# Patient Record
Sex: MALE | Race: WHITE | Employment: OTHER | URBAN - METROPOLITAN AREA
[De-identification: names, ages, dates, MRNs, and addresses within clinical notes are randomized per-mention and may not be internally consistent; named-entity substitution may affect disease eponyms.]

---

## 2017-02-04 PROBLEM — I38 ENDOCARDITIS: Status: ACTIVE | Noted: 2017-01-28

## 2017-02-04 PROBLEM — I33.0 MYCOTIC ANEURYSM DUE TO BACTERIAL ENDOCARDITIS: Status: ACTIVE | Noted: 2017-01-28

## 2017-02-04 PROBLEM — R49.0 DYSPHONIA: Status: ACTIVE | Noted: 2017-02-01

## 2017-02-04 PROBLEM — E11.9 TYPE 2 DIABETES MELLITUS (HCC): Status: ACTIVE | Noted: 2017-01-29

## 2017-02-04 PROBLEM — I61.9 BRAIN BLEED (HCC): Status: ACTIVE | Noted: 2017-01-25

## 2017-02-04 PROBLEM — A49.1 BACTERIAL INFECTION DUE TO STREPTOCOCCUS: Status: ACTIVE | Noted: 2017-02-02

## 2017-02-04 PROBLEM — E46 PROTEIN CALORIE MALNUTRITION (HCC): Status: ACTIVE | Noted: 2017-02-04

## 2017-02-04 PROBLEM — R13.10 DYSPHAGIA: Status: ACTIVE | Noted: 2017-02-01

## 2017-02-04 PROBLEM — I38 ENDOCARDITIS, VALVE: Status: ACTIVE | Noted: 2017-01-28

## 2019-08-14 ENCOUNTER — ANESTHESIA (OUTPATIENT)
Dept: ENDOSCOPY | Age: 59
End: 2019-08-14
Payer: MEDICARE

## 2019-08-14 ENCOUNTER — HOSPITAL ENCOUNTER (OUTPATIENT)
Age: 59
Setting detail: OUTPATIENT SURGERY
Discharge: HOME | End: 2019-08-14
Attending: INTERNAL MEDICINE | Admitting: INTERNAL MEDICINE
Payer: MEDICARE

## 2019-08-14 ENCOUNTER — ANESTHESIA EVENT (OUTPATIENT)
Dept: ENDOSCOPY | Age: 59
End: 2019-08-14
Payer: MEDICARE

## 2019-08-14 VITALS
BODY MASS INDEX: 21.67 KG/M2 | OXYGEN SATURATION: 99 % | HEART RATE: 78 BPM | DIASTOLIC BLOOD PRESSURE: 73 MMHG | RESPIRATION RATE: 18 BRPM | SYSTOLIC BLOOD PRESSURE: 116 MMHG | TEMPERATURE: 97.9 F | WEIGHT: 160 LBS | HEIGHT: 72 IN

## 2019-08-14 VITALS
DIASTOLIC BLOOD PRESSURE: 72 MMHG | OXYGEN SATURATION: 99 % | SYSTOLIC BLOOD PRESSURE: 110 MMHG | RESPIRATION RATE: 19 BRPM

## 2019-08-14 PROCEDURE — 3609012400 HC EGD TRANSORAL BIOPSY SINGLE/MULTIPLE: Performed by: INTERNAL MEDICINE

## 2019-08-14 PROCEDURE — 88305 TISSUE EXAM BY PATHOLOGIST: CPT

## 2019-08-14 PROCEDURE — 7100000010 HC PHASE II RECOVERY - FIRST 15 MIN: Performed by: INTERNAL MEDICINE

## 2019-08-14 PROCEDURE — 3609009500 HC COLONOSCOPY DIAGNOSTIC OR SCREENING: Performed by: INTERNAL MEDICINE

## 2019-08-14 PROCEDURE — 2580000003 HC RX 258: Performed by: NURSE ANESTHETIST, CERTIFIED REGISTERED

## 2019-08-14 PROCEDURE — 6370000000 HC RX 637 (ALT 250 FOR IP): Performed by: INTERNAL MEDICINE

## 2019-08-14 PROCEDURE — 3700000001 HC ADD 15 MINUTES (ANESTHESIA): Performed by: INTERNAL MEDICINE

## 2019-08-14 PROCEDURE — 3700000000 HC ANESTHESIA ATTENDED CARE: Performed by: INTERNAL MEDICINE

## 2019-08-14 PROCEDURE — 6360000002 HC RX W HCPCS: Performed by: INTERNAL MEDICINE

## 2019-08-14 PROCEDURE — 2709999900 HC NON-CHARGEABLE SUPPLY: Performed by: INTERNAL MEDICINE

## 2019-08-14 PROCEDURE — 2500000003 HC RX 250 WO HCPCS: Performed by: INTERNAL MEDICINE

## 2019-08-14 PROCEDURE — 2580000003 HC RX 258: Performed by: INTERNAL MEDICINE

## 2019-08-14 PROCEDURE — 7100000011 HC PHASE II RECOVERY - ADDTL 15 MIN: Performed by: INTERNAL MEDICINE

## 2019-08-14 RX ORDER — OLANZAPINE 2.5 MG/1
2.5 TABLET ORAL NIGHTLY
Status: ON HOLD | COMMUNITY
End: 2022-01-13

## 2019-08-14 RX ORDER — METHYLPREDNISOLONE 4 MG/1
2 TABLET ORAL DAILY
COMMUNITY
End: 2020-07-15 | Stop reason: ALTCHOICE

## 2019-08-14 RX ORDER — AMPICILLIN AND SULBACTAM 2; 1 G/1; G/1
3 INJECTION, POWDER, FOR SOLUTION INTRAMUSCULAR; INTRAVENOUS EVERY 6 HOURS
Status: DISCONTINUED | OUTPATIENT
Start: 2019-08-14 | End: 2019-08-14

## 2019-08-14 RX ORDER — CITALOPRAM 20 MG/1
20 TABLET ORAL DAILY
Status: ON HOLD | COMMUNITY
End: 2022-01-13

## 2019-08-14 RX ORDER — CALCIUM CARBONATE 200(500)MG
1 TABLET,CHEWABLE ORAL 2 TIMES DAILY
COMMUNITY

## 2019-08-14 RX ORDER — POTASSIUM CHLORIDE 20 MEQ/1
20 TABLET, EXTENDED RELEASE ORAL 2 TIMES DAILY
COMMUNITY
End: 2020-07-15 | Stop reason: ALTCHOICE

## 2019-08-14 RX ORDER — AMPICILLIN AND SULBACTAM 2; 1 G/1; G/1
3 INJECTION, POWDER, FOR SOLUTION INTRAMUSCULAR; INTRAVENOUS EVERY 6 HOURS
Status: DISCONTINUED | OUTPATIENT
Start: 2019-08-14 | End: 2019-08-14 | Stop reason: SDUPTHER

## 2019-08-14 RX ORDER — LEVETIRACETAM 500 MG/1
500 TABLET ORAL
Status: ON HOLD | COMMUNITY
End: 2020-07-18 | Stop reason: HOSPADM

## 2019-08-14 RX ORDER — LORAZEPAM 2 MG/ML
2 INJECTION INTRAMUSCULAR PRN
COMMUNITY
End: 2020-07-15 | Stop reason: ALTCHOICE

## 2019-08-14 RX ORDER — LACOSAMIDE 200 MG/1
200 TABLET ORAL 2 TIMES DAILY
COMMUNITY

## 2019-08-14 RX ORDER — METHADONE HYDROCHLORIDE 5 MG/1
5 TABLET ORAL DAILY
COMMUNITY

## 2019-08-14 RX ORDER — LEVETIRACETAM 750 MG/1
1500 TABLET ORAL 2 TIMES DAILY
COMMUNITY
Start: 2018-07-02 | End: 2019-08-19

## 2019-08-14 RX ORDER — SODIUM CHLORIDE, SODIUM LACTATE, POTASSIUM CHLORIDE, CALCIUM CHLORIDE 600; 310; 30; 20 MG/100ML; MG/100ML; MG/100ML; MG/100ML
INJECTION, SOLUTION INTRAVENOUS CONTINUOUS PRN
Status: DISCONTINUED | OUTPATIENT
Start: 2019-08-14 | End: 2019-08-14 | Stop reason: SDUPTHER

## 2019-08-14 RX ADMIN — SODIUM CHLORIDE, SODIUM LACTATE, POTASSIUM CHLORIDE, AND CALCIUM CHLORIDE: 600; 310; 30; 20 INJECTION, SOLUTION INTRAVENOUS at 09:26

## 2019-08-14 RX ADMIN — AMPICILLIN SODIUM AND SULBACTAM SODIUM 3 G: 2; 1 INJECTION, POWDER, FOR SOLUTION INTRAMUSCULAR; INTRAVENOUS at 11:39

## 2019-08-14 ASSESSMENT — PAIN SCALES - GENERAL
PAINLEVEL_OUTOF10: 0

## 2019-08-14 ASSESSMENT — PULMONARY FUNCTION TESTS
PIF_VALUE: 0

## 2019-08-14 ASSESSMENT — PAIN SCALES - WONG BAKER
WONGBAKER_NUMERICALRESPONSE: 0

## 2019-08-14 ASSESSMENT — PAIN - FUNCTIONAL ASSESSMENT
PAIN_FUNCTIONAL_ASSESSMENT: ACTIVITIES ARE NOT PREVENTED
PAIN_FUNCTIONAL_ASSESSMENT: 0-10

## 2019-08-14 ASSESSMENT — PAIN DESCRIPTION - DESCRIPTORS: DESCRIPTORS: ACHING;BURNING;STABBING

## 2019-08-14 NOTE — ANESTHESIA PRE PROCEDURE
Department of Anesthesiology  Preprocedure Note       Name:  Yessi Santos   Age:  62 y.o.  :  1960                                          MRN:  [id number]         Date:  2019      Surgeon: Severiano Limes):  Shun Flor MD    Procedure: ESOPHAGOGASTRODUODENOSCOPY (N/A )  COLONOSCOPY WITH MAC ANESTHESIA (N/A )    Medications prior to admission:   Prior to Admission medications    Medication Sig Start Date End Date Taking? Authorizing Provider   levETIRAcetam (KEPPRA) 750 MG tablet Take 1,500 mg by mouth 2 times daily 18  Yes Historical Provider, MD   levETIRAcetam (KEPPRA) 500 MG tablet Take 500 mg by mouth AT [address] [place] [address]   Yes Historical Provider, MD   citalopram (CELEXA) 20 MG tablet Take 20 mg by mouth daily   Yes Historical Provider, MD   Cholecalciferol (VITAMIN D3) 5000 units TABS Take by mouth daily   Yes Historical Provider, MD   OLANZapine (ZYPREXA) 2.5 MG tablet Take 2.5 mg by mouth nightly   Yes Historical Provider, MD   methylPREDNISolone (MEDROL) 4 MG tablet Take 2 mg by mouth daily   Yes Historical Provider, MD   lacosamide (VIMPAT) 200 MG tablet Take 200 mg by mouth 2 times daily. Yes Historical Provider, MD   calcium carbonate (TUMS) 500 MG chewable tablet Take 1 tablet by mouth daily   Yes Historical Provider, MD   methadone (DOLOPHINE) 5 MG tablet Take 5 mg by mouth 2 times daily. Yes Historical Provider, MD   potassium chloride (KLOR-CON M) 20 MEQ extended release tablet Take 20 mEq by mouth 2 times daily   Yes Historical Provider, MD   methylPREDNISolone (MEDROL) 4 MG tablet Take 2 mg by mouth daily   Yes Historical Provider, MD   atorvastatin (LIPITOR) 10 MG tablet Take 10 mg by mouth 2/3/17  Yes Historical Provider, MD   LORazepam (ATIVAN) 2 MG/ML injection Infuse 2 mg intravenously as needed.  AS NEEDED FOR SEIZURE    Historical Provider, MD   Dimethicone-Zinc Oxide 2-26 % OINT Apply 1 applicator topically 2 times daily 2/3/17   Historical Provider, MD       Current medications: [address]    Anesthesia Evaluation  Patient summary reviewed and Nursing notes reviewed  Airway: Mallampati: II  TM distance: >3 FB   Neck ROM: limited  Mouth opening: > = 3 FB Dental: normal exam         Pulmonary:Negative Pulmonary ROS and normal exam  breath sounds clear to auscultation                             Cardiovascular:Negative CV ROS            Rhythm: regular  Rate: normal                    Neuro/Psych:   Negative Neuro/Psych ROS              GI/Hepatic/Renal: Neg GI/Hepatic/Renal ROS            Endo/Other: Negative Endo/Other ROS                    Abdominal:           Vascular: negative vascular ROS. Anesthesia Plan      MAC     ASA 3       Induction: intravenous. MIPS: Postoperative opioids intended and Prophylactic antiemetics administered. Anesthetic plan and risks discussed with patient.         Attending anesthesiologist reviewed and agrees with Mp Shankar MD   8/14/2019

## 2019-08-14 NOTE — ANESTHESIA POSTPROCEDURE EVALUATION
Department of Anesthesiology  Postprocedure Note    Patient: Jackeline Mireles  MRN: [id number]  YOB: 1960  Date of evaluation: 8/14/2019  Time:  2:23 PM     Procedure Summary     Date:  08/14/19 Room / Location:  [facility] ENDO 01 / [facility] ENDOSCOPY    Anesthesia Start:  5378 Anesthesia Stop:  1025    Procedures:       EGD BIOPSY (N/A )      COLONOSCOPY WITH MAC ANESTHESIA (N/A ) Diagnosis:  (ANEMIA D64.9)    Surgeon:  Aide Coto MD Responsible Provider:  Lynn French MD    Anesthesia Type:  MAC ASA Status:  3          Anesthesia Type: No value filed. Eriberto Phase I: Eriberto Score: 10    Eriberto Phase II: Eriberto Score: 10    Last vitals: Reviewed and per EMR flowsheets.        Anesthesia Post Evaluation    Patient location during evaluation: bedside  Patient participation: complete - patient participated  Level of consciousness: awake and alert  Airway patency: patent  Nausea & Vomiting: no nausea and no vomiting  Complications: no  Cardiovascular status: blood pressure returned to baseline  Respiratory status: acceptable  Hydration status: stable

## 2019-08-19 ENCOUNTER — APPOINTMENT (OUTPATIENT)
Dept: CT IMAGING | Age: 59
End: 2019-08-19
Payer: MEDICARE

## 2019-08-19 ENCOUNTER — HOSPITAL ENCOUNTER (EMERGENCY)
Age: 59
Discharge: NURSING, REHAB OR LONG-TERM CARE | End: 2019-08-20
Attending: EMERGENCY MEDICINE
Payer: MEDICARE

## 2019-08-19 VITALS
OXYGEN SATURATION: 98 % | HEIGHT: 73 IN | RESPIRATION RATE: 16 BRPM | WEIGHT: 180 LBS | TEMPERATURE: 98.1 F | BODY MASS INDEX: 23.86 KG/M2 | SYSTOLIC BLOOD PRESSURE: 138 MMHG | HEART RATE: 77 BPM | DIASTOLIC BLOOD PRESSURE: 75 MMHG

## 2019-08-19 DIAGNOSIS — Z04.9 OBSERVATION OR EVALUATION FOR SUSPECTED CONDITION: Primary | ICD-10-CM

## 2019-08-19 LAB
A/G RATIO: 1.7 (ref 1.1–2.2)
ALBUMIN SERPL-MCNC: 4.1 G/DL (ref 3.4–5)
ALP BLD-CCNC: 35 U/L (ref 40–129)
ALT SERPL-CCNC: 24 U/L (ref 10–40)
AMPHETAMINE SCREEN, URINE: ABNORMAL
ANION GAP SERPL CALCULATED.3IONS-SCNC: 8 MMOL/L (ref 3–16)
AST SERPL-CCNC: 20 U/L (ref 15–37)
BARBITURATE SCREEN URINE: ABNORMAL
BASOPHILS ABSOLUTE: 0 K/UL (ref 0–0.2)
BASOPHILS RELATIVE PERCENT: 0.6 %
BENZODIAZEPINE SCREEN, URINE: ABNORMAL
BILIRUB SERPL-MCNC: 0.4 MG/DL (ref 0–1)
BILIRUBIN URINE: NEGATIVE
BLOOD, URINE: NEGATIVE
BUN BLDV-MCNC: 17 MG/DL (ref 7–20)
CALCIUM SERPL-MCNC: 9.4 MG/DL (ref 8.3–10.6)
CANNABINOID SCREEN URINE: ABNORMAL
CHLORIDE BLD-SCNC: 103 MMOL/L (ref 99–110)
CLARITY: CLEAR
CO2: 28 MMOL/L (ref 21–32)
COCAINE METABOLITE SCREEN URINE: ABNORMAL
COLOR: YELLOW
CREAT SERPL-MCNC: 0.6 MG/DL (ref 0.9–1.3)
EOSINOPHILS ABSOLUTE: 0 K/UL (ref 0–0.6)
EOSINOPHILS RELATIVE PERCENT: 0.2 %
ETHANOL: NORMAL MG/DL (ref 0–0.08)
GFR AFRICAN AMERICAN: >60
GFR NON-AFRICAN AMERICAN: >60
GLOBULIN: 2.4 G/DL
GLUCOSE BLD-MCNC: 147 MG/DL (ref 70–99)
GLUCOSE URINE: NEGATIVE MG/DL
HCT VFR BLD CALC: 33 % (ref 40.5–52.5)
HEMOGLOBIN: 10.5 G/DL (ref 13.5–17.5)
KETONES, URINE: NEGATIVE MG/DL
LEUKOCYTE ESTERASE, URINE: NEGATIVE
LYMPHOCYTES ABSOLUTE: 1.1 K/UL (ref 1–5.1)
LYMPHOCYTES RELATIVE PERCENT: 19 %
Lab: ABNORMAL
MCH RBC QN AUTO: 28.1 PG (ref 26–34)
MCHC RBC AUTO-ENTMCNC: 32 G/DL (ref 31–36)
MCV RBC AUTO: 87.9 FL (ref 80–100)
METHADONE SCREEN, URINE: POSITIVE
MICROSCOPIC EXAMINATION: NORMAL
MONOCYTES ABSOLUTE: 0.4 K/UL (ref 0–1.3)
MONOCYTES RELATIVE PERCENT: 6.1 %
NEUTROPHILS ABSOLUTE: 4.4 K/UL (ref 1.7–7.7)
NEUTROPHILS RELATIVE PERCENT: 74.1 %
NITRITE, URINE: NEGATIVE
OPIATE SCREEN URINE: ABNORMAL
OXYCODONE URINE: ABNORMAL
PDW BLD-RTO: 18.8 % (ref 12.4–15.4)
PH UA: 7
PH UA: 7 (ref 5–8)
PHENCYCLIDINE SCREEN URINE: ABNORMAL
PLATELET # BLD: 140 K/UL (ref 135–450)
PMV BLD AUTO: 8.5 FL (ref 5–10.5)
POTASSIUM REFLEX MAGNESIUM: 4.3 MMOL/L (ref 3.5–5.1)
PROPOXYPHENE SCREEN: ABNORMAL
PROTEIN UA: NEGATIVE MG/DL
RBC # BLD: 3.76 M/UL (ref 4.2–5.9)
SALICYLATE, SERUM: <0.3 MG/DL (ref 15–30)
SODIUM BLD-SCNC: 139 MMOL/L (ref 136–145)
SPECIFIC GRAVITY UA: <=1.005 (ref 1–1.03)
TOTAL PROTEIN: 6.5 G/DL (ref 6.4–8.2)
TROPONIN: <0.01 NG/ML
URINE REFLEX TO CULTURE: NORMAL
URINE TYPE: NORMAL
UROBILINOGEN, URINE: 0.2 E.U./DL
WBC # BLD: 6 K/UL (ref 4–11)

## 2019-08-19 PROCEDURE — 70450 CT HEAD/BRAIN W/O DYE: CPT

## 2019-08-19 PROCEDURE — 6370000000 HC RX 637 (ALT 250 FOR IP): Performed by: PHYSICIAN ASSISTANT

## 2019-08-19 PROCEDURE — 99285 EMERGENCY DEPT VISIT HI MDM: CPT

## 2019-08-19 PROCEDURE — 93005 ELECTROCARDIOGRAM TRACING: CPT | Performed by: PHYSICIAN ASSISTANT

## 2019-08-19 PROCEDURE — 80053 COMPREHEN METABOLIC PANEL: CPT

## 2019-08-19 PROCEDURE — 81003 URINALYSIS AUTO W/O SCOPE: CPT

## 2019-08-19 PROCEDURE — G0480 DRUG TEST DEF 1-7 CLASSES: HCPCS

## 2019-08-19 PROCEDURE — 80307 DRUG TEST PRSMV CHEM ANLYZR: CPT

## 2019-08-19 PROCEDURE — 85025 COMPLETE CBC W/AUTO DIFF WBC: CPT

## 2019-08-19 PROCEDURE — 84484 ASSAY OF TROPONIN QUANT: CPT

## 2019-08-19 RX ORDER — IBUPROFEN 600 MG/1
600 TABLET ORAL ONCE
Status: COMPLETED | OUTPATIENT
Start: 2019-08-19 | End: 2019-08-19

## 2019-08-19 RX ADMIN — IBUPROFEN 600 MG: 600 TABLET, FILM COATED ORAL at 20:55

## 2019-08-19 ASSESSMENT — PAIN SCALES - WONG BAKER: WONGBAKER_NUMERICALRESPONSE: 0

## 2019-08-19 ASSESSMENT — PAIN DESCRIPTION - LOCATION
LOCATION: HEAD
LOCATION: BACK

## 2019-08-19 ASSESSMENT — PAIN SCALES - GENERAL
PAINLEVEL_OUTOF10: 7
PAINLEVEL_OUTOF10: 7
PAINLEVEL_OUTOF10: 8

## 2019-08-19 ASSESSMENT — PAIN DESCRIPTION - DESCRIPTORS: DESCRIPTORS: ACHING;HEADACHE

## 2019-08-19 ASSESSMENT — PAIN DESCRIPTION - PAIN TYPE
TYPE: ACUTE PAIN
TYPE: CHRONIC PAIN

## 2019-08-19 ASSESSMENT — ENCOUNTER SYMPTOMS
GASTROINTESTINAL NEGATIVE: 1
RESPIRATORY NEGATIVE: 1

## 2019-08-19 ASSESSMENT — PAIN DESCRIPTION - FREQUENCY: FREQUENCY: CONTINUOUS

## 2019-08-19 NOTE — ED PROVIDER NOTES
Phone [phone]   COMPREHENSIVE METABOLIC PANEL W/ REFLEX TO MG FOR LOW K - Abnormal; Notable for the following components:    Glucose 147 (*)     CREATININE 0.6 (*)     Alkaline Phosphatase 35 (*)     All other components within normal limits    Narrative:     Performed at:  [facility],  [place], [facility]   Phone [phone]   SALICYLATE LEVEL - Abnormal; Notable for the following components:    Salicylate, Serum <1.5 (*)     All other components within normal limits    Narrative:     Performed at:  [facility]) - [facility],  [place], [facility]   Phone [phone]   [address] - Abnormal; Notable for the following components:    Methadone Screen, Urine POSITIVE (*)     All other components within normal limits    Narrative:     Performed at:  [facility],  [place], [facility]   Phone [phone]   ETHANOL    Narrative:     Performed at:  [facility],  [place], [facility]   Phone [phone]   URINE RT REFLEX TO CULTURE    Narrative:     Performed at:  [facility],  [place], [facility]   Phone [phone]   TROPONIN    Narrative:     Performed at:  [facility]) [facility],  [place], [facility] [employer] [facility]   Phone [phone]       All other labs were within normal range or not returned as of this dictation. EKG: All EKG's are interpreted by the Emergency Department Physician in the absence of a cardiologist.  Please see their note for interpretation of EKG.       RADIOLOGY:   Non-plain film images such as CT, Ultrasound and MRI are read by the radiologist. Plain radiographic images are visualized andpreliminarily interpreted by the  ED Provider with the below findings:        Interpretation perthe Radiologist below,

## 2019-08-19 NOTE — ED NOTES
Bed: 09  Expected date:   Expected time:   Means of arrival:   Comments:  Hold for Felix Martell RN  08/19/19 8762

## 2019-08-20 LAB
EKG ATRIAL RATE: 83 BPM
EKG DIAGNOSIS: NORMAL
EKG P AXIS: 48 DEGREES
EKG P-R INTERVAL: 176 MS
EKG Q-T INTERVAL: 372 MS
EKG QRS DURATION: 112 MS
EKG QTC CALCULATION (BAZETT): 437 MS
EKG R AXIS: -42 DEGREES
EKG T AXIS: 59 DEGREES
EKG VENTRICULAR RATE: 83 BPM

## 2019-08-20 PROCEDURE — 93010 ELECTROCARDIOGRAM REPORT: CPT | Performed by: INTERNAL MEDICINE

## 2019-08-20 NOTE — ED NOTES
Collateral Contact:  Name: Sabas Westfall  Relation to Patient: RN at pt's F  Last Contact with Patient: has seen pt in the building, but has not directly cared for him in a few months. Concerns:     Sabas Westfall stated she typically works on a different unit, so she does not regularly care for pt. Pt was initially on her unit when he first came to the facility, so she cared for him regularly then. Pt was moved to a different unit a few months ago, and tonight is the first time she has cared for him since he was moved. She does work in the same building and gets to see pt's behavior and interactions. She is able to describe what she has seen, but much of the specific information she provided was from pt's chart. Pt stated pt has been having behavioral issues over the past month or so. She stated he has been running out of the building and refusing to come back in, breaking dishes and other things, and physically attempt to take things from others when they don't give him what he wants. He has not been attacking others, but he does become physically aggressive when trying to get things from others. He has not harmed anyone. When he leaves the building, he will refuse to return until he is given beer or cigarettes. Initially, they were able to redirect him back into the building, but this has been getting harder to do. At the end of July, pt refused to return, and the police were called to help the staff get him back inside. After that incident (7/27/19), they were given an order for Zyprexa 5 mg three times a day PRN for pt's agitation and behavioral problems. According to the documentation in pt's chart, he has only received this medication once. There is no record of it being offered other times and pt refusing. She reports pt was sent to the ED today, because he eloped from the building again, and they could not redirect him and get him to return.  He also became agitated prior to eloping and was breaking dishes and

## 2020-02-22 ENCOUNTER — HOSPITAL ENCOUNTER (EMERGENCY)
Age: 60
Discharge: HOME | End: 2020-02-23
Attending: EMERGENCY MEDICINE
Payer: MEDICARE

## 2020-02-22 LAB
ANION GAP SERPL CALCULATED.3IONS-SCNC: 8 MMOL/L (ref 3–16)
BASOPHILS ABSOLUTE: 0 K/UL (ref 0–0.2)
BASOPHILS RELATIVE PERCENT: 0.2 %
BUN BLDV-MCNC: 11 MG/DL (ref 7–20)
CALCIUM SERPL-MCNC: 9.1 MG/DL (ref 8.3–10.6)
CHLORIDE BLD-SCNC: 102 MMOL/L (ref 99–110)
CO2: 29 MMOL/L (ref 21–32)
CREAT SERPL-MCNC: 0.7 MG/DL (ref 0.9–1.3)
EOSINOPHILS ABSOLUTE: 0 K/UL (ref 0–0.6)
EOSINOPHILS RELATIVE PERCENT: 0.4 %
GFR AFRICAN AMERICAN: >60
GFR NON-AFRICAN AMERICAN: >60
GLUCOSE BLD-MCNC: 108 MG/DL (ref 70–99)
HCT VFR BLD CALC: 39.2 % (ref 40.5–52.5)
HEMOGLOBIN: 13.4 G/DL (ref 13.5–17.5)
LYMPHOCYTES ABSOLUTE: 0.5 K/UL (ref 1–5.1)
LYMPHOCYTES RELATIVE PERCENT: 9.6 %
MCH RBC QN AUTO: 33.2 PG (ref 26–34)
MCHC RBC AUTO-ENTMCNC: 34.2 G/DL (ref 31–36)
MCV RBC AUTO: 97.2 FL (ref 80–100)
MONOCYTES ABSOLUTE: 0.3 K/UL (ref 0–1.3)
MONOCYTES RELATIVE PERCENT: 5.5 %
NEUTROPHILS ABSOLUTE: 4.4 K/UL (ref 1.7–7.7)
NEUTROPHILS RELATIVE PERCENT: 84.3 %
PDW BLD-RTO: 13.2 % (ref 12.4–15.4)
PLATELET # BLD: 133 K/UL (ref 135–450)
PMV BLD AUTO: 8.6 FL (ref 5–10.5)
POTASSIUM SERPL-SCNC: 5.2 MMOL/L (ref 3.5–5.1)
RBC # BLD: 4.03 M/UL (ref 4.2–5.9)
SODIUM BLD-SCNC: 139 MMOL/L (ref 136–145)
WBC # BLD: 5.2 K/UL (ref 4–11)

## 2020-02-22 PROCEDURE — 93005 ELECTROCARDIOGRAM TRACING: CPT | Performed by: EMERGENCY MEDICINE

## 2020-02-22 PROCEDURE — 80177 DRUG SCRN QUAN LEVETIRACETAM: CPT

## 2020-02-22 PROCEDURE — 85025 COMPLETE CBC W/AUTO DIFF WBC: CPT

## 2020-02-22 PROCEDURE — 99284 EMERGENCY DEPT VISIT MOD MDM: CPT

## 2020-02-22 PROCEDURE — 80048 BASIC METABOLIC PNL TOTAL CA: CPT

## 2020-02-22 RX ORDER — LORAZEPAM 2 MG/ML
1 INJECTION INTRAMUSCULAR PRN
Status: DISCONTINUED | OUTPATIENT
Start: 2020-02-22 | End: 2020-02-23 | Stop reason: HOSPADM

## 2020-02-22 RX ORDER — LEVETIRACETAM 5 MG/ML
500 INJECTION INTRAVASCULAR ONCE
Status: COMPLETED | OUTPATIENT
Start: 2020-02-23 | End: 2020-02-23

## 2020-02-23 VITALS
HEART RATE: 88 BPM | RESPIRATION RATE: 18 BRPM | DIASTOLIC BLOOD PRESSURE: 60 MMHG | WEIGHT: 210 LBS | BODY MASS INDEX: 27.71 KG/M2 | SYSTOLIC BLOOD PRESSURE: 105 MMHG | TEMPERATURE: 98.7 F | OXYGEN SATURATION: 96 %

## 2020-02-23 LAB
EKG ATRIAL RATE: 104 BPM
EKG DIAGNOSIS: NORMAL
EKG P AXIS: 61 DEGREES
EKG P-R INTERVAL: 204 MS
EKG Q-T INTERVAL: 354 MS
EKG QRS DURATION: 108 MS
EKG QTC CALCULATION (BAZETT): 465 MS
EKG R AXIS: -29 DEGREES
EKG T AXIS: 41 DEGREES
EKG VENTRICULAR RATE: 104 BPM
KEPPRA DOSE AMT: ABNORMAL
KEPPRA: <2 UG/ML (ref 6–46)

## 2020-02-23 PROCEDURE — 93010 ELECTROCARDIOGRAM REPORT: CPT | Performed by: INTERNAL MEDICINE

## 2020-02-23 PROCEDURE — 96365 THER/PROPH/DIAG IV INF INIT: CPT

## 2020-02-23 PROCEDURE — 6360000002 HC RX W HCPCS: Performed by: EMERGENCY MEDICINE

## 2020-02-23 RX ADMIN — LEVETIRACETAM 500 MG: 5 INJECTION INTRAVENOUS at 00:04

## 2020-02-23 NOTE — ED PROVIDER NOTES
[address] [facility]        Pt Name: Jeramy Salinas  MRN: [id number]  [place] 1960  Date of evaluation: 2/22/2020  Provider: Stephany Lyles PA-C  PCP: Debbie Lee MD    This patient was seen and evaluated by the attending physician Dr. Chandler Sen. CHIEF COMPLAINT       Chief Complaint   Patient presents with    Seizures     pt. from [facility] [place] and brought in by [facility]. pt. had a sz at [facility] [place] and was given ativan at [facility] [place] and squad unsure of amount. HISTORY OF PRESENT ILLNESS   (Location, Timing/Onset, Context/Setting, Quality, Duration, Modifying Factors, Severity, Associated Signs and Symptoms)  Note limiting factors. Jeramy Salinas is a 61 y.o. male presents for evaluation of reported seizure-like activity by ECF. He has known seizures and takes Keppra and Vimpat. ECF had given him Ativan prior to arrival.  Patient remains postictal, confused, poor historian. No additional information is able to obtained at this time. Nursing Notes were all reviewed and agreed with or any disagreements were addressed in the HPI. REVIEW OF SYSTEMS    (2-9 systems for level 4, 10 or more for level 5)     Review of Systems   Unable to perform ROS: Other   Neurological: Positive for seizures. Positives and Pertinent negatives as per HPI. Except as noted above in the ROS, all other systems were reviewed and negative.        PAST MEDICAL HISTORY     Past Medical History:   Diagnosis Date    Acute intracerebral hemorrhage ([address] 02/03/2017    Anxiety     Back pain     Cerebral artery occlusion with cerebral infarction (HCC)     Chronic back pain greater than 3 months duration 02/03/2017    Diabetes mellitus ([address]     Dysphasia 02/03/2017    Dysphonia 02/03/2017    Endocarditis and heart valve disorders in diseases classified elsewhere 02/03/2017    Hydrocephalus ([address] 02/03/2017    Hypertension     IVH (intraventricular such as CT, Ultrasound and MRI are read by the radiologist. Plain radiographic images are visualized and preliminarily interpreted by the ED Provider with the below findings:        Interpretation per the Radiologist below, if available at the time of this note:    No orders to display     No results found. PROCEDURES   Unless otherwise noted below, none     Procedures    CRITICAL CARE TIME   N/A    CONSULTS:  None      EMERGENCY DEPARTMENT COURSE and DIFFERENTIAL DIAGNOSIS/MDM:   Vitals:    Vitals:    02/22/20 2201 02/22/20 2300   BP: (!) 122/52    Pulse: 52    Resp: 18    Temp:  98.7 °F (37.1 °C)   SpO2: 97%    Weight: 210 lb (95.3 kg)        Patient was given the following medications:  Medications   LORazepam (ATIVAN) injection 1 mg (has no administration in time range)       Patient presents for evaluation of breakthrough seizure. On exam, he is resting comfortably in bed no acute distress and nontoxic. Vitals are stable and he is afebrile. He is confused but alert, presumably postictal.  No focal neurologic deficits appreciated. Lungs are clear to auscultation bilaterally. Chest is nontender and abdomen is benign. Seizure precautions were initiated. PRN order for Ativan. Please see attending note for EKG interpretation. Basic labs and Keppra level are pending at time of shift change. Please see attending note for additional information regarding these results and patient disposition. FINAL IMPRESSION      1. Breakthrough seizure ([address]          DISPOSITION/PLAN   DISPOSITION        PATIENT REFERREDTO:  No follow-up provider specified.     DISCHARGE MEDICATIONS:  New Prescriptions    No medications on file       DISCONTINUED MEDICATIONS:  Discontinued Medications    No medications on file              (Please note that portions of this note were completed with a voice recognition program.  Efforts were made to edit the dictations but occasionally words are mis-transcribed.)    Basiloi CUNNINGHAM Chantel Rondon (electronically signed)    ]     Gilberto Perdomo PA-C  02/22/20 8356

## 2020-07-15 ENCOUNTER — APPOINTMENT (OUTPATIENT)
Dept: CT IMAGING | Age: 60
DRG: 101 | End: 2020-07-15
Payer: COMMERCIAL

## 2020-07-15 ENCOUNTER — APPOINTMENT (OUTPATIENT)
Dept: GENERAL RADIOLOGY | Age: 60
DRG: 101 | End: 2020-07-15
Payer: COMMERCIAL

## 2020-07-15 ENCOUNTER — HOSPITAL ENCOUNTER (INPATIENT)
Age: 60
LOS: 3 days | Discharge: HOME | DRG: 101 | End: 2020-07-18
Attending: EMERGENCY MEDICINE | Admitting: INTERNAL MEDICINE
Payer: COMMERCIAL

## 2020-07-15 PROBLEM — R56.9 SEIZURE (HCC): Status: ACTIVE | Noted: 2020-07-15

## 2020-07-15 LAB
A/G RATIO: 1.8 (ref 1.1–2.2)
ALBUMIN SERPL-MCNC: 4.5 G/DL (ref 3.4–5)
ALP BLD-CCNC: 56 U/L (ref 40–129)
ALT SERPL-CCNC: 16 U/L (ref 10–40)
ANION GAP SERPL CALCULATED.3IONS-SCNC: 24 MMOL/L (ref 3–16)
AST SERPL-CCNC: 25 U/L (ref 15–37)
BASOPHILS ABSOLUTE: 0.1 K/UL (ref 0–0.2)
BASOPHILS RELATIVE PERCENT: 0.6 %
BILIRUB SERPL-MCNC: 0.4 MG/DL (ref 0–1)
BILIRUBIN URINE: NEGATIVE
BLOOD, URINE: ABNORMAL
BUN BLDV-MCNC: 17 MG/DL (ref 7–20)
CALCIUM SERPL-MCNC: 10.4 MG/DL (ref 8.3–10.6)
CHLORIDE BLD-SCNC: 104 MMOL/L (ref 99–110)
CLARITY: ABNORMAL
CO2: 15 MMOL/L (ref 21–32)
COLOR: YELLOW
COMMENT UA: ABNORMAL
CREAT SERPL-MCNC: 1 MG/DL (ref 0.9–1.3)
EKG ATRIAL RATE: 85 BPM
EKG DIAGNOSIS: NORMAL
EKG P AXIS: 62 DEGREES
EKG P-R INTERVAL: 182 MS
EKG Q-T INTERVAL: 338 MS
EKG QRS DURATION: 116 MS
EKG QTC CALCULATION (BAZETT): 402 MS
EKG R AXIS: -41 DEGREES
EKG T AXIS: 56 DEGREES
EKG VENTRICULAR RATE: 85 BPM
EOSINOPHILS ABSOLUTE: 0.3 K/UL (ref 0–0.6)
EOSINOPHILS RELATIVE PERCENT: 2.7 %
EPITHELIAL CELLS, UA: 0 /HPF (ref 0–5)
GFR AFRICAN AMERICAN: >60
GFR NON-AFRICAN AMERICAN: >60
GLOBULIN: 2.5 G/DL
GLUCOSE BLD-MCNC: 179 MG/DL (ref 70–99)
GLUCOSE URINE: NEGATIVE MG/DL
HCT VFR BLD CALC: 43.3 % (ref 40.5–52.5)
HEMOGLOBIN: 14.1 G/DL (ref 13.5–17.5)
HYALINE CASTS: 4 /LPF (ref 0–8)
KEPPRA DOSE AMT: NORMAL
KEPPRA: 26.8 UG/ML (ref 6–46)
KETONES, URINE: NEGATIVE MG/DL
LACTIC ACID, SEPSIS: 19.5 MMOL/L (ref 0.4–1.9)
LACTIC ACID, SEPSIS: 7 MMOL/L (ref 0.4–1.9)
LEUKOCYTE ESTERASE, URINE: ABNORMAL
LYMPHOCYTES ABSOLUTE: 4.6 K/UL (ref 1–5.1)
LYMPHOCYTES RELATIVE PERCENT: 48.3 %
MCH RBC QN AUTO: 34.4 PG (ref 26–34)
MCHC RBC AUTO-ENTMCNC: 32.4 G/DL (ref 31–36)
MCV RBC AUTO: 106 FL (ref 80–100)
MICROSCOPIC EXAMINATION: YES
MONOCYTES ABSOLUTE: 0.7 K/UL (ref 0–1.3)
MONOCYTES RELATIVE PERCENT: 6.9 %
NEUTROPHILS ABSOLUTE: 3.9 K/UL (ref 1.7–7.7)
NEUTROPHILS RELATIVE PERCENT: 41.5 %
NITRITE, URINE: NEGATIVE
PDW BLD-RTO: 13 % (ref 12.4–15.4)
PH UA: 6 (ref 5–8)
PLATELET # BLD: 165 K/UL (ref 135–450)
PMV BLD AUTO: 9.8 FL (ref 5–10.5)
POTASSIUM REFLEX MAGNESIUM: 5.8 MMOL/L (ref 3.5–5.1)
POTASSIUM SERPL-SCNC: 4.1 MMOL/L (ref 3.5–5.1)
PROTEIN UA: 100 MG/DL
RBC # BLD: 4.09 M/UL (ref 4.2–5.9)
RBC UA: 19 /HPF (ref 0–4)
SODIUM BLD-SCNC: 143 MMOL/L (ref 136–145)
SPECIFIC GRAVITY UA: 1.02 (ref 1–1.03)
TOTAL PROTEIN: 7 G/DL (ref 6.4–8.2)
TROPONIN: <0.01 NG/ML
URINE TYPE: ABNORMAL
UROBILINOGEN, URINE: 1 E.U./DL
WBC # BLD: 9.5 K/UL (ref 4–11)
WBC UA: 24 /HPF (ref 0–5)

## 2020-07-15 PROCEDURE — 99291 CRITICAL CARE FIRST HOUR: CPT

## 2020-07-15 PROCEDURE — 2580000003 HC RX 258: Performed by: EMERGENCY MEDICINE

## 2020-07-15 PROCEDURE — 81001 URINALYSIS AUTO W/SCOPE: CPT

## 2020-07-15 PROCEDURE — 85025 COMPLETE CBC W/AUTO DIFF WBC: CPT

## 2020-07-15 PROCEDURE — 87186 SC STD MICRODIL/AGAR DIL: CPT

## 2020-07-15 PROCEDURE — 80177 DRUG SCRN QUAN LEVETIRACETAM: CPT

## 2020-07-15 PROCEDURE — 2580000003 HC RX 258: Performed by: INTERNAL MEDICINE

## 2020-07-15 PROCEDURE — 6360000002 HC RX W HCPCS: Performed by: INTERNAL MEDICINE

## 2020-07-15 PROCEDURE — 2060000000 HC ICU INTERMEDIATE R&B

## 2020-07-15 PROCEDURE — 87077 CULTURE AEROBIC IDENTIFY: CPT

## 2020-07-15 PROCEDURE — 84484 ASSAY OF TROPONIN QUANT: CPT

## 2020-07-15 PROCEDURE — 96365 THER/PROPH/DIAG IV INF INIT: CPT

## 2020-07-15 PROCEDURE — U0003 INFECTIOUS AGENT DETECTION BY NUCLEIC ACID (DNA OR RNA); SEVERE ACUTE RESPIRATORY SYNDROME CORONAVIRUS 2 (SARS-COV-2) (CORONAVIRUS DISEASE [COVID-19]), AMPLIFIED PROBE TECHNIQUE, MAKING USE OF HIGH THROUGHPUT TECHNOLOGIES AS DESCRIBED BY CMS-2020-01-R: HCPCS

## 2020-07-15 PROCEDURE — 87040 BLOOD CULTURE FOR BACTERIA: CPT

## 2020-07-15 PROCEDURE — 83605 ASSAY OF LACTIC ACID: CPT

## 2020-07-15 PROCEDURE — 80053 COMPREHEN METABOLIC PANEL: CPT

## 2020-07-15 PROCEDURE — 6360000002 HC RX W HCPCS: Performed by: EMERGENCY MEDICINE

## 2020-07-15 PROCEDURE — 70450 CT HEAD/BRAIN W/O DYE: CPT

## 2020-07-15 PROCEDURE — 6370000000 HC RX 637 (ALT 250 FOR IP): Performed by: INTERNAL MEDICINE

## 2020-07-15 PROCEDURE — 93005 ELECTROCARDIOGRAM TRACING: CPT | Performed by: EMERGENCY MEDICINE

## 2020-07-15 PROCEDURE — 96375 TX/PRO/DX INJ NEW DRUG ADDON: CPT

## 2020-07-15 PROCEDURE — 96367 TX/PROPH/DG ADDL SEQ IV INF: CPT

## 2020-07-15 PROCEDURE — 36415 COLL VENOUS BLD VENIPUNCTURE: CPT

## 2020-07-15 PROCEDURE — U0002 COVID-19 LAB TEST NON-CDC: HCPCS

## 2020-07-15 PROCEDURE — 93010 ELECTROCARDIOGRAM REPORT: CPT | Performed by: INTERNAL MEDICINE

## 2020-07-15 PROCEDURE — 71045 X-RAY EXAM CHEST 1 VIEW: CPT

## 2020-07-15 PROCEDURE — 84132 ASSAY OF SERUM POTASSIUM: CPT

## 2020-07-15 PROCEDURE — 87086 URINE CULTURE/COLONY COUNT: CPT

## 2020-07-15 RX ORDER — LEVETIRACETAM 500 MG/1
1500 TABLET ORAL 2 TIMES DAILY
COMMUNITY
End: 2021-01-04 | Stop reason: SDUPTHER

## 2020-07-15 RX ORDER — MIRTAZAPINE 7.5 MG/1
7.5 TABLET, FILM COATED ORAL NIGHTLY
COMMUNITY

## 2020-07-15 RX ORDER — ACETAMINOPHEN 650 MG/1
650 SUPPOSITORY RECTAL EVERY 6 HOURS PRN
Status: DISCONTINUED | OUTPATIENT
Start: 2020-07-15 | End: 2020-07-18 | Stop reason: HOSPADM

## 2020-07-15 RX ORDER — 0.9 % SODIUM CHLORIDE 0.9 %
30 INTRAVENOUS SOLUTION INTRAVENOUS ONCE
Status: COMPLETED | OUTPATIENT
Start: 2020-07-15 | End: 2020-07-15

## 2020-07-15 RX ORDER — METHADONE HYDROCHLORIDE 10 MG/1
5 TABLET ORAL 2 TIMES DAILY
Status: DISCONTINUED | OUTPATIENT
Start: 2020-07-15 | End: 2020-07-18 | Stop reason: HOSPADM

## 2020-07-15 RX ORDER — MIDAZOLAM HYDROCHLORIDE 1 MG/ML
5 INJECTION INTRAMUSCULAR; INTRAVENOUS ONCE
Status: COMPLETED | OUTPATIENT
Start: 2020-07-15 | End: 2020-07-15

## 2020-07-15 RX ORDER — CITALOPRAM 20 MG/1
20 TABLET ORAL DAILY
Status: DISCONTINUED | OUTPATIENT
Start: 2020-07-15 | End: 2020-07-18 | Stop reason: HOSPADM

## 2020-07-15 RX ORDER — LORAZEPAM 2 MG/ML
1 INJECTION INTRAMUSCULAR EVERY 4 HOURS PRN
Status: DISCONTINUED | OUTPATIENT
Start: 2020-07-15 | End: 2020-07-18 | Stop reason: HOSPADM

## 2020-07-15 RX ORDER — SODIUM CHLORIDE 9 MG/ML
INJECTION, SOLUTION INTRAVENOUS CONTINUOUS
Status: DISCONTINUED | OUTPATIENT
Start: 2020-07-15 | End: 2020-07-16

## 2020-07-15 RX ORDER — SODIUM CHLORIDE 0.9 % (FLUSH) 0.9 %
10 SYRINGE (ML) INJECTION PRN
Status: DISCONTINUED | OUTPATIENT
Start: 2020-07-15 | End: 2020-07-18 | Stop reason: HOSPADM

## 2020-07-15 RX ORDER — POLYETHYLENE GLYCOL 3350 17 G/17G
17 POWDER, FOR SOLUTION ORAL DAILY PRN
Status: DISCONTINUED | OUTPATIENT
Start: 2020-07-15 | End: 2020-07-18 | Stop reason: HOSPADM

## 2020-07-15 RX ORDER — LORAZEPAM 2 MG/ML
2 INJECTION INTRAMUSCULAR ONCE
Status: COMPLETED | OUTPATIENT
Start: 2020-07-15 | End: 2020-07-15

## 2020-07-15 RX ORDER — ONDANSETRON 2 MG/ML
4 INJECTION INTRAMUSCULAR; INTRAVENOUS EVERY 6 HOURS PRN
Status: DISCONTINUED | OUTPATIENT
Start: 2020-07-15 | End: 2020-07-18 | Stop reason: HOSPADM

## 2020-07-15 RX ORDER — SODIUM CHLORIDE 0.9 % (FLUSH) 0.9 %
10 SYRINGE (ML) INJECTION EVERY 12 HOURS SCHEDULED
Status: DISCONTINUED | OUTPATIENT
Start: 2020-07-15 | End: 2020-07-18 | Stop reason: HOSPADM

## 2020-07-15 RX ORDER — ACETAMINOPHEN 325 MG/1
650 TABLET ORAL EVERY 6 HOURS PRN
Status: DISCONTINUED | OUTPATIENT
Start: 2020-07-15 | End: 2020-07-18 | Stop reason: HOSPADM

## 2020-07-15 RX ORDER — LEVETIRACETAM 500 MG/5ML
1500 INJECTION, SOLUTION, CONCENTRATE INTRAVENOUS ONCE
Status: DISCONTINUED | OUTPATIENT
Start: 2020-07-15 | End: 2020-07-15 | Stop reason: SDUPTHER

## 2020-07-15 RX ORDER — ACETAMINOPHEN 325 MG/1
650 TABLET ORAL EVERY 4 HOURS PRN
Status: DISCONTINUED | OUTPATIENT
Start: 2020-07-15 | End: 2020-07-18 | Stop reason: HOSPADM

## 2020-07-15 RX ORDER — CALCIUM CARBONATE 200(500)MG
1 TABLET,CHEWABLE ORAL 2 TIMES DAILY
Status: DISCONTINUED | OUTPATIENT
Start: 2020-07-15 | End: 2020-07-18 | Stop reason: HOSPADM

## 2020-07-15 RX ORDER — POTASSIUM CHLORIDE 20 MEQ/1
20 TABLET, EXTENDED RELEASE ORAL 2 TIMES DAILY
COMMUNITY

## 2020-07-15 RX ORDER — PROMETHAZINE HYDROCHLORIDE 25 MG/1
12.5 TABLET ORAL EVERY 6 HOURS PRN
Status: DISCONTINUED | OUTPATIENT
Start: 2020-07-15 | End: 2020-07-18 | Stop reason: HOSPADM

## 2020-07-15 RX ORDER — ACETAMINOPHEN 325 MG/1
650 TABLET ORAL EVERY 4 HOURS PRN
COMMUNITY

## 2020-07-15 RX ORDER — OLANZAPINE 5 MG/1
2.5 TABLET ORAL NIGHTLY
Status: DISCONTINUED | OUTPATIENT
Start: 2020-07-15 | End: 2020-07-18 | Stop reason: HOSPADM

## 2020-07-15 RX ORDER — MIRTAZAPINE 15 MG/1
7.5 TABLET, FILM COATED ORAL NIGHTLY
Status: DISCONTINUED | OUTPATIENT
Start: 2020-07-15 | End: 2020-07-18 | Stop reason: HOSPADM

## 2020-07-15 RX ORDER — LACOSAMIDE 100 MG/1
200 TABLET ORAL 2 TIMES DAILY
Status: DISCONTINUED | OUTPATIENT
Start: 2020-07-15 | End: 2020-07-18 | Stop reason: HOSPADM

## 2020-07-15 RX ORDER — ATORVASTATIN CALCIUM 10 MG/1
10 TABLET, FILM COATED ORAL NIGHTLY
Status: DISCONTINUED | OUTPATIENT
Start: 2020-07-15 | End: 2020-07-18 | Stop reason: HOSPADM

## 2020-07-15 RX ORDER — POTASSIUM CHLORIDE 20 MEQ/1
20 TABLET, EXTENDED RELEASE ORAL 2 TIMES DAILY
Status: DISCONTINUED | OUTPATIENT
Start: 2020-07-15 | End: 2020-07-18 | Stop reason: HOSPADM

## 2020-07-15 RX ADMIN — SODIUM CHLORIDE: 9 INJECTION, SOLUTION INTRAVENOUS at 20:59

## 2020-07-15 RX ADMIN — LEVETIRACETAM 1500 MG: 100 INJECTION, SOLUTION INTRAVENOUS at 23:03

## 2020-07-15 RX ADMIN — ATORVASTATIN CALCIUM 10 MG: 10 TABLET, FILM COATED ORAL at 20:49

## 2020-07-15 RX ADMIN — SODIUM CHLORIDE 2244 ML: 9 INJECTION, SOLUTION INTRAVENOUS at 12:15

## 2020-07-15 RX ADMIN — MIDAZOLAM 5 MG: 1 INJECTION INTRAMUSCULAR; INTRAVENOUS at 10:16

## 2020-07-15 RX ADMIN — CITALOPRAM HYDROBROMIDE 20 MG: 20 TABLET ORAL at 23:02

## 2020-07-15 RX ADMIN — OLANZAPINE 2.5 MG: 5 TABLET, FILM COATED ORAL at 20:49

## 2020-07-15 RX ADMIN — Medication 1 G: at 12:08

## 2020-07-15 RX ADMIN — Medication 10 ML: at 20:58

## 2020-07-15 RX ADMIN — MIRTAZAPINE 7.5 MG: 15 TABLET, FILM COATED ORAL at 20:50

## 2020-07-15 RX ADMIN — ANTACID TABLETS 500 MG: 500 TABLET, CHEWABLE ORAL at 20:49

## 2020-07-15 RX ADMIN — METHADONE HYDROCHLORIDE 5 MG: 10 TABLET ORAL at 20:50

## 2020-07-15 RX ADMIN — LEVETIRACETAM 1500 MG: 100 INJECTION, SOLUTION INTRAVENOUS at 10:56

## 2020-07-15 RX ADMIN — LORAZEPAM 2 MG: 2 INJECTION, SOLUTION INTRAMUSCULAR; INTRAVENOUS at 10:02

## 2020-07-15 RX ADMIN — POTASSIUM CHLORIDE 20 MEQ: 1500 TABLET, EXTENDED RELEASE ORAL at 20:49

## 2020-07-15 RX ADMIN — LACOSAMIDE 200 MG: 100 TABLET, FILM COATED ORAL at 20:50

## 2020-07-15 ASSESSMENT — PAIN SCALES - GENERAL: PAINLEVEL_OUTOF10: 4

## 2020-07-15 NOTE — ED NOTES
Bed: 01  Expected date:   Expected time:   Means of arrival:   Comments:  [address] RN  07/15/20 7152

## 2020-07-15 NOTE — ED NOTES
Franci Ayala from [facility] given report. States pt is there for alcohol abuse and homelessness. She states pt has not had alcohol in a long time, is usually alert and oriented, able to converse normally. She reports pt was eating breakfast this morning when another resident started that the pt did have a seizure hx.         Gurjit Valencia RN  07/15/20 6556

## 2020-07-15 NOTE — ED NOTES
Repeat K+ and lactic drawn and sent. Blood for cultures 1 and 2 also drawn and sent. Pt BP improving, NS still infusing, Keppra complete, will monitor.       Angel Richomnd RN  07/15/20 2538

## 2020-07-15 NOTE — ED NOTES
Pt began to seize, Steven Chin MD at bedside, verbal order for 2 mg ativan IVP given.      Hina Duke RN  07/15/20 1016

## 2020-07-15 NOTE — H&P
Hospital Medicine History & Physical      PCP: Grisel Davis MD    Date of Admission: 7/15/2020    Date of Service: Pt seen/examined on 7/15/2020 and Admitted to Inpatient with expected LOS greater than two midnights due to medical therapy. Chief Complaint: Seizures      History Of Present Illness:      61 y.o. male with a history of intracerebral hemorrhage status post aneurysm repair, grand mal seizures, hypertension, hyperlipidemia, chronic pain syndrome , nursing home resident, medication noncompliance was sent here for evaluation of seizures. Patient apparently had generalized tonic-clonic seizure at nursing home. During transport here by EMS he had a brief seizure. In the ER he had 2 more episodes. He was given IV Ativan, IV Versed, IV Keppra. Discussed with neurology already. At this point of time, his last seizure was 3 hours ago. Patient is still postictal versus under the effect of Ativan. Was not able to provide much history. No recent diarrhea or URI or UTI reported.      Past Medical History:          Diagnosis Date    Acute intracerebral hemorrhage ([address] 02/03/2017    Anxiety     Back pain     Cerebral artery occlusion with cerebral infarction (HCC)     Chronic back pain greater than 3 months duration 02/03/2017    Diabetes mellitus ([address]     Dysphasia 02/03/2017    Dysphonia 02/03/2017    Endocarditis and heart valve disorders in diseases classified elsewhere 02/03/2017    Hydrocephalus ([address] 02/03/2017    Hypertension     IVH (intraventricular hemorrhage) ([address] 02/03/2017    NSTEMI (non-ST elevated myocardial infarction) ([address] 02/03/2017    Seizures ([address]        Past Surgical History:          Procedure Laterality Date    BRAIN ANEURYSM SURGERY Left 02/03/2017    COLONOSCOPY N/A 8/14/2019    COLONOSCOPY WITH MAC ANESTHESIA performed by Antoine Gore MD at [address] [place] [address] Left 02/03/2017    LEG SURGERY      UPPER GASTROINTESTINAL ENDOSCOPY N/A 8/14/2019    EGD BIOPSY performed by Tanya Wiggins MD at [facility]       Medications Prior to Admission:      Prior to Admission medications    Medication Sig Start Date End Date Taking? Authorizing Provider   levETIRAcetam (KEPPRA) 500 MG tablet Take 1,500 mg by mouth 2 times daily   Yes Historical Provider, MD   potassium chloride (KLOR-CON M) 20 MEQ extended release tablet Take 20 mEq by mouth 2 times daily   Yes Historical Provider, MD   acetaminophen (TYLENOL) 325 MG tablet Take 650 mg by mouth every 4 hours as needed for Pain   Yes Historical Provider, MD   mirtazapine (REMERON) 7.5 MG tablet Take 7.5 mg by mouth nightly   Yes Historical Provider, MD   levETIRAcetam (KEPPRA) 500 MG tablet Take 500 mg by mouth AT [address] [place] [address]   Yes Historical Provider, MD   citalopram (CELEXA) 20 MG tablet Take 20 mg by mouth daily   Yes Historical Provider, MD   Cholecalciferol (VITAMIN D3) 5000 units TABS Take by mouth daily   Yes Historical Provider, MD   OLANZapine (ZYPREXA) 2.5 MG tablet Take 2.5 mg by mouth nightly   Yes Historical Provider, MD   lacosamide (VIMPAT) 200 MG tablet Take 200 mg by mouth 2 times daily. Yes Historical Provider, MD   calcium carbonate (TUMS) 500 MG chewable tablet Take 1 tablet by mouth 2 times daily    Yes Historical Provider, MD   methadone (DOLOPHINE) 5 MG tablet Take 5 mg by mouth 2 times daily. Yes Historical Provider, MD   atorvastatin (LIPITOR) 10 MG tablet Take 10 mg by mouth nightly  2/3/17  Yes Historical Provider, MD       Allergies:  No known allergies and Seasonal    Social History:      The patient currently lives at  nursing home  TOBACCO:   reports that he has been smoking cigarettes. He has been smoking about 0.50 packs per day. He has never used smokeless tobacco.  ETOH:   reports no history of alcohol use. E-Cigarettes Vaping or Juuling     Questions Responses    Vaping Use Never User    Start Date     Does device contain nicotine?      Quit Date     Vaping Type Family History:        Reviewed in detail and negative for DM, CAD, Cancer, CVA. Positive as follows:    History reviewed. No pertinent family history. REVIEW OF SYSTEMS:   Pertinent positives as noted in the HPI. All other systems reviewed and negative. PHYSICAL EXAM PERFORMED:    BP (!) 105/56   Pulse 148   Resp 24   Wt 165 lb (74.8 kg)   SpO2 98%   BMI 21.77 kg/m²     General appearance:   Postictal, confused  HEENT:  Normal cephalic, atraumatic without obvious deformity. Pupils equal, round, and reactive to light. Extra ocular muscles intact. Conjunctivae/corneas clear. Neck: Supple, with full range of motion. No jugular venous distention. Trachea midline. Respiratory:  Normal respiratory effort. Clear to auscultation, bilaterally without Rales/Wheezes/Rhonchi. Cardiovascular:  Regular rate and rhythm with normal S1/S2 without murmurs, rubs or gallops. Abdomen: Soft, non-tender, non-distended with normal bowel sounds. Musculoskeletal:  No clubbing, cyanosis or edema bilaterally. Full range of motion without deformity. Skin: Skin color, texture, turgor normal.  No rashes or lesions. Neurologic:  Neurovascularly intact without any focal sensory/motor deficits. Cranial nerves: II-XII intact, grossly non-focal.  Psychiatric:  Alert but confused, possibly postictal.  Capillary Refill: Brisk,< 3 seconds   Peripheral Pulses: +2 palpable, equal bilaterally       Labs:     Recent Labs     07/15/20  1008   WBC 9.5   HGB 14.1   HCT 43.3        Recent Labs     07/15/20  1008 07/15/20  1126     --    K 5.8* 4.1     --    CO2 15*  --    BUN 17  --    CREATININE 1.0  --    CALCIUM 10.4  --      Recent Labs     07/15/20  1008   AST 25   ALT 16   BILITOT 0.4   ALKPHOS 56     No results for input(s): INR in the last 72 hours.   Recent Labs     07/15/20  1008   TROPONINI <0.01       Urinalysis:      Lab Results   Component Value Date    NITRU Negative 07/15/2020    WBCUA 24 07/15/2020 RBCUA 19 07/15/2020    BLOODU MODERATE 07/15/2020    SPECGRAV 1.021 07/15/2020    GLUCOSEU Negative 07/15/2020       Radiology:        EKG:  I have reviewed the EKG with the following interpretation: Sinus rhythm, sinus arrhythmia noted. Left axis deviation noted. CT HEAD WO CONTRAST   Final Result   No acute intracranial abnormality. Redemonstration of postoperative changes of left craniotomy with underlying   left parietal and occipital encephalomalacia. XR CHEST PORTABLE   Final Result   1. Cardiomegaly. ASSESSMENT:    Active Hospital Problems    Diagnosis Date Noted    Seizure [facility]) [R56.9] 07/15/2020         PLAN:      Breakthrough seizure: Recent admission at outside hospital reviewed. Question of medication noncompliance. Keppra level ordered from emergency room. Will follow. Loading dose of Keppra given in the emergency room. Continue IV Keppra 1500 twice daily. Continue Vimpat. Neurology consulted. Will check EEG. CT head nonacute. Seizure precautions. N.p.o. for now. IV fluids gently. Lactic acid: Secondary to seizure. IV fluids    Pyuria:?  UTI. Rocephin given in emergency room. Continue Rocephin. Blood cultures and urine cultures taken. Hyperlipidemia: Lipitor    Chronic pain syndrome: Continue home pain medications. History of intracranial hemorrhage, aneurysm repair          DVT Prophylaxis: scd's   Diet: N.p.o.  Code Status: Full code    PT/OT Eval Status: Not now    Dispo -progressive care       Hermon Claude, MD    Thank you Marcela Swanson MD for the opportunity to be involved in this patient's care. If you have any questions or concerns please feel free to contact me at [phone].

## 2020-07-15 NOTE — ED NOTES
Attempted to call report to [facility]. Floor states she will send a tele box but that the RN is not available to take report and will call back.       John Campbell RN  07/15/20 3744

## 2020-07-15 NOTE — ED PROVIDER NOTES
Admission medications    Medication Sig Start Date End Date Taking? Authorizing Provider   levETIRAcetam (KEPPRA) 500 MG tablet Take 1,500 mg by mouth 2 times daily   Yes Historical Provider, MD   potassium chloride (KLOR-CON M) 20 MEQ extended release tablet Take 20 mEq by mouth 2 times daily   Yes Historical Provider, MD   acetaminophen (TYLENOL) 325 MG tablet Take 650 mg by mouth every 4 hours as needed for Pain   Yes Historical Provider, MD   mirtazapine (REMERON) 7.5 MG tablet Take 7.5 mg by mouth nightly   Yes Historical Provider, MD   levETIRAcetam (KEPPRA) 500 MG tablet Take 500 mg by mouth AT [address] [place] [address]   Yes Historical Provider, MD   citalopram (CELEXA) 20 MG tablet Take 20 mg by mouth daily   Yes Historical Provider, MD   Cholecalciferol (VITAMIN D3) 5000 units TABS Take by mouth daily   Yes Historical Provider, MD   OLANZapine (ZYPREXA) 2.5 MG tablet Take 2.5 mg by mouth nightly   Yes Historical Provider, MD   lacosamide (VIMPAT) 200 MG tablet Take 200 mg by mouth 2 times daily. Yes Historical Provider, MD   calcium carbonate (TUMS) 500 MG chewable tablet Take 1 tablet by mouth 2 times daily    Yes Historical Provider, MD   methadone (DOLOPHINE) 5 MG tablet Take 5 mg by mouth 2 times daily. Yes Historical Provider, MD   atorvastatin (LIPITOR) 10 MG tablet Take 10 mg by mouth nightly  2/3/17  Yes Historical Provider, MD       Social history:  reports that he has been smoking cigarettes. He has been smoking about 0.50 packs per day. He has never used smokeless tobacco. He reports that he does not drink alcohol or use drugs. Family history:  History reviewed. No pertinent family history. Exam  ED Triage Vitals   BP Temp Temp src Pulse Resp SpO2 Height Weight   07/15/20 1012 -- -- 07/15/20 1012 07/15/20 1012 07/15/20 1012 -- 07/15/20 1018   (!) 162/54   117 19 (!) 87 %  165 lb (74.8 kg)   Physical Exam  Vitals signs and nursing note reviewed.    Constitutional:       General: He is in acute distress. Appearance: He is well-developed. He is not diaphoretic. Comments: Patient arrived postictal but had another episode of generalized tonic-clonic seizure   HENT:      Head: Normocephalic and atraumatic. Eyes:      General: No scleral icterus. Right eye: No discharge. Left eye: No discharge. Conjunctiva/sclera: Conjunctivae normal.      Comments: Pupils are sluggish in reaction to light   Neck:      Musculoskeletal: Neck supple. Trachea: No tracheal deviation. Cardiovascular:      Rate and Rhythm: Tachycardia present. Rhythm irregular. Pulmonary:      Effort: Pulmonary effort is normal. Tachypnea present. Breath sounds: No stridor. Decreased breath sounds (in the bases) present. Abdominal:      General: There is no distension. Palpations: Abdomen is soft. Musculoskeletal:         General: No deformity. Right lower leg: No edema. Left lower leg: No edema. Skin:     General: Skin is warm and dry. Findings: No erythema or rash. Neurological:      Mental Status: He is unresponsive. Motor: Seizure activity present. Psychiatric:      Comments: Unable to assess.   Patient is actively seizing           MDM/ED Course  ED Medication Orders (From admission, onward)    Start Ordered     Status Ordering Provider    07/15/20 1215 07/15/20 1203  0.9 % sodium chloride bolus  ONCE      Acknowledged [place], SAVITA     07/15/20 1115 07/15/20 1113  cefTRIAXone (ROCEPHIN) 1 g in sterile water 10 mL IV syringe  ONCE      Last MAR action:  Given - by Aura Mejia on 07/15/20 at [address] [place] [address]    07/15/20 1045 07/15/20 1032  levETIRAcetam (KEPPRA) 1,500 mg in sodium chloride 0.9 % 100 mL IVPB  ONCE      Last MAR action:  Stopped - by Aura Mejia on 07/15/20 at [facility], [facility]    07/15/20 1015 07/15/20 1013  midazolam (VERSED) injection 5 mg  ONCE      Last MAR action:  Given - by Herber Bro on 07/15/20 at [address], [facility]    07/15/20 1015 07/15/20 1013  LORazepam (ATIVAN) injection 2 mg  ONCE      Last MAR action:  Given - by Jacinto Dixon on 07/15/20 at [address]          EKG  The Ekg interpreted by me in the absence of a cardiologist shows. sinus arrhythmia with a rate of 85  Axis is   Left axis deviation  QTc is  normal  Intervals and Durations are unremarkable. No specific ST-T wave changes appreciated. No evidence of acute ischemia. Compared to prior EKG dated February 22, 2020, PVC resolved, otherwise no significant changes. Radiology  Ct Head Wo Contrast    Result Date: 7/15/2020  EXAMINATION: CT OF THE HEAD WITHOUT CONTRAST  7/15/2020 10:48 am TECHNIQUE: CT of the head was performed without the administration of intravenous contrast. Dose modulation, iterative reconstruction, and/or weight based adjustment of the mA/kV was utilized to reduce the radiation dose to as low as reasonably achievable. COMPARISON: CT head August 19, 2019 HISTORY: ORDERING SYSTEM PROVIDED HISTORY: Status epilepticus TECHNOLOGIST PROVIDED HISTORY: Reason for exam:->Status epilepticus Has a \"code stroke\" or \"stroke alert\" been called? ->No Reason for Exam: Status epilepticus Acuity: Acute Type of Exam: Initial FINDINGS: BRAIN/VENTRICLES: There is no acute intracranial hemorrhage, mass effect or midline shift. No abnormal extra-axial fluid collection. Redemonstration of encephalomalacia involving the left parietal and occipital lobes with associated ex vacuo dilatation of the left lateral ventricle. The gray-white differentiation is otherwise grossly maintained without evidence of an acute infarct. There is no evidence of hydrocephalus. ORBITS: The visualized portion of the orbits demonstrate no acute abnormality. SINUSES: The visualized paranasal sinuses and mastoid air cells demonstrate no acute abnormality. SOFT TISSUES/SKULL:  No acute abnormality of the visualized skull or soft tissues.   Redemonstration of postoperative changes of left craniotomy. No acute intracranial abnormality. Redemonstration of postoperative changes of left craniotomy with underlying left parietal and occipital encephalomalacia. Xr Chest Portable    Result Date: 7/15/2020  EXAMINATION: ONE XRAY VIEW OF THE CHEST 7/15/2020 10:49 am COMPARISON: 02/08/2017 HISTORY: ORDERING SYSTEM PROVIDED HISTORY: AMS TECHNOLOGIST PROVIDED HISTORY: Reason for exam:->AMS Reason for Exam: Seizures (Pt in by [facility] from [address] [facility]. Pt has hx of epilepsy, per EMS pt was seizing on the arrival and then again enroute. Pt started to seize shortly after arrival to ED and 2mg of ativan given IV. Seizure has stopped at this time.) Acuity: Acute Type of Exam: Initial FINDINGS: The lungs are clear. The cardiac silhouette is enlarged. There is no pneumothorax or pleural effusion. The endotracheal tube and right PICC is been removed. 1. Cardiomegaly.          Labs  Results for orders placed or performed during the hospital encounter of 07/15/20   CBC Auto Differential   Result Value Ref Range    WBC 9.5 4.0 - 11.0 K/uL    RBC 4.09 (L) 4.20 - 5.90 M/uL    Hemoglobin 14.1 13.5 - 17.5 g/dL    Hematocrit 43.3 40.5 - 52.5 %    .0 (H) 80.0 - 100.0 fL    MCH 34.4 (H) 26.0 - 34.0 pg    MCHC 32.4 31.0 - 36.0 g/dL    RDW 13.0 12.4 - 15.4 %    Platelets 628 169 - 637 K/uL    MPV 9.8 5.0 - 10.5 fL    Neutrophils % 41.5 %    Lymphocytes % 48.3 %    Monocytes % 6.9 %    Eosinophils % 2.7 %    Basophils % 0.6 %    Neutrophils Absolute 3.9 1.7 - 7.7 K/uL    Lymphocytes Absolute 4.6 1.0 - 5.1 K/uL    Monocytes Absolute 0.7 0.0 - 1.3 K/uL    Eosinophils Absolute 0.3 0.0 - 0.6 K/uL    Basophils Absolute 0.1 0.0 - 0.2 K/uL   Comprehensive Metabolic Panel w/ Reflex to MG   Result Value Ref Range    Sodium 143 136 - 145 mmol/L    Potassium reflex Magnesium 5.8 (H) 3.5 - 5.1 mmol/L    Chloride 104 99 - 110 mmol/L    CO2 15 (L) 21 - 32 mmol/L    Anion Gap 24 (H) 3 - 16    Glucose 179 (H) 70 - 99 mg/dL    BUN 17 7 - 20 mg/dL    CREATININE 1.0 0.9 - 1.3 mg/dL    GFR Non-African American >60 >60    GFR African American >60 >60    Calcium 10.4 8.3 - 10.6 mg/dL    Total Protein 7.0 6.4 - 8.2 g/dL    Alb 4.5 3.4 - 5.0 g/dL    Albumin/Globulin Ratio 1.8 1.1 - 2.2    Total Bilirubin 0.4 0.0 - 1.0 mg/dL    Alkaline Phosphatase 56 40 - 129 U/L    ALT 16 10 - 40 U/L    AST 25 15 - 37 U/L    Globulin 2.5 g/dL   Troponin   Result Value Ref Range    Troponin <0.01 <0.01 ng/mL   Urinalysis, reflex to microscopic   Result Value Ref Range    Color, UA YELLOW Straw/Yellow    Clarity, UA CLOUDY (A) Clear    Glucose, Ur Negative Negative mg/dL    Bilirubin Urine Negative Negative    Ketones, Urine Negative Negative mg/dL    Specific Gravity, UA 1.021 1.005 - 1.030    Blood, Urine MODERATE (A) Negative    pH, UA 6.0 5.0 - 8.0    Protein,  (A) Negative mg/dL    Urobilinogen, Urine 1.0 <2.0 E.U./dL    Nitrite, Urine Negative Negative    Leukocyte Esterase, Urine TRACE (A) Negative    Microscopic Examination YES     Urine Type NotGiven    Microscopic Urinalysis   Result Value Ref Range    Urinalysis Comments see below     Hyaline Casts, UA 4 0 - 8 /LPF    WBC, UA 24 (H) 0 - 5 /HPF    RBC, UA 19 (H) 0 - 4 /HPF    Epithelial Cells, UA 0 0 - 5 /HPF   Potassium   Result Value Ref Range    Potassium 4.1 3.5 - 5.1 mmol/L   Lactate, Sepsis   Result Value Ref Range    Lactic Acid, Sepsis 7.0 (HH) 0.4 - 1.9 mmol/L   Levetiracetam level   Result Value Ref Range    KEPPRA Dose Amt Unknown    EKG 12 Lead   Result Value Ref Range    Ventricular Rate 85 BPM    Atrial Rate 85 BPM    P-R Interval 182 ms    QRS Duration 116 ms    Q-T Interval 338 ms    QTc Calculation (Bazett) 402 ms    P Axis 62 degrees    R Axis -41 degrees    T Axis 56 degrees    Diagnosis       Sinus rhythm with marked sinus arrhythmiaLeft axis deviationInferior infarct , age undeterminedCannot rule out Anterior infarct , age undeterminedAbnormal ECGConfirmed by CAROLINA GARCIA, Vanessa Fisher ([id number]) on 7/15/2020 12:36:48 PM         - Patient seen and evaluated in room 1.  61 y.o. male presented for AMS/postictal state. Then a few minutes arrival, patient had generalized tonic-clonic seizure. This will be his third episode of seizure without returning to normal baseline mental status. I ordered 2 mg Ativan IV. Within a minute or 2 of its administration however, patient had another episode of seizure. I therefore order 5 mg of Versed IV. After that, no more episodes of seizure observed. I reviewed the chart and noticed patient has a history of noncompliance and even while in nursing home, his Keppra has been as low as undetectable. It is unclear if patient is taking his medicine at nursing home. I therefore ordered a Keppra load. - Patient was placed on telemetry during his/her ED stay and no malignant dysrhythmia observed. Frequent PVC noted but that was seen on prior EKG in the past.   - Pertinent old records reviewed. - Diagnostic studies reviewed. Patient has UTI, culture sent  - lactate elevated but that is likely due to seizure. Fluids given and repeat lactate ordered. Normal WBC. After the seizure, patient's blood pressure was low. It may be due to in part that he received both Ativan and Versed. Fluid bolus initiated and patient responded to fluid bolus. I spoke with Dr. Clarence Whitfield, neurologist. We discussed the pertinent history, exam, and workup results. Based on that discussion, he recommended adding on Keppra level (this was drawn before Keppra administration) and agree with other meds. Patient's recurrent seizure happened mostly before he received benzodiazepine. Therefore we do not believe continuous EEG is indicated at this time as we have a reason why patient continued to have breakthrough seizure. Ever since patient received his first set, no more episodes of seizure noted.     I spoke with Dr. Karen Mary, patient's PCP and endocrinologist. We discussed the pertinent history, exam, and workup results. Based on that discussion, he states this patient is known to be noncompliant and has not follow-up with Dr. Yoav Martin for quite some time. Dr. Yoav Martin advised that a hospitalist should admit this patient. I spoke with Dr. Meaghan Plaza, hospitalist. We thoroughly discussed the history, physical exam, laboratory and imaging studies, as well as, emergency department course. Based upon that discussion, we've decided to admit Shefali Deshpande for further observation and evaluation of Deane Simmonds Boggs's status epilepticus, urinary tract infection. As I have deemed necessary from their history, physical and studies, I have considered and evaluated Shefali Meals for the following diagnoses:  UTI, PNEUMONIA, DIABETES, INTRACRANIAL HEMORRHAGE, SEPSIS SUBARACHNOID HEMORRHAGE, SUBDURAL HEMATOMA, & STROKE. Clinical Impression:  1. Status epilepticus ([address]    2. Urinary tract infection without hematuria, site unspecified          Disposition:  Admit to hospitalist in guarded condition. Total critical care time is 65 minutes, which excludes separately billable procedures and updating family. Time spent is specifically for management of the presenting complaint and symptoms initially, direct bedside care, reevaluation, review of records, and consultation. There was a high probability of clinically significant life-threatening deterioration in the patient's condition, which required my urgent intervention. This chart was generated in part by using Dragon Dictation system and may contain errors related to that system including errors in grammar, punctuation, and spelling, as well as words and phrases that may be inappropriate. If there are any questions or concerns please feel free to contact the dictating provider for clarification.      Chang Tracey MD  [address] [place] [address] Dinorah Huang MD  07/15/20 7121

## 2020-07-15 NOTE — ED NOTES
Dr Constantino Patten paged and notified of Lactic acid result.       Krystal Gomez RN  07/15/20 7547

## 2020-07-15 NOTE — ED NOTES
Pt now awake, only oriented to person and place - pt does know what happened, states he does not know why he is here or of he even has a seizure hx. Nasal trumpet removed, pt remains on 3 lpm nasal canula. No further seizure activity noted. VS as charted. Pt updated on POC. Pt positioned for comfort. Call light in reach. Bed locked and in low position. Pt denies any needs or concerns at this time.      Avinash Muse RN  07/15/20 0704

## 2020-07-15 NOTE — ED NOTES
COVID swab collected and sent. Pt tolerated WNL. Pt placed in droplet isolation. N95 mask, eye shied, gloves, and gown worn when in room by this RN. Mask remains on pt. Repeat lactic drawn and sent per order. Pt watching TV. Pt updated on POC. Pt positioned for comfort. Call light in reach. Bed locked and in low position. Pt denies any needs or concerns at this time.      Moisés Cline RN  07/15/20 2546

## 2020-07-15 NOTE — ED NOTES
Pt began to seize for a second time since arrival to ED and verbal order from Maria R Cadet MD obtained for 5mg versed IVP and given.      Mitul Woods RN  07/15/20 1013

## 2020-07-16 LAB
ANION GAP SERPL CALCULATED.3IONS-SCNC: 8 MMOL/L (ref 3–16)
BUN BLDV-MCNC: 11 MG/DL (ref 7–20)
CALCIUM SERPL-MCNC: 8.8 MG/DL (ref 8.3–10.6)
CHLORIDE BLD-SCNC: 109 MMOL/L (ref 99–110)
CO2: 26 MMOL/L (ref 21–32)
CREAT SERPL-MCNC: 0.6 MG/DL (ref 0.9–1.3)
GFR AFRICAN AMERICAN: >60
GFR NON-AFRICAN AMERICAN: >60
GLUCOSE BLD-MCNC: 70 MG/DL (ref 70–99)
HCT VFR BLD CALC: 36.8 % (ref 40.5–52.5)
HEMOGLOBIN: 12.7 G/DL (ref 13.5–17.5)
LACTIC ACID: 1.5 MMOL/L (ref 0.4–2)
MCH RBC QN AUTO: 34.5 PG (ref 26–34)
MCHC RBC AUTO-ENTMCNC: 34.4 G/DL (ref 31–36)
MCV RBC AUTO: 100.2 FL (ref 80–100)
PDW BLD-RTO: 12.7 % (ref 12.4–15.4)
PLATELET # BLD: 105 K/UL (ref 135–450)
PMV BLD AUTO: 10.1 FL (ref 5–10.5)
POTASSIUM REFLEX MAGNESIUM: 4.5 MMOL/L (ref 3.5–5.1)
RBC # BLD: 3.67 M/UL (ref 4.2–5.9)
SARS-COV-2, PCR: NOT DETECTED
SODIUM BLD-SCNC: 143 MMOL/L (ref 136–145)
WBC # BLD: 8.3 K/UL (ref 4–11)

## 2020-07-16 PROCEDURE — 95819 EEG AWAKE AND ASLEEP: CPT

## 2020-07-16 PROCEDURE — 36415 COLL VENOUS BLD VENIPUNCTURE: CPT

## 2020-07-16 PROCEDURE — 6360000002 HC RX W HCPCS: Performed by: INTERNAL MEDICINE

## 2020-07-16 PROCEDURE — 6370000000 HC RX 637 (ALT 250 FOR IP): Performed by: INTERNAL MEDICINE

## 2020-07-16 PROCEDURE — 80048 BASIC METABOLIC PNL TOTAL CA: CPT

## 2020-07-16 PROCEDURE — 99255 IP/OBS CONSLTJ NEW/EST HI 80: CPT | Performed by: PSYCHIATRY & NEUROLOGY

## 2020-07-16 PROCEDURE — 83605 ASSAY OF LACTIC ACID: CPT

## 2020-07-16 PROCEDURE — 2060000000 HC ICU INTERMEDIATE R&B

## 2020-07-16 PROCEDURE — 2580000003 HC RX 258: Performed by: INTERNAL MEDICINE

## 2020-07-16 PROCEDURE — 95819 EEG AWAKE AND ASLEEP: CPT | Performed by: PSYCHIATRY & NEUROLOGY

## 2020-07-16 PROCEDURE — 85027 COMPLETE CBC AUTOMATED: CPT

## 2020-07-16 RX ORDER — LEVETIRACETAM 500 MG/1
500 TABLET ORAL
Status: DISCONTINUED | OUTPATIENT
Start: 2020-07-16 | End: 2020-07-17

## 2020-07-16 RX ORDER — LEVETIRACETAM 500 MG/1
1500 TABLET ORAL 2 TIMES DAILY
Status: DISCONTINUED | OUTPATIENT
Start: 2020-07-16 | End: 2020-07-18 | Stop reason: HOSPADM

## 2020-07-16 RX ORDER — LEVETIRACETAM 5 MG/ML
500 INJECTION INTRAVASCULAR DAILY
Status: DISCONTINUED | OUTPATIENT
Start: 2020-07-16 | End: 2020-07-16

## 2020-07-16 RX ADMIN — POTASSIUM CHLORIDE 20 MEQ: 1500 TABLET, EXTENDED RELEASE ORAL at 23:32

## 2020-07-16 RX ADMIN — OLANZAPINE 2.5 MG: 5 TABLET, FILM COATED ORAL at 23:33

## 2020-07-16 RX ADMIN — METHADONE HYDROCHLORIDE 5 MG: 10 TABLET ORAL at 23:33

## 2020-07-16 RX ADMIN — ATORVASTATIN CALCIUM 10 MG: 10 TABLET, FILM COATED ORAL at 23:33

## 2020-07-16 RX ADMIN — LACOSAMIDE 200 MG: 100 TABLET, FILM COATED ORAL at 13:29

## 2020-07-16 RX ADMIN — CITALOPRAM HYDROBROMIDE 20 MG: 20 TABLET ORAL at 09:19

## 2020-07-16 RX ADMIN — ANTACID TABLETS 500 MG: 500 TABLET, CHEWABLE ORAL at 23:32

## 2020-07-16 RX ADMIN — METHADONE HYDROCHLORIDE 5 MG: 10 TABLET ORAL at 09:19

## 2020-07-16 RX ADMIN — Medication 1 G: at 09:18

## 2020-07-16 RX ADMIN — SODIUM CHLORIDE: 9 INJECTION, SOLUTION INTRAVENOUS at 05:17

## 2020-07-16 RX ADMIN — LACOSAMIDE 200 MG: 100 TABLET, FILM COATED ORAL at 23:48

## 2020-07-16 RX ADMIN — LEVETIRACETAM 1500 MG: 500 TABLET ORAL at 23:33

## 2020-07-16 RX ADMIN — MIRTAZAPINE 7.5 MG: 15 TABLET, FILM COATED ORAL at 23:32

## 2020-07-16 RX ADMIN — LEVETIRACETAM 1500 MG: 100 INJECTION, SOLUTION INTRAVENOUS at 13:29

## 2020-07-16 RX ADMIN — ANTACID TABLETS 500 MG: 500 TABLET, CHEWABLE ORAL at 09:19

## 2020-07-16 RX ADMIN — POTASSIUM CHLORIDE 20 MEQ: 1500 TABLET, EXTENDED RELEASE ORAL at 09:19

## 2020-07-16 RX ADMIN — Medication 10 ML: at 23:35

## 2020-07-16 RX ADMIN — Medication 10 ML: at 09:20

## 2020-07-16 RX ADMIN — LEVETIRACETAM 500 MG: 500 TABLET, FILM COATED ORAL at 14:39

## 2020-07-16 ASSESSMENT — PAIN DESCRIPTION - PROGRESSION
CLINICAL_PROGRESSION: NOT CHANGED

## 2020-07-16 ASSESSMENT — PAIN SCALES - GENERAL
PAINLEVEL_OUTOF10: 0
PAINLEVEL_OUTOF10: 0
PAINLEVEL_OUTOF10: 5
PAINLEVEL_OUTOF10: 0
PAINLEVEL_OUTOF10: 7
PAINLEVEL_OUTOF10: 0

## 2020-07-16 ASSESSMENT — PAIN DESCRIPTION - PAIN TYPE
TYPE: CHRONIC PAIN
TYPE: CHRONIC PAIN

## 2020-07-16 ASSESSMENT — PAIN DESCRIPTION - DESCRIPTORS: DESCRIPTORS: THROBBING

## 2020-07-16 ASSESSMENT — PAIN DESCRIPTION - LOCATION
LOCATION: BACK
LOCATION: BACK

## 2020-07-16 ASSESSMENT — PAIN DESCRIPTION - ORIENTATION: ORIENTATION: LOWER;LEFT

## 2020-07-16 ASSESSMENT — PAIN - FUNCTIONAL ASSESSMENT: PAIN_FUNCTIONAL_ASSESSMENT: ACTIVITIES ARE NOT PREVENTED

## 2020-07-16 ASSESSMENT — PAIN DESCRIPTION - FREQUENCY: FREQUENCY: CONTINUOUS

## 2020-07-16 ASSESSMENT — PAIN DESCRIPTION - ONSET: ONSET: ON-GOING

## 2020-07-16 NOTE — PROGRESS NOTES
Shift assessment done, see flow sheet. Medication administrated per MAR, droplet plus and seizure precaution in place, call light in reach. Patient A/O to person and place, disoriented to time and situation.  Will continue to monitor

## 2020-07-16 NOTE — CARE COORDINATION
Discharge Planning Assessment    RN/SW discharge planner met with patient/ (and family member) to discuss reason for admission, current living situation, and potential needs at the time of discharge    Demographics/Insurance verified:  YES    Current type of dwelling:  ECF    Patient from ECF/SW confirmed with:  Sherice Veronica @ [address] [place] [address] arrangements:  LT      Confirmed patient is a resident in [address] [place] [address] at:      [facility] 110: [phone]  F: [phone]    He is on a bed hold and can return to facility at discharge.     Teressa Rodriguez RN BSN  Case Management  [phone]

## 2020-07-16 NOTE — PROGRESS NOTES
PM assessment complete and documented. Meds given per SHERYL Tamayo patent and secure. Draining without difficulty. No needs or concerns expressed. Will continue to monitor.

## 2020-07-16 NOTE — PROGRESS NOTES
Sleeping between care. No changes noted in assessment. Siderails up x4 for seizure precautions. Tamayo draining without difficulty. No needs expressed. Will continue to monitor.

## 2020-07-16 NOTE — CONSULTS
NEUROLOGY CONSULTATION     Patient's Name :   Glenny Sweeney        YOB: 1960                    Date of Consultation : 7/16/2020 4:04 PM    Referring Physician :  Betsy Salinas MD    Reason for Consultation :  Intractable seizure disorder    HISTORY OF PRESENT ILLNESS      Glenny Sweeney is a 61 y.o. male   History was obtained from patient and from dictations in the chart. Patient is a resident of a nursing facility. She had breakthrough seizure and was brought to the emergency room. Here patient had a couple more seizures and was admitted for further evaluation. There is history of poor compliance with medication in the past.  Patient had been seen at [facility] in the past.  There is previous history of left-sided intracerebral hemorrhage. Patient has had a seizure since that time. Patient is now on high-dose Keppra and Vimpat. Patient was loaded with extra Keppra in the hospital.  Keppra level was then later drawn and was therapeutic. Patient admits that he misses medications on and off. There is some confusion present. REVIEW OF SYSTEMS     Denies any chest pain palpitation breathlessness abdominal pain fever or weight loss.   Rest of the 14 system review was reviewed and was negative except for the symptoms noted above    Past Medical History:   Diagnosis Date    Acute intracerebral hemorrhage ([address] 02/03/2017    Anxiety     Back pain     Cerebral artery occlusion with cerebral infarction ([address]     Chronic back pain greater than 3 months duration 02/03/2017    Diabetes mellitus ([address]     Dysphasia 02/03/2017    Dysphonia 02/03/2017    Endocarditis and heart valve disorders in diseases classified elsewhere 02/03/2017    Hydrocephalus ([address] 02/03/2017    Hypertension     IVH (intraventricular hemorrhage) ([address] 02/03/2017    NSTEMI (non-ST elevated myocardial infarction) ([address] 02/03/2017    Seizures ([address]      History reviewed. No pertinent family history.   Social History     Socioeconomic History    Marital status:      Spouse name: None    Number of children: None    Years of education: None    Highest education level: None   Occupational History    None   Social Needs    Financial resource strain: None    Food insecurity     Worry: None     Inability: None    Transportation needs     Medical: None     Non-medical: None   Tobacco Use    Smoking status: Current Some Day Smoker     Packs/day: 0.50     Types: Cigarettes    Smokeless tobacco: Never Used    Tobacco comment: 6 per WEEK   Substance and Sexual Activity    Alcohol use: No    Drug use: No    Sexual activity: None   Lifestyle    Physical activity     Days per week: None     Minutes per session: None    Stress: None   Relationships    Social connections     Talks on phone: None     Gets together: None     Attends Tenriism service: None     Active member of club or organization: None     Attends meetings of clubs or organizations: None     Relationship status: None    Intimate partner violence     Fear of current or ex partner: None     Emotionally abused: None     Physically abused: None     Forced sexual activity: None   Other Topics Concern    None   Social History Narrative    None     Current Facility-Administered Medications   Medication Dose Route Frequency Provider Last Rate Last Dose    levETIRAcetam (KEPPRA) tablet 1,500 mg  1,500 mg Oral BID Luan Ng MD        levETIRAcetam (KEPPRA) tablet 500 mg  500 mg Oral Daily before lunch Luan Ng MD   500 mg at 07/16/20 1439    acetaminophen (TYLENOL) tablet 650 mg  650 mg Oral Q4H PRN Luan Ng MD        atorvastatin (LIPITOR) tablet 10 mg  10 mg Oral Nightly Luan Ng MD   10 mg at 07/15/20 2049    calcium carbonate (TUMS) chewable tablet 500 mg  1 tablet Oral BID Luan Ng MD   500 mg at 07/16/20 0919    citalopram (CELEXA) tablet 20 mg  20 mg Oral Daily Ele Napoles MD   20 mg at 07/16/20 0919    lacosamide (VIMPAT) tablet 200 mg  200 mg Oral BID Ele Napoles MD   200 mg at 07/16/20 1329    methadone (DOLOPHINE) tablet 5 mg  5 mg Oral BID Ele Napoles MD   5 mg at 07/16/20 0919    mirtazapine (REMERON) tablet 7.5 mg  7.5 mg Oral Nightly Ele Napoles MD   7.5 mg at 07/15/20 2050    OLANZapine (ZYPREXA) tablet 2.5 mg  2.5 mg Oral Nightly Ele Napoles MD   2.5 mg at 07/15/20 2049    potassium chloride (KLOR-CON M) extended release tablet 20 mEq  20 mEq Oral BID Ele Napoles MD   20 mEq at 07/16/20 0919    LORazepam (ATIVAN) injection 1 mg  1 mg Intravenous Q4H PRN Ele Napoles MD        sodium chloride flush 0.9 % injection 10 mL  10 mL Intravenous 2 times per day Ele Napoles MD   10 mL at 07/16/20 0920    sodium chloride flush 0.9 % injection 10 mL  10 mL Intravenous PRN Ele Napoles MD   10 mL at 07/15/20 2058    acetaminophen (TYLENOL) tablet 650 mg  650 mg Oral Q6H PRN Ele Napoles MD        Or   Cason acetaminophen (TYLENOL) suppository 650 mg  650 mg Rectal Q6H PRN Ele Napoles MD        polyethylene glycol (GLYCOLAX) packet 17 g  17 g Oral Daily PRN Ele Napoles MD        promethazine (PHENERGAN) tablet 12.5 mg  12.5 mg Oral Q6H PRN Ele Napoles MD        Or    ondansetron (ZOFRAN) injection 4 mg  4 mg Intravenous Q6H PRN Ele Napoles MD        cefTRIAXone (ROCEPHIN) 1 g in sterile water 10 mL IV syringe  1 g Intravenous Q24H Ele Napoles MD   1 g at 07/16/20 9465     Allergies   Allergen Reactions    No Known Allergies     Seasonal        PHYSICAL EXAMINATION:  BP (!) 108/42   Pulse 60   Temp 97.4 °F (36.3 °C) (Temporal)   Resp 10   Ht 6' 1\" (1.854 m)   Wt 153 lb 14.4 oz (69.8 kg)   SpO2 100%   BMI 20.30 kg/m²   Appearance: Well appearing, well nourished and in no distress  Mental Status partial complex seizure  Breakthrough seizure  There is history of noncompliance with medication in the past  History of left intracerebral hemorrhage in the past  CT head shows encephalomalacia in the left hemisphere  EEG showed focal slowing over the left hemisphere  Patient Active Problem List   Diagnosis    Psychosis ([address]    Acute intracerebral hemorrhage ([address]    Dysphagia    Dysphonia    Endocarditis, sumit and aortic valves    Endocarditis    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 Karolyn Ghosh current dose of Keppra as well as Vimpat. He is on fairly high doses and I am concerned that patient's doses may have been increased as an outpatient because of recurrent seizures especially when he is not taking his medications regularly. I will get a Keppra level tomorrow. I will also order a Vimpat level but this may be a few days before itcomes back. His levels will need to be adjusted as an outpatient as well by his primary physician. He had seen [facility] neurologist in the past as well and may prefer to follow-up there. Thank you for this consultation. Please note a portion of this chart was generated using dragon dictation software. Although every effort was made to ensure the accuracy of this automated transcription, some errors in transcription may have occurred.

## 2020-07-16 NOTE — PROGRESS NOTES
without deformity. Skin: Skin color, texture, turgor normal.  No rashes or lesions. Neurologic:  Neurovascularly intact without any focal sensory/motor deficits. Cranial nerves: II-XII intact, grossly non-focal.  Psychiatric: Alert and oriented-memory lapses noted. Capillary Refill: Brisk,< 3 seconds   Peripheral Pulses: +2 palpable, equal bilaterally       Labs:   Recent Labs     07/15/20  1008 07/16/20  0430   WBC 9.5 8.3   HGB 14.1 12.7*   HCT 43.3 36.8*    105*     Recent Labs     07/15/20  1008 07/15/20  1126 07/16/20  0430     --  143   K 5.8* 4.1 4.5     --  109   CO2 15*  --  26   BUN 17  --  11   CREATININE 1.0  --  0.6*   CALCIUM 10.4  --  8.8     Recent Labs     07/15/20  1008   AST 25   ALT 16   BILITOT 0.4   ALKPHOS 56     No results for input(s): INR in the last 72 hours. Recent Labs     07/15/20  1008   TROPONINI <0.01       Urinalysis:      Lab Results   Component Value Date    NITRU Negative 07/15/2020    WBCUA 24 07/15/2020    RBCUA 19 07/15/2020    BLOODU MODERATE 07/15/2020    SPECGRAV 1.021 07/15/2020    GLUCOSEU Negative 07/15/2020       Radiology:  CT HEAD WO CONTRAST   Final Result   No acute intracranial abnormality. Redemonstration of postoperative changes of left craniotomy with underlying   left parietal and occipital encephalomalacia. XR CHEST PORTABLE   Final Result   1. Cardiomegaly. Assessment/Plan:    Active Hospital Problems    Diagnosis    Seizure [facility]) [R56.9]         Breakthrough seizure: CT head nonacute. Recent admission at outside hospital reviewed. Question of medication noncompliance. Keppra level within normal limits. Continue home dose Keppra and Vimpat. Neurology consulted. EEG showed mild slowing in left hemispheric region(patient had craniotomy and aneurysm repair in the past). Await neurology recommendations. .       Lactic acidosis: Secondary to seizure. Normalized. Stop IV fluids.     Pyuria:?  UTI. Rocephin given in emergency room. Continue Rocephin.     Awaiting urine cultures.     Hyperlipidemia: Lipitor     Chronic pain syndrome: Continue home pain medications.     History of intracranial hemorrhage, aneurysm repair              DVT Prophylaxis: scd's   Diet: DIET GENERAL;  Code Status: Full Code    PT/OT Eval Status: order    Dispo - inpt 1 more day     Josh Dunham MD

## 2020-07-16 NOTE — PROGRESS NOTES
Assessment completed, oriented to place and self, disoriented to year, month and his birth year. States \"I'm so confused right now\". Reoriented patient. IV, NS infusing at 125 ml/lhr via right wrist, site unremarkable. Side rails up and padded for seizure precautions. C/o chronic lower left back pain.

## 2020-07-17 LAB
KEPPRA DOSE AMT: ABNORMAL
KEPPRA: 47.4 UG/ML (ref 6–46)
ORGANISM: ABNORMAL
URINE CULTURE, ROUTINE: ABNORMAL

## 2020-07-17 PROCEDURE — 97530 THERAPEUTIC ACTIVITIES: CPT

## 2020-07-17 PROCEDURE — 99232 SBSQ HOSP IP/OBS MODERATE 35: CPT | Performed by: PSYCHIATRY & NEUROLOGY

## 2020-07-17 PROCEDURE — 97161 PT EVAL LOW COMPLEX 20 MIN: CPT

## 2020-07-17 PROCEDURE — 2060000000 HC ICU INTERMEDIATE R&B

## 2020-07-17 PROCEDURE — 80235 DRUG ASSAY LACOSAMIDE: CPT

## 2020-07-17 PROCEDURE — 80177 DRUG SCRN QUAN LEVETIRACETAM: CPT

## 2020-07-17 PROCEDURE — 6370000000 HC RX 637 (ALT 250 FOR IP): Performed by: INTERNAL MEDICINE

## 2020-07-17 PROCEDURE — 97165 OT EVAL LOW COMPLEX 30 MIN: CPT

## 2020-07-17 PROCEDURE — 2580000003 HC RX 258: Performed by: INTERNAL MEDICINE

## 2020-07-17 PROCEDURE — 6360000002 HC RX W HCPCS: Performed by: INTERNAL MEDICINE

## 2020-07-17 RX ADMIN — Medication 10 ML: at 20:44

## 2020-07-17 RX ADMIN — VANCOMYCIN HYDROCHLORIDE 1250 MG: 10 INJECTION, POWDER, LYOPHILIZED, FOR SOLUTION INTRAVENOUS at 23:30

## 2020-07-17 RX ADMIN — LEVETIRACETAM 1500 MG: 500 TABLET ORAL at 10:42

## 2020-07-17 RX ADMIN — LEVETIRACETAM 1500 MG: 500 TABLET ORAL at 20:37

## 2020-07-17 RX ADMIN — METHADONE HYDROCHLORIDE 5 MG: 10 TABLET ORAL at 20:38

## 2020-07-17 RX ADMIN — MIRTAZAPINE 7.5 MG: 15 TABLET, FILM COATED ORAL at 20:37

## 2020-07-17 RX ADMIN — ANTACID TABLETS 500 MG: 500 TABLET, CHEWABLE ORAL at 10:43

## 2020-07-17 RX ADMIN — LACOSAMIDE 200 MG: 100 TABLET, FILM COATED ORAL at 20:37

## 2020-07-17 RX ADMIN — METHADONE HYDROCHLORIDE 5 MG: 10 TABLET ORAL at 10:43

## 2020-07-17 RX ADMIN — VANCOMYCIN HYDROCHLORIDE 1250 MG: 10 INJECTION, POWDER, LYOPHILIZED, FOR SOLUTION INTRAVENOUS at 12:17

## 2020-07-17 RX ADMIN — ANTACID TABLETS 500 MG: 500 TABLET, CHEWABLE ORAL at 20:38

## 2020-07-17 RX ADMIN — CITALOPRAM HYDROBROMIDE 20 MG: 20 TABLET ORAL at 10:42

## 2020-07-17 RX ADMIN — LACOSAMIDE 200 MG: 100 TABLET, FILM COATED ORAL at 12:20

## 2020-07-17 RX ADMIN — POTASSIUM CHLORIDE 20 MEQ: 1500 TABLET, EXTENDED RELEASE ORAL at 10:43

## 2020-07-17 RX ADMIN — POTASSIUM CHLORIDE 20 MEQ: 1500 TABLET, EXTENDED RELEASE ORAL at 20:38

## 2020-07-17 RX ADMIN — LEVETIRACETAM 500 MG: 500 TABLET, FILM COATED ORAL at 12:20

## 2020-07-17 RX ADMIN — ATORVASTATIN CALCIUM 10 MG: 10 TABLET, FILM COATED ORAL at 20:38

## 2020-07-17 RX ADMIN — Medication 10 ML: at 10:42

## 2020-07-17 RX ADMIN — OLANZAPINE 2.5 MG: 5 TABLET, FILM COATED ORAL at 20:37

## 2020-07-17 ASSESSMENT — PAIN SCALES - GENERAL
PAINLEVEL_OUTOF10: 5
PAINLEVEL_OUTOF10: 3

## 2020-07-17 NOTE — PROGRESS NOTES
Clinical Pharmacy Note: Pharmacy to Dose Vancomycin    Giovanni Unger is a 61 y.o. male started on Vancomycin for enterococcus UTI; consult received from Dr. To Bueno to manage therapy. Also receiving the following antibiotics: N/A. Goal Trough Level: 15 mcg/mL    Assessment/Plan:  Initiate vancomycin 1250 mg IV every 12 hours. A vancomycin trough has been ordered for 7/19 @1030. Changes in regimen will be determined based on culture results, renal function, and clinical response. Pharmacy will continue to monitor and adjust regimen as necessary. Allergies:  No known allergies and Seasonal         Recent Labs     07/15/20  1008 07/16/20  0430   CREATININE 1.0 0.6*       Recent Labs     07/15/20  1008 07/16/20  0430   WBC 9.5 8.3       Ht Readings from Last 1 Encounters:   07/15/20 6' 1\" (1.854 m)        Wt Readings from Last 1 Encounters:   07/15/20 153 lb 14.4 oz (69.8 kg)         Estimated Creatinine Clearance: 131 mL/min (A) (based on SCr of 0.6 mg/dL (L)).       Thank you for the consult,    Liza Fernandez, PharmD, [id number].   [id number]

## 2020-07-17 NOTE — PROGRESS NOTES
Hospitalist Progress Note      PCP: Lambert Ramos MD    Date of Admission: 7/15/2020    Chief Complaint: Seizure    Hospital Course: Admitted with breakthrough seizure. Subjective: Doing okay. No new complaints. Wants to return to his place as soon as possible. Medications:  Reviewed    Infusion Medications   Scheduled Medications    vancomycin  1,250 mg Intravenous Q12H    levETIRAcetam  1,500 mg Oral BID    levETIRAcetam  500 mg Oral Daily before lunch    atorvastatin  10 mg Oral Nightly    calcium carbonate  1 tablet Oral BID    citalopram  20 mg Oral Daily    lacosamide  200 mg Oral BID    methadone  5 mg Oral BID    mirtazapine  7.5 mg Oral Nightly    OLANZapine  2.5 mg Oral Nightly    potassium chloride  20 mEq Oral BID    sodium chloride flush  10 mL Intravenous 2 times per day     PRN Meds: acetaminophen, LORazepam, sodium chloride flush, acetaminophen **OR** acetaminophen, polyethylene glycol, promethazine **OR** ondansetron      Intake/Output Summary (Last 24 hours) at 7/17/2020 1531  Last data filed at 7/16/2020 2001  Gross per 24 hour   Intake 240 ml   Output 700 ml   Net -460 ml       Physical Exam Performed:    BP (!) 102/47   Pulse 61   Temp 97.2 °F (36.2 °C) (Temporal)   Resp 14   Ht 6' 1\" (1.854 m)   Wt 153 lb 14.4 oz (69.8 kg)   SpO2 100%   BMI 20.30 kg/m²     General appearance: No apparent distress, appears stated age and cooperative. HEENT: Pupils equal, round, and reactive to light. Conjunctivae/corneas clear. Neck: Supple, with full range of motion. No jugular venous distention. Trachea midline. Respiratory:  Normal respiratory effort. Clear to auscultation, bilaterally without Rales/Wheezes/Rhonchi. Cardiovascular: Regular rate and rhythm with normal S1/S2 without murmurs, rubs or gallops. Abdomen: Soft, non-tender, non-distended with normal bowel sounds. Musculoskeletal: No clubbing, cyanosis or edema bilaterally.   Full range of motion without Continue Vimpat. Lactic acidosis: Secondary to seizure. Normalized.        UTI: Urine cultures grew E faecalis. Change antibiotics to IV vancomycin.   Await sensitivities.     Hyperlipidemia: Lipitor     Chronic pain syndrome: Continue home pain medications.     History of intracranial hemorrhage, aneurysm repair              DVT Prophylaxis: scd's   Diet: DIET GENERAL;  Code Status: Full Code    PT/OT Eval Status: ordered    Dispo - inpt 1 more day     Aide Marin MD

## 2020-07-17 NOTE — PROGRESS NOTES
Physical Therapy    Facility/Department: [address]  Initial Assessment/Discharge Summary    NAME: Erasmo Aguillon  : 1960  MRN: [id number]    Date of Service: 2020    Discharge Recommendations:    Erasmo Aguillon scored a 20/24 on the AM-PAC short mobility form. At this time, no further PT is recommended upon discharge due to independence with functional mobility. Recommend patient returns to prior setting with prior services. PT Equipment Recommendations  Equipment Needed: No    Assessment   Assessment: pt demonstrated independence with functional mobility, no skilled therapy needs  Prognosis: Good  Decision Making: Low Complexity  Patient Education: pt verbalized understanding of PT eval  REQUIRES PT FOLLOW UP: No  Activity Tolerance  Activity Tolerance: Patient Tolerated treatment well       Patient Diagnosis(es): The primary encounter diagnosis was Status epilepticus ([facility] A diagnosis of Urinary tract infection without hematuria, site unspecified was also pertinent to this visit. has a past medical history of Acute intracerebral hemorrhage ([facility] Anxiety, Back pain, Cerebral artery occlusion with cerebral infarction [facility]), Chronic back pain greater than 3 months duration, Diabetes mellitus ([address] Dysphasia, Dysphonia, Endocarditis and heart valve disorders in diseases classified elsewhere, Hydrocephalus ([facility] Hypertension, IVH (intraventricular hemorrhage) ([address] NSTEMI (non-ST elevated myocardial infarction) ([address] and Seizures ([facility] [address] [facility] has a past surgical history that includes Leg Surgery; craniotomy (Left, 2017); Brain aneurysm surgery (Left, 2017); Upper gastrointestinal endoscopy (N/A, 2019); and Colonoscopy (N/A, 2019). Restrictions  Restrictions/Precautions  Restrictions/Precautions: Fall Risk  Appropriate PPE donned prior to entering patients room this date:  gown, gloves, N-95 and face shield. Subjective  General  Chart Reviewed:  Yes  Additional Pertinent Hx: CVA, HTN, DM  Family / Caregiver Present: No  Diagnosis: Status epilepticus  Follows Commands: Within Functional Limits  Subjective  Subjective: pt supine at start of session, agreeable to therapy this morning  Pain Screening  Patient Currently in Pain: Denies  Vital Signs  Patient Currently in Pain: Denies       Orientation  Orientation  Overall Orientation Status: Within Functional Limits  Social/Functional History  Social/Functional History  Type of Home: Facility  ADL Assistance: Independent  Ambulation Assistance: Independent  Transfer Assistance: Independent  Additional Comments: LTC at [facility], normally independent with ADL's and ambulation    Objective    AROM RLE (degrees)  RLE AROM: WFL  AROM LLE (degrees)  LLE AROM : WFL  Strength RLE  Strength RLE: WFL  Strength LLE  Strength LLE: WFL        Bed mobility  Supine to Sit: Supervision  Sit to Supine: Unable to assess(pt sitting up in chair at end of session)  Scooting: Supervision  Transfers  Sit to Stand: Supervision  Stand to sit: Supervision  Ambulation  Ambulation?: Yes  Ambulation 1  Surface: level tile  Device: No Device  Assistance: Supervision  Quality of Gait: steady, no LOB  Distance: 25' in room  Stairs/Curb  Stairs?: No     Balance  Posture: Good  Sitting - Static: Good  Sitting - Dynamic: Good  Standing - Static: Good  Standing - Dynamic: Good        Plan   Safety Devices  Type of devices:  All fall risk precautions in place, Call light within reach, Chair alarm in place, Left in chair, Nurse notified      AM-PAC Score  AM-PAC Inpatient Mobility Raw Score : 20 (07/17/20 1329)  AM-PAC Inpatient T-Scale Score : 47.67 (07/17/20 1329)  Mobility Inpatient CMS 0-100% Score: 35.83 (07/17/20 1329)  Mobility Inpatient CMS G-Code Modifier : Dong Gomez (07/17/20 1329)          Goals  Short term goals  Time Frame for Short term goals: no acute PT goals       Therapy Time   Individual Concurrent Group Co-treatment   Time In 7124         Time Out 0850

## 2020-07-17 NOTE — PROGRESS NOTES
Pt is alert and oriented x1. Able to follow commands throughout assessment. Pt HR and RR within normal limits. Respirations were even and easy with no adventitious sounds. Bowel sounds active x4. Pt demonstrated how to reach nurse with call light. Call light in reach. Will continue to monitor.

## 2020-07-17 NOTE — PROGRESS NOTES
Occupational Therapy   Occupational Therapy Initial Assessment/ discharge  Date: 2020   Patient Name: Kenisha Marmolejo  MRN: [id number]     : 1960    Date of Service: 2020    Discharge Recommendations:  Kenisha Marmolejo scored a 18/24 on the AM-[facility] ADL Inpatient form. At this time, no further OT is recommended upon discharge due to patient being at baseline. Recommend patient returns to prior setting with prior services. OT Equipment Recommendations  Equipment Needed: No    Assessment   Performance deficits / Impairments: Decreased safe awareness;Decreased cognition  Treatment Diagnosis: decreased independence with ADL related ot late affects of seizures  Prognosis: Good  Decision Making: Low Complexity  OT Education: OT Role;Plan of Care;ADL Adaptive Strategies  No Skilled OT: At baseline function  REQUIRES OT FOLLOW UP: No  Activity Tolerance  Activity Tolerance: Patient Tolerated treatment well  Safety Devices  Safety Devices in place: Yes  Type of devices: All fall risk precautions in place; Patient at risk for falls; Left in chair;Call light within reach; Chair alarm in place;Nurse notified           Patient Diagnosis(es): The primary encounter diagnosis was Status epilepticus ([facility] A diagnosis of Urinary tract infection without hematuria, site unspecified was also pertinent to this visit. has a past medical history of Acute intracerebral hemorrhage ([facility] [facility] Anxiety, Back pain, Cerebral artery occlusion with cerebral infarction [facility]), Chronic back pain greater than 3 months duration, Diabetes mellitus ([address] Dysphasia, Dysphonia, Endocarditis and heart valve disorders in diseases classified elsewhere, Hydrocephalus ([facility] Hypertension, IVH (intraventricular hemorrhage) ([facility] NSTEMI (non-ST elevated myocardial infarction) ([facility] and Seizures ([facility] has a past surgical history that includes Leg Surgery; craniotomy (Left, 2017); Brain aneurysm surgery (Left, 2017);  Upper gastrointestinal endoscopy (N/A, 8/14/2019); and Colonoscopy (N/A, 8/14/2019). Treatment Diagnosis: decreased independence with ADL related ot late affects of seizures      Restrictions  Restrictions/Precautions  Restrictions/Precautions: Fall Risk    Subjective   General  Chart Reviewed: Yes  Diagnosis: seizures  Subjective  Subjective: Patinet in good spirits, agreeable to therapy evaluation    Social/Functional History          Objective        Orientation  Overall Orientation Status: Within Normal Limits     Balance  Sitting Balance: Supervision  Standing Balance: Supervision  Standing Balance  Time: up to 5 minutes  Activity: ambulation in room  Wheelchair Bed Transfers  Wheelchair/Bed - Technique: Ambulating  Level of Asssistance: Supervision           Bed mobility  Supine to Sit: Supervision  Transfers  Stand Pivot Transfers: Supervision  Sit to stand: Supervision  Stand to sit: Supervision     Cognition  Overall Cognitive Status: Exceptions  Arousal/Alertness: Appropriate responses to stimuli  Following Commands:  Follows one step commands consistently  Attention Span: Attends with cues to redirect  Memory: Decreased recall of recent events  Safety Judgement: Decreased awareness of need for safety  Problem Solving: Assistance required to correct errors made;Assistance required to identify errors made  Insights: Decreased awareness of deficits  Initiation: Requires cues for some  Sequencing: Requires cues for some                 LUE PROM (degrees)  LUE PROM: WNL  LUE AROM (degrees)  LUE AROM : WNL  RUE AROM (degrees)  RUE AROM : WNL                      Plan        G-Code     OutComes Score                                                  AM-PAC Score        AM-PAC Inpatient Daily Activity Raw Score: 18 (07/17/20 1315)  AM-PAC Inpatient ADL T-Scale Score : 38.66 (07/17/20 1315)  ADL Inpatient CMS 0-100% Score: 46.65 (07/17/20 1315)  ADL Inpatient CMS G-Code Modifier : CK (07/17/20 1315)    Goals  Patient Goals   Patient goals : Patient's goal is to go back to nursing home.        Therapy Time   Individual Concurrent Group Co-treatment   Time In 0926         Time Out 0949         Minutes [address] [place] [address]

## 2020-07-17 NOTE — PROGRESS NOTES
Tamayo discontinued per order. No complications. See flowsheets. Pt wishes to remain in chair as of now for comfort. Pt demonstrated how to reach nurse with call light. Call light in reach. Will continue to monitor.

## 2020-07-17 NOTE — PROGRESS NOTES
NEUROLOGY FOLLOWUP    HISTORY OF PRESENT ILLNESS :     Larisa Cross is a 61 y.o. male   History was obtained from the patient and dictations in the chart. Patient has not had any further seizures. No side effects to medication. His Keppra level was noted to be high today. He is also on Vimpat. REVIEW OF SYSTEMS       Constitutional:  []   Chills   []  Fatigue   []  Fevers   []  Malaise   []  Weight loss     [x] Denies all of the above    Respiratory:   []  Cough    []  Shortness of breath         [x] Denies all of the above     Cardiovascular:   []  Chest pain    []  Exertional chest pressure/discomfort           [] Palpitations    []  Syncope     [x] Denies all of the above    Past Medical History:   Diagnosis Date    [facility] hemorrhage ([facility] 02/03/2017    Anxiety     Back pain     Cerebral artery occlusion with cerebral infarction (HCC)     Chronic back pain greater than 3 months duration 02/03/2017    Diabetes mellitus ([facility]     Dysphasia 02/03/2017    Dysphonia 02/03/2017    Endocarditis and heart valve disorders in diseases classified elsewhere 02/03/2017    Hydrocephalus ([facility] 02/03/2017    Hypertension     IVH (intraventricular hemorrhage) ([facility] 02/03/2017    NSTEMI (non-ST elevated myocardial infarction) ([facility] 02/03/2017    Seizures [facility]      History reviewed. No pertinent family history.   Social History     Socioeconomic History    Marital status:      Spouse name: None    Number of children: None    Years of education: None    Highest education level: None   Occupational History    None   Social Needs    Financial resource strain: None    Food insecurity     Worry: None     Inability: None    Transportation needs     Medical: None     Non-medical: None   Tobacco Use    Smoking status: Current Some Day Smoker     Packs/day: 0.50     Types: Cigarettes    Smokeless GLUCOSE 70 07/16/2020     RADIOLOGY REVIEW:  I have reviewed radiology image(s) and reports(s) of: CT head      IMPRESSION :  Partial complex seizures  Breakthrough seizure  Keppra level is high today  Patient Active Problem List   Diagnosis    Psychosis ([facility]    Acute intracerebral hemorrhage ([facility]    Dysphagia    Dysphonia    Endocarditis, sumit and aortic valves    Endocarditis    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 Nicole Wood  Decrease Keppra dose to 1500 mg twice daily  Continue Vimpat 200 mg twice daily  Would recommend repeating Keppra level in 1 week          Please note a portion of this chart was generated using dragon dictation software. Although every effort was made to ensure the accuracy of this automated transcription, some errors in transcription may have occurred.          Jorge Alvarez M.D.

## 2020-07-18 VITALS
HEIGHT: 73 IN | TEMPERATURE: 97.6 F | RESPIRATION RATE: 14 BRPM | SYSTOLIC BLOOD PRESSURE: 120 MMHG | DIASTOLIC BLOOD PRESSURE: 62 MMHG | WEIGHT: 155.4 LBS | HEART RATE: 62 BPM | BODY MASS INDEX: 20.6 KG/M2 | OXYGEN SATURATION: 97 %

## 2020-07-18 PROCEDURE — 6360000002 HC RX W HCPCS: Performed by: INTERNAL MEDICINE

## 2020-07-18 PROCEDURE — 99232 SBSQ HOSP IP/OBS MODERATE 35: CPT | Performed by: PSYCHIATRY & NEUROLOGY

## 2020-07-18 PROCEDURE — 2580000003 HC RX 258: Performed by: INTERNAL MEDICINE

## 2020-07-18 PROCEDURE — 6370000000 HC RX 637 (ALT 250 FOR IP): Performed by: INTERNAL MEDICINE

## 2020-07-18 RX ORDER — AMOXICILLIN 250 MG/1
500 CAPSULE ORAL 2 TIMES DAILY
Qty: 12 CAPSULE | Refills: 0 | Status: SHIPPED | OUTPATIENT
Start: 2020-07-18 | End: 2020-07-24

## 2020-07-18 RX ORDER — GREEN TEA/HOODIA GORDONII 315-12.5MG
1 CAPSULE ORAL 2 TIMES DAILY
Qty: 60 TABLET | Refills: 0 | Status: SHIPPED | OUTPATIENT
Start: 2020-07-18 | End: 2020-08-17

## 2020-07-18 RX ADMIN — LEVETIRACETAM 1500 MG: 500 TABLET ORAL at 08:29

## 2020-07-18 RX ADMIN — LACOSAMIDE 200 MG: 100 TABLET, FILM COATED ORAL at 08:59

## 2020-07-18 RX ADMIN — POTASSIUM CHLORIDE 20 MEQ: 1500 TABLET, EXTENDED RELEASE ORAL at 08:29

## 2020-07-18 RX ADMIN — CITALOPRAM HYDROBROMIDE 20 MG: 20 TABLET ORAL at 08:30

## 2020-07-18 RX ADMIN — ANTACID TABLETS 500 MG: 500 TABLET, CHEWABLE ORAL at 08:29

## 2020-07-18 RX ADMIN — VANCOMYCIN HYDROCHLORIDE 1250 MG: 10 INJECTION, POWDER, LYOPHILIZED, FOR SOLUTION INTRAVENOUS at 11:41

## 2020-07-18 RX ADMIN — METHADONE HYDROCHLORIDE 5 MG: 10 TABLET ORAL at 08:29

## 2020-07-18 ASSESSMENT — PAIN SCALES - GENERAL: PAINLEVEL_OUTOF10: 7

## 2020-07-18 NOTE — DISCHARGE SUMMARY
Hospital Medicine Discharge Summary    Patient ID: Sena Stroud      Patient's PCP: Ruby Saab MD    Admit Date: 7/15/2020     Discharge Date:   7/18/2020    Admitting Physician: Abdiel Sanchez MD     Discharge Physician: Abdiel Sanchez MD     Discharge Diagnoses: Active Hospital Problems    Diagnosis    Seizure [facility]) [R56.9]       The patient was seen and examined on day of discharge and this discharge summary is in conjunction with any daily progress note from day of discharge.       History Of Present Illness:       61 y.o. male with a history of intracerebral hemorrhage status post aneurysm repair, grand mal seizures, hypertension, hyperlipidemia, chronic pain syndrome , nursing home resident, medication noncompliance was sent here for evaluation of seizures. Patient apparently had generalized tonic-clonic seizure at nursing home. During transport here by EMS he had a brief seizure. In the ER he had 2 more episodes. He was given IV Ativan, IV Versed, IV Keppra. Discussed with neurology already.     At this point of time, his last seizure was 3 hours ago. Patient is still postictal versus under the effect of Ativan. Was not able to provide much history. No recent diarrhea or URI or UTI reported.        Hospital Course:     Breakthrough seizure: CT head nonacute.   Recent admission at outside hospital reviewed.  Question of medication noncompliance.  EEG showed mild slowing in left hemispheric region(patient had craniotomy and aneurysm repair in the past). Initial Keppra level was within normal limits but repeat levels here after 2 days here was supratherapeutic, which strengthened suspicion of medication noncompliance. Decreased dose of Keppra to 1500 twice daily. Discussed with neurology. Continued Vimpat.        Lactic acidosis: Secondary to seizure.    Normalized.        UTI: Urine cultures grew E faecalis- PCN sensitive. Treated with amoxicillin.    Hyperlipidemia: Lipitor     Chronic pain syndrome: Continued home pain medications.     History of intracranial hemorrhage, aneurysm repair          Physical Exam Performed:     /62   Pulse 62   Temp 97.6 °F (36.4 °C) (Tympanic)   Resp 14   Ht 6' 1\" (1.854 m)   Wt 155 lb 6.4 oz (70.5 kg)   SpO2 97%   BMI 20.50 kg/m²       General appearance:  No apparent distress, appears stated age and cooperative. HEENT:  Normal cephalic, atraumatic without obvious deformity. Pupils equal, round, and reactive to light. Extra ocular muscles intact. Conjunctivae/corneas clear. Neck: Supple, with full range of motion. No jugular venous distention. Trachea midline. Respiratory:  Normal respiratory effort. Clear to auscultation, bilaterally without Rales/Wheezes/Rhonchi. Cardiovascular:  Regular rate and rhythm with normal S1/S2 without murmurs, rubs or gallops. Abdomen: Soft, non-tender, non-distended with normal bowel sounds. Musculoskeletal:  No clubbing, cyanosis or edema bilaterally. Full range of motion without deformity. Skin: Skin color, texture, turgor normal.  No rashes or lesions. Neurologic:  Neurovascularly intact without any focal sensory/motor deficits. Cranial nerves: II-XII intact, grossly non-focal.  Psychiatric:  Alert and oriented, thought content appropriate, normal insight  Capillary Refill: Brisk,< 3 seconds   Peripheral Pulses: +2 palpable, equal bilaterally       Labs:  For convenience and continuity at follow-up the following most recent labs are provided:      CBC:    Lab Results   Component Value Date    WBC 8.3 07/16/2020    HGB 12.7 07/16/2020    HCT 36.8 07/16/2020     07/16/2020       Renal:    Lab Results   Component Value Date     07/16/2020    K 4.5 07/16/2020     07/16/2020    CO2 26 07/16/2020    BUN 11 07/16/2020    CREATININE 0.6 07/16/2020    CALCIUM 8.8 07/16/2020         Significant Diagnostic Studies    Radiology:   CT HEAD WO CONTRAST   Final medications and discharge plan. Signed:    Lilian Stark MD   7/18/2020      Thank you Lambert Ramos MD for the opportunity to be involved in this patient's care. If you have any questions or concerns please feel free to contact me at [phone].

## 2020-07-18 NOTE — PLAN OF CARE
Pt is medically stable to dced. Pt does not want to go back to [facility] and wants to go home.    Await  input

## 2020-07-18 NOTE — PROGRESS NOTES
Am assessment complete, vss, alert and oriented, call light and phone by side, fall precautions in place, seizure precautions in place, no needs at this time, will continue to monitor.  Ishmael Chong

## 2020-07-18 NOTE — DISCHARGE INSTR - COC
Continuity of Care Form    Patient Name: Rashida Cage   :  1960  MRN:  [id number]    Admit date:  7/15/2020  Discharge date:  20    Code Status Order: Full Code   Advance Directives:   [facility] Documentation     Date/Time Healthcare Directive Type of Healthcare Directive Copy in [facility] [address] Agent's Name Healthcare Agent's Phone Number    20 0033  No, patient does not have an advance directive for healthcare treatment -- -- -- -- --          Admitting Physician:  Thalia Bond MD  PCP: Viktoria Schuler MD    Discharging Nurse:  Sho  Unit/Room#: W8T-6539/7714-08  Discharging Unit Phone Number: [phone]    Emergency Contact:   Extended Emergency Contact Information  Primary Emergency Contact: Korin Luciano  [place] Phone: [phone]  Relation: Parent  Secondary Emergency Contact: [address]alvo, [address] [place] [address] Phone: [phone]  Relation: Brother/Sister    Past Surgical History:  Past Surgical History:   Procedure Laterality Date    BRAIN ANEURYSM SURGERY Left 2017    COLONOSCOPY N/A 2019    COLONOSCOPY WITH MAC ANESTHESIA performed by Selma Lechuga MD at [address] [place] [address] Left 2017    LEG SURGERY      UPPER GASTROINTESTINAL ENDOSCOPY N/A 2019    EGD BIOPSY performed by Selma Lechuga MD at [facility] ENDOSCOPY       Immunization History: There is no immunization history for the selected administration types on file for this patient.     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 Unmapped Infections (1)      MRSA 03/03/16 03/03/16 07/15/20             Patient Infection Status     Infection Onset Added Last Indicated Last Indicated By Review Planned Expiration Resolved Resolved By    None active    Resolved    COVID-19 Rule Out 07/15/20 07/15/20 07/15/20 COVID-19 (Ordered)   07/16/20 Rule-Out Test Resulted          Nurse Assessment:  Last Vital Signs: /62   Pulse 62   Temp 97.6 °F (36.4 °C) (Tympanic)   Resp 14   Ht 6' 1\" (1.854 m)   Wt 155 lb 6.4 oz (70.5 kg)   SpO2 97%   BMI 20.50 kg/m²     Last documented pain score (0-10 scale): Pain Level: 7  Last Weight:   Wt Readings from Last 1 Encounters:   07/18/20 155 lb 6.4 oz (70.5 kg)     Mental Status:  alert and oriented    IV Access:  - None    Nursing Mobility/ADLs:  Walking   Independent  Transfer  Independent  Bathing  Independent  Dressing  Independent  Toileting  Independent  Feeding  Independent  Med Admin  Assisted  Med Delivery   whole    Wound Care Documentation and Therapy:  Incision 02/03/17 Head Left;Posterior (Active)   Number of days: 1260        Elimination:  Continence:   · Bowel:  Yes  · Bladder: Yes  Urinary Catheter: None   Colostomy/Ileostomy/Ileal Conduit: No       Date of Last BM: NA    Intake/Output Summary (Last 24 hours) at 7/18/2020 1321  Last data filed at 7/18/2020 6993  Gross per 24 hour   Intake 200 ml   Output 300 ml   Net -100 ml     I/O last 3 completed shifts:  In: -   Out: 300 [Urine:300]    Safety Concerns:     History of Seizures    Impairments/Disabilities:      None    Nutrition Therapy:  Current Nutrition Therapy:   - Oral Diet:  General    Routes of Feeding: Oral  Liquids: No Restrictions  Daily Fluid Restriction: no  Last Modified Barium Swallow with Video (Video Swallowing Test): not done    Treatments at the Time of Hospital Discharge:   Respiratory Treatments: none  Oxygen Therapy:  is not on home oxygen therapy. Ventilator:    - No ventilator support    Rehab Therapies: none  Weight Bearing Status/Restrictions: No weight bearing restirctions  Other Medical Equipment (for information only, NOT a DME order):  none  Other Treatments: none    Patient's personal belongings (please select all that are sent with patient):  None    RN SIGNATURE:  Electronically signed by Giacomo Berry RN on 7/18/20 at 1:29 PM EDT    CASE MANAGEMENT/SOCIAL WORK SECTION    Inpatient Status Date: 15 JUL 2020  Readmission Risk Assessment Score:  Readmission Risk              Risk of Unplanned Readmission:        13           Discharging to Facility/ Agency   Name: Discharging to Facility/ Agency   [facility]  R: [phone]  · F: [phone]      / signature: DHARMESH Orellana, .[phone]      PHYSICIAN SECTION    Prognosis: Good    Condition at Discharge: Stable    Rehab Potential (if transferring to Rehab): Good    Recommended Labs or Other Treatments After Discharge: PCP or neurology in 1 week for rechecking keppra levels    Physician Certification: I certify the above information and transfer of John Grady  is necessary for the continuing treatment of the diagnosis listed and that he requires [facility] for less 30 days.      Update Admission H&P: No change in H&P    PHYSICIAN SIGNATURE:  Electronically signed by Ko Min MD on 7/18/20 at 1:21 PM EDT

## 2020-07-18 NOTE — CARE COORDINATION
Discharge Plan:     Patient discharged to:  SW/DC Planner faxed, [address] and AVS to:  [phone]  Narcotic Prescriptions faxed were:  [phone]   RN: Risa Eric [facility]  with: [address] [facility]  [phone]   time: 36  Family advised of discharge?: Yes  HENS Submitted?:  n/a, LTC  All discharge needs met per case management.     Confirmed w/ RN on LTD side of ability to accept patient    Patt Olson RN, BSN  [phone]

## 2020-07-18 NOTE — PROGRESS NOTES
IV infiltrated during vanc infusion. Infusion stopped. No changes noted in assessment. Voiding per urinal. Will continue to monitor.

## 2020-07-18 NOTE — PROGRESS NOTES
NEUROLOGY FOLLOWUP    HISTORY OF PRESENT ILLNESS :     Ese Murrieta is a 61 y.o. male   History was obtained from the patient and dictations in the chart. Patient has not had any further seizures. No side effects to medication. His Keppra level was noted to be high yesterday  He is also on Vimpat. REVIEW OF SYSTEMS       Constitutional:  []   Chills   []  Fatigue   []  Fevers   []  Malaise   []  Weight loss     [x] Denies all of the above    Respiratory:   []  Cough    []  Shortness of breath         [x] Denies all of the above     Cardiovascular:   []  Chest pain    []  Exertional chest pressure/discomfort           [] Palpitations    []  Syncope     [x] Denies all of the above    Past Medical History:   Diagnosis Date    Acute intracerebral hemorrhage ([facility] 02/03/2017    Anxiety     Back pain     Cerebral artery occlusion with cerebral infarction (HCC)     Chronic back pain greater than 3 months duration 02/03/2017    Diabetes mellitus ([facility]     Dysphasia 02/03/2017    Dysphonia 02/03/2017    Endocarditis and heart valve disorders in diseases classified elsewhere 02/03/2017    Hydrocephalus ([facility] 02/03/2017    Hypertension     IVH (intraventricular hemorrhage) ([facility] 02/03/2017    NSTEMI (non-ST elevated myocardial infarction) [facility] 02/03/2017    Seizures ([facility]      History reviewed. No pertinent family history.   Social History     Socioeconomic History    Marital status:      Spouse name: None    Number of children: None    Years of education: None    Highest education level: None   Occupational History    None   Social Needs    Financial resource strain: None    Food insecurity     Worry: None     Inability: None    Transportation needs     Medical: None     Non-medical: None   Tobacco Use    Smoking status: Current Some Day Smoker     Packs/day: 0.50     Types: Cigarettes    Smokeless tobacco: Never Used    Tobacco comment: 6 per WEEK   Substance and Sexual Activity    Alcohol use: No    Drug use: No    Sexual activity: None   Lifestyle    Physical activity     Days per week: None     Minutes per session: None    Stress: None   Relationships    Social connections     Talks on phone: None     Gets together: None     Attends Confucianist service: None     Active member of club or organization: None     Attends meetings of clubs or organizations: None     Relationship status: None    Intimate partner violence     Fear of current or ex partner: None     Emotionally abused: None     Physically abused: None     Forced sexual activity: None   Other Topics Concern    None   Social History Narrative    None        PHYSICAL EXAMINATION      /62   Pulse 62   Temp 97.6 °F (36.4 °C) (Tympanic)   Resp 14   Ht 6' 1\" (1.854 m)   Wt 155 lb 6.4 oz (70.5 kg)   SpO2 97%   BMI 20.50 kg/m²   This is a well-nourished patient in no acute distress  Patient is awake, alert and oriented x3. Speech is normal.  Pupils are equal round reacting to light. Extraocular movements intact. Face symmetrical. Tongue midline. Motor exam shows normal symmetrical strength. Deep tendon reflexes normal. Plantar reflexes downgoing. Sensory exam normal. Coordination normal.. No carotid bruit. No neck stiffness.     DATA :  LABS:  General Labs:    CBC:   Lab Results   Component Value Date    WBC 8.3 07/16/2020    RBC 3.67 07/16/2020    HGB 12.7 07/16/2020    HCT 36.8 07/16/2020    .2 07/16/2020    MCH 34.5 07/16/2020    MCHC 34.4 07/16/2020    RDW 12.7 07/16/2020     07/16/2020    MPV 10.1 07/16/2020     BMP:    Lab Results   Component Value Date     07/16/2020    K 4.5 07/16/2020     07/16/2020    CO2 26 07/16/2020    BUN 11 07/16/2020    LABALBU 4.5 07/15/2020    CREATININE 0.6 07/16/2020    CALCIUM 8.8 07/16/2020    GFRAA >60 07/16/2020    GFRAA >60 12/13/2011    LABGLOM >60 07/16/2020 GLUCOSE 70 07/16/2020     RADIOLOGY REVIEW:  I have reviewed radiology image(s) and reports(s) of: CT head      IMPRESSION :  Partial complex seizures  Breakthrough seizure  Keppra level is slightly high  Patient Active Problem List   Diagnosis    Psychosis ([address]    Acute intracerebral hemorrhage ([address]    Dysphagia    Dysphonia    Endocarditis, sumit and aortic valves    Endocarditis    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 Nolvia Banda  The Keppra dose has been lowered to 1500 mg twice daily. Continue Vimpat 200 mg twice daily  Would recommend repeating Keppra level in 1 week  Okay to discharge from a neurological standpoint          Please note a portion of this chart was generated using dragon dictation software. Although every effort was made to ensure the accuracy of this automated transcription, some errors in transcription may have occurred.          Justin Rosenbaum M.D.

## 2020-07-19 LAB
BLOOD CULTURE, ROUTINE: NORMAL
CULTURE, BLOOD 2: NORMAL

## 2020-07-20 PROBLEM — G40.919 BREAKTHROUGH SEIZURE (HCC): Status: ACTIVE | Noted: 2020-07-15

## 2020-07-20 LAB — LACOSAMIDE: 13.6 UG/ML (ref 5–10)

## 2021-01-03 ENCOUNTER — APPOINTMENT (OUTPATIENT)
Dept: CT IMAGING | Age: 61
End: 2021-01-03
Payer: COMMERCIAL

## 2021-01-03 ENCOUNTER — APPOINTMENT (OUTPATIENT)
Dept: GENERAL RADIOLOGY | Age: 61
End: 2021-01-03
Payer: COMMERCIAL

## 2021-01-03 ENCOUNTER — HOSPITAL ENCOUNTER (EMERGENCY)
Age: 61
Discharge: HOME | End: 2021-01-04
Attending: EMERGENCY MEDICINE
Payer: COMMERCIAL

## 2021-01-03 DIAGNOSIS — G40.901 STATUS EPILEPTICUS (HCC): Primary | ICD-10-CM

## 2021-01-03 DIAGNOSIS — R56.9 SEIZURE (HCC): ICD-10-CM

## 2021-01-03 LAB
A/G RATIO: 1.8 (ref 1.1–2.2)
ALBUMIN SERPL-MCNC: 4.6 G/DL (ref 3.4–5)
ALP BLD-CCNC: 70 U/L (ref 40–129)
ALT SERPL-CCNC: 22 U/L (ref 10–40)
ANION GAP SERPL CALCULATED.3IONS-SCNC: 26 MMOL/L (ref 3–16)
AST SERPL-CCNC: 29 U/L (ref 15–37)
BASE EXCESS VENOUS: -17.3 MMOL/L (ref -3–3)
BASE EXCESS VENOUS: -2.3 MMOL/L (ref -3–3)
BASOPHILS ABSOLUTE: 0.1 K/UL (ref 0–0.2)
BASOPHILS RELATIVE PERCENT: 0.6 %
BILIRUB SERPL-MCNC: 0.5 MG/DL (ref 0–1)
BUN BLDV-MCNC: 15 MG/DL (ref 7–20)
CALCIUM SERPL-MCNC: 9.6 MG/DL (ref 8.3–10.6)
CARBOXYHEMOGLOBIN: 2.4 % (ref 0–1.5)
CARBOXYHEMOGLOBIN: 3.9 % (ref 0–1.5)
CHLORIDE BLD-SCNC: 99 MMOL/L (ref 99–110)
CO2: 14 MMOL/L (ref 21–32)
CREAT SERPL-MCNC: 1.1 MG/DL (ref 0.8–1.3)
EKG ATRIAL RATE: 125 BPM
EKG DIAGNOSIS: NORMAL
EKG Q-T INTERVAL: 360 MS
EKG QRS DURATION: 118 MS
EKG QTC CALCULATION (BAZETT): 543 MS
EKG R AXIS: -44 DEGREES
EKG T AXIS: 60 DEGREES
EKG VENTRICULAR RATE: 137 BPM
EOSINOPHILS ABSOLUTE: 0.4 K/UL (ref 0–0.6)
EOSINOPHILS RELATIVE PERCENT: 3.7 %
GFR AFRICAN AMERICAN: >60
GFR NON-AFRICAN AMERICAN: >60
GLOBULIN: 2.6 G/DL
GLUCOSE BLD-MCNC: 237 MG/DL (ref 70–99)
HCO3 VENOUS: 13.5 MMOL/L (ref 23–29)
HCO3 VENOUS: 22.9 MMOL/L (ref 23–29)
HCT VFR BLD CALC: 45.2 % (ref 40.5–52.5)
HEMATOLOGY PATH CONSULT: YES
HEMOGLOBIN: 14.8 G/DL (ref 13.5–17.5)
KEPPRA DOSE AMT: ABNORMAL
KEPPRA: 47.3 UG/ML (ref 6–46)
LYMPHOCYTES ABSOLUTE: 5.9 K/UL (ref 1–5.1)
LYMPHOCYTES RELATIVE PERCENT: 53.6 %
MACROCYTES: ABNORMAL
MCH RBC QN AUTO: 33.8 PG (ref 26–34)
MCHC RBC AUTO-ENTMCNC: 32.8 G/DL (ref 31–36)
MCV RBC AUTO: 103.1 FL (ref 80–100)
METHEMOGLOBIN VENOUS: 0.1 %
METHEMOGLOBIN VENOUS: 0.1 %
MONOCYTES ABSOLUTE: 0.6 K/UL (ref 0–1.3)
MONOCYTES RELATIVE PERCENT: 5.3 %
NEUTROPHILS ABSOLUTE: 4.1 K/UL (ref 1.7–7.7)
NEUTROPHILS RELATIVE PERCENT: 36.8 %
O2 CONTENT, VEN: 19 VOL %
O2 CONTENT, VEN: 21 VOL %
O2 SAT, VEN: 100 %
O2 SAT, VEN: 99 %
O2 THERAPY: ABNORMAL
O2 THERAPY: ABNORMAL
PCO2, VEN: 39.9 MMHG (ref 40–50)
PCO2, VEN: 49.8 MMHG (ref 40–50)
PDW BLD-RTO: 12.2 % (ref 12.4–15.4)
PH VENOUS: 7.04 (ref 7.35–7.45)
PH VENOUS: 7.37 (ref 7.35–7.45)
PLATELET # BLD: 196 K/UL (ref 135–450)
PLATELET SLIDE REVIEW: ADEQUATE
PMV BLD AUTO: 9.7 FL (ref 5–10.5)
PO2, VEN: 184 MMHG (ref 25–40)
PO2, VEN: 188 MMHG (ref 25–40)
POTASSIUM REFLEX MAGNESIUM: 4.6 MMOL/L (ref 3.5–5.1)
RBC # BLD: 4.39 M/UL (ref 4.2–5.9)
SARS-COV-2, NAAT: NOT DETECTED
SLIDE REVIEW: ABNORMAL
SODIUM BLD-SCNC: 139 MMOL/L (ref 136–145)
TCO2 CALC VENOUS: 34 MMOL/L
TCO2 CALC VENOUS: 54 MMOL/L
TOTAL CK: 58 U/L (ref 39–308)
TOTAL PROTEIN: 7.2 G/DL (ref 6.4–8.2)
TROPONIN: <0.01 NG/ML
WBC # BLD: 11.1 K/UL (ref 4–11)

## 2021-01-03 PROCEDURE — 82550 ASSAY OF CK (CPK): CPT

## 2021-01-03 PROCEDURE — U0002 COVID-19 LAB TEST NON-CDC: HCPCS

## 2021-01-03 PROCEDURE — 84484 ASSAY OF TROPONIN QUANT: CPT

## 2021-01-03 PROCEDURE — 6360000002 HC RX W HCPCS: Performed by: EMERGENCY MEDICINE

## 2021-01-03 PROCEDURE — 71045 X-RAY EXAM CHEST 1 VIEW: CPT

## 2021-01-03 PROCEDURE — 85025 COMPLETE CBC W/AUTO DIFF WBC: CPT

## 2021-01-03 PROCEDURE — 80053 COMPREHEN METABOLIC PANEL: CPT

## 2021-01-03 PROCEDURE — 96365 THER/PROPH/DIAG IV INF INIT: CPT

## 2021-01-03 PROCEDURE — 36415 COLL VENOUS BLD VENIPUNCTURE: CPT

## 2021-01-03 PROCEDURE — 99284 EMERGENCY DEPT VISIT MOD MDM: CPT

## 2021-01-03 PROCEDURE — 80177 DRUG SCRN QUAN LEVETIRACETAM: CPT

## 2021-01-03 PROCEDURE — 70450 CT HEAD/BRAIN W/O DYE: CPT

## 2021-01-03 PROCEDURE — 2580000003 HC RX 258: Performed by: EMERGENCY MEDICINE

## 2021-01-03 PROCEDURE — 93010 ELECTROCARDIOGRAM REPORT: CPT | Performed by: INTERNAL MEDICINE

## 2021-01-03 PROCEDURE — 96375 TX/PRO/DX INJ NEW DRUG ADDON: CPT

## 2021-01-03 PROCEDURE — 93005 ELECTROCARDIOGRAM TRACING: CPT | Performed by: EMERGENCY MEDICINE

## 2021-01-03 PROCEDURE — 82803 BLOOD GASES ANY COMBINATION: CPT

## 2021-01-03 RX ORDER — LEVETIRACETAM 500 MG/5ML
1500 INJECTION, SOLUTION, CONCENTRATE INTRAVENOUS ONCE
Status: DISCONTINUED | OUTPATIENT
Start: 2021-01-04 | End: 2021-01-04 | Stop reason: SDUPTHER

## 2021-01-03 RX ORDER — LEVETIRACETAM 10 MG/ML
1000 INJECTION INTRAVASCULAR ONCE
Status: COMPLETED | OUTPATIENT
Start: 2021-01-03 | End: 2021-01-03

## 2021-01-03 RX ORDER — LORAZEPAM 2 MG/ML
INJECTION INTRAMUSCULAR
Status: DISPENSED
Start: 2021-01-03 | End: 2021-01-03

## 2021-01-03 RX ORDER — LORAZEPAM 2 MG/ML
2 INJECTION INTRAMUSCULAR ONCE
Status: COMPLETED | OUTPATIENT
Start: 2021-01-03 | End: 2021-01-03

## 2021-01-03 RX ORDER — LACOSAMIDE 100 MG/1
200 TABLET ORAL ONCE
Status: COMPLETED | OUTPATIENT
Start: 2021-01-04 | End: 2021-01-04

## 2021-01-03 RX ORDER — 0.9 % SODIUM CHLORIDE 0.9 %
1000 INTRAVENOUS SOLUTION INTRAVENOUS ONCE
Status: COMPLETED | OUTPATIENT
Start: 2021-01-03 | End: 2021-01-03

## 2021-01-03 RX ADMIN — LEVETIRACETAM 1000 MG: 10 INJECTION INTRAVENOUS at 08:12

## 2021-01-03 RX ADMIN — SODIUM CHLORIDE 1000 ML: 9 INJECTION, SOLUTION INTRAVENOUS at 08:10

## 2021-01-03 RX ADMIN — LORAZEPAM 2 MG: 2 INJECTION INTRAMUSCULAR; INTRAVENOUS at 07:44

## 2021-01-03 NOTE — ED NOTES
Pt seizing has stopped at this time  Dr Suzie Nath at bedside, sats good without seizure  Will hold off on RSI at this time    Pt having EKG changes per telemetry monitor, Dr Suzie Nath at bedside.       Collette Michaels, RN  01/03/21 7000

## 2021-01-03 NOTE — ED NOTES
Pt had a approximate 45 sec pause in his seizure activity  Then started seizing again  Gazing to the right     Wilder Novak RN  01/03/21 1773

## 2021-01-03 NOTE — ED PROVIDER NOTES
[address] [facility] Emergency Department      Pt Name: Gonzalo Cabello  MRN: [id number]  [place] 1960  Date of evaluation: 1/3/2021  Provider: Sorin Leon MD  CHIEF COMPLAINT  Chief Complaint   Patient presents with    Seizures     [facility], versed 10 mg given en route pt arrives seizing. Diaphoretic. HPI  Gonzalo Cabello is a 61 y.o. male who presents because of a seizure. He has a history of seizure disorder and is on Keppra. He lives at a nursing home and EMS call was for seizure. He was postictal on their arrival.  He did start to seize again in the ambulance and he received 10 mg of Versed IV. He was seizing on arrival.  Further history is not possible from the patient. REVIEW OF SYSTEMS:  See HPI for further details. Remainder of review of systems limited by patient ability to provide history. Nursing notes reviewed.     PAST MEDICAL HISTORY  Past Medical History:   Diagnosis Date    Acute intracerebral hemorrhage ([address] 02/03/2017    Anxiety     Back pain     Cerebral artery occlusion with cerebral infarction (HCC)     Chronic back pain greater than 3 months duration 02/03/2017    Diabetes mellitus ([address]     Dysphasia 02/03/2017    Dysphonia 02/03/2017    Endocarditis and heart valve disorders in diseases classified elsewhere 02/03/2017    Hydrocephalus ([address] 02/03/2017    Hypertension     IVH (intraventricular hemorrhage) ([address] 02/03/2017    NSTEMI (non-ST elevated myocardial infarction) ([address] 02/03/2017    Seizures ([address]      SURGICAL HISTORY  Past Surgical History:   Procedure Laterality Date    BRAIN ANEURYSM SURGERY Left 02/03/2017    COLONOSCOPY N/A 8/14/2019    COLONOSCOPY WITH MAC ANESTHESIA performed by Parish Rinaldi MD at [address] [place] [address] Left 02/03/2017    LEG SURGERY      UPPER GASTROINTESTINAL ENDOSCOPY N/A 8/14/2019    EGD BIOPSY performed by Parish Rinaldi MD at [place] [address] MEDICATIONS:  No current facility-administered medications on file prior to encounter. Current Outpatient Medications on File Prior to Encounter   Medication Sig Dispense Refill    levETIRAcetam (KEPPRA) 500 MG tablet Take 1,500 mg by mouth 2 times daily      potassium chloride (KLOR-CON M) 20 MEQ extended release tablet Take 20 mEq by mouth 2 times daily      acetaminophen (TYLENOL) 325 MG tablet Take 650 mg by mouth every 4 hours as needed for Pain      mirtazapine (REMERON) 7.5 MG tablet Take 7.5 mg by mouth nightly      citalopram (CELEXA) 20 MG tablet Take 20 mg by mouth daily      Cholecalciferol (VITAMIN D3) 5000 units TABS Take by mouth daily      OLANZapine (ZYPREXA) 2.5 MG tablet Take 2.5 mg by mouth nightly      lacosamide (VIMPAT) 200 MG tablet Take 200 mg by mouth 2 times daily.  calcium carbonate (TUMS) 500 MG chewable tablet Take 1 tablet by mouth 2 times daily       methadone (DOLOPHINE) 5 MG tablet Take 5 mg by mouth 2 times daily.  atorvastatin (LIPITOR) 10 MG tablet Take 10 mg by mouth nightly        ALLERGIES  No known allergies and Seasonal  FAMILY HISTORY:  History reviewed. No pertinent family history. SOCIAL HISTORY  Social History     Tobacco Use    Smoking status: Current Some Day Smoker     Packs/day: 0.50     Types: Cigarettes    Smokeless tobacco: Never Used    Tobacco comment: 6 per WEEK   Substance Use Topics    Alcohol use: No    Drug use: No     IMMUNIZATIONS  Noncontributory    PHYSICAL EXAM  VITAL SIGNS:  Blood pressure (!) 125/59, pulse 137, temperature 99.3 °F (37.4 °C), temperature source Rectal, resp. rate 22, weight 180 lb (81.6 kg), SpO2 97 %.   Constitutional:  61 y.o. male actively seizing with generalized shaking with the right arm and leg more prominently shaking then the left side  HENT:  Atraumatic, mucous membranes moist, tongue without bruising  Eyes:   Conjunctiva clear, no icterus  Neck:  Supple, no adenopathy, no JVD  Cardiovascular:  Regular, no rubs, no discernible murmur  Thorax & Lungs:  No accessory muscle usage, clear  Abdomen:  Soft, bowel sounds present, no tenderness  Back:  No deformity  Genitalia:  Externally appears normal  Rectal:  Deferred  Extremities:  No cyanosis, no edema  Skin:  Warm, dry  Neurologic:  Initially with generalized seizure, later had spontaneous extremity movements, no facial droop, could answer some simple questions, pupils symmetric  Psychiatric: unassessable    DIAGNOSTIC RESULTS:  Labs resulted at the time of this note reviewed.   Labs Reviewed   CBC WITH AUTO DIFFERENTIAL - Abnormal; Notable for the following components:       Result Value    WBC 11.1 (*)     .1 (*)     RDW 12.2 (*)     Lymphocytes Absolute 5.9 (*)     Macrocytes 1+ (*)     All other components within normal limits    Narrative:     Performed at:  [facility] [address], [place], [address]   Phone [phone]   COMPREHENSIVE METABOLIC PANEL W/ REFLEX TO MG FOR LOW K - Abnormal; Notable for the following components:    CO2 14 (*)     Anion Gap 26 (*)     Glucose 237 (*)     All other components within normal limits    Narrative:     Vinay Yusuf  [facility] tel. [phone],  Chemistry results called to and read back by taco manrique, 01/03/2021  08:44, by [facility]  Performed at:  [facility] [address], [place], [address]   Phone [phone]   BLOOD GAS, VENOUS - Abnormal; Notable for the following components:    pH, Vineet 7.040 (*)     pO2, Vineet 188.0 (*)     HCO3, Venous 13.5 (*)     Base Excess, Vineet -17.3 (*)     Carboxyhemoglobin 2.4 (*)     All other components within normal limits    Narrative:     Vinay Yusuf  [facility] tel. [phone],  Chemistry results called to and read back by veto pickard, 01/03/2021  08:17, by [facility]  Performed at:  [facility] [address], [place], [facility] [place]   Phone [phone]   BLOOD GAS, VENOUS - Abnormal; Notable for the following components:    pCO2, Vineet 39.9 (*)     pO2, Vineet 184.0 (*)     HCO3, Venous 22.9 (*)     Carboxyhemoglobin 3.9 (*)     All other components within normal limits    Narrative:     Performed at:  [facility]  [address] [facility],  [place], [address]   Phone [phone]   CK    Narrative:     Perry Banks  [facility] tel. [phone],  Chemistry results called to and read back by taco manrique, 01/03/2021  08:44, by [facility]  Performed at:  [facility] [address] [facility]  [place], [address]   Phone [phone]   TROPONIN    Narrative:     Perry Banks  [facility] tel. [phone],  Chemistry results called to and read back by taco manrique, 01/03/2021  08:44, by [facility]  Performed at:  [facility]  [address] [facility],  [place], [address]   Phone [phone]   LEVETIRACETAM LEVEL    Narrative:     Performed at:  [facility]  [address] [place] [address]   Phone [phone]    Narrative:     Performed at:  [facility] [address] [facility], [address]   Phone [phone]     EKG:  Read by me in the absence of a cardiologist shows: SVT with frequent PVCs, rate 137, left axis, LVH, nonspecific conduction delay, more ectopy and faster heart rate than prior EKG    RADIOLOGY:    Plain x-rays were viewed by me:   CT HEAD WO CONTRAST   Final Result   1. No acute intracranial abnormality. XR CHEST PORTABLE   Final Result   No acute cardiopulmonary disease.            ED COURSE:    Medications administered:  Medications   LORazepam (ATIVAN) 2 MG/ML injection (has no administration in time range)   LORazepam (ATIVAN) injection 2 mg (2 mg Intravenous Given 1/3/21 0744)   levetiracetam (KEPPRA) 1000 mg/100 mL IVPB (0 mg Intravenous Stopped 1/3/21 0827)   0.9 % sodium chloride bolus (0 mLs Intravenous Stopped 1/3/21 1317)     Vitals:    01/03/21 1313 01/03/21 1330 01/03/21 1345 01/03/21 1415   BP: 127/64 113/66 (!) 122/57 135/71   Pulse: 88 90 86 100   Resp: 11 17 19 18   Temp:       TempSrc:       SpO2: 100% 100% 100% 100%   Weight:         PROCEDURES:  None    CRITICAL CARE:  Total critical care time of 31 minutes (excludes any time for procedures). This includes but not limited to vital sign monitoring, medication, clinical response to medications, interpretation of diagnostics, review of nursing notes, pertinent record review, discussions about patient condition, consultation time, documentation time. Critical care due to the patient's seizure. CONSULTATIONS:  Hospitalist, Dr Estefanía Ernandez, Dr Negrito Mcneil: Ramirez Clancy is a 61 y.o. male who presented because of concern for seizure. He had prolonged seizure after brief postictal state. His seizures did stop while monitored in the ED thus far, after receiving ativan, versed, keppra. He is now able to answer some basic questions. His vital signs have improved as has his blood pH. I did consult with Dr. Xochitl Mariscal, our neurologist at this facility. He requests that I transfer the patient to [facility]. He did a chart review and saw that this patient has affiliation with that hospital due to prior seizures. I have spoken with neurology at [facility] and he has been accepted for transfer to their facility for continued care. Differential Diagnosis:  Breakthrough seizure, ingestion, infection, electrolyte abnormality, cardiac arrhythmia, other    FINAL IMPRESSION:    1. Status epilepticus ([facility]        (Please note that I used voice recognition software to generate this note.   Occasionally words are mistranscribed despite my efforts to edit errors.)        Zain King MD  01/03/21 2000 Loretta Riley MD  01/03/21 9154

## 2021-01-03 NOTE — ED NOTES
Pt placed on 5 liters per cannula with nasal trumpet still present.      Collette Michaels, RN  01/03/21 2228

## 2021-01-03 NOTE — SIGNIFICANT EVENT
Hospitalist service received request for admission of this patient  24-year-old male presented with seizure  As per ER signout patient with 30 minutes of witnessed seizure in the ED  As per last progress note patient is still postictal    At this time we would recommend neurology consultation  In all likelihood this would be defined as status epilepticus  In that case patient will need endotracheal intubation to protect airway  Patient will also need transfer to neuro ICU either at [facility] or [facility]    Please notify hospitalist of neurology recommendation

## 2021-01-03 NOTE — ED TRIAGE NOTES
Pt arrives actively seizing, diaphoretic. On a non rebreather mask.     Dr Kim Solis aware of pt's arrival.

## 2021-01-03 NOTE — ED NOTES
Pt continues to seize, Dr Pascual Chaudhari at bedside. Rn x 2 present, pt on non rebreather mask.        Yuly Schmitz RN  01/03/21 0346

## 2021-01-04 VITALS
HEART RATE: 80 BPM | OXYGEN SATURATION: 98 % | SYSTOLIC BLOOD PRESSURE: 111 MMHG | DIASTOLIC BLOOD PRESSURE: 45 MMHG | WEIGHT: 180 LBS | RESPIRATION RATE: 18 BRPM | BODY MASS INDEX: 23.75 KG/M2 | TEMPERATURE: 99.3 F

## 2021-01-04 LAB — HEMATOLOGY PATH CONSULT: NORMAL

## 2021-01-04 PROCEDURE — 96366 THER/PROPH/DIAG IV INF ADDON: CPT

## 2021-01-04 PROCEDURE — 2580000003 HC RX 258: Performed by: EMERGENCY MEDICINE

## 2021-01-04 PROCEDURE — 6370000000 HC RX 637 (ALT 250 FOR IP): Performed by: EMERGENCY MEDICINE

## 2021-01-04 PROCEDURE — 6360000002 HC RX W HCPCS: Performed by: EMERGENCY MEDICINE

## 2021-01-04 RX ORDER — LEVETIRACETAM 500 MG/1
1500 TABLET ORAL EVERY MORNING
Qty: 90 TABLET | Refills: 1 | Status: ON HOLD | OUTPATIENT
Start: 2021-01-04 | End: 2022-01-13 | Stop reason: DRUGHIGH

## 2021-01-04 RX ORDER — LEVETIRACETAM 500 MG/1
2000 TABLET ORAL NIGHTLY
Qty: 120 TABLET | Refills: 1 | Status: ON HOLD | OUTPATIENT
Start: 2021-01-04 | End: 2022-01-13 | Stop reason: DRUGHIGH

## 2021-01-04 RX ADMIN — LEVETIRACETAM 1500 MG: 100 INJECTION, SOLUTION INTRAVENOUS at 00:47

## 2021-01-04 RX ADMIN — LACOSAMIDE 200 MG: 100 TABLET, FILM COATED ORAL at 00:47

## 2021-01-04 NOTE — ED NOTES
PT lights off in room. PT with blanket over his head, denies any needs at this time.       Dewayne Mora RN  01/04/21 2927

## 2021-01-04 NOTE — ED PROVIDER NOTES
Emergency Department Encounter  Location: [address]    Patient: Leanna Peterson  MRN: [id number]  : 1960  Date of evaluation: 1/3/2021  ED Provider: Amelia Smith MD    Leanna Peterson was checked out to me by [facility]. Please see his/her initial documentation for details of the patient's initial ED presentation, physical exam and completed studies. In brief, Leanna Peterson is a 61 y.o. male that presented to the emergency department for seizure. She was in the emergency room pending transfer when he was signed out to me. Patient has a complex seizure. Patient has been in the emergency room for more than 30 hours was ambulating and eating. Neurology was consulted and they recommended patient could be discharged home and maintain his home dose Vimpat however they recommended Keppra 1500 mg in the morning and increase his dose from to 2000mg from 1500 mg. Recommend patient follows up with his neurologist at [facility]. She was discharged home in stable condition. See neurologist note.     I have reviewed and interpreted all of the currently available lab results and diagnostics from this visit:  Results for orders placed or performed during the hospital encounter of 21   CBC Auto Differential   Result Value Ref Range    WBC 11.1 (H) 4.0 - 11.0 K/uL    RBC 4.39 4.20 - 5.90 M/uL    Hemoglobin 14.8 13.5 - 17.5 g/dL    Hematocrit 45.2 40.5 - 52.5 %    .1 (H) 80.0 - 100.0 fL    MCH 33.8 26.0 - 34.0 pg    MCHC 32.8 31.0 - 36.0 g/dL    RDW 12.2 (L) 12.4 - 15.4 %    Platelets 933 594 - 659 K/uL    MPV 9.7 5.0 - 10.5 fL    PLATELET SLIDE REVIEW Adequate     SLIDE REVIEW see below     Path Consult Yes     Neutrophils % 36.8 %    Lymphocytes % 53.6 %    Monocytes % 5.3 %    Eosinophils % 3.7 %    Basophils % 0.6 %    Neutrophils Absolute 4.1 1.7 - 7.7 K/uL    Lymphocytes Absolute 5.9 (H) 1.0 - 5.1 K/uL    Monocytes Absolute 0.6 0.0 - 1.3 K/uL    Eosinophils Absolute 0.4 0.0 - 0.6 K/uL    Basophils Absolute 0.1 0.0 - 0.2 K/uL    Macrocytes 1+ (A)    Comprehensive Metabolic Panel w/ Reflex to MG   Result Value Ref Range    Sodium 139 136 - 145 mmol/L    Potassium reflex Magnesium 4.6 3.5 - 5.1 mmol/L    Chloride 99 99 - 110 mmol/L    CO2 14 (LL) 21 - 32 mmol/L    Anion Gap 26 (H) 3 - 16    Glucose 237 (H) 70 - 99 mg/dL    BUN 15 7 - 20 mg/dL    CREATININE 1.1 0.8 - 1.3 mg/dL    GFR Non-African American >60 >60    GFR African American >60 >60    Calcium 9.6 8.3 - 10.6 mg/dL    Total Protein 7.2 6.4 - 8.2 g/dL    Alb 4.6 3.4 - 5.0 g/dL    Albumin/Globulin Ratio 1.8 1.1 - 2.2    Total Bilirubin 0.5 0.0 - 1.0 mg/dL    Alkaline Phosphatase 70 40 - 129 U/L    ALT 22 10 - 40 U/L    AST 29 15 - 37 U/L    Globulin 2.6 g/dL   Blood Gas, Venous   Result Value Ref Range    pH, Vineet 7.040 (LL) 7.350 - 7.450    pCO2, Vineet 49.8 40.0 - 50.0 mmHg    pO2, Vineet 188.0 (H) 25.0 - 40.0 mmHg    HCO3, Venous 13.5 (L) 23.0 - 29.0 mmol/L    Base Excess, Vineet -17.3 (L) -3.0 - 3.0 mmol/L    O2 Sat, Vineet 99 Not Established %    Carboxyhemoglobin 2.4 (H) 0.0 - 1.5 %    MetHgb, Vineet 0.1 <1.5 %    TC02 (Calc), Vineet 34 Not Established mmol/L    O2 Content, Vineet 21 Not Established VOL %    O2 Therapy Unknown    CK   Result Value Ref Range    Total CK 58 39 - 308 U/L   Troponin   Result Value Ref Range    Troponin <0.01 <0.01 ng/mL   Levetiracetam level   Result Value Ref Range    Levetiracetam Lvl 47.3 (H) 6.0 - 46.0 ug/mL    KEPPRA Dose Amt Unknown    COVID-19   Result Value Ref Range    SARS-CoV-2, NAAT Not Detected Not Detected   Blood Gas, Venous   Result Value Ref Range    pH, Vineet 7.367 7.350 - 7.450    pCO2, Vineet 39.9 (L) 40.0 - 50.0 mmHg    pO2, Vineet 184.0 (H) 25.0 - 40.0 mmHg    HCO3, Venous 22.9 (L) 23.0 - 29.0 mmol/L    Base Excess, Vineet -2.3 -3.0 - 3.0 mmol/L    O2 Sat, Vineet 100 Not Established %    Carboxyhemoglobin 3.9 (H) 0.0 - 1.5 %    MetHgb, Vineet 0.1 <1.5 %    TC02 (Calc), Vineet 54 Not Established mmol/L    O2 Content, Vineet 19 Not Established VOL %    O2 Therapy Unknown    Blood Smear Review   Result Value Ref Range    Path Consult Reviewed    EKG 12 Lead   Result Value Ref Range    Ventricular Rate 137 BPM    Atrial Rate 125 BPM    QRS Duration 118 ms    Q-T Interval 360 ms    QTc Calculation (Bazett) 543 ms    R Axis -44 degrees    T Axis 60 degrees    Diagnosis       Supraventricular tachycardia with frequent Premature ventricular complexes and Fusion complexesLeft axis deviationLeft ventricular hypertrophy with QRS widening and repolarization abnormalityLateral infarct , age undeterminedInferior infarct , age undeterminedAbnormal ECGConfirmed by Kori Brown ([id number]) on 1/3/2021 11:15:50 AM     No results found. ED Medication Orders (From admission, onward)    Start Ordered     Status Ordering Provider    01/04/21 0015 01/04/21 0009  levETIRAcetam (KEPPRA) 1,500 mg in sodium chloride 0.9 % 100 mL IVPB  ONCE      Last MAR action: Stopped - by Junior Llamas on 01/04/21 at [facility]    01/04/21 0000 01/03/21 2350  lacosamide (VIMPAT) tablet 200 mg  ONCE      Last MAR action: Given - by Junior Llamas on 01/04/21 at [facility], [place] [facility]    01/03/21 0815 01/03/21 0807  0.9 % sodium chloride bolus  ONCE      Last MAR action: Stopped - by Ruma Bassett on [facility]03/21 [facility] [address]    01/03/21 0745 01/03/21 0743  LORazepam (ATIVAN) injection 2 mg  ONCE      Last MAR action: Given - by Osmar Jones on 01/03/21 at [address] [facility]    01/03/21 0745 01/03/21 0743  levetiracetam (KEPPRA) 1000 mg/100 mL IVPB  ONCE      Last MAR action: Stopped - by Osmar Jones on 01/03/21 at [facility] [address], [address]    01/03/21 0742 01/03/21 0742  LORazepam (ATIVAN) 2 MG/ML injection     Note to Pharmacy: Ananda Galvez: cabinet override    Ordered           Final Impression      1.  Status epilepticus ([facility] [address]    2. Seizure ([facility] [address] [facility]        DISPOSITION Decision To Discharge 01/04/2021 03:50:13 PM (Please note that portions of this note may have been completed with a voice recognition program. Efforts were made to edit the dictations but occasionally words are mis-transcribed.)    MD Annette Ballesteros MD  01/05/21 Lena Escalona MD  01/07/21 4454

## 2021-01-04 NOTE — ED NOTES
Frances Cardoza from the transfer center called back stating  states they are at a high census and still waiting to d/c patients at [facility]. There are multiple transfers to .      [employer]  01/04/21 6211

## 2021-01-04 NOTE — PROGRESS NOTES
Called about this patient to give an opinion about his epilepsy. The patient has been in the ED at [facility] for 2 days pending transfer to [facility] which has been so far on hold due to lack of availability. The patient now is back to his baseline according to the ER physician with no seizure over the last 24 hours. I was able to review his extensive outside records from [facility]. 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 He has not seen [facility] epilepsy for the last several months. Known history of poor compliance. He is currently on Keppra 1500 mg x 2 and Vimpat 200 mg x 2. Both levels are mildly elevated. He does not appear toxic. He can be discharged home on Vimpat 200 mg x 2 and increasing Keppra dose to 1500 mg in a.m. and 2000 mg at night. The patient is not allowed to drive  Discussed risk of status epilepticus and sudden death  He needs to follow-up with [facility] epilepsy sooner to manage his refractory epilepsy and may consider addition of a third agent. Can be discharged if medically stable from the ED.

## 2021-01-04 NOTE — ED NOTES
[facility] updated on decision to discharge back to [facility].       Loni Mcdonald RN  01/04/21 3438

## 2021-01-04 NOTE — ED NOTES
PT denies any needs at this time. Pt offered breakfast and declined. PT states he has no need to void at this time.        Roger Perla RN  01/04/21 6334

## 2021-01-04 NOTE — ED NOTES
PT given non slip socks. PT out of bed walking around bed. PT given menu. PT denies any needs at this time.       Roger Perla RN  01/04/21 2293

## 2021-01-05 NOTE — ED NOTES
Report given to [facility] JOSE at this time. Pt to be transported back to facility.       Dale Casillas RN  01/04/21 8727

## 2022-01-12 ENCOUNTER — APPOINTMENT (OUTPATIENT)
Dept: GENERAL RADIOLOGY | Age: 62
DRG: 101 | End: 2022-01-12
Payer: COMMERCIAL

## 2022-01-12 ENCOUNTER — APPOINTMENT (OUTPATIENT)
Dept: CT IMAGING | Age: 62
DRG: 101 | End: 2022-01-12
Payer: COMMERCIAL

## 2022-01-12 ENCOUNTER — HOSPITAL ENCOUNTER (INPATIENT)
Age: 62
LOS: 4 days | Discharge: NURSING, REHAB OR LONG-TERM CARE | DRG: 101 | End: 2022-01-17
Attending: EMERGENCY MEDICINE | Admitting: HOSPITALIST
Payer: COMMERCIAL

## 2022-01-12 DIAGNOSIS — R56.9 POST-ICTAL STATE (HCC): ICD-10-CM

## 2022-01-12 DIAGNOSIS — R77.8 ELEVATED TROPONIN: ICD-10-CM

## 2022-01-12 DIAGNOSIS — G40.919 BREAKTHROUGH SEIZURE (HCC): Primary | ICD-10-CM

## 2022-01-12 LAB
A/G RATIO: 1.8 (ref 1.1–2.2)
ALBUMIN SERPL-MCNC: 3.2 G/DL (ref 3.4–5)
ALP BLD-CCNC: 35 U/L (ref 40–129)
ALT SERPL-CCNC: 19 U/L (ref 10–40)
AMPHETAMINE SCREEN, URINE: ABNORMAL
ANION GAP SERPL CALCULATED.3IONS-SCNC: 12 MMOL/L (ref 3–16)
AST SERPL-CCNC: 25 U/L (ref 15–37)
BARBITURATE SCREEN URINE: ABNORMAL
BASOPHILS ABSOLUTE: 0 K/UL (ref 0–0.2)
BASOPHILS RELATIVE PERCENT: 0.4 %
BENZODIAZEPINE SCREEN, URINE: POSITIVE
BILIRUB SERPL-MCNC: 0.3 MG/DL (ref 0–1)
BUN BLDV-MCNC: 20 MG/DL (ref 7–20)
CALCIUM SERPL-MCNC: 7.6 MG/DL (ref 8.3–10.6)
CANNABINOID SCREEN URINE: ABNORMAL
CHLORIDE BLD-SCNC: 105 MMOL/L (ref 99–110)
CO2: 20 MMOL/L (ref 21–32)
COCAINE METABOLITE SCREEN URINE: ABNORMAL
CREAT SERPL-MCNC: 0.8 MG/DL (ref 0.8–1.3)
EOSINOPHILS ABSOLUTE: 0.1 K/UL (ref 0–0.6)
EOSINOPHILS RELATIVE PERCENT: 0.5 %
GFR AFRICAN AMERICAN: >60
GFR NON-AFRICAN AMERICAN: >60
GLUCOSE BLD-MCNC: 87 MG/DL (ref 70–99)
HCT VFR BLD CALC: 42.3 % (ref 40.5–52.5)
HEMOGLOBIN: 14.2 G/DL (ref 13.5–17.5)
KEPPRA DOSE AMT: ABNORMAL
KEPPRA: 49.6 UG/ML (ref 6–46)
LACTIC ACID, SEPSIS: 1.9 MMOL/L (ref 0.4–1.9)
LACTIC ACID, SEPSIS: 6.3 MMOL/L (ref 0.4–1.9)
LYMPHOCYTES ABSOLUTE: 0.5 K/UL (ref 1–5.1)
LYMPHOCYTES RELATIVE PERCENT: 4.5 %
Lab: ABNORMAL
MCH RBC QN AUTO: 33.7 PG (ref 26–34)
MCHC RBC AUTO-ENTMCNC: 33.7 G/DL (ref 31–36)
MCV RBC AUTO: 100.1 FL (ref 80–100)
METHADONE SCREEN, URINE: POSITIVE
MONOCYTES ABSOLUTE: 0.3 K/UL (ref 0–1.3)
MONOCYTES RELATIVE PERCENT: 2.6 %
NEUTROPHILS ABSOLUTE: 10.2 K/UL (ref 1.7–7.7)
NEUTROPHILS RELATIVE PERCENT: 92 %
OPIATE SCREEN URINE: ABNORMAL
OXYCODONE URINE: ABNORMAL
PDW BLD-RTO: 12.4 % (ref 12.4–15.4)
PH UA: 6
PHENCYCLIDINE SCREEN URINE: ABNORMAL
PLATELET # BLD: 161 K/UL (ref 135–450)
PMV BLD AUTO: 9.2 FL (ref 5–10.5)
POTASSIUM SERPL-SCNC: 3.4 MMOL/L (ref 3.5–5.1)
PROPOXYPHENE SCREEN: ABNORMAL
RBC # BLD: 4.23 M/UL (ref 4.2–5.9)
REASON FOR REJECTION: NORMAL
REJECTED TEST: NORMAL
SODIUM BLD-SCNC: 137 MMOL/L (ref 136–145)
TOTAL CK: 95 U/L (ref 39–308)
TOTAL PROTEIN: 5 G/DL (ref 6.4–8.2)
TROPONIN: 0.03 NG/ML
WBC # BLD: 11.1 K/UL (ref 4–11)

## 2022-01-12 PROCEDURE — 70450 CT HEAD/BRAIN W/O DYE: CPT

## 2022-01-12 PROCEDURE — 2580000003 HC RX 258: Performed by: PHYSICIAN ASSISTANT

## 2022-01-12 PROCEDURE — 80307 DRUG TEST PRSMV CHEM ANLYZR: CPT

## 2022-01-12 PROCEDURE — 99284 EMERGENCY DEPT VISIT MOD MDM: CPT

## 2022-01-12 PROCEDURE — 96361 HYDRATE IV INFUSION ADD-ON: CPT

## 2022-01-12 PROCEDURE — 82550 ASSAY OF CK (CPK): CPT

## 2022-01-12 PROCEDURE — 36415 COLL VENOUS BLD VENIPUNCTURE: CPT

## 2022-01-12 PROCEDURE — 84484 ASSAY OF TROPONIN QUANT: CPT

## 2022-01-12 PROCEDURE — 83605 ASSAY OF LACTIC ACID: CPT

## 2022-01-12 PROCEDURE — 96360 HYDRATION IV INFUSION INIT: CPT

## 2022-01-12 PROCEDURE — 2580000003 HC RX 258: Performed by: HOSPITALIST

## 2022-01-12 PROCEDURE — 96365 THER/PROPH/DIAG IV INF INIT: CPT

## 2022-01-12 PROCEDURE — 80177 DRUG SCRN QUAN LEVETIRACETAM: CPT

## 2022-01-12 PROCEDURE — 6360000002 HC RX W HCPCS: Performed by: EMERGENCY MEDICINE

## 2022-01-12 PROCEDURE — 80053 COMPREHEN METABOLIC PANEL: CPT

## 2022-01-12 PROCEDURE — 87040 BLOOD CULTURE FOR BACTERIA: CPT

## 2022-01-12 PROCEDURE — G0378 HOSPITAL OBSERVATION PER HR: HCPCS

## 2022-01-12 PROCEDURE — 93005 ELECTROCARDIOGRAM TRACING: CPT | Performed by: PHYSICIAN ASSISTANT

## 2022-01-12 PROCEDURE — 6360000002 HC RX W HCPCS: Performed by: HOSPITALIST

## 2022-01-12 PROCEDURE — 71045 X-RAY EXAM CHEST 1 VIEW: CPT

## 2022-01-12 PROCEDURE — 85025 COMPLETE CBC W/AUTO DIFF WBC: CPT

## 2022-01-12 RX ORDER — ONDANSETRON 2 MG/ML
4 INJECTION INTRAMUSCULAR; INTRAVENOUS EVERY 6 HOURS PRN
Status: DISCONTINUED | OUTPATIENT
Start: 2022-01-12 | End: 2022-01-17 | Stop reason: HOSPADM

## 2022-01-12 RX ORDER — ACETAMINOPHEN 325 MG/1
650 TABLET ORAL EVERY 6 HOURS PRN
Status: DISCONTINUED | OUTPATIENT
Start: 2022-01-12 | End: 2022-01-17 | Stop reason: HOSPADM

## 2022-01-12 RX ORDER — CITALOPRAM 20 MG/1
20 TABLET ORAL DAILY
Status: DISCONTINUED | OUTPATIENT
Start: 2022-01-13 | End: 2022-01-13

## 2022-01-12 RX ORDER — ATORVASTATIN CALCIUM 10 MG/1
10 TABLET, FILM COATED ORAL NIGHTLY
Status: DISCONTINUED | OUTPATIENT
Start: 2022-01-12 | End: 2022-01-17 | Stop reason: HOSPADM

## 2022-01-12 RX ORDER — OLANZAPINE 5 MG/1
2.5 TABLET ORAL NIGHTLY
Status: DISCONTINUED | OUTPATIENT
Start: 2022-01-12 | End: 2022-01-13

## 2022-01-12 RX ORDER — ACETAMINOPHEN 650 MG/1
650 SUPPOSITORY RECTAL EVERY 6 HOURS PRN
Status: DISCONTINUED | OUTPATIENT
Start: 2022-01-12 | End: 2022-01-17 | Stop reason: HOSPADM

## 2022-01-12 RX ORDER — SODIUM CHLORIDE 9 MG/ML
INJECTION, SOLUTION INTRAVENOUS CONTINUOUS
Status: DISCONTINUED | OUTPATIENT
Start: 2022-01-12 | End: 2022-01-17 | Stop reason: HOSPADM

## 2022-01-12 RX ORDER — 0.9 % SODIUM CHLORIDE 0.9 %
30 INTRAVENOUS SOLUTION INTRAVENOUS ONCE
Status: DISCONTINUED | OUTPATIENT
Start: 2022-01-12 | End: 2022-01-12

## 2022-01-12 RX ORDER — METHADONE HYDROCHLORIDE 5 MG/1
5 TABLET ORAL 2 TIMES DAILY
Status: DISCONTINUED | OUTPATIENT
Start: 2022-01-13 | End: 2022-01-17 | Stop reason: HOSPADM

## 2022-01-12 RX ORDER — SODIUM CHLORIDE 0.9 % (FLUSH) 0.9 %
5-40 SYRINGE (ML) INJECTION PRN
Status: DISCONTINUED | OUTPATIENT
Start: 2022-01-12 | End: 2022-01-17 | Stop reason: HOSPADM

## 2022-01-12 RX ORDER — SODIUM CHLORIDE 0.9 % (FLUSH) 0.9 %
5-40 SYRINGE (ML) INJECTION EVERY 12 HOURS SCHEDULED
Status: DISCONTINUED | OUTPATIENT
Start: 2022-01-12 | End: 2022-01-17 | Stop reason: HOSPADM

## 2022-01-12 RX ORDER — MIRTAZAPINE 15 MG/1
7.5 TABLET, FILM COATED ORAL NIGHTLY
Status: DISCONTINUED | OUTPATIENT
Start: 2022-01-12 | End: 2022-01-17 | Stop reason: HOSPADM

## 2022-01-12 RX ORDER — LORAZEPAM 2 MG/ML
2 INJECTION INTRAMUSCULAR
Status: DISCONTINUED | OUTPATIENT
Start: 2022-01-12 | End: 2022-01-17 | Stop reason: HOSPADM

## 2022-01-12 RX ORDER — LACOSAMIDE 100 MG/1
200 TABLET ORAL 2 TIMES DAILY
Status: DISCONTINUED | OUTPATIENT
Start: 2022-01-13 | End: 2022-01-17 | Stop reason: HOSPADM

## 2022-01-12 RX ORDER — SODIUM CHLORIDE 9 MG/ML
25 INJECTION, SOLUTION INTRAVENOUS PRN
Status: DISCONTINUED | OUTPATIENT
Start: 2022-01-12 | End: 2022-01-17 | Stop reason: HOSPADM

## 2022-01-12 RX ORDER — ONDANSETRON 4 MG/1
4 TABLET, ORALLY DISINTEGRATING ORAL EVERY 8 HOURS PRN
Status: DISCONTINUED | OUTPATIENT
Start: 2022-01-12 | End: 2022-01-17 | Stop reason: HOSPADM

## 2022-01-12 RX ORDER — LEVETIRACETAM 10 MG/ML
1000 INJECTION INTRAVASCULAR EVERY 12 HOURS
Status: DISCONTINUED | OUTPATIENT
Start: 2022-01-12 | End: 2022-01-12

## 2022-01-12 RX ORDER — POTASSIUM CHLORIDE 7.45 MG/ML
10 INJECTION INTRAVENOUS
Status: COMPLETED | OUTPATIENT
Start: 2022-01-13 | End: 2022-01-13

## 2022-01-12 RX ORDER — POLYETHYLENE GLYCOL 3350 17 G/17G
17 POWDER, FOR SOLUTION ORAL DAILY PRN
Status: DISCONTINUED | OUTPATIENT
Start: 2022-01-12 | End: 2022-01-17 | Stop reason: HOSPADM

## 2022-01-12 RX ADMIN — LEVETIRACETAM 2000 MG: 100 INJECTION, SOLUTION INTRAVENOUS at 22:32

## 2022-01-12 RX ADMIN — LEVETIRACETAM 1000 MG: 10 INJECTION INTRAVENOUS at 19:41

## 2022-01-12 RX ADMIN — SODIUM CHLORIDE: 9 INJECTION, SOLUTION INTRAVENOUS at 22:32

## 2022-01-12 RX ADMIN — SODIUM CHLORIDE 1000 ML: 9 INJECTION, SOLUTION INTRAVENOUS at 17:44

## 2022-01-12 ASSESSMENT — PAIN SCALES - GENERAL: PAINLEVEL_OUTOF10: 0
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
## 2022-01-12 NOTE — ED NOTES
Bed: 15  Expected date:   Expected time:   Means of arrival:   Comments:  ander Partida RN  01/12/22 2946
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
## 2022-01-12 NOTE — ED NOTES
With painful stimuli pt is posturing on right side.  PA aware and witnessed     Beulah Rape, RN  01/12/22 9526

## 2022-01-13 PROBLEM — G93.40 ACUTE ENCEPHALOPATHY: Status: ACTIVE | Noted: 2022-01-13

## 2022-01-13 LAB
A/G RATIO: 1.6 (ref 1.1–2.2)
ALBUMIN SERPL-MCNC: 3.5 G/DL (ref 3.4–5)
ALP BLD-CCNC: 40 U/L (ref 40–129)
ALT SERPL-CCNC: 20 U/L (ref 10–40)
AMMONIA: 41 UMOL/L (ref 16–60)
ANION GAP SERPL CALCULATED.3IONS-SCNC: 7 MMOL/L (ref 3–16)
AST SERPL-CCNC: 30 U/L (ref 15–37)
BACTERIA: ABNORMAL /HPF
BASE EXCESS VENOUS: 2 MMOL/L (ref -3–3)
BASOPHILS ABSOLUTE: 0 K/UL (ref 0–0.2)
BASOPHILS RELATIVE PERCENT: 0.2 %
BILIRUB SERPL-MCNC: 0.5 MG/DL (ref 0–1)
BILIRUBIN URINE: NEGATIVE
BLOOD, URINE: ABNORMAL
BUN BLDV-MCNC: 17 MG/DL (ref 7–20)
CALCIUM SERPL-MCNC: 8.1 MG/DL (ref 8.3–10.6)
CARBOXYHEMOGLOBIN: 3.9 % (ref 0–1.5)
CHLORIDE BLD-SCNC: 106 MMOL/L (ref 99–110)
CLARITY: CLEAR
CO2: 25 MMOL/L (ref 21–32)
COLOR: YELLOW
CREAT SERPL-MCNC: 0.7 MG/DL (ref 0.8–1.3)
EKG ATRIAL RATE: 92 BPM
EKG DIAGNOSIS: NORMAL
EKG P AXIS: 72 DEGREES
EKG P-R INTERVAL: 204 MS
EKG Q-T INTERVAL: 354 MS
EKG QRS DURATION: 112 MS
EKG QTC CALCULATION (BAZETT): 437 MS
EKG R AXIS: -46 DEGREES
EKG T AXIS: 35 DEGREES
EKG VENTRICULAR RATE: 92 BPM
EOSINOPHILS ABSOLUTE: 0 K/UL (ref 0–0.6)
EOSINOPHILS RELATIVE PERCENT: 0.1 %
EPITHELIAL CELLS, UA: 0 /HPF (ref 0–5)
GFR AFRICAN AMERICAN: >60
GFR NON-AFRICAN AMERICAN: >60
GLUCOSE BLD-MCNC: 105 MG/DL (ref 70–99)
GLUCOSE URINE: NEGATIVE MG/DL
HCO3 VENOUS: 28 MMOL/L (ref 23–29)
HCT VFR BLD CALC: 36.2 % (ref 40.5–52.5)
HEMOGLOBIN: 12.5 G/DL (ref 13.5–17.5)
HYALINE CASTS: 0 /LPF (ref 0–8)
KETONES, URINE: NEGATIVE MG/DL
LEUKOCYTE ESTERASE, URINE: ABNORMAL
LYMPHOCYTES ABSOLUTE: 1.1 K/UL (ref 1–5.1)
LYMPHOCYTES RELATIVE PERCENT: 11.6 %
MCH RBC QN AUTO: 34.4 PG (ref 26–34)
MCHC RBC AUTO-ENTMCNC: 34.5 G/DL (ref 31–36)
MCV RBC AUTO: 99.7 FL (ref 80–100)
METHEMOGLOBIN VENOUS: 0.5 %
MICROSCOPIC EXAMINATION: YES
MONOCYTES ABSOLUTE: 0.7 K/UL (ref 0–1.3)
MONOCYTES RELATIVE PERCENT: 7.7 %
NEUTROPHILS ABSOLUTE: 7.3 K/UL (ref 1.7–7.7)
NEUTROPHILS RELATIVE PERCENT: 80.4 %
NITRITE, URINE: NEGATIVE
O2 CONTENT, VEN: 17 VOL %
O2 SAT, VEN: 98 %
O2 THERAPY: ABNORMAL
PCO2, VEN: 48.8 MMHG (ref 40–50)
PDW BLD-RTO: 12.4 % (ref 12.4–15.4)
PH UA: 6 (ref 5–8)
PH VENOUS: 7.37 (ref 7.35–7.45)
PLATELET # BLD: 125 K/UL (ref 135–450)
PMV BLD AUTO: 8.7 FL (ref 5–10.5)
PO2, VEN: 98.4 MMHG (ref 25–40)
POTASSIUM REFLEX MAGNESIUM: 4.5 MMOL/L (ref 3.5–5.1)
PROTEIN UA: NEGATIVE MG/DL
RBC # BLD: 3.63 M/UL (ref 4.2–5.9)
RBC UA: 78 /HPF (ref 0–4)
SODIUM BLD-SCNC: 138 MMOL/L (ref 136–145)
SPECIFIC GRAVITY UA: 1.02 (ref 1–1.03)
TCO2 CALC VENOUS: 66 MMOL/L
TOTAL PROTEIN: 5.7 G/DL (ref 6.4–8.2)
TROPONIN: 0.03 NG/ML
TROPONIN: 0.04 NG/ML
TSH REFLEX: 0.54 UIU/ML (ref 0.27–4.2)
URINE REFLEX TO CULTURE: YES
URINE TYPE: ABNORMAL
UROBILINOGEN, URINE: 0.2 E.U./DL
WBC # BLD: 9.1 K/UL (ref 4–11)
WBC UA: 13 /HPF (ref 0–5)

## 2022-01-13 PROCEDURE — 95816 EEG AWAKE AND DROWSY: CPT | Performed by: PSYCHIATRY & NEUROLOGY

## 2022-01-13 PROCEDURE — 80053 COMPREHEN METABOLIC PANEL: CPT

## 2022-01-13 PROCEDURE — 6370000000 HC RX 637 (ALT 250 FOR IP): Performed by: HOSPITALIST

## 2022-01-13 PROCEDURE — 87186 SC STD MICRODIL/AGAR DIL: CPT

## 2022-01-13 PROCEDURE — 99223 1ST HOSP IP/OBS HIGH 75: CPT | Performed by: PSYCHIATRY & NEUROLOGY

## 2022-01-13 PROCEDURE — 82803 BLOOD GASES ANY COMBINATION: CPT

## 2022-01-13 PROCEDURE — 84484 ASSAY OF TROPONIN QUANT: CPT

## 2022-01-13 PROCEDURE — 6360000002 HC RX W HCPCS: Performed by: INTERNAL MEDICINE

## 2022-01-13 PROCEDURE — 6360000002 HC RX W HCPCS: Performed by: HOSPITALIST

## 2022-01-13 PROCEDURE — 93010 ELECTROCARDIOGRAM REPORT: CPT | Performed by: INTERNAL MEDICINE

## 2022-01-13 PROCEDURE — 96361 HYDRATE IV INFUSION ADD-ON: CPT

## 2022-01-13 PROCEDURE — 87086 URINE CULTURE/COLONY COUNT: CPT

## 2022-01-13 PROCEDURE — 2580000003 HC RX 258: Performed by: HOSPITALIST

## 2022-01-13 PROCEDURE — 95816 EEG AWAKE AND DROWSY: CPT

## 2022-01-13 PROCEDURE — 87077 CULTURE AEROBIC IDENTIFY: CPT

## 2022-01-13 PROCEDURE — 82140 ASSAY OF AMMONIA: CPT

## 2022-01-13 PROCEDURE — 81001 URINALYSIS AUTO W/SCOPE: CPT

## 2022-01-13 PROCEDURE — 85025 COMPLETE CBC W/AUTO DIFF WBC: CPT

## 2022-01-13 PROCEDURE — 96366 THER/PROPH/DIAG IV INF ADDON: CPT

## 2022-01-13 PROCEDURE — 2140000000 HC CCU INTERMEDIATE R&B

## 2022-01-13 PROCEDURE — 84443 ASSAY THYROID STIM HORMONE: CPT

## 2022-01-13 RX ORDER — LANOLIN ALCOHOL/MO/W.PET/CERES
3 CREAM (GRAM) TOPICAL DAILY
Status: ON HOLD | COMMUNITY
End: 2022-02-23

## 2022-01-13 RX ORDER — TRAZODONE HYDROCHLORIDE 50 MG/1
50 TABLET ORAL NIGHTLY
Status: ON HOLD | COMMUNITY
End: 2022-02-23

## 2022-01-13 RX ORDER — LORATADINE 10 MG/1
10 TABLET ORAL DAILY
Status: ON HOLD | COMMUNITY
End: 2022-02-23

## 2022-01-13 RX ORDER — LEVETIRACETAM 500 MG/1
1500 TABLET ORAL 2 TIMES DAILY
COMMUNITY

## 2022-01-13 RX ORDER — DULOXETIN HYDROCHLORIDE 30 MG/1
30 CAPSULE, DELAYED RELEASE ORAL DAILY
Status: ON HOLD | COMMUNITY
End: 2022-02-23

## 2022-01-13 RX ADMIN — METHADONE HYDROCHLORIDE 5 MG: 5 TABLET ORAL at 10:42

## 2022-01-13 RX ADMIN — ATORVASTATIN CALCIUM 10 MG: 10 TABLET, FILM COATED ORAL at 22:28

## 2022-01-13 RX ADMIN — SODIUM CHLORIDE, PRESERVATIVE FREE 10 ML: 5 INJECTION INTRAVENOUS at 09:33

## 2022-01-13 RX ADMIN — ACETAMINOPHEN 650 MG: 325 TABLET ORAL at 04:36

## 2022-01-13 RX ADMIN — SODIUM CHLORIDE: 9 INJECTION, SOLUTION INTRAVENOUS at 14:36

## 2022-01-13 RX ADMIN — POTASSIUM CHLORIDE 10 MEQ: 7.46 INJECTION, SOLUTION INTRAVENOUS at 00:40

## 2022-01-13 RX ADMIN — LACOSAMIDE 200 MG: 100 TABLET, FILM COATED ORAL at 22:27

## 2022-01-13 RX ADMIN — LEVETIRACETAM 1500 MG: 100 INJECTION, SOLUTION INTRAVENOUS at 09:16

## 2022-01-13 RX ADMIN — MIRTAZAPINE 7.5 MG: 15 TABLET, FILM COATED ORAL at 22:26

## 2022-01-13 RX ADMIN — SODIUM CHLORIDE, PRESERVATIVE FREE 10 ML: 5 INJECTION INTRAVENOUS at 22:31

## 2022-01-13 RX ADMIN — LACOSAMIDE 200 MG: 100 TABLET, FILM COATED ORAL at 10:47

## 2022-01-13 RX ADMIN — LEVETIRACETAM 2000 MG: 100 INJECTION, SOLUTION INTRAVENOUS at 22:31

## 2022-01-13 RX ADMIN — METHADONE HYDROCHLORIDE 5 MG: 5 TABLET ORAL at 22:27

## 2022-01-13 RX ADMIN — ACETAMINOPHEN 650 MG: 325 TABLET ORAL at 18:40

## 2022-01-13 RX ADMIN — SODIUM CHLORIDE: 9 INJECTION, SOLUTION INTRAVENOUS at 00:36

## 2022-01-13 RX ADMIN — Medication 1000 MG: at 18:42

## 2022-01-13 RX ADMIN — POTASSIUM CHLORIDE 10 MEQ: 7.46 INJECTION, SOLUTION INTRAVENOUS at 01:26

## 2022-01-13 ASSESSMENT — PAIN SCALES - GENERAL
PAINLEVEL_OUTOF10: 0
PAINLEVEL_OUTOF10: 5
PAINLEVEL_OUTOF10: 4
PAINLEVEL_OUTOF10: 10
PAINLEVEL_OUTOF10: 2

## 2022-01-13 NOTE — CONSULTS
In patient Neurology consult        [facility] Neurology      MD Amada Ozuna  1960    Date of Service: 1/13/2022    Referring Physician: Carly Syed MD    Most of the history was obtained from detailed chart reviewing and discussion with the patient's nurse. The patient is currently aphasic and unable to provide me with accurate history. Reason for the consult and CC: Acute encephalopathy and breakthrough seizure    HPI:   The patient is a 64y.o.  years old male with known history of medically refractory epilepsy and remote stroke who was admitted from his ECF yesterday with a breakthrough seizure. Symptoms started few hours prior to admission. Description found unresponsive with generalized tonic activity for few minutes. Degree was severe. No other relieving or aggravating factors or clear triggers. No recent fever chills. He was somewhat postictally. He was transported to the ER from his F where he was in postictal state with following direction. Initial work-up was unremarkable. CT head showed no acute findings and old left MCA stroke. He was admitted to the hospital  The patient is currently awake and alert. He can follow direction. At baseline. He does have aphasia. He is currently on Keppra 1500, 200 mg and Vimpat 2 mg x 2. He does see  epilepsy and has a longstanding history of epilepsy as well. Other review of system was unremarkable.     Family history: Noncontributory      Past Medical History:   Diagnosis Date    Acute intracerebral hemorrhage ([address] 02/03/2017    Anxiety     Back pain     Cerebral artery occlusion with cerebral infarction ([address]     Chronic back pain greater than 3 months duration 02/03/2017    Diabetes mellitus ([address]     Dysphasia 02/03/2017    Dysphonia 02/03/2017    Endocarditis and heart valve disorders in diseases classified elsewhere 02/03/2017    Hydrocephalus ([address] 02/03/2017    Hypertension     IV[facility]intraventricular hemorrhage) ([facility] 02/03/2017    NSTEMI (non-ST elevated myocardial infarction) ([facility] 02/03/2017    Seizures ([facility]      Past Surgical History:   Procedure Laterality Date    BRAIN ANEURYSM SURGERY Left 02/03/2017    COLONOSCOPY N/A 8/14/2019    COLONOSCOPY WITH MAC ANESTHESIA performed by Kirsten Teresa MD at [address] [place] [address] Left 02/03/2017    LEG SURGERY      UPPER GASTROINTESTINAL ENDOSCOPY N/A 8/14/2019    EGD BIOPSY performed by Kirsten Teresa MD at [address] History     Tobacco Use    Smoking status: Current Some Day Smoker     Packs/day: 0.50     Types: Cigarettes    Smokeless tobacco: Never Used    Tobacco comment: 6 per WEEK   Vaping Use    Vaping Use: Never used   Substance Use Topics    Alcohol use: No    Drug use: No     Allergies   Allergen Reactions    No Known Allergies     Seasonal      Current Facility-Administered Medications   Medication Dose Route Frequency Provider Last Rate Last Admin    lacosamide (VIMPAT) tablet 200 mg  200 mg Oral BID Lynn Cleary MD   200 mg at 01/13/22 1047    methadone (DOLOPHINE) tablet 5 mg  5 mg Oral BID Lynn Cleary MD   5 mg at 01/13/22 1042    sodium chloride flush 0.9 % injection 5-40 mL  5-40 mL IntraVENous 2 times per day ePter Jason MD   10 mL at 01/13/22 0933    sodium chloride flush 0.9 % injection 5-40 mL  5-40 mL IntraVENous PRN Lynn Cleary MD        0.9 % sodium chloride infusion  25 mL IntraVENous PRN Peter Jason MD 75 mL/hr at 01/13/22 0730 Rate Verify at 01/13/22 0730    ondansetron (ZOFRAN-ODT) disintegrating tablet 4 mg  4 mg Oral Q8H PRN Lynn Cleary MD        Or    ondansetron (ZOFRAN) injection 4 mg  4 mg IntraVENous Q6H PRN Lynn Cleary MD        polyethylene glycol (GLYCOLAX) packet 17 g  17 g Oral Daily PRN Peter Jason MD        acetaminophen (TYLENOL) tablet 650 mg  650 mg Oral Q6H PRN Peter Jason MD   650 mg at 01/13/22 2615    Or    acetaminophen (TYLENOL) suppository 650 mg  650 mg Rectal Q6H PRN Lakeisha Pacheco MD        LORazepam (ATIVAN) injection 2 mg  2 mg IntraVENous Q3H PRN Lynn Cleary MD        0.9 % sodium chloride infusion   IntraVENous Continuous Lynn Cleary MD        levETIRAcetam (KEPPRA) 1,500 mg in sodium chloride 0.9 % 100 mL IVPB  1,500 mg IntraVENous Daily Lakeisha Pacheco MD   Stopped at 01/13/22 0945    levETIRAcetam (KEPPRA) 2,000 mg in sodium chloride 0.9 % 100 mL IVPB  2,000 mg IntraVENous Nightly Lynn Cleary MD        0.9 % sodium chloride infusion   IntraVENous Continuous Lynn Cleary MD 75 mL/hr at 01/13/22 0936 Rate Verify at 01/13/22 0936    atorvastatin (LIPITOR) tablet 10 mg  10 mg Oral Nightly Lynn Cleary MD        mirtazapine (REMERON) tablet 7.5 mg  7.5 mg Oral Nightly Lynn Cleary MD           ROS: 10-14 system review was limited due to MS changes or as per HPI. Constitutional:   Vitals:    01/13/22 0800 01/13/22 0900 01/13/22 1000 01/13/22 1100   BP: (!) 119/46 110/61 (!) 111/50 (!) 110/51   Pulse: 56 76 66 66   Resp:  20     Temp:  97.3 °F (36.3 °C)     TempSrc:  Oral     SpO2:  97% 96%    Weight:       Height:           General appearance: Aphasic  Eye: Fundus of the eye: Optic disc is difficult to obtain due to poor cooperation from the patient  Neck: supple  Cardiovascular: No lower leg edema with good pulsation. Mental Status:   AAO times one, himself  Good attention span  Mixed aphasia and perseveration  Unable to assess memory or fund of knowledge due to aphasia. Cranial Nerves:   II:  Pupils: equal, round, reactive to light  III,IV,VI: Extra Ocular Movements are intact.  No nystagmus  V: Facial sensation : not tested due to confusion  VII: Facial strength and movements: intact and symmetric  VIII: Hearing: can track my voice  IX: Palate elevation: NT due to confusion  XI: Shoulder shrug: NT due to confusion  XII: Tongue movements: midline  Musculoskeletal: He can move his arms and leg spontaneously. Tone: Normal tone. No rigidity. Reflexes: 1+ throughout  Planters: flexor bilaterally. Coordination: No abnormal movement  Sensation: Patient can withdraw to pain from left and right   Gait/Posture: Cannot be tested due to poor cooperation from the patient. Data:  LABS:   Lab Results   Component Value Date     01/13/2022    K 4.5 01/13/2022     01/13/2022    CO2 25 01/13/2022    BUN 17 01/13/2022    CREATININE 0.7 01/13/2022    GFRAA >60 01/13/2022    GFRAA >60 12/13/2011    LABGLOM >60 01/13/2022    GLUCOSE 105 01/13/2022    MG 1.50 02/05/2017    CALCIUM 8.1 01/13/2022     Lab Results   Component Value Date    WBC 9.1 01/13/2022    RBC 3.63 01/13/2022    HGB 12.5 01/13/2022    HCT 36.2 01/13/2022    MCV 99.7 01/13/2022    RDW 12.4 01/13/2022     01/13/2022     Lab Results   Component Value Date    INR 1.33 (H) 02/08/2017    PROTIME 15.2 (H) 02/08/2017       Neuroimaging were independently reviewed by me. Reviewed notes from different physicians  Reviewed lab and blood testing    Impression:  Acute encephalopathy and breakthrough seizure in a patient with known history of medical refractory epilepsy. Likely breakthrough seizure. Remote left MCA stroke  Chronic aphasia  Hyperlipidemia  Chronic pain      Recommendation:  No need to change or add a third agent at this time since the patient is back to his baseline. Likely breakthrough seizure. I do not feel adding or increasing dose of Vimpat or Keppra will help the patient at this point. Since he appears at baseline, will continue with Keppra 1500, 2000 mg and Vimpat 200 mg x 2. Recent Keppra level was 49.   Seizure precautions  Hydration  Speech, PT and OT  Aspirin  Statin  DVT and GI prophylaxis  Telemetry  Continue home pain medications  Observation for 24 hours and discharge back to his ECF  Follow-up with  epilepsy regarding epilepsy management and consideration for third AED agent if he continues to have breakthrough seizure  Discussed with ICU nurse           Thank you for referring such patient. If you have any questions regarding my consult note, please don't hesitate to call me. Yanique Milan MD  [phone]    This dictation was generated by voice recognition computer software.  Although all attempts are made to edit the dictation for accuracy, there may be errors in the  transcription that are not intended

## 2022-01-13 NOTE — PROGRESS NOTES
Expressive and receptive aphasia  When asked pt where he lives, his response is  \"Where was I born? --[birth year]\"

## 2022-01-13 NOTE — DISCHARGE INSTR - COC
Continuity of Care Form    Patient Name: Shital Garcia   :  1960  MRN:  [id number]    Admit date:  2022  Discharge date:  2022    Code Status Order: Full Code   Advance Directives:      Admitting Physician:  Marla Mason MD  PCP: Zak Bueno MD    [facility] 8KO-9225/9471-33  Discharging Unit Phone Number: [phone]    Emergency Contact:   Extended Emergency Contact Information  Primary Emergency Contact: Vidhya Montalvo, [address] [facility] Phone: [phone]  Mobile Phone: [phone]  Relation: Brother/Sister  Secondary Emergency Contact: Vidhya Montalvo [address] Phone: [phone]  Relation: Other    Past Surgical History:  Past Surgical History:   Procedure Laterality Date    BRAIN ANEURYSM SURGERY Left 2017    COLONOSCOPY N/A 2019    COLONOSCOPY WITH MAC ANESTHESIA performed by Johnna Mora MD at [address] [place] [address] Left 2017    LEG SURGERY      UPPER GASTROINTESTINAL ENDOSCOPY N/A 2019    EGD BIOPSY performed by Johnna Mora MD at [place] [address]       Immunization History:   Immunization History   Administered Date(s) Administered    COVID-19, NETTA [place], 30mcg/0.3mL 2020, 2021       Active Problems:  Patient Active Problem List   Diagnosis Code    Psychosis ([facility] F29    Acute intracerebral hemorrhage ([facility] I61.9    Dysphagia R13.10    Dysphonia R49.0    Endocarditis, sumit and aortic valves I38    Endocarditis I38    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    Bacterial infection due to Streptococcus mutans A49.1    Type 2 diabetes mellitus ([address] E11.9    Protein calorie malnutrition ([address] E46    Breakthrough seizure ([address] G40.919    Partial symptomatic epilepsy with complex partial seizures, intractable, without status epilepticus ([address] G40.219    Abnormal CT of the head R93.0    Acute encephalopathy G93.40    Remote history of stroke Z86.73    Aphasia R47.01       Isolation/Infection: Isolation            No Isolation          Patient Infection Status       Infection Onset Added Last Indicated Last Indicated By Review Planned Expiration Resolved Resolved By    None active    Resolved    COVID-19 (Rule Out) 01/03/21 01/03/21 01/03/21 COVID-19 (Ordered)   01/03/21 Rule-Out Test Resulted    COVID-19 (Rule Out) 07/15/20 07/15/20 07/15/20 COVID-19 (Ordered)   07/16/20 Rule-Out Test Resulted            Nurse Assessment:  Last Vital Signs: /64   Pulse 72   Temp 98.5 °F (36.9 °C) (Oral)   Resp 16   Ht 5' 11\" (1.803 m)   Wt 168 lb 8 oz (76.4 kg)   SpO2 97%   BMI 23.50 kg/m²     Last documented pain score (0-10 scale): Pain Level: 6  Last Weight:   Wt Readings from Last 1 Encounters:   01/16/22 168 lb 8 oz (76.4 kg)     Mental Status:  alert    IV Access:  - None    Nursing Mobility/ADLs:  Walking   Independent  Transfer  Independent  Bathing  Independent  Dressing  Independent  Toileting  Independent  Feeding  Independent  Med Admin  Assisted  Med Delivery   whole    Wound Care Documentation and Therapy:  Incision 02/03/17 Head Left;Posterior (Active)   Number of days: 1808        Elimination:  Continence: Bowel: Yes  Bladder: Yes  Urinary Catheter: None   Colostomy/Ileostomy/Ileal Conduit: No       Date of Last BM: 1/15/2022    Intake/Output Summary (Last 24 hours) at 1/17/2022 1421  Last data filed at 1/17/2022 1028  Gross per 24 hour   Intake 720 ml   Output 800 ml   Net -80 ml     I/O last 3 completed shifts:   In: 12 [P.O.:960]  Out: 1200 [Urine:1200]    Safety Concerns:     History of Seizures    Impairments/Disabilities:      None    Nutrition Therapy:  Current Nutrition Therapy:   - Oral Diet:  General    Routes of Feeding: Oral  Liquids: No Restrictions  Daily Fluid Restriction: no  Last Modified Barium Swallow with Video (Video Swallowing Test): not done    Treatments at the Time of Hospital Discharge:   Respiratory Treatments: none  Oxygen Therapy:  is not on home oxygen therapy. Ventilator:    - No ventilator support    Rehab Therapies: Physical Therapy and Occupational Therapy  Weight Bearing Status/Restrictions: No weight bearing restirctions  Other Medical Equipment (for information only, NOT a DME order):  none  Other Treatments: none    Patient's personal belongings (please select all that are sent with patient):  Belongings pt came in with    RN SIGNATURE:  Electronically signed by Aleks Wilkerson RN on 1/17/22 at 1:34 PM EST    CASE MANAGEMENT/SOCIAL WORK SECTION    Inpatient Status Date: 1-13-22    Readmission Risk Assessment Score:  Readmission Risk              Risk of Unplanned Readmission:  11           Discharging to [facility] at 2:30pm via [employer].  Call report [phone], fax [phone]  Name: Discharging to [address] [place] [address]  R: [phone]  F: [phone]     / signature: DHARMESH Bone, CCM, RN  [facility]  [phone]     PHYSICIAN SECTION    Prognosis: Fair    Condition at Discharge: Stable    Rehab Potential (if transferring to Rehab): Fair    Recommended Labs or Other Treatments After Discharge:     Physician Certification: I certify the above information and transfer of Fani Calderon  is necessary for the continuing treatment of the diagnosis listed and that he requires [facility] for greater 30 days.      Update Admission H&P: No change in H&P    PHYSICIAN SIGNATURE:  Electronically signed by Any Cross MD on 1/17/22 at 11:23 AM EST

## 2022-01-13 NOTE — H&P
_    [facility]    1/12/2022 11:48 PM    Patient Information:  Farrah Howard is a 64 y.o. male [id number]    PCP:  Roselia Pichardo MD (Tel: [phone] )    Date of Service:   Pt seen/examined on 1/12/2022   Placed in Observation. Chief complaint:    Chief Complaint   Patient presents with    Seizures     brought by [facility] from [facility]. found on floor with seizure activity. Iv established given 10mg of versed       History of Present Illness:  Melissa Mitchell is a 64 y.o. male who presented with   · BIBA to ED   · Limited Hx available . · Patient found an floor having a Sz episode  . He is from an ECF ? · He was here recently for similar complaints of Breakthrough Sz and was seen and evaluated By NEURO and recommended Transfer to  that time or at least an ear;y appointment to [facility] Neurology for AED unresponsive epilepsy . · Also there are concerns about him being non compliant w/ AEDs       History and pertinent information obtained from   · ED provider   · Prior Chart         Notable/Significant ED Findings/VItals :   · Is on RA   · BP stable        While in ED  · Patient care Given. · Appropriate Monitoring done. · Pertinent Labs done. See [facility] for details   · Pertinent Acute issues identified and managed . See Epic for details  · Pertinent consults called and case d/w them by ED Provider . See Epic for details. · Imaging CT, done in ED . While in ED, Indicated/Pertinent Medications/Care given as below      · ED Chart reviewed. · Medications reviewed w/c were give in ED . See Epic for details on medications and orders  · IVPB Keppra X 1       · Imaging/Labs/Work up done in ED reviewed and their interpretation/active and acute issues, as mentioned below in Assessment and Plan. Currently, on my evaluation, patient's condition as below :   · Since arrival, post above Rx, patient is Awake .    · Does not follow commands   · Is able to LIN X 4 and 5/5 B/L UE and LEs   · No resp distress noted   · ED attempted transfer to  per request but could not since  not accepting Transfers     10 complete review of symptoms performed. Pertinent positives and negatives listed above in HPI. REVIEW OF SYSTEMS:     Pertinent positives and negatives are noted in the HPI . All other systems were reviewed and are negative. Past Medical History:         has a past medical history of Acute intracerebral hemorrhage ([facility] [address] Anxiety, Back pain, Cerebral artery occlusion with cerebral infarction [facility]), Chronic back pain greater than 3 months duration, Diabetes mellitus ([facility] [address] Dysphasia, Dysphonia, Endocarditis and heart valve disorders in diseases classified elsewhere, Hydrocephalus ([facility] [address] Hypertension, IVH (intraventricular hemorrhage) ([facility] [address] NSTEMI (non-ST elevated myocardial infarction) ([facility] [address] and Seizures ([facility] [address] Past Surgical History:         has a past surgical history that includes Leg Surgery; craniotomy (Left, 02/03/2017); Brain aneurysm surgery (Left, 02/03/2017); Upper gastrointestinal endoscopy (N/A, 8/14/2019); and Colonoscopy (N/A, 8/14/2019). Medications:      Medication Sig Dispense Refill    levETIRAcetam (KEPPRA) 500 MG tablet Take 3 tablets by mouth every morning 90 tablet 1    levETIRAcetam (KEPPRA) 500 MG tablet Take 4 tablets by mouth nightly 120 tablet 1    potassium chloride (KLOR-CON M) 20 MEQ extended release tablet Take 20 mEq by mouth 2 times daily      acetaminophen (TYLENOL) 325 MG tablet Take 650 mg by mouth every 4 hours as needed for Pain      mirtazapine (REMERON) 7.5 MG tablet Take 7.5 mg by mouth nightly      citalopram (CELEXA) 20 MG tablet Take 20 mg by mouth daily      Cholecalciferol (VITAMIN D3) 5000 units TABS Take by mouth daily      OLANZapine (ZYPREXA) 2.5 MG tablet Take 2.5 mg by mouth nightly      lacosamide (VIMPAT) 200 MG tablet Take 200 mg by mouth 2 times daily.       calcium carbonate (TUMS) 500 MG chewable tablet Take 1 tablet by mouth 2 times daily       methadone (DOLOPHINE) 5 MG tablet Take 5 mg by mouth 2 times daily.  atorvastatin (LIPITOR) 10 MG tablet Take 10 mg by mouth nightly            Allergies: Allergies   Allergen Reactions    No Known Allergies     Seasonal           Social History:   reports that he has been smoking cigarettes. He has been smoking about 0.50 packs per day. He has never used smokeless tobacco. He reports that he does not drink alcohol and does not use drugs. Family History:      Unable to Obtain Family History 2/2 AMS and post Ictal state in Patient          Physical Exam:  /61   Pulse 104   Temp 98.8 °F (37.1 °C) (Temporal)   Resp 18   Ht 5' 11\" (1.803 m)   Wt 176 lb 12.9 oz (80.2 kg)   SpO2 99%   BMI 24.66 kg/m²     General appearance:  Ill appearing . Eyes:  Sclera clear, pupils equal  ENT:  Dry  mucus membranes, Trachea midline. Cardiovascular: Regular rhythm, normal S1, S2.     Respiratory:  Noted Dec AE B/ L . Gastrointestinal:  Abdomen soft,  non-tender,   Musculoskeletal:  No cyanosis in digits, neck supple  Neurology: Awake . LIN X 4 . 5/5 B/L . Does not follow commands     Labs:     Recent Labs     01/12/22  1622   WBC 11.1*   HGB 14.2   HCT 42.3        Recent Labs     01/12/22  1758      K 3.4*      CO2 20*   BUN 20   CREATININE 0.8   CALCIUM 7.6*     Recent Labs     01/12/22  1758   AST 25   ALT 19   BILITOT 0.3   ALKPHOS 35*     No results for input(s): INR in the last 72 hours.   Recent Labs     01/12/22  1758   CKTOTAL 95   TROPONINI 0.03*         Urinalysis:      Lab Results   Component Value Date    NITRU Negative 07/15/2020    WBCUA 24 07/15/2020    RBCUA 19 07/15/2020    BLOODU MODERATE 07/15/2020    SPECGRAV 1.021 07/15/2020    GLUCOSEU Negative 07/15/2020         Radiology:     CXR:   I have reviewed the CXR  No acute findings or is unremarkable for any acute pathology needing acute interventions, on review. EKG/Telemonitor :    I have reviewed the EKG  NSR   LAD   LVH +     IMAGING :     CT HEAD WO CONTRAST   Final Result   No acute intracranial abnormality. Old infarct left posterior parietal occipital region         XR CHEST PORTABLE   Final Result   No acute process. 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 · Home medications for chronic medical problems Reviewed and Held/Resumed/Changes made, as clinically warranted/Indicated. · Resumed IVPB Keppra at home dosing   · Resumed Vimpat   · EEG planned in AM   · Neuro c/s in AM   · Swallow screen   · Trend Trops X 3   · Replace K   · Will advance diet when able to swallow safely   · Gentle IVF            · Please See EPIC Order for detailed plans of care and further Details. · DVT Prophylaxis .  + SCDs   · Nutrition/Diet. Diet NPO  · Code Status . Full Code  · PT/OT and ambulatory Eval Status. Ambulate with Assist    · Probable LOS & future Disposition planned post discharge .  ECF in 1-2 days       CONSULTS ORDERED @ ADMISSION   IP CONSULT TO INTERNAL MEDICINE   IP CONSULT TO NEUROLOGY      Medical Decision Making : HIGH     Total patient  Care [ Direct and Indirect ] time spent in evaluating the patient an discussing plan with appropriate staff/patient/family members is 46 min       Isamar Ayers MD    Hospitalist, [facility].    1/12/2022 11:48 PM

## 2022-01-13 NOTE — PROGRESS NOTES
Pt currently unable to finish admission d/ t confusion and post ictal state. Large bump noted on patients head. Seizure pads in place.

## 2022-01-13 NOTE — PROGRESS NOTES
Pt admitted to CVU 4. Seizure pads placed on bed. MIV fluid infusing. Pt attached to Tele box 4. Bed alarm in place. Orders release\d.

## 2022-01-13 NOTE — PROGRESS NOTES
Asked pt about pain after methadone \"i'm alright now\"    Only ordered Milk for lunch. Does not wish for me to open it. Did not eat late bkfst tray.  Some OJ with meds

## 2022-01-13 NOTE — PROGRESS NOTES
[facility] PROGRESS NOTE    1/13/2022 11:19 AM        Name: Briana Kohler . Admitted: 1/12/2022  Primary Care Provider: Rizwana Engel MD (Tel: [phone])          Subjective:    Lying in bed no chest pain sob but only oriented to self    Reviewed interval ancillary notes    Current Medications  lacosamide (VIMPAT) tablet 200 mg, BID  methadone (DOLOPHINE) tablet 5 mg, BID  sodium chloride flush 0.9 % injection 5-40 mL, 2 times per day  sodium chloride flush 0.9 % injection 5-40 mL, PRN  0.9 % sodium chloride infusion, PRN  ondansetron (ZOFRAN-ODT) disintegrating tablet 4 mg, Q8H PRN   Or  ondansetron (ZOFRAN) injection 4 mg, Q6H PRN  polyethylene glycol (GLYCOLAX) packet 17 g, Daily PRN  acetaminophen (TYLENOL) tablet 650 mg, Q6H PRN   Or  acetaminophen (TYLENOL) suppository 650 mg, Q6H PRN  LORazepam (ATIVAN) injection 2 mg, Q3H PRN  0.9 % sodium chloride infusion, Continuous  levETIRAcetam (KEPPRA) 1,500 mg in sodium chloride 0.9 % 100 mL IVPB, Daily  levETIRAcetam (KEPPRA) 2,000 mg in sodium chloride 0.9 % 100 mL IVPB, Nightly  0.9 % sodium chloride infusion, Continuous  atorvastatin (LIPITOR) tablet 10 mg, Nightly  mirtazapine (REMERON) tablet 7.5 mg, Nightly        Objective:  BP (!) 111/50   Pulse 66   Temp 97.3 °F (36.3 °C) (Oral)   Resp 20   Ht 5' 11\" (1.803 m)   Wt 176 lb 12.9 oz (80.2 kg)   SpO2 96%   BMI 24.66 kg/m²     Intake/Output Summary (Last 24 hours) at 1/13/2022 1119  Last data filed at 1/13/2022 0936  Gross per 24 hour   Intake 323.64 ml   Output 300 ml   Net 23.64 ml      Wt Readings from Last 3 Encounters:   01/13/22 176 lb 12.9 oz (80.2 kg)   01/03/21 180 lb (81.6 kg)   07/18/20 155 lb 6.4 oz (70.5 kg)       General appearance:  Appears comfortable.  AAOx1 to person only  HEENT: atraumatic, Pupils equal, muscous membranes moist, no masses appreciated  Cardiovascular: Regular rate and rhythm no murmurs appreciated  Respiratory: CTAB no wheezing  Gastrointestinal: Abdomen soft, non-tender, BS+  EXT: no edema  Neurology: no gross focal deficts  Psychiatry: Appropriate affect. Not agitated  Skin: Warm, dry, no rashes appreciated    Labs and Tests:  CBC:   Recent Labs     01/12/22  1622 01/13/22  0433   WBC 11.1* 9.1   HGB 14.2 12.5*    125*     BMP:    Recent Labs     01/12/22  1758 01/13/22  0433    138   K 3.4* 4.5    106   CO2 20* 25   BUN 20 17   CREATININE 0.8 0.7*   GLUCOSE 87 105*     Hepatic:   Recent Labs     01/12/22  1758 01/13/22  0433   AST 25 30   ALT 19 20   BILITOT 0.3 0.5   ALKPHOS 35* 40     CT HEAD WO CONTRAST   Final Result   No acute intracranial abnormality. Old infarct left posterior parietal occipital region         XR CHEST PORTABLE   Final Result   No acute process. Stable cardiomegaly             Recent imaging reviewed        Assessment & Plan:   Acute encephalopathy: possible to breakthrough seizure  - check tsh ammonia vbg  - start seziure meds  - neuro consult pending  - check ua    Diet: ADULT DIET;  Regular  Code:Full Code      Any Cross MD   1/13/2022 11:19 AM

## 2022-01-13 NOTE — PROGRESS NOTES
Pharmacy Medication History Note      List of current medications patient is taking is complete. Source of information: Medication list from [facility] 96 made to medication list:    Medications removed (include reason, ex. therapy complete or physician discontinued):  Olanzapine 2.5 mg tablets  Citalopram 20 mg tablets    Medications added/doses adjusted:  Duloxetine 30 mg ER capsule - 1 tablet PO daily  Levetiracetam dose adjusted to 1500 mg PO BID   Loratadine 10 mg - 1 tablet PO daily  Melatonin - 6 mg PO QHS  Lactobacillus - 1 tablet PO BID  Trazodone 50 mg - 1 tablet QHS for insomnia    Other notes (ex.  Recent course of antibiotics, Coumadin dosing):  N/A    Inge Garcia, PharmD, BCPS  Clinical Pharmacist  [phone]

## 2022-01-13 NOTE — CARE COORDINATION
CM notes patient is from [facility], call placed to admissions to verify level of care and if a bed is on hold.    Graciela Camacho, BOBN, CCM, RN  [facility]  [phone]

## 2022-01-13 NOTE — PROGRESS NOTES
Call placed to [facility], pts place of residency. Spoke to RN, asked for Med list to be faxed over.      [facility] at [address]

## 2022-01-14 LAB
ANION GAP SERPL CALCULATED.3IONS-SCNC: 9 MMOL/L (ref 3–16)
BASOPHILS ABSOLUTE: 0 K/UL (ref 0–0.2)
BASOPHILS RELATIVE PERCENT: 0.4 %
BUN BLDV-MCNC: 13 MG/DL (ref 7–20)
CALCIUM SERPL-MCNC: 8.3 MG/DL (ref 8.3–10.6)
CHLORIDE BLD-SCNC: 106 MMOL/L (ref 99–110)
CO2: 24 MMOL/L (ref 21–32)
CREAT SERPL-MCNC: 0.6 MG/DL (ref 0.8–1.3)
EOSINOPHILS ABSOLUTE: 0 K/UL (ref 0–0.6)
EOSINOPHILS RELATIVE PERCENT: 0.6 %
GFR AFRICAN AMERICAN: >60
GFR NON-AFRICAN AMERICAN: >60
GLUCOSE BLD-MCNC: 87 MG/DL (ref 70–99)
HCT VFR BLD CALC: 36.6 % (ref 40.5–52.5)
HEMOGLOBIN: 12.7 G/DL (ref 13.5–17.5)
LYMPHOCYTES ABSOLUTE: 0.6 K/UL (ref 1–5.1)
LYMPHOCYTES RELATIVE PERCENT: 12 %
MCH RBC QN AUTO: 34.3 PG (ref 26–34)
MCHC RBC AUTO-ENTMCNC: 34.8 G/DL (ref 31–36)
MCV RBC AUTO: 98.7 FL (ref 80–100)
MONOCYTES ABSOLUTE: 0.4 K/UL (ref 0–1.3)
MONOCYTES RELATIVE PERCENT: 8.7 %
NEUTROPHILS ABSOLUTE: 3.9 K/UL (ref 1.7–7.7)
NEUTROPHILS RELATIVE PERCENT: 78.3 %
PDW BLD-RTO: 12.6 % (ref 12.4–15.4)
PLATELET # BLD: 94 K/UL (ref 135–450)
PMV BLD AUTO: 9 FL (ref 5–10.5)
POTASSIUM REFLEX MAGNESIUM: 3.8 MMOL/L (ref 3.5–5.1)
RBC # BLD: 3.71 M/UL (ref 4.2–5.9)
SARS-COV-2, NAAT: NOT DETECTED
SODIUM BLD-SCNC: 139 MMOL/L (ref 136–145)
WBC # BLD: 4.9 K/UL (ref 4–11)

## 2022-01-14 PROCEDURE — 2580000003 HC RX 258: Performed by: HOSPITALIST

## 2022-01-14 PROCEDURE — 85025 COMPLETE CBC W/AUTO DIFF WBC: CPT

## 2022-01-14 PROCEDURE — 80048 BASIC METABOLIC PNL TOTAL CA: CPT

## 2022-01-14 PROCEDURE — 6360000002 HC RX W HCPCS: Performed by: HOSPITALIST

## 2022-01-14 PROCEDURE — 99232 SBSQ HOSP IP/OBS MODERATE 35: CPT | Performed by: PSYCHIATRY & NEUROLOGY

## 2022-01-14 PROCEDURE — 87635 SARS-COV-2 COVID-19 AMP PRB: CPT

## 2022-01-14 PROCEDURE — 6360000002 HC RX W HCPCS: Performed by: INTERNAL MEDICINE

## 2022-01-14 PROCEDURE — 6370000000 HC RX 637 (ALT 250 FOR IP): Performed by: HOSPITALIST

## 2022-01-14 PROCEDURE — 2060000000 HC ICU INTERMEDIATE R&B

## 2022-01-14 RX ADMIN — SODIUM CHLORIDE, PRESERVATIVE FREE 10 ML: 5 INJECTION INTRAVENOUS at 20:59

## 2022-01-14 RX ADMIN — LACOSAMIDE 200 MG: 100 TABLET, FILM COATED ORAL at 20:56

## 2022-01-14 RX ADMIN — SODIUM CHLORIDE: 9 INJECTION, SOLUTION INTRAVENOUS at 21:37

## 2022-01-14 RX ADMIN — METHADONE HYDROCHLORIDE 5 MG: 5 TABLET ORAL at 20:56

## 2022-01-14 RX ADMIN — ACETAMINOPHEN 650 MG: 325 TABLET ORAL at 18:07

## 2022-01-14 RX ADMIN — LEVETIRACETAM 1500 MG: 100 INJECTION, SOLUTION INTRAVENOUS at 10:00

## 2022-01-14 RX ADMIN — ATORVASTATIN CALCIUM 10 MG: 10 TABLET, FILM COATED ORAL at 20:56

## 2022-01-14 RX ADMIN — LACOSAMIDE 200 MG: 100 TABLET, FILM COATED ORAL at 10:05

## 2022-01-14 RX ADMIN — METHADONE HYDROCHLORIDE 5 MG: 5 TABLET ORAL at 09:11

## 2022-01-14 RX ADMIN — Medication 1000 MG: at 18:07

## 2022-01-14 RX ADMIN — LEVETIRACETAM 2000 MG: 100 INJECTION, SOLUTION INTRAVENOUS at 21:38

## 2022-01-14 RX ADMIN — SODIUM CHLORIDE 25 ML: 9 INJECTION, SOLUTION INTRAVENOUS at 10:09

## 2022-01-14 RX ADMIN — MIRTAZAPINE 7.5 MG: 15 TABLET, FILM COATED ORAL at 20:56

## 2022-01-14 RX ADMIN — SODIUM CHLORIDE, PRESERVATIVE FREE 10 ML: 5 INJECTION INTRAVENOUS at 09:11

## 2022-01-14 ASSESSMENT — PAIN DESCRIPTION - ONSET
ONSET: ON-GOING
ONSET: GRADUAL
ONSET: ON-GOING

## 2022-01-14 ASSESSMENT — PAIN - FUNCTIONAL ASSESSMENT
PAIN_FUNCTIONAL_ASSESSMENT: ACTIVITIES ARE NOT PREVENTED
PAIN_FUNCTIONAL_ASSESSMENT: PREVENTS OR INTERFERES SOME ACTIVE ACTIVITIES AND ADLS

## 2022-01-14 ASSESSMENT — PAIN SCALES - GENERAL
PAINLEVEL_OUTOF10: 0
PAINLEVEL_OUTOF10: 0
PAINLEVEL_OUTOF10: 5
PAINLEVEL_OUTOF10: 2
PAINLEVEL_OUTOF10: 2
PAINLEVEL_OUTOF10: 0
PAINLEVEL_OUTOF10: 10
PAINLEVEL_OUTOF10: 2
PAINLEVEL_OUTOF10: 5
PAINLEVEL_OUTOF10: 10
PAINLEVEL_OUTOF10: 4

## 2022-01-14 ASSESSMENT — PAIN DESCRIPTION - ORIENTATION
ORIENTATION: LOWER

## 2022-01-14 ASSESSMENT — PAIN DESCRIPTION - PAIN TYPE
TYPE: CHRONIC PAIN

## 2022-01-14 ASSESSMENT — PAIN DESCRIPTION - PROGRESSION
CLINICAL_PROGRESSION: GRADUALLY WORSENING

## 2022-01-14 ASSESSMENT — PAIN DESCRIPTION - LOCATION
LOCATION: BACK

## 2022-01-14 ASSESSMENT — PAIN DESCRIPTION - DESCRIPTORS
DESCRIPTORS: ACHING
DESCRIPTORS: ACHING;DISCOMFORT

## 2022-01-14 ASSESSMENT — PAIN DESCRIPTION - FREQUENCY
FREQUENCY: CONTINUOUS

## 2022-01-14 NOTE — PROGRESS NOTES
Call out to [facility] at [facility] where patient lives to find out patients baseline cognition per MD. Awaiting call back from nurse. Will continue to monitor.

## 2022-01-14 NOTE — PROGRESS NOTES
atraumatic, Pupils equal, muscous membranes moist, no masses appreciated  Cardiovascular: Regular rate and rhythm no murmurs appreciated  Respiratory: CTAB no wheezing  Gastrointestinal: Abdomen soft, non-tender, BS+  EXT: no edema  Neurology: no gross focal deficts  Psychiatry: Appropriate affect. Not agitated  Skin: Warm, dry, no rashes appreciated    Labs and Tests:  CBC:   Recent Labs     01/12/22  1622 01/13/22  0433 01/14/22  0500   WBC 11.1* 9.1 4.9   HGB 14.2 12.5* 12.7*    125* 94*     BMP:    Recent Labs     01/12/22  1758 01/13/22  0433 01/14/22  0500    138 139   K 3.4* 4.5 3.8    106 106   CO2 20* 25 24   BUN 20 17 13   CREATININE 0.8 0.7* 0.6*   GLUCOSE 87 105* 87     Hepatic:   Recent Labs     01/12/22  1758 01/13/22  0433   AST 25 30   ALT 19 20   BILITOT 0.3 0.5   ALKPHOS 35* 40     CT HEAD WO CONTRAST   Final Result   No acute intracranial abnormality. Old infarct left posterior parietal occipital region         XR CHEST PORTABLE   Final Result   No acute process. Stable cardiomegaly             Recent imaging reviewed        Assessment & Plan:   Acute encephalopathy: possible to breakthrough seizure  Slowly improving monitor  - will call nursing home and get baseline mental status    UTI: iv atbx fu cultures    Diet: ADULT DIET;  Regular  Code:Full Code      Lucila Michaud MD   1/14/2022 11:58 AM

## 2022-01-14 NOTE — PROCEDURES
Patient: Rogelio Jones    MR Number: [id number]  YOB: 1960  Date of Visit: 1/13/2022    Clinical History:  The patient is a 64y.o. years old male with recurrent seizure and history of epilepsy. Medications reviewed. Method: The EEG was performed utilizing the international 10/20 of electrode placements of both referential and bipolar montages. The patient was awake and drowsy through out the recording. Photic stimulation was performed. Findings: The background of the EEG showed normal alpha posterior background of 8 HZ and amplitude of 20-40 UV. This background was symmetric, waxing and waning. As the patient became drowsy, generalized diffuse slowing was seen through recording at the mixture of delta and theta. Left frontotemporal delta slowing was seen throughout the recording with average amplitude. No spike or sharp waves. Impression: This EEG is abnormal.  Generalized diffuse slowing is suggestive of diffuse encephalopathy. There is also evidence of structural dysfunction of the left hemisphere which is consistent with the patient's history of strokes. No epileptiform discharges.        Rosita Mcdonald MD      Board certified in clinical neurophysiology

## 2022-01-14 NOTE — PROGRESS NOTES
Briana Kohler  Neurology Follow-up  [facility] Neurology    Date of Service: 1/14/2022    Subjective:   CC: Follow up today regarding: Breakthrough seizures    Events noted. Chart and lab reviewed. The patient denies any new symptoms today. Back to his baseline aphasia. No seizure overnight. No headache, double vision or blurred vision.           Family history: Contributory    ROS was negative  Past Medical History:   Diagnosis Date    Acute intracerebral hemorrhage ([facility] [address] 02/03/2017    Anxiety     Back pain     Cerebral artery occlusion with cerebral infarction ([facility] [address]     Chronic back pain greater than 3 months duration 02/03/2017    Diabetes mellitus ([facility] [address]     Dysphasia 02/03/2017    Dysphonia 02/03/2017    Endocarditis and heart valve disorders in diseases classified elsewhere 02/03/2017    Hydrocephalus ([facility] [address] 02/03/2017    Hypertension     IVH (intraventricular hemorrhage) ([facility] [address] 02/03/2017    NSTEMI (non-ST elevated myocardial infarction) ([facility] [address] 02/03/2017    Seizures (HCC)      Current Facility-Administered Medications   Medication Dose Route Frequency Provider Last Rate Last Admin    cefTRIAXone (ROCEPHIN) 1000 mg in sterile water 10 mL IV syringe  1,000 mg IntraVENous Q24H Bernabe Plummer MD   1,000 mg at 01/13/22 1842    lacosamide (VIMPAT) tablet 200 mg  200 mg Oral BID Lynn Cleary MD   200 mg at 01/14/22 1005    methadone (DOLOPHINE) tablet 5 mg  5 mg Oral BID Lynn Cleary MD   5 mg at 01/14/22 0911    sodium chloride flush 0.9 % injection 5-40 mL  5-40 mL IntraVENous 2 times per day Acacia Mills MD   10 mL at 01/14/22 0911    sodium chloride flush 0.9 % injection 5-40 mL  5-40 mL IntraVENous PRN Lynn Cleary MD        0.9 % sodium chloride infusion  25 mL IntraVENous PRN Lynn Cleary  mL/hr at 01/14/22 1009 25 mL at 01/14/22 1009    ondansetron (ZOFRAN-ODT) disintegrating tablet 4 mg  4 mg Oral Q8H PRN Acacia Mills MD        Or   Aaliyah Berumen ondansetron (ZOFRAN) injection 4 mg  4 mg IntraVENous Q6H PRN Viky Garcia MD        polyethylene glycol (GLYCOLAX) packet 17 g  17 g Oral Daily PRN Viky Garcia MD        acetaminophen (TYLENOL) tablet 650 mg  650 mg Oral Q6H PRN Viky Garcia MD   650 mg at 01/13/22 1840    Or    acetaminophen (TYLENOL) suppository 650 mg  650 mg Rectal Q6H PRN Viky Garcia MD        LORazepam (ATIVAN) injection 2 mg  2 mg IntraVENous Q3H PRN Lynn Cleary MD        0.9 % sodium chloride infusion   IntraVENous Continuous Lynn Cleary MD        levETIRAcetam (KEPPRA) 1,500 mg in sodium chloride 0.9 % 100 mL IVPB  1,500 mg IntraVENous Daily Viky Garcia MD   Stopped at 01/14/22 1100    levETIRAcetam (KEPPRA) 2,000 mg in sodium chloride 0.9 % 100 mL IVPB  2,000 mg IntraVENous Nightly Viky Garcia MD   Stopped at 01/13/22 2246    0.9 % sodium chloride infusion   IntraVENous Continuous Lynn Cleary MD 75 mL/hr at 01/14/22 0504 Rate Verify at 01/14/22 0504    atorvastatin (LIPITOR) tablet 10 mg  10 mg Oral Nightly Lynn Cleary MD   10 mg at 01/13/22 2228    mirtazapine (REMERON) tablet 7.5 mg  7.5 mg Oral Nightly Lynn Cleary MD   7.5 mg at 01/13/22 2226     Allergies   Allergen Reactions    No Known Allergies     Seasonal       reports that he has been smoking cigarettes. He has been smoking about 0.50 packs per day. He has never used smokeless tobacco. He reports that he does not drink alcohol and does not use drugs.        Objective:  Exam:   Constitutional:   Vitals:    01/13/22 1947 01/14/22 0031 01/14/22 0447 01/14/22 0902   BP: (!) 137/55 (!) 144/66 128/74 (!) 143/54   Pulse: 89 68 73 88   Resp: 16 16 16 19   Temp: 97.8 °F (36.6 °C) 97.4 °F (36.3 °C) 98 °F (36.7 °C) 97.3 °F (36.3 °C)   TempSrc: Temporal Temporal Temporal Temporal   SpO2: 96% 98% 97% 97%   Weight:   172 lb 13.5 oz (78.4 kg)    Height:         General appearance:  Normal development and appear in no acute distress. Mental Status:   He is awake and alert x1  Residual fluent aphasia  Paraphasic errors  Good attention today  Cranial Nerves:   II: Visual fields: Full. Pupils: equal, round, reactive to light  III,IV,VI: Extra Ocular Movements are intact. No nystagmus  VII: Facial strength and movements: intact and symmetric  IX: Palate elevation is symmetric  XI: Shoulder shrug is intact  XII: Tongue movements are normal  Musculoskeletal: No focal weakness  Normal tone  No sensory loss  No tremors      Data:  LABS:   Lab Results   Component Value Date     01/14/2022    K 3.8 01/14/2022     01/14/2022    CO2 24 01/14/2022    BUN 13 01/14/2022    CREATININE 0.6 01/14/2022    GFRAA >60 01/14/2022    GFRAA >60 12/13/2011    LABGLOM >60 01/14/2022    GLUCOSE 87 01/14/2022    MG 1.50 02/05/2017    CALCIUM 8.3 01/14/2022     Lab Results   Component Value Date    WBC 4.9 01/14/2022    RBC 3.71 01/14/2022    HGB 12.7 01/14/2022    HCT 36.6 01/14/2022    MCV 98.7 01/14/2022    RDW 12.6 01/14/2022    PLT 94 01/14/2022     Lab Results   Component Value Date    INR 1.33 (H) 02/08/2017    PROTIME 15.2 (H) 02/08/2017       EEG was independently reviewed by me and discussed results with the patient  I reviewed blood testing and other test results and discussed results with the patient      Impression: no change      Acute encephalopathy and breakthrough seizure in a patient with known history of medical refractory epilepsy. Likely breakthrough seizure from refractory epilepsy or secondary to recent UTI.   Remote left MCA stroke  Chronic aphasia  Hyperlipidemia  Chronic pain    Recommendation    Continue Keppra 1500, 2000 milligrams daily  Continue Vimpat 200 mg x 2  PT, OT and speech  DVT and GI prophylaxis  Seizure precautions  Hydration  Antibiotics  Statin  Can be discharged from neurology once medically stable and follow-up with UC epilepsy  No further recommendation  Discussed with primary team        Arvid Credit, MD   [phone]      This dictation was generated by voice recognition computer software. Although all attempts are made to edit the dictation for accuracy, there may be errors in the transcription that are not intended.

## 2022-01-14 NOTE — CARE COORDINATION
CM verified with [place]/ admissions liaison at [facility] --patient's usual mental status is oriented x 3, involved in resident [place] and various activities. Dr Claudette Diaz advised, patient will not be discharging today based on the change in mental status.     Danielle Mireles, BSN, CCM, RN  [facility]  [phone]

## 2022-01-14 NOTE — PROGRESS NOTES
Patient with confusion throughout the day. Bed alarm on and helping with ADL's due to confusion not being patients baseline according to ECF he came from. Tonight around 1800, patient is more alert and oriented to himself and his surroundings. Pt is in room moving independently to restroom and back to bed. No s/s of distress or SOB observed.

## 2022-01-15 ENCOUNTER — APPOINTMENT (OUTPATIENT)
Dept: MRI IMAGING | Age: 62
DRG: 101 | End: 2022-01-15
Payer: COMMERCIAL

## 2022-01-15 LAB
ORGANISM: ABNORMAL
URINE CULTURE, ROUTINE: ABNORMAL

## 2022-01-15 PROCEDURE — 6360000002 HC RX W HCPCS: Performed by: HOSPITALIST

## 2022-01-15 PROCEDURE — 2060000000 HC ICU INTERMEDIATE R&B

## 2022-01-15 PROCEDURE — 6370000000 HC RX 637 (ALT 250 FOR IP): Performed by: HOSPITALIST

## 2022-01-15 PROCEDURE — 2580000003 HC RX 258: Performed by: INTERNAL MEDICINE

## 2022-01-15 PROCEDURE — 70551 MRI BRAIN STEM W/O DYE: CPT

## 2022-01-15 PROCEDURE — 2580000003 HC RX 258: Performed by: HOSPITALIST

## 2022-01-15 PROCEDURE — 6360000002 HC RX W HCPCS: Performed by: INTERNAL MEDICINE

## 2022-01-15 RX ADMIN — SODIUM CHLORIDE, PRESERVATIVE FREE 10 ML: 5 INJECTION INTRAVENOUS at 09:19

## 2022-01-15 RX ADMIN — AMPICILLIN SODIUM 1000 MG: 1 INJECTION, POWDER, FOR SOLUTION INTRAMUSCULAR; INTRAVENOUS at 14:05

## 2022-01-15 RX ADMIN — LACOSAMIDE 200 MG: 100 TABLET, FILM COATED ORAL at 09:19

## 2022-01-15 RX ADMIN — METHADONE HYDROCHLORIDE 5 MG: 5 TABLET ORAL at 20:41

## 2022-01-15 RX ADMIN — LEVETIRACETAM 2000 MG: 100 INJECTION, SOLUTION INTRAVENOUS at 21:54

## 2022-01-15 RX ADMIN — SODIUM CHLORIDE: 9 INJECTION, SOLUTION INTRAVENOUS at 11:59

## 2022-01-15 RX ADMIN — AMPICILLIN SODIUM 1000 MG: 1 INJECTION, POWDER, FOR SOLUTION INTRAMUSCULAR; INTRAVENOUS at 17:51

## 2022-01-15 RX ADMIN — LEVETIRACETAM 1500 MG: 100 INJECTION, SOLUTION INTRAVENOUS at 09:54

## 2022-01-15 RX ADMIN — LACOSAMIDE 200 MG: 100 TABLET, FILM COATED ORAL at 20:42

## 2022-01-15 RX ADMIN — SODIUM CHLORIDE, PRESERVATIVE FREE 10 ML: 5 INJECTION INTRAVENOUS at 20:42

## 2022-01-15 RX ADMIN — METHADONE HYDROCHLORIDE 5 MG: 5 TABLET ORAL at 09:19

## 2022-01-15 RX ADMIN — MIRTAZAPINE 7.5 MG: 15 TABLET, FILM COATED ORAL at 20:42

## 2022-01-15 RX ADMIN — ATORVASTATIN CALCIUM 10 MG: 10 TABLET, FILM COATED ORAL at 20:42

## 2022-01-15 RX ADMIN — AMPICILLIN SODIUM 1000 MG: 1 INJECTION, POWDER, FOR SOLUTION INTRAMUSCULAR; INTRAVENOUS at 20:43

## 2022-01-15 ASSESSMENT — PAIN DESCRIPTION - PROGRESSION
CLINICAL_PROGRESSION: GRADUALLY WORSENING

## 2022-01-15 ASSESSMENT — PAIN DESCRIPTION - DESCRIPTORS: DESCRIPTORS: DISCOMFORT

## 2022-01-15 ASSESSMENT — PAIN SCALES - GENERAL
PAINLEVEL_OUTOF10: 4
PAINLEVEL_OUTOF10: 6
PAINLEVEL_OUTOF10: 2

## 2022-01-15 ASSESSMENT — PAIN DESCRIPTION - LOCATION
LOCATION: RIB CAGE
LOCATION: BACK

## 2022-01-15 ASSESSMENT — PAIN DESCRIPTION - ORIENTATION
ORIENTATION: RIGHT
ORIENTATION: MID;RIGHT

## 2022-01-15 ASSESSMENT — PAIN DESCRIPTION - PAIN TYPE
TYPE: ACUTE PAIN
TYPE: CHRONIC PAIN

## 2022-01-15 NOTE — PROGRESS NOTES
Pt MRI checklist done from chart history due to patient being unable to discuss previous medical history at this time.

## 2022-01-15 NOTE — PLAN OF CARE
Pt remains free from falls and physical injury at this time. Pt pain adequately controlled at this time. Pt shows no signs of skin breakdown at this time. Pt remains only oriented to person which is not baseline status per nursing home. MD meredith, MRI ordered. See flowsheet for assessment.    Problem: Falls - Risk of:  Goal: Will remain free from falls  Description: Will remain free from falls  Outcome: Ongoing  Goal: Absence of physical injury  Description: Absence of physical injury  Outcome: Ongoing     Problem: Pain:  Goal: Pain level will decrease  Description: Pain level will decrease  Outcome: Ongoing  Goal: Control of acute pain  Description: Control of acute pain  Outcome: Ongoing  Goal: Control of chronic pain  Description: Control of chronic pain  Outcome: Ongoing     Problem: Skin Integrity:  Goal: Will show no infection signs and symptoms  Description: Will show no infection signs and symptoms  Outcome: Ongoing  Goal: Absence of new skin breakdown  Description: Absence of new skin breakdown  Outcome: Ongoing

## 2022-01-15 NOTE — PROGRESS NOTES
[facility] PROGRESS NOTE    1/15/2022 12:21 PM        Name: Pascual Brewer . Admitted: 1/12/2022  Primary Care Provider: Rosemarie Nance MD (Tel: [phone])          Subjective:    Patient lying in bed more confused today  And some paranoid thoughts fever overnight no nausea  Reviewed interval ancillary notes    Current Medications  ampicillin 1000 mg ivpb mini bag, 6 times per day  lacosamide (VIMPAT) tablet 200 mg, BID  methadone (DOLOPHINE) tablet 5 mg, BID  sodium chloride flush 0.9 % injection 5-40 mL, 2 times per day  sodium chloride flush 0.9 % injection 5-40 mL, PRN  0.9 % sodium chloride infusion, PRN  ondansetron (ZOFRAN-ODT) disintegrating tablet 4 mg, Q8H PRN   Or  ondansetron (ZOFRAN) injection 4 mg, Q6H PRN  polyethylene glycol (GLYCOLAX) packet 17 g, Daily PRN  acetaminophen (TYLENOL) tablet 650 mg, Q6H PRN   Or  acetaminophen (TYLENOL) suppository 650 mg, Q6H PRN  LORazepam (ATIVAN) injection 2 mg, Q3H PRN  0.9 % sodium chloride infusion, Continuous  levETIRAcetam (KEPPRA) 1,500 mg in sodium chloride 0.9 % 100 mL IVPB, Daily  levETIRAcetam (KEPPRA) 2,000 mg in sodium chloride 0.9 % 100 mL IVPB, Nightly  0.9 % sodium chloride infusion, Continuous  atorvastatin (LIPITOR) tablet 10 mg, Nightly  mirtazapine (REMERON) tablet 7.5 mg, Nightly        Objective:  BP (!) 125/58   Pulse 80   Temp 98.3 °F (36.8 °C) (Temporal)   Resp 18   Ht 5' 11\" (1.803 m)   Wt 172 lb 13.5 oz (78.4 kg)   SpO2 98%   BMI 24.11 kg/m²     Intake/Output Summary (Last 24 hours) at 1/15/2022 1221  Last data filed at 1/15/2022 0921  Gross per 24 hour   Intake 970 ml   Output    Net 970 ml      Wt Readings from Last 3 Encounters:   01/14/22 172 lb 13.5 oz (78.4 kg)   01/03/21 180 lb (81.6 kg)   07/18/20 155 lb 6.4 oz (70.5 kg)       General appearance:  Appears comfortable.  AAOx1 just to selft  HEENT: atraumatic, Pupils equal, muscous membranes moist, no masses appreciated  Cardiovascular: Regular rate and rhythm no murmurs appreciated  Respiratory: CTAB no wheezing  Gastrointestinal: Abdomen soft, non-tender, BS+  EXT: no edema  Neurology: no gross focal deficts  Psychiatry:flight of thoughts  Skin: Warm, dry, no rashes appreciated    Labs and Tests:  CBC:   Recent Labs     01/12/22  1622 01/13/22  0433 01/14/22  0500   WBC 11.1* 9.1 4.9   HGB 14.2 12.5* 12.7*    125* 94*     BMP:    Recent Labs     01/12/22  1758 01/13/22  0433 01/14/22  0500    138 139   K 3.4* 4.5 3.8    106 106   CO2 20* 25 24   BUN 20 17 13   CREATININE 0.8 0.7* 0.6*   GLUCOSE 87 105* 87     Hepatic:   Recent Labs     01/12/22  1758 01/13/22  0433   AST 25 30   ALT 19 20   BILITOT 0.3 0.5   ALKPHOS 35* 40     CT HEAD WO CONTRAST   Final Result   No acute intracranial abnormality. Old infarct left posterior parietal occipital region         XR CHEST PORTABLE   Final Result   No acute process. Stable cardiomegaly         MRI BRAIN WO CONTRAST    (Results Pending)       Recent imaging reviewed        Assessment & Plan:   Acute encephalopathy: possible to breakthrough seizure  - monitor getting worse get mri brain     UTI:change to ampiccilin given cultures    Diet: ADULT DIET;  Regular  Code:Full Code      Mitch Morse MD   1/15/2022 12:21 PM

## 2022-01-15 NOTE — PROGRESS NOTES
Report given to [facility] on 3T. 3T telemetry monitor attached to pt and confirmed NSR by 3T MT. Transport here to take pt to room 3369. Pt belongings gathered and taken with pt.

## 2022-01-16 LAB
ANION GAP SERPL CALCULATED.3IONS-SCNC: 10 MMOL/L (ref 3–16)
BASOPHILS ABSOLUTE: 0 K/UL (ref 0–0.2)
BASOPHILS RELATIVE PERCENT: 0.4 %
BLOOD CULTURE, ROUTINE: NORMAL
BUN BLDV-MCNC: 9 MG/DL (ref 7–20)
CALCIUM SERPL-MCNC: 8.7 MG/DL (ref 8.3–10.6)
CHLORIDE BLD-SCNC: 106 MMOL/L (ref 99–110)
CO2: 26 MMOL/L (ref 21–32)
CREAT SERPL-MCNC: 0.5 MG/DL (ref 0.8–1.3)
CULTURE, BLOOD 2: NORMAL
EOSINOPHILS ABSOLUTE: 0 K/UL (ref 0–0.6)
EOSINOPHILS RELATIVE PERCENT: 0.9 %
GFR AFRICAN AMERICAN: >60
GFR NON-AFRICAN AMERICAN: >60
GLUCOSE BLD-MCNC: 96 MG/DL (ref 70–99)
HCT VFR BLD CALC: 39.6 % (ref 40.5–52.5)
HEMOGLOBIN: 13.9 G/DL (ref 13.5–17.5)
LYMPHOCYTES ABSOLUTE: 0.8 K/UL (ref 1–5.1)
LYMPHOCYTES RELATIVE PERCENT: 15.8 %
MAGNESIUM: 1.9 MG/DL (ref 1.8–2.4)
MCH RBC QN AUTO: 34.2 PG (ref 26–34)
MCHC RBC AUTO-ENTMCNC: 35 G/DL (ref 31–36)
MCV RBC AUTO: 97.9 FL (ref 80–100)
MONOCYTES ABSOLUTE: 0.5 K/UL (ref 0–1.3)
MONOCYTES RELATIVE PERCENT: 10.9 %
NEUTROPHILS ABSOLUTE: 3.4 K/UL (ref 1.7–7.7)
NEUTROPHILS RELATIVE PERCENT: 72 %
PDW BLD-RTO: 12.3 % (ref 12.4–15.4)
PLATELET # BLD: 111 K/UL (ref 135–450)
PMV BLD AUTO: 8.7 FL (ref 5–10.5)
POTASSIUM REFLEX MAGNESIUM: 3.5 MMOL/L (ref 3.5–5.1)
RBC # BLD: 4.05 M/UL (ref 4.2–5.9)
SODIUM BLD-SCNC: 142 MMOL/L (ref 136–145)
WBC # BLD: 4.8 K/UL (ref 4–11)

## 2022-01-16 PROCEDURE — 2580000003 HC RX 258: Performed by: INTERNAL MEDICINE

## 2022-01-16 PROCEDURE — 85025 COMPLETE CBC W/AUTO DIFF WBC: CPT

## 2022-01-16 PROCEDURE — 6370000000 HC RX 637 (ALT 250 FOR IP): Performed by: HOSPITALIST

## 2022-01-16 PROCEDURE — 2060000000 HC ICU INTERMEDIATE R&B

## 2022-01-16 PROCEDURE — 36415 COLL VENOUS BLD VENIPUNCTURE: CPT

## 2022-01-16 PROCEDURE — 80048 BASIC METABOLIC PNL TOTAL CA: CPT

## 2022-01-16 PROCEDURE — 6360000002 HC RX W HCPCS: Performed by: HOSPITALIST

## 2022-01-16 PROCEDURE — 6360000002 HC RX W HCPCS: Performed by: INTERNAL MEDICINE

## 2022-01-16 PROCEDURE — 2580000003 HC RX 258: Performed by: HOSPITALIST

## 2022-01-16 PROCEDURE — 83735 ASSAY OF MAGNESIUM: CPT

## 2022-01-16 RX ADMIN — AMPICILLIN SODIUM 1000 MG: 1 INJECTION, POWDER, FOR SOLUTION INTRAMUSCULAR; INTRAVENOUS at 10:59

## 2022-01-16 RX ADMIN — AMPICILLIN SODIUM 1000 MG: 1 INJECTION, POWDER, FOR SOLUTION INTRAMUSCULAR; INTRAVENOUS at 02:44

## 2022-01-16 RX ADMIN — AMPICILLIN SODIUM 1000 MG: 1 INJECTION, POWDER, FOR SOLUTION INTRAMUSCULAR; INTRAVENOUS at 21:54

## 2022-01-16 RX ADMIN — METHADONE HYDROCHLORIDE 5 MG: 5 TABLET ORAL at 21:46

## 2022-01-16 RX ADMIN — SODIUM CHLORIDE, PRESERVATIVE FREE 10 ML: 5 INJECTION INTRAVENOUS at 09:04

## 2022-01-16 RX ADMIN — AMPICILLIN SODIUM 1000 MG: 1 INJECTION, POWDER, FOR SOLUTION INTRAMUSCULAR; INTRAVENOUS at 18:58

## 2022-01-16 RX ADMIN — SODIUM CHLORIDE, PRESERVATIVE FREE 10 ML: 5 INJECTION INTRAVENOUS at 22:58

## 2022-01-16 RX ADMIN — LEVETIRACETAM 1500 MG: 100 INJECTION, SOLUTION INTRAVENOUS at 12:06

## 2022-01-16 RX ADMIN — SODIUM CHLORIDE: 9 INJECTION, SOLUTION INTRAVENOUS at 06:24

## 2022-01-16 RX ADMIN — ACETAMINOPHEN 650 MG: 325 TABLET ORAL at 15:42

## 2022-01-16 RX ADMIN — MIRTAZAPINE 7.5 MG: 15 TABLET, FILM COATED ORAL at 21:46

## 2022-01-16 RX ADMIN — SODIUM CHLORIDE: 9 INJECTION, SOLUTION INTRAVENOUS at 21:52

## 2022-01-16 RX ADMIN — ATORVASTATIN CALCIUM 10 MG: 10 TABLET, FILM COATED ORAL at 21:46

## 2022-01-16 RX ADMIN — AMPICILLIN SODIUM 1000 MG: 1 INJECTION, POWDER, FOR SOLUTION INTRAMUSCULAR; INTRAVENOUS at 06:24

## 2022-01-16 RX ADMIN — LACOSAMIDE 200 MG: 100 TABLET, FILM COATED ORAL at 21:46

## 2022-01-16 RX ADMIN — LACOSAMIDE 200 MG: 100 TABLET, FILM COATED ORAL at 09:03

## 2022-01-16 RX ADMIN — AMPICILLIN SODIUM 1000 MG: 1 INJECTION, POWDER, FOR SOLUTION INTRAMUSCULAR; INTRAVENOUS at 15:42

## 2022-01-16 RX ADMIN — METHADONE HYDROCHLORIDE 5 MG: 5 TABLET ORAL at 09:03

## 2022-01-16 RX ADMIN — SODIUM CHLORIDE, PRESERVATIVE FREE 10 ML: 5 INJECTION INTRAVENOUS at 22:59

## 2022-01-16 ASSESSMENT — PAIN DESCRIPTION - ORIENTATION
ORIENTATION: LOWER
ORIENTATION: LOWER

## 2022-01-16 ASSESSMENT — PAIN DESCRIPTION - LOCATION
LOCATION: BACK;HEAD
LOCATION: BACK
LOCATION: BACK

## 2022-01-16 ASSESSMENT — PAIN DESCRIPTION - DESCRIPTORS
DESCRIPTORS: SORE
DESCRIPTORS: ACHING
DESCRIPTORS: POUNDING;SORE

## 2022-01-16 ASSESSMENT — PAIN DESCRIPTION - PAIN TYPE
TYPE: CHRONIC PAIN
TYPE: ACUTE PAIN

## 2022-01-16 ASSESSMENT — PAIN DESCRIPTION - PROGRESSION: CLINICAL_PROGRESSION: GRADUALLY WORSENING

## 2022-01-16 ASSESSMENT — PAIN SCALES - GENERAL
PAINLEVEL_OUTOF10: 4
PAINLEVEL_OUTOF10: 8
PAINLEVEL_OUTOF10: 4
PAINLEVEL_OUTOF10: 2
PAINLEVEL_OUTOF10: 0
PAINLEVEL_OUTOF10: 2

## 2022-01-16 ASSESSMENT — PAIN DESCRIPTION - ONSET
ONSET: ON-GOING
ONSET: ON-GOING

## 2022-01-16 ASSESSMENT — PAIN DESCRIPTION - FREQUENCY
FREQUENCY: CONTINUOUS
FREQUENCY: CONTINUOUS

## 2022-01-16 NOTE — PLAN OF CARE
Problem: Falls - Risk of:  Goal: Will remain free from falls  Description: Will remain free from falls  1/16/2022 0108 by Noel Robin RN  Outcome: Ongoing  1/15/2022 1630 by Nel Coto RN  Outcome: Ongoing  Goal: Absence of physical injury  Description: Absence of physical injury  1/16/2022 0108 by Noel Robin RN  Outcome: Ongoing  1/15/2022 1630 by Nel Coto RN  Outcome: Ongoing     Problem: Pain:  Goal: Pain level will decrease  Description: Pain level will decrease  1/15/2022 1630 by Nel Coto RN  Outcome: Ongoing  Goal: Control of acute pain  Description: Control of acute pain  1/15/2022 1630 by Nel Coto RN  Outcome: Ongoing  Goal: Control of chronic pain  Description: Control of chronic pain  1/16/2022 0108 by Noel Robin RN  Outcome: Ongoing  1/15/2022 1630 by Nel Coto RN  Outcome: Ongoing     Problem: Skin Integrity:  Goal: Will show no infection signs and symptoms  Description: Will show no infection signs and symptoms  1/16/2022 0108 by Noel Robin RN  Outcome: Ongoing  1/15/2022 1630 by Nel Coto RN  Outcome: Ongoing  Goal: Absence of new skin breakdown  Description: Absence of new skin breakdown  1/16/2022 0108 by Noel Robin RN  Outcome: Ongoing  1/15/2022 1630 by Nel Coto RN  Outcome: Ongoing

## 2022-01-16 NOTE — PROGRESS NOTES
[facility]    1/16/2022 1:09 PM        Name: Sj Leyva . Admitted: 1/12/2022  Primary Care Provider: Talmadge Curling, MD (Tel: [phone])          Subjective:    Patient lying in bed still confused to date and place  Reviewed interval ancillary notes    Current Medications  ampicillin 1000 mg ivpb mini bag, 6 times per day  lacosamide (VIMPAT) tablet 200 mg, BID  methadone (DOLOPHINE) tablet 5 mg, BID  sodium chloride flush 0.9 % injection 5-40 mL, 2 times per day  sodium chloride flush 0.9 % injection 5-40 mL, PRN  0.9 % sodium chloride infusion, PRN  ondansetron (ZOFRAN-ODT) disintegrating tablet 4 mg, Q8H PRN   Or  ondansetron (ZOFRAN) injection 4 mg, Q6H PRN  polyethylene glycol (GLYCOLAX) packet 17 g, Daily PRN  acetaminophen (TYLENOL) tablet 650 mg, Q6H PRN   Or  acetaminophen (TYLENOL) suppository 650 mg, Q6H PRN  LORazepam (ATIVAN) injection 2 mg, Q3H PRN  0.9 % sodium chloride infusion, Continuous  levETIRAcetam (KEPPRA) 1,500 mg in sodium chloride 0.9 % 100 mL IVPB, Daily  levETIRAcetam (KEPPRA) 2,000 mg in sodium chloride 0.9 % 100 mL IVPB, Nightly  0.9 % sodium chloride infusion, Continuous  atorvastatin (LIPITOR) tablet 10 mg, Nightly  mirtazapine (REMERON) tablet 7.5 mg, Nightly        Objective:  /73   Pulse 86   Temp 98.4 °F (36.9 °C) (Oral)   Resp 18   Ht 5' 11\" (1.803 m)   Wt 168 lb 8 oz (76.4 kg)   SpO2 96%   BMI 23.50 kg/m²     Intake/Output Summary (Last 24 hours) at 1/16/2022 1309  Last data filed at 1/16/2022 1059  Gross per 24 hour   Intake 240 ml   Output 525 ml   Net -285 ml      Wt Readings from Last 3 Encounters:   01/16/22 168 lb 8 oz (76.4 kg)   01/03/21 180 lb (81.6 kg)   07/18/20 155 lb 6.4 oz (70.5 kg)       General appearance:  Appears comfortable.  AAOx1 just to self  HEENT: atraumatic, Pupils equal, muscous membranes moist, no masses appreciated  Cardiovascular: Regular rate and rhythm no murmurs appreciated  Respiratory: CTAB no wheezing  Gastrointestinal: Abdomen soft, non-tender, BS+  EXT: no edema  Neurology: no gross focal deficts  Psychiatry:calm  Skin: Warm, dry, no rashes appreciated    Labs and Tests:  CBC:   Recent Labs     01/14/22  0500 01/16/22  0609   WBC 4.9 4.8   HGB 12.7* 13.9   PLT 94* 111*     BMP:    Recent Labs     01/14/22  0500 01/16/22  0609    142   K 3.8 3.5    106   CO2 24 26   BUN 13 9   CREATININE 0.6* 0.5*   GLUCOSE 87 96     Hepatic:   No results for input(s): AST, ALT, ALB, BILITOT, ALKPHOS in the last 72 hours. MRI BRAIN WO CONTRAST   Final Result   1. No acute intracranial abnormality. No acute infarction. 2. Microangiopathic change. 3. Left parietal encephalomalacia and scattered remote lacunar infarcts are   noted. CT HEAD WO CONTRAST   Final Result   No acute intracranial abnormality. Old infarct left posterior parietal occipital region         XR CHEST PORTABLE   Final Result   No acute process. Stable cardiomegaly             Recent imaging reviewed        Assessment & Plan:   Acute encephalopathy: possible to breakthrough seizure  - mri brain no acute cva  - slighlty improved from yesterday monitor    UTI:continue ampicclin iv, repeat cbc and bmp in am    Diet: ADULT DIET;  Regular  Code:Full Code      Rex Andres MD   1/16/2022 1:09 PM

## 2022-01-17 VITALS
BODY MASS INDEX: 23.59 KG/M2 | HEIGHT: 71 IN | RESPIRATION RATE: 16 BRPM | TEMPERATURE: 98.5 F | SYSTOLIC BLOOD PRESSURE: 132 MMHG | DIASTOLIC BLOOD PRESSURE: 64 MMHG | OXYGEN SATURATION: 97 % | WEIGHT: 168.5 LBS | HEART RATE: 72 BPM

## 2022-01-17 LAB
ANION GAP SERPL CALCULATED.3IONS-SCNC: 7 MMOL/L (ref 3–16)
BASOPHILS ABSOLUTE: 0 K/UL (ref 0–0.2)
BASOPHILS RELATIVE PERCENT: 0.5 %
BUN BLDV-MCNC: 10 MG/DL (ref 7–20)
CALCIUM SERPL-MCNC: 8.6 MG/DL (ref 8.3–10.6)
CHLORIDE BLD-SCNC: 109 MMOL/L (ref 99–110)
CO2: 27 MMOL/L (ref 21–32)
CREAT SERPL-MCNC: 0.5 MG/DL (ref 0.8–1.3)
EOSINOPHILS ABSOLUTE: 0.1 K/UL (ref 0–0.6)
EOSINOPHILS RELATIVE PERCENT: 3.2 %
GFR AFRICAN AMERICAN: >60
GFR NON-AFRICAN AMERICAN: >60
GLUCOSE BLD-MCNC: 86 MG/DL (ref 70–99)
HCT VFR BLD CALC: 38 % (ref 40.5–52.5)
HEMOGLOBIN: 13.4 G/DL (ref 13.5–17.5)
LYMPHOCYTES ABSOLUTE: 1.2 K/UL (ref 1–5.1)
LYMPHOCYTES RELATIVE PERCENT: 28.2 %
MCH RBC QN AUTO: 34.4 PG (ref 26–34)
MCHC RBC AUTO-ENTMCNC: 35.3 G/DL (ref 31–36)
MCV RBC AUTO: 97.3 FL (ref 80–100)
MONOCYTES ABSOLUTE: 0.4 K/UL (ref 0–1.3)
MONOCYTES RELATIVE PERCENT: 10.7 %
NEUTROPHILS ABSOLUTE: 2.4 K/UL (ref 1.7–7.7)
NEUTROPHILS RELATIVE PERCENT: 57.4 %
PDW BLD-RTO: 12.5 % (ref 12.4–15.4)
PLATELET # BLD: 115 K/UL (ref 135–450)
PMV BLD AUTO: 7.8 FL (ref 5–10.5)
POTASSIUM REFLEX MAGNESIUM: 3.7 MMOL/L (ref 3.5–5.1)
RBC # BLD: 3.91 M/UL (ref 4.2–5.9)
SODIUM BLD-SCNC: 143 MMOL/L (ref 136–145)
WBC # BLD: 4.2 K/UL (ref 4–11)

## 2022-01-17 PROCEDURE — 6360000002 HC RX W HCPCS: Performed by: HOSPITALIST

## 2022-01-17 PROCEDURE — 6370000000 HC RX 637 (ALT 250 FOR IP): Performed by: HOSPITALIST

## 2022-01-17 PROCEDURE — 36415 COLL VENOUS BLD VENIPUNCTURE: CPT

## 2022-01-17 PROCEDURE — 2580000003 HC RX 258: Performed by: HOSPITALIST

## 2022-01-17 PROCEDURE — 85025 COMPLETE CBC W/AUTO DIFF WBC: CPT

## 2022-01-17 PROCEDURE — 80048 BASIC METABOLIC PNL TOTAL CA: CPT

## 2022-01-17 PROCEDURE — 2580000003 HC RX 258: Performed by: INTERNAL MEDICINE

## 2022-01-17 PROCEDURE — 6360000002 HC RX W HCPCS: Performed by: INTERNAL MEDICINE

## 2022-01-17 RX ORDER — AMOXICILLIN 500 MG/1
500 CAPSULE ORAL 2 TIMES DAILY
Qty: 14 CAPSULE | Refills: 0
Start: 2022-01-17 | End: 2022-01-24

## 2022-01-17 RX ADMIN — SODIUM CHLORIDE, PRESERVATIVE FREE 10 ML: 5 INJECTION INTRAVENOUS at 09:34

## 2022-01-17 RX ADMIN — AMPICILLIN SODIUM 1000 MG: 1 INJECTION, POWDER, FOR SOLUTION INTRAMUSCULAR; INTRAVENOUS at 10:31

## 2022-01-17 RX ADMIN — LEVETIRACETAM 1500 MG: 100 INJECTION, SOLUTION INTRAVENOUS at 10:30

## 2022-01-17 RX ADMIN — AMPICILLIN SODIUM 1000 MG: 1 INJECTION, POWDER, FOR SOLUTION INTRAMUSCULAR; INTRAVENOUS at 05:03

## 2022-01-17 RX ADMIN — METHADONE HYDROCHLORIDE 5 MG: 5 TABLET ORAL at 09:34

## 2022-01-17 RX ADMIN — LEVETIRACETAM 2000 MG: 100 INJECTION, SOLUTION INTRAVENOUS at 01:21

## 2022-01-17 RX ADMIN — LACOSAMIDE 200 MG: 100 TABLET, FILM COATED ORAL at 09:34

## 2022-01-17 ASSESSMENT — PAIN SCALES - GENERAL: PAINLEVEL_OUTOF10: 6

## 2022-01-17 NOTE — DISCHARGE SUMMARY
Hospital Medicine Discharge Summary    Patient: Briana Kohler     Gender: male  : 1960   Age: 64 y.o. MRN: [id number]    Admitting Physician: Acacia Mills MD  Discharge Physician: Bernabe Plummer MD     Code Status: Full Code     Admit Date: 2022   Discharge Date:   22    Disposition:  Home  Time spent arranging discharge: 35 minutes    Discharge Diagnoses:  Acute encephalopathy: possible to breakthrough seizure  UTI    Condition at Discharge:  [facility] [place] Course:   he was admitted to the hospital with acute encephalopathy possible breakthrough seizure MRI brain was done no acute CVA. Patient was continued on home meds per neurology and cleared for discharge with follow-up with outpatient neurologist  he did have UTI Enterococcus sensitive to ampicillin patient was discharged on amoxicillin to complete. Encephalopathy slowly improved with correct antibiotic therapy. Patient was discharged to SNF for further rehab  Discharge Exam:    /64   Pulse 72   Temp 98.5 °F (36.9 °C) (Oral)   Resp 16   Ht 5' 11\" (1.803 m)   Wt 168 lb 8 oz (76.4 kg)   SpO2 97%   BMI 23.50 kg/m²   General appearance:  Appears comfortable. AAOx3 sometimes confused to time  HEENT: atraumatic, Pupils equal, muscous membranes moist, no masses appreciated  Cardiovascular: Regular rate and rhythm no murmurs appreciated  Respiratory: CTAB no wheezing  Gastrointestinal: Abdomen soft, non-tender, BS+  EXT: no edema  Neurology: no gross focal deficts  Psychiatry: Appropriate affect.  Not agitated  Skin: Warm, dry, no rashes appreciated    Discharge Medications:   Current Discharge Medication List      START taking these medications    Details   amoxicillin (AMOXIL) 500 MG capsule Take 1 capsule by mouth 2 times daily for 7 days  Qty: 14 capsule, Refills: 0           Current Discharge Medication List        Current Discharge Medication List      CONTINUE these medications which have NOT CHANGED Details   DULoxetine (CYMBALTA) 30 MG extended release capsule Take 30 mg by mouth daily      levETIRAcetam (KEPPRA) 500 MG tablet Take 1,500 mg by mouth 2 times daily      loratadine (CLARITIN) 10 MG tablet Take 10 mg by mouth daily      melatonin 3 MG TABS tablet Take 3 mg by mouth daily      ACIDOPHILUS LACTOBACILLUS PO Take 1 tablet by mouth 2 times daily      traZODone (DESYREL) 50 MG tablet Take 50 mg by mouth nightly      potassium chloride (KLOR-CON M) 20 MEQ extended release tablet Take 20 mEq by mouth 2 times daily      acetaminophen (TYLENOL) 325 MG tablet Take 650 mg by mouth every 4 hours as needed for Pain      mirtazapine (REMERON) 7.5 MG tablet Take 7.5 mg by mouth nightly      Cholecalciferol (VITAMIN D3) 5000 units TABS Take by mouth daily      lacosamide (VIMPAT) 200 MG tablet Take 200 mg by mouth 2 times daily. calcium carbonate (TUMS) 500 MG chewable tablet Take 1 tablet by mouth 2 times daily       methadone (DOLOPHINE) 5 MG tablet Take 5 mg by mouth 2 times daily. atorvastatin (LIPITOR) 10 MG tablet Take 10 mg by mouth nightly            Current Discharge Medication List      STOP taking these medications       citalopram (CELEXA) 20 MG tablet Comments:   Reason for Stopping:         OLANZapine (ZYPREXA) 2.5 MG tablet Comments:   Reason for Stopping:               Labs:  For convenience and continuity at follow-up the following most recent labs are provided:    Lab Results   Component Value Date    WBC 4.2 01/17/2022    HGB 13.4 01/17/2022    HCT 38.0 01/17/2022    MCV 97.3 01/17/2022     01/17/2022     01/17/2022    K 3.7 01/17/2022     01/17/2022    CO2 27 01/17/2022    BUN 10 01/17/2022    CREATININE 0.5 01/17/2022    CALCIUM 8.6 01/17/2022    ALKPHOS 40 01/13/2022    ALT 20 01/13/2022    AST 30 01/13/2022    BILITOT 0.5 01/13/2022    LABALBU 3.5 01/13/2022     Lab Results   Component Value Date    INR 1.33 (H) 02/08/2017       Radiology:  CT HEAD WO CONTRAST    Result Date: 1/12/2022  EXAMINATION: CT OF THE HEAD WITHOUT CONTRAST  1/12/2022 5:32 pm TECHNIQUE: CT of the head was performed without the administration of intravenous contrast. Dose modulation, iterative reconstruction, and/or weight based adjustment of the mA/kV was utilized to reduce the radiation dose to as low as reasonably achievable. COMPARISON: 01/03/2021 HISTORY: ORDERING SYSTEM PROVIDED HISTORY: ams TECHNOLOGIST PROVIDED HISTORY: Reason for exam:->ams Has a \"code stroke\" or \"stroke alert\" been called? ->No Decision Support Exception - unselect if not a suspected or confirmed emergency medical condition->Emergency Medical Condition (MA) Reason for Exam: Seizures FINDINGS: BRAIN/VENTRICLES: The ventricles and sulci are mildly enlarged. Encephalomalacia left posterior parietooccipital region. No acute intracranial hemorrhage. ORBITS: The visualized portion of the orbits demonstrate no acute abnormality. SINUSES: The visualized paranasal sinuses and mastoid air cells demonstrate no acute abnormality. SOFT TISSUES/SKULL:  Left-sided craniotomy. No acute intracranial abnormality. Old infarct left posterior parietal occipital region     XR CHEST PORTABLE    Result Date: 1/12/2022  EXAMINATION: ONE XRAY VIEW OF THE CHEST 1/12/2022 4:32 pm COMPARISON: 01/03/2021 HISTORY: ORDERING SYSTEM PROVIDED HISTORY: SOB TECHNOLOGIST PROVIDED HISTORY: Reason for exam:->SOB Reason for Exam: shortness of breath FINDINGS: The lungs are without acute focal process. There is no effusion or pneumothorax. The cardiomediastinal silhouette is stable. The osseous structures are stable. No acute process.  Stable cardiomegaly     MRI BRAIN WO CONTRAST    Result Date: 1/15/2022  EXAMINATION: MRI OF THE BRAIN WITHOUT CONTRAST  1/15/2022 1:38 pm TECHNIQUE: Multiplanar multisequence MRI of the brain was performed without the administration of intravenous contrast. COMPARISON: CT from 01/12/2022 HISTORY: [id number] PROVIDED HISTORY: r/o cva TECHNOLOGIST PROVIDED HISTORY: Reason for exam:->r/o cva Reason for Exam: r/o cva FINDINGS: INTRACRANIAL STRUCTURES/VENTRICLES: There is no acute infarct. No mass effect or midline shift. No evidence of an acute intracranial hemorrhage. The ventricles and sulci are normal in size and configuration. The sellar/suprasellar regions appear unremarkable. The normal signal voids within the major intracranial vessels appear maintained. There are scattered periventricular and deep subcortical white matter T2/FLAIR hyperintensities, consistent with microangiopathic change. There is mild parenchymal volume loss. There is left parietal encephalomalacia. There are few scattered foci susceptibility artifact in the supra and infratentorial brain, likely due to remote microhemorrhage or small cavernomas. Remote lacunar infarcts are noted in the bilateral cerebellar hemispheres. ORBITS: The visualized portion of the orbits demonstrate no acute abnormality. SINUSES: The visualized paranasal sinuses and mastoid air cells demonstrate no acute abnormality. BONES/SOFT TISSUES: Postoperative changes from prior left craniotomy. 1. No acute intracranial abnormality. No acute infarction. 2. Microangiopathic change. 3. Left parietal encephalomalacia and scattered remote lacunar infarcts are noted.          Signed:    Constantine Mead MD   1/17/2022

## 2022-01-17 NOTE — CARE COORDINATION
Patient discharged [facility]  Call report [phone], fax [phone]. Family notified. Ama Kahn nephew will notify patient's sister. His mother is incapacitated in a nursing home.   RN, Please sign and print EDINSON/AVS   Thank you  All discharge needs met per case management

## 2022-02-22 ENCOUNTER — APPOINTMENT (OUTPATIENT)
Dept: GENERAL RADIOLOGY | Age: 62
DRG: 871 | End: 2022-02-22
Payer: COMMERCIAL

## 2022-02-22 ENCOUNTER — APPOINTMENT (OUTPATIENT)
Dept: CT IMAGING | Age: 62
DRG: 871 | End: 2022-02-22
Payer: COMMERCIAL

## 2022-02-22 ENCOUNTER — HOSPITAL ENCOUNTER (INPATIENT)
Age: 62
LOS: 8 days | Discharge: NURSING, REHAB OR LONG-TERM CARE | DRG: 871 | End: 2022-03-02
Attending: EMERGENCY MEDICINE | Admitting: INTERNAL MEDICINE
Payer: COMMERCIAL

## 2022-02-22 DIAGNOSIS — E87.20 LACTIC ACIDOSIS: ICD-10-CM

## 2022-02-22 DIAGNOSIS — R56.9 SEIZURE (HCC): ICD-10-CM

## 2022-02-22 DIAGNOSIS — R78.81 ENTEROCOCCAL BACTEREMIA: ICD-10-CM

## 2022-02-22 DIAGNOSIS — R40.2432 GLASGOW COMA SCALE TOTAL SCORE 3-8, AT ARRIVAL TO EMERGENCY DEPARTMENT (HCC): ICD-10-CM

## 2022-02-22 DIAGNOSIS — R41.89 UNRESPONSIVE: Primary | ICD-10-CM

## 2022-02-22 DIAGNOSIS — B95.2 ENTEROCOCCAL BACTEREMIA: ICD-10-CM

## 2022-02-22 LAB
A/G RATIO: 0.9 (ref 1.1–2.2)
ACETAMINOPHEN LEVEL: <5 UG/ML (ref 10–30)
ALBUMIN SERPL-MCNC: 3.4 G/DL (ref 3.4–5)
ALP BLD-CCNC: 72 U/L (ref 40–129)
ALT SERPL-CCNC: 26 U/L (ref 10–40)
AMPHETAMINE SCREEN, URINE: ABNORMAL
ANION GAP SERPL CALCULATED.3IONS-SCNC: 22 MMOL/L (ref 3–16)
AST SERPL-CCNC: 26 U/L (ref 15–37)
BARBITURATE SCREEN URINE: ABNORMAL
BASE EXCESS VENOUS: -6.7 MMOL/L (ref -3–3)
BASOPHILS ABSOLUTE: 0.1 K/UL (ref 0–0.2)
BASOPHILS RELATIVE PERCENT: 0.4 %
BENZODIAZEPINE SCREEN, URINE: POSITIVE
BILIRUB SERPL-MCNC: 0.4 MG/DL (ref 0–1)
BILIRUBIN URINE: NEGATIVE
BLOOD, URINE: ABNORMAL
BUN BLDV-MCNC: 26 MG/DL (ref 7–20)
CALCIUM SERPL-MCNC: 9.8 MG/DL (ref 8.3–10.6)
CANNABINOID SCREEN URINE: ABNORMAL
CARBOXYHEMOGLOBIN: 4.4 % (ref 0–1.5)
CHLORIDE BLD-SCNC: 98 MMOL/L (ref 99–110)
CLARITY: ABNORMAL
CO2: 17 MMOL/L (ref 21–32)
COCAINE METABOLITE SCREEN URINE: ABNORMAL
COLOR: YELLOW
COMMENT UA: ABNORMAL
CREAT SERPL-MCNC: 1.2 MG/DL (ref 0.8–1.3)
EOSINOPHILS ABSOLUTE: 0 K/UL (ref 0–0.6)
EOSINOPHILS RELATIVE PERCENT: 0.1 %
EPITHELIAL CELLS, UA: 2 /HPF (ref 0–5)
ETHANOL: NORMAL MG/DL (ref 0–0.08)
GFR AFRICAN AMERICAN: >60
GFR NON-AFRICAN AMERICAN: >60
GLUCOSE BLD-MCNC: 219 MG/DL (ref 70–99)
GLUCOSE URINE: NEGATIVE MG/DL
HCO3 VENOUS: 18.7 MMOL/L (ref 23–29)
HCT VFR BLD CALC: 40.4 % (ref 40.5–52.5)
HEMOGLOBIN: 13.5 G/DL (ref 13.5–17.5)
HYALINE CASTS: ABNORMAL /LPF (ref 0–2)
INR BLD: 1.18 (ref 0.88–1.12)
KETONES, URINE: NEGATIVE MG/DL
LACTIC ACID: 3.5 MMOL/L (ref 0.4–2)
LACTIC ACID: 8.4 MMOL/L (ref 0.4–2)
LEUKOCYTE ESTERASE, URINE: NEGATIVE
LYMPHOCYTES ABSOLUTE: 0.5 K/UL (ref 1–5.1)
LYMPHOCYTES RELATIVE PERCENT: 4.2 %
Lab: ABNORMAL
MCH RBC QN AUTO: 33.2 PG (ref 26–34)
MCHC RBC AUTO-ENTMCNC: 33.4 G/DL (ref 31–36)
MCV RBC AUTO: 99.4 FL (ref 80–100)
METHADONE SCREEN, URINE: ABNORMAL
METHEMOGLOBIN VENOUS: 0.2 %
MICROSCOPIC EXAMINATION: YES
MONOCYTES ABSOLUTE: 0.3 K/UL (ref 0–1.3)
MONOCYTES RELATIVE PERCENT: 2.8 %
NEUTROPHILS ABSOLUTE: 11 K/UL (ref 1.7–7.7)
NEUTROPHILS RELATIVE PERCENT: 92.5 %
NITRITE, URINE: NEGATIVE
O2 CONTENT, VEN: 19 VOL %
O2 SAT, VEN: 99 %
O2 THERAPY: ABNORMAL
OPIATE SCREEN URINE: ABNORMAL
OXYCODONE URINE: ABNORMAL
PCO2, VEN: 36.3 MMHG (ref 40–50)
PDW BLD-RTO: 12.7 % (ref 12.4–15.4)
PH UA: 6.5
PH UA: 6.5 (ref 5–8)
PH VENOUS: 7.32 (ref 7.35–7.45)
PHENCYCLIDINE SCREEN URINE: ABNORMAL
PLATELET # BLD: 296 K/UL (ref 135–450)
PMV BLD AUTO: 7.8 FL (ref 5–10.5)
PO2, VEN: 161 MMHG (ref 25–40)
POTASSIUM REFLEX MAGNESIUM: 4.7 MMOL/L (ref 3.5–5.1)
PRO-BNP: 759 PG/ML (ref 0–124)
PROPOXYPHENE SCREEN: ABNORMAL
PROTEIN UA: 100 MG/DL
PROTHROMBIN TIME: 13.4 SEC (ref 9.9–12.7)
RBC # BLD: 4.07 M/UL (ref 4.2–5.9)
RBC UA: 11 /HPF (ref 0–4)
SALICYLATE, SERUM: <0.3 MG/DL (ref 15–30)
SODIUM BLD-SCNC: 137 MMOL/L (ref 136–145)
SPECIFIC GRAVITY UA: 1.02 (ref 1–1.03)
TCO2 CALC VENOUS: 44 MMOL/L
TOTAL CK: 73 U/L (ref 39–308)
TOTAL PROTEIN: 7.4 G/DL (ref 6.4–8.2)
TROPONIN: <0.01 NG/ML
URINE REFLEX TO CULTURE: ABNORMAL
URINE TYPE: ABNORMAL
UROBILINOGEN, URINE: 0.2 E.U./DL
WBC # BLD: 11.9 K/UL (ref 4–11)
WBC UA: 4 /HPF (ref 0–5)

## 2022-02-22 PROCEDURE — 85610 PROTHROMBIN TIME: CPT

## 2022-02-22 PROCEDURE — 94002 VENT MGMT INPAT INIT DAY: CPT

## 2022-02-22 PROCEDURE — 83880 ASSAY OF NATRIURETIC PEPTIDE: CPT

## 2022-02-22 PROCEDURE — 36415 COLL VENOUS BLD VENIPUNCTURE: CPT

## 2022-02-22 PROCEDURE — 81001 URINALYSIS AUTO W/SCOPE: CPT

## 2022-02-22 PROCEDURE — 82077 ASSAY SPEC XCP UR&BREATH IA: CPT

## 2022-02-22 PROCEDURE — 71045 X-RAY EXAM CHEST 1 VIEW: CPT

## 2022-02-22 PROCEDURE — 2000000000 HC ICU R&B

## 2022-02-22 PROCEDURE — 80179 DRUG ASSAY SALICYLATE: CPT

## 2022-02-22 PROCEDURE — 2580000003 HC RX 258: Performed by: EMERGENCY MEDICINE

## 2022-02-22 PROCEDURE — 87040 BLOOD CULTURE FOR BACTERIA: CPT

## 2022-02-22 PROCEDURE — 85025 COMPLETE CBC W/AUTO DIFF WBC: CPT

## 2022-02-22 PROCEDURE — 96374 THER/PROPH/DIAG INJ IV PUSH: CPT

## 2022-02-22 PROCEDURE — 87186 SC STD MICRODIL/AGAR DIL: CPT

## 2022-02-22 PROCEDURE — 93005 ELECTROCARDIOGRAM TRACING: CPT | Performed by: EMERGENCY MEDICINE

## 2022-02-22 PROCEDURE — 99285 EMERGENCY DEPT VISIT HI MDM: CPT

## 2022-02-22 PROCEDURE — 82803 BLOOD GASES ANY COMBINATION: CPT

## 2022-02-22 PROCEDURE — 82550 ASSAY OF CK (CPK): CPT

## 2022-02-22 PROCEDURE — 84484 ASSAY OF TROPONIN QUANT: CPT

## 2022-02-22 PROCEDURE — 70450 CT HEAD/BRAIN W/O DYE: CPT

## 2022-02-22 PROCEDURE — 80143 DRUG ASSAY ACETAMINOPHEN: CPT

## 2022-02-22 PROCEDURE — 80053 COMPREHEN METABOLIC PANEL: CPT

## 2022-02-22 PROCEDURE — 80177 DRUG SCRN QUAN LEVETIRACETAM: CPT

## 2022-02-22 PROCEDURE — 72125 CT NECK SPINE W/O DYE: CPT

## 2022-02-22 PROCEDURE — 0BH18EZ INSERTION OF ENDOTRACHEAL AIRWAY INTO TRACHEA, VIA NATURAL OR ARTIFICIAL OPENING ENDOSCOPIC: ICD-10-PCS | Performed by: INTERNAL MEDICINE

## 2022-02-22 PROCEDURE — 80307 DRUG TEST PRSMV CHEM ANLYZR: CPT

## 2022-02-22 PROCEDURE — 87150 DNA/RNA AMPLIFIED PROBE: CPT

## 2022-02-22 PROCEDURE — 74176 CT ABD & PELVIS W/O CONTRAST: CPT

## 2022-02-22 PROCEDURE — 5A1935Z RESPIRATORY VENTILATION, LESS THAN 24 CONSECUTIVE HOURS: ICD-10-PCS | Performed by: INTERNAL MEDICINE

## 2022-02-22 PROCEDURE — 83605 ASSAY OF LACTIC ACID: CPT

## 2022-02-22 PROCEDURE — 6360000002 HC RX W HCPCS: Performed by: EMERGENCY MEDICINE

## 2022-02-22 RX ORDER — PROPOFOL 10 MG/ML
5-50 INJECTION, EMULSION INTRAVENOUS
Status: DISCONTINUED | OUTPATIENT
Start: 2022-02-22 | End: 2022-02-25

## 2022-02-22 RX ORDER — ETOMIDATE 2 MG/ML
INJECTION INTRAVENOUS
Status: DISCONTINUED
Start: 2022-02-22 | End: 2022-02-23

## 2022-02-22 RX ORDER — MIDAZOLAM HYDROCHLORIDE 2 MG/2ML
5 INJECTION, SOLUTION INTRAMUSCULAR; INTRAVENOUS ONCE
Status: COMPLETED | OUTPATIENT
Start: 2022-02-22 | End: 2022-02-22

## 2022-02-22 RX ORDER — ETOMIDATE 2 MG/ML
20 INJECTION INTRAVENOUS ONCE
Status: DISCONTINUED | OUTPATIENT
Start: 2022-02-22 | End: 2022-02-23

## 2022-02-22 RX ORDER — LEVETIRACETAM 10 MG/ML
1000 INJECTION INTRAVASCULAR ONCE
Status: COMPLETED | OUTPATIENT
Start: 2022-02-22 | End: 2022-02-22

## 2022-02-22 RX ORDER — FENTANYL CITRATE-0.9 % NACL/PF 10 MCG/ML
25-200 PLASTIC BAG, INJECTION (ML) INTRAVENOUS CONTINUOUS
Status: DISCONTINUED | OUTPATIENT
Start: 2022-02-22 | End: 2022-02-22 | Stop reason: SDUPTHER

## 2022-02-22 RX ORDER — ROCURONIUM BROMIDE 10 MG/ML
INJECTION, SOLUTION INTRAVENOUS
Status: DISCONTINUED
Start: 2022-02-22 | End: 2022-02-23

## 2022-02-22 RX ORDER — ROCURONIUM BROMIDE 10 MG/ML
100 INJECTION, SOLUTION INTRAVENOUS ONCE
Status: DISCONTINUED | OUTPATIENT
Start: 2022-02-22 | End: 2022-02-23

## 2022-02-22 RX ORDER — SODIUM CHLORIDE 9 MG/ML
30 INJECTION, SOLUTION INTRAVENOUS ONCE
Status: COMPLETED | OUTPATIENT
Start: 2022-02-22 | End: 2022-02-22

## 2022-02-22 RX ADMIN — LEVETIRACETAM 1000 MG: 10 INJECTION INTRAVENOUS at 20:54

## 2022-02-22 RX ADMIN — SODIUM CHLORIDE 2292 ML: 9 INJECTION, SOLUTION INTRAVENOUS at 21:13

## 2022-02-22 RX ADMIN — MIDAZOLAM 5 MG: 1 INJECTION INTRAMUSCULAR; INTRAVENOUS at 22:18

## 2022-02-22 RX ADMIN — PROPOFOL 5 MCG/KG/MIN: 10 INJECTION, EMULSION INTRAVENOUS at 20:52

## 2022-02-22 RX ADMIN — Medication 25 MCG/HR: at 23:12

## 2022-02-22 ASSESSMENT — PULMONARY FUNCTION TESTS: PIF_VALUE: 18

## 2022-02-23 PROBLEM — E87.20 LACTIC ACIDOSIS: Status: ACTIVE | Noted: 2022-02-23

## 2022-02-23 PROBLEM — N17.9 AKI (ACUTE KIDNEY INJURY) (HCC): Status: ACTIVE | Noted: 2022-02-23

## 2022-02-23 LAB
A/G RATIO: 1.2 (ref 1.1–2.2)
ALBUMIN SERPL-MCNC: 3.3 G/DL (ref 3.4–5)
ALP BLD-CCNC: 63 U/L (ref 40–129)
ALT SERPL-CCNC: 22 U/L (ref 10–40)
ANION GAP SERPL CALCULATED.3IONS-SCNC: 10 MMOL/L (ref 3–16)
AST SERPL-CCNC: 25 U/L (ref 15–37)
BASE EXCESS ARTERIAL: 1.5 MMOL/L (ref -3–3)
BASOPHILS ABSOLUTE: 0 K/UL (ref 0–0.2)
BASOPHILS RELATIVE PERCENT: 0.1 %
BILIRUB SERPL-MCNC: 0.5 MG/DL (ref 0–1)
BUN BLDV-MCNC: 27 MG/DL (ref 7–20)
CALCIUM SERPL-MCNC: 8.6 MG/DL (ref 8.3–10.6)
CARBOXYHEMOGLOBIN ARTERIAL: 0.4 % (ref 0–1.5)
CHLORIDE BLD-SCNC: 106 MMOL/L (ref 99–110)
CO2: 24 MMOL/L (ref 21–32)
CREAT SERPL-MCNC: 0.8 MG/DL (ref 0.8–1.3)
EKG ATRIAL RATE: 126 BPM
EKG DIAGNOSIS: NORMAL
EKG P AXIS: 65 DEGREES
EKG P-R INTERVAL: 164 MS
EKG Q-T INTERVAL: 310 MS
EKG QRS DURATION: 104 MS
EKG QTC CALCULATION (BAZETT): 448 MS
EKG R AXIS: -45 DEGREES
EKG T AXIS: 54 DEGREES
EKG VENTRICULAR RATE: 126 BPM
EOSINOPHILS ABSOLUTE: 0 K/UL (ref 0–0.6)
EOSINOPHILS RELATIVE PERCENT: 0 %
GFR AFRICAN AMERICAN: >60
GFR NON-AFRICAN AMERICAN: >60
GLUCOSE BLD-MCNC: 152 MG/DL (ref 70–99)
HCO3 ARTERIAL: 27.2 MMOL/L (ref 21–29)
HCT VFR BLD CALC: 34.8 % (ref 40.5–52.5)
HEMOGLOBIN, ART, EXTENDED: 11.4 G/DL (ref 13.5–17.5)
HEMOGLOBIN: 11.7 G/DL (ref 13.5–17.5)
INR BLD: 1.15 (ref 0.88–1.12)
KEPPRA DOSE AMT: NORMAL
KEPPRA: 26.1 UG/ML (ref 6–46)
LYMPHOCYTES ABSOLUTE: 0.7 K/UL (ref 1–5.1)
LYMPHOCYTES RELATIVE PERCENT: 6.2 %
MAGNESIUM: 2.4 MG/DL (ref 1.8–2.4)
MCH RBC QN AUTO: 33.3 PG (ref 26–34)
MCHC RBC AUTO-ENTMCNC: 33.6 G/DL (ref 31–36)
MCV RBC AUTO: 98.9 FL (ref 80–100)
METHEMOGLOBIN ARTERIAL: 0.5 %
MONOCYTES ABSOLUTE: 1 K/UL (ref 0–1.3)
MONOCYTES RELATIVE PERCENT: 8.8 %
NEUTROPHILS ABSOLUTE: 9.7 K/UL (ref 1.7–7.7)
NEUTROPHILS RELATIVE PERCENT: 84.9 %
O2 SAT, ARTERIAL: 99.7 %
O2 THERAPY: ABNORMAL
PCO2 ARTERIAL: 46.7 MMHG (ref 35–45)
PDW BLD-RTO: 12.6 % (ref 12.4–15.4)
PH ARTERIAL: 7.37 (ref 7.35–7.45)
PLATELET # BLD: 211 K/UL (ref 135–450)
PMV BLD AUTO: 7.4 FL (ref 5–10.5)
PO2 ARTERIAL: 167 MMHG (ref 75–108)
POTASSIUM SERPL-SCNC: 4.8 MMOL/L (ref 3.5–5.1)
PROCALCITONIN: 0.6 NG/ML (ref 0–0.15)
PROTHROMBIN TIME: 13.1 SEC (ref 9.9–12.7)
RBC # BLD: 3.52 M/UL (ref 4.2–5.9)
REPORT: NORMAL
SODIUM BLD-SCNC: 140 MMOL/L (ref 136–145)
TCO2 ARTERIAL: 64.2 MMOL/L
TOTAL PROTEIN: 6 G/DL (ref 6.4–8.2)
WBC # BLD: 11.4 K/UL (ref 4–11)

## 2022-02-23 PROCEDURE — 84145 PROCALCITONIN (PCT): CPT

## 2022-02-23 PROCEDURE — 94640 AIRWAY INHALATION TREATMENT: CPT

## 2022-02-23 PROCEDURE — 6360000002 HC RX W HCPCS: Performed by: INTERNAL MEDICINE

## 2022-02-23 PROCEDURE — 6370000000 HC RX 637 (ALT 250 FOR IP): Performed by: INTERNAL MEDICINE

## 2022-02-23 PROCEDURE — 83735 ASSAY OF MAGNESIUM: CPT

## 2022-02-23 PROCEDURE — 94003 VENT MGMT INPAT SUBQ DAY: CPT

## 2022-02-23 PROCEDURE — 99223 1ST HOSP IP/OBS HIGH 75: CPT | Performed by: PSYCHIATRY & NEUROLOGY

## 2022-02-23 PROCEDURE — 85610 PROTHROMBIN TIME: CPT

## 2022-02-23 PROCEDURE — 2580000003 HC RX 258: Performed by: INTERNAL MEDICINE

## 2022-02-23 PROCEDURE — 80053 COMPREHEN METABOLIC PANEL: CPT

## 2022-02-23 PROCEDURE — 92526 ORAL FUNCTION THERAPY: CPT

## 2022-02-23 PROCEDURE — 95816 EEG AWAKE AND DROWSY: CPT | Performed by: PSYCHIATRY & NEUROLOGY

## 2022-02-23 PROCEDURE — 2000000000 HC ICU R&B

## 2022-02-23 PROCEDURE — 92610 EVALUATE SWALLOWING FUNCTION: CPT

## 2022-02-23 PROCEDURE — 2500000003 HC RX 250 WO HCPCS: Performed by: INTERNAL MEDICINE

## 2022-02-23 PROCEDURE — 85025 COMPLETE CBC W/AUTO DIFF WBC: CPT

## 2022-02-23 PROCEDURE — 2700000000 HC OXYGEN THERAPY PER DAY

## 2022-02-23 PROCEDURE — 93010 ELECTROCARDIOGRAM REPORT: CPT | Performed by: INTERNAL MEDICINE

## 2022-02-23 PROCEDURE — 36415 COLL VENOUS BLD VENIPUNCTURE: CPT

## 2022-02-23 PROCEDURE — 82803 BLOOD GASES ANY COMBINATION: CPT

## 2022-02-23 PROCEDURE — 95816 EEG AWAKE AND DROWSY: CPT

## 2022-02-23 PROCEDURE — 99291 CRITICAL CARE FIRST HOUR: CPT | Performed by: INTERNAL MEDICINE

## 2022-02-23 RX ORDER — ALBUTEROL SULFATE 2.5 MG/3ML
2.5 SOLUTION RESPIRATORY (INHALATION)
Status: DISCONTINUED | OUTPATIENT
Start: 2022-02-23 | End: 2022-02-23

## 2022-02-23 RX ORDER — ATORVASTATIN CALCIUM 10 MG/1
10 TABLET, FILM COATED ORAL NIGHTLY
Status: DISCONTINUED | OUTPATIENT
Start: 2022-02-23 | End: 2022-03-02 | Stop reason: HOSPADM

## 2022-02-23 RX ORDER — MINERAL OIL AND WHITE PETROLATUM 150; 830 MG/G; MG/G
OINTMENT OPHTHALMIC EVERY 4 HOURS
Status: DISCONTINUED | OUTPATIENT
Start: 2022-02-23 | End: 2022-02-23

## 2022-02-23 RX ORDER — METHADONE HYDROCHLORIDE 10 MG/1
5 TABLET ORAL 2 TIMES DAILY
Status: DISCONTINUED | OUTPATIENT
Start: 2022-02-23 | End: 2022-03-02 | Stop reason: HOSPADM

## 2022-02-23 RX ORDER — POTASSIUM CHLORIDE 29.8 MG/ML
20 INJECTION INTRAVENOUS PRN
Status: DISCONTINUED | OUTPATIENT
Start: 2022-02-23 | End: 2022-03-02 | Stop reason: HOSPADM

## 2022-02-23 RX ORDER — POTASSIUM CHLORIDE 7.45 MG/ML
10 INJECTION INTRAVENOUS PRN
Status: DISCONTINUED | OUTPATIENT
Start: 2022-02-23 | End: 2022-03-02 | Stop reason: HOSPADM

## 2022-02-23 RX ORDER — LACOSAMIDE 100 MG/1
200 TABLET ORAL 2 TIMES DAILY
Status: DISCONTINUED | OUTPATIENT
Start: 2022-02-23 | End: 2022-03-02 | Stop reason: HOSPADM

## 2022-02-23 RX ORDER — LEVETIRACETAM 500 MG/1
1500 TABLET ORAL 2 TIMES DAILY
Status: DISCONTINUED | OUTPATIENT
Start: 2022-02-23 | End: 2022-02-23

## 2022-02-23 RX ORDER — SODIUM CHLORIDE 0.9 % (FLUSH) 0.9 %
10 SYRINGE (ML) INJECTION PRN
Status: DISCONTINUED | OUTPATIENT
Start: 2022-02-23 | End: 2022-03-02 | Stop reason: HOSPADM

## 2022-02-23 RX ORDER — ONDANSETRON 2 MG/ML
4 INJECTION INTRAMUSCULAR; INTRAVENOUS EVERY 6 HOURS PRN
Status: DISCONTINUED | OUTPATIENT
Start: 2022-02-23 | End: 2022-03-02 | Stop reason: HOSPADM

## 2022-02-23 RX ORDER — ACETAMINOPHEN 325 MG/1
650 TABLET ORAL EVERY 4 HOURS PRN
Status: DISCONTINUED | OUTPATIENT
Start: 2022-02-23 | End: 2022-03-02 | Stop reason: HOSPADM

## 2022-02-23 RX ORDER — ACETAMINOPHEN 650 MG/1
650 SUPPOSITORY RECTAL EVERY 4 HOURS PRN
Status: DISCONTINUED | OUTPATIENT
Start: 2022-02-23 | End: 2022-03-02 | Stop reason: HOSPADM

## 2022-02-23 RX ORDER — CHLORHEXIDINE GLUCONATE 0.12 MG/ML
15 RINSE ORAL 2 TIMES DAILY
Status: DISCONTINUED | OUTPATIENT
Start: 2022-02-23 | End: 2022-02-23

## 2022-02-23 RX ORDER — POLYVINYL ALCOHOL 14 MG/ML
1 SOLUTION/ DROPS OPHTHALMIC EVERY 4 HOURS
Status: DISCONTINUED | OUTPATIENT
Start: 2022-02-23 | End: 2022-02-23

## 2022-02-23 RX ORDER — CITALOPRAM 20 MG/1
20 TABLET ORAL DAILY
COMMUNITY
End: 2022-03-11 | Stop reason: ALTCHOICE

## 2022-02-23 RX ORDER — MAGNESIUM SULFATE 1 G/100ML
1000 INJECTION INTRAVENOUS PRN
Status: DISCONTINUED | OUTPATIENT
Start: 2022-02-23 | End: 2022-03-02 | Stop reason: HOSPADM

## 2022-02-23 RX ORDER — DULOXETIN HYDROCHLORIDE 30 MG/1
30 CAPSULE, DELAYED RELEASE ORAL DAILY
Status: DISCONTINUED | OUTPATIENT
Start: 2022-02-23 | End: 2022-02-23

## 2022-02-23 RX ORDER — ALBUTEROL SULFATE 2.5 MG/3ML
2.5 SOLUTION RESPIRATORY (INHALATION) 2 TIMES DAILY
Status: DISCONTINUED | OUTPATIENT
Start: 2022-02-23 | End: 2022-02-23

## 2022-02-23 RX ORDER — LORAZEPAM 2 MG/ML
4 INJECTION INTRAMUSCULAR
Status: ACTIVE | OUTPATIENT
Start: 2022-02-23 | End: 2022-02-23

## 2022-02-23 RX ORDER — LEVETIRACETAM 100 MG/ML
1500 SOLUTION ORAL 2 TIMES DAILY
Status: DISCONTINUED | OUTPATIENT
Start: 2022-02-23 | End: 2022-03-02 | Stop reason: HOSPADM

## 2022-02-23 RX ORDER — ALBUTEROL SULFATE 2.5 MG/3ML
2.5 SOLUTION RESPIRATORY (INHALATION) EVERY 4 HOURS PRN
Status: DISCONTINUED | OUTPATIENT
Start: 2022-02-23 | End: 2022-02-26

## 2022-02-23 RX ORDER — SODIUM CHLORIDE 9 MG/ML
25 INJECTION, SOLUTION INTRAVENOUS PRN
Status: DISCONTINUED | OUTPATIENT
Start: 2022-02-23 | End: 2022-03-02 | Stop reason: HOSPADM

## 2022-02-23 RX ORDER — OLANZAPINE 2.5 MG/1
2.5 TABLET ORAL NIGHTLY
COMMUNITY
End: 2022-03-11 | Stop reason: ALTCHOICE

## 2022-02-23 RX ORDER — LEVETIRACETAM 500 MG/1
500 TABLET ORAL DAILY
Status: ON HOLD | COMMUNITY
End: 2022-03-02 | Stop reason: HOSPADM

## 2022-02-23 RX ADMIN — PROPOFOL 40 MCG/KG/MIN: 10 INJECTION, EMULSION INTRAVENOUS at 08:45

## 2022-02-23 RX ADMIN — ACETAMINOPHEN 325MG 650 MG: 325 TABLET ORAL at 21:34

## 2022-02-23 RX ADMIN — MINERAL OIL, PETROLATUM: 425; 573 OINTMENT OPHTHALMIC at 01:58

## 2022-02-23 RX ADMIN — Medication 25 MCG/HR: at 08:09

## 2022-02-23 RX ADMIN — SODIUM CHLORIDE 1000 ML: 9 INJECTION, SOLUTION INTRAVENOUS at 16:33

## 2022-02-23 RX ADMIN — ATORVASTATIN CALCIUM 10 MG: 10 TABLET, FILM COATED ORAL at 21:25

## 2022-02-23 RX ADMIN — FAMOTIDINE 20 MG: 10 INJECTION, SOLUTION INTRAVENOUS at 01:57

## 2022-02-23 RX ADMIN — LEVETIRACETAM 1500 MG: 100 SOLUTION ORAL at 21:25

## 2022-02-23 RX ADMIN — LACOSAMIDE 200 MG: 100 TABLET, FILM COATED ORAL at 11:28

## 2022-02-23 RX ADMIN — LEVETIRACETAM 1500 MG: 100 SOLUTION ORAL at 11:19

## 2022-02-23 RX ADMIN — PROPOFOL 40 MCG/KG/MIN: 10 INJECTION, EMULSION INTRAVENOUS at 03:12

## 2022-02-23 RX ADMIN — ALBUTEROL SULFATE 2.5 MG: 2.5 SOLUTION RESPIRATORY (INHALATION) at 08:49

## 2022-02-23 RX ADMIN — METHADONE HYDROCHLORIDE 5 MG: 10 TABLET ORAL at 08:46

## 2022-02-23 RX ADMIN — METHADONE HYDROCHLORIDE 5 MG: 10 TABLET ORAL at 21:25

## 2022-02-23 RX ADMIN — ENOXAPARIN SODIUM 40 MG: 40 INJECTION SUBCUTANEOUS at 08:46

## 2022-02-23 RX ADMIN — SODIUM CHLORIDE, PRESERVATIVE FREE 10 ML: 5 INJECTION INTRAVENOUS at 08:45

## 2022-02-23 RX ADMIN — LACOSAMIDE 200 MG: 100 TABLET, FILM COATED ORAL at 21:25

## 2022-02-23 RX ADMIN — VANCOMYCIN HYDROCHLORIDE 1000 MG: 1 INJECTION, POWDER, LYOPHILIZED, FOR SOLUTION INTRAVENOUS at 16:35

## 2022-02-23 ASSESSMENT — PAIN SCALES - GENERAL
PAINLEVEL_OUTOF10: 8
PAINLEVEL_OUTOF10: 3
PAINLEVEL_OUTOF10: 0
PAINLEVEL_OUTOF10: 5

## 2022-02-23 ASSESSMENT — PULMONARY FUNCTION TESTS
PIF_VALUE: 15
PIF_VALUE: 31
PIF_VALUE: 32
PIF_VALUE: 27
PIF_VALUE: 15

## 2022-02-23 NOTE — PROGRESS NOTES
02/23/22 1618   RT Protocol   History Pulmonary Disease 1   Respiratory pattern 0   Breath sounds 2   Cough 0   Indications for Bronchodilator Therapy Decreased or absent breath sounds   Bronchodilator Assessment Score 3

## 2022-02-23 NOTE — PROGRESS NOTES
[facility] called to report positive blood cultures in 2 sets (4 bottles positive). Dr. Pj Tsang notified now.

## 2022-02-23 NOTE — CONSULTS
In patient Neurology consult        [facility] Neurology      MD Anand Ibarra Logan  1960    Date of Service: 2/23/2022    Referring Physician: Dandre Burrell MD      Reason for the consult and CC: Acute encephalopathy and possible seizure. HPI:   Most of the history was obtained from chart reviewing as the patient is currently intubated     The patient is a 64y.o. years old male with multiple medical problems who was admitted from his ECF yesterday after found unresponsive by nursing home staff. Unclear duration but could be minutes. Degree was severe. No other relieving or aggravating factors or clear triggers. No witnessed seizure but he had some agonal breathing requiring intubation and admission to the ICU. The patient was loaded with Keppra in the ED. No other relieving or aggravating factors. Today he is awake and alert but still intubated. Can follow direction. 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 Patient was diagnosed with epilepsy since that time and he was placed on different AED. Unable to get more history regarding seizure frequency or compliance. The patient is unable to give any more history. Other review of system was limited. History reviewed. No pertinent family history.     Past Medical History:   Diagnosis Date    Acute intracerebral hemorrhage ([address] 02/03/2017    Anxiety     Back pain     Cerebral artery occlusion with cerebral infarction (HCC)     Chronic back pain greater than 3 months duration 02/03/2017    Diabetes mellitus ([address]     Dysphasia 02/03/2017    Dysphonia 02/03/2017    Endocarditis and heart valve disorders in diseases classified elsewhere 02/03/2017    Hydrocephalus ([address] 02/03/2017    Hypertension     IVH (intraventricular hemorrhage) ([facility] 02/03/2017    NSTEMI (non-ST elevated myocardial infarction) ([facility] 02/03/2017    Seizures ([facility]      Past Surgical History:   Procedure Laterality Date    BRAIN ANEURYSM SURGERY Left 02/03/2017    COLONOSCOPY N/A 8/14/2019    COLONOSCOPY WITH MAC ANESTHESIA performed by Denise Veloz MD at [facility] Left 02/03/2017    LEG SURGERY      UPPER GASTROINTESTINAL ENDOSCOPY N/A 8/14/2019    EGD BIOPSY performed by Denise Veloz MD at [address] History     Tobacco Use    Smoking status: Current Some Day Smoker     Packs/day: 0.50     Types: Cigarettes    Smokeless tobacco: Never Used    Tobacco comment: 6 per WEEK   Vaping Use    Vaping Use: Never used   Substance Use Topics    Alcohol use: No    Drug use: No     Allergies   Allergen Reactions    No Known Allergies     Seasonal      Current Facility-Administered Medications   Medication Dose Route Frequency Provider Last Rate Last Admin    methadone (DOLOPHINE) tablet 5 mg  5 mg Oral BID Leonel Hill MD   5 mg at 02/23/22 0846    lacosamide (VIMPAT) tablet 200 mg  200 mg Oral BID Leonel Hill MD        atorvastatin (LIPITOR) tablet 10 mg  10 mg Oral Nightly Leonel Hill MD        sodium chloride flush 0.9 % injection 10 mL  10 mL IntraVENous PRN Leonel Hill MD   10 mL at 02/23/22 0845    0.9 % sodium chloride infusion  25 mL IntraVENous PRN Leonel Hill MD        magnesium sulfate 1000 mg in dextrose 5% 100 mL IVPB  1,000 mg IntraVENous PRN Leonel Hill MD        enoxaparin (LOVENOX) injection 40 mg  40 mg SubCUTAneous Daily Leonel Hill MD   40 mg at 02/23/22 0846    ondansetron (ZOFRAN) injection 4 mg  4 mg IntraVENous Q6H PRN Leonel Hill MD        acetaminophen (TYLENOL) tablet 650 mg  650 mg Oral Q4H PRN Leonel Hill MD        Or    acetaminophen (TYLENOL) suppository 650 mg  650 mg Rectal Q4H PRN Leonel Hill MD        potassium chloride 20 mEq/50 mL IVPB (Central Line)  20 mEq IntraVENous PRN Asmita Pineda MD        Or    potassium chloride 10 mEq/100 mL IVPB (Peripheral Line)  10 mEq IntraVENous PRN Asmita Pineda MD        albuterol (PROVENTIL) nebulizer solution 2.5 mg  2.5 mg Nebulization Q2H PRN Asmita Pineda MD        LORazepam (ATIVAN) injection 4 mg  4 mg IntraVENous Once PRN Asmita Pineda MD        albuterol (PROVENTIL) nebulizer solution 2.5 mg  2.5 mg Nebulization BID Asmita Pineda MD   2.5 mg at 02/23/22 0849    levETIRAcetam (KEPPRA) 100 MG/ML solution 1,500 mg  1,500 mg Oral BID Linnette Medeiros MD        propofol injection  5-50 mcg/kg/min IntraVENous Titrated Asmita Pineda MD   Stopped at 02/23/22 0850    fentaNYL 10 mcg/mL infusion   mcg/hr IntraVENous Continuous Asmita Pineda MD 2.5 mL/hr at 02/23/22 0809 25 mcg/hr at 02/23/22 0809       ROS: 10-12 ROS was Limited to obtain secondary to intubation and encephalopathy. Otherwise rest per HPI     Exam:   Vitals:    02/23/22 0700 02/23/22 0801 02/23/22 0850 02/23/22 0952   BP: (!) 102/59 113/63     Pulse: 63 84 78 91   Resp: 14 15 8 8   Temp:  97.5 °F (36.4 °C)     TempSrc:  Temporal     SpO2: 100% 100% 100% 100%   Weight:         General appearance: intubated   Eye: Fundus of the eye: Funduscopic examination could not be performed due to intubation and COVID-19 restrictions. Neck: supple  Cardiovascular: No lower leg edema with good pulsation. No carotid bruit. Mental Status: The patient is awake and alert. Can follow direction  Attention and concentration: Intact  Language: Intubated  Recent and remote memory: Intubated  Fund of knowledge: Intubated  Cranial Nerves:   II: Pupils: equal, round, reactive to light  III,IV,VI: No gaze preference. OCR present  V: Positive corneal.  No facial grimacing with painful stimulation. VII: Face is symmetric.    VIII: No nystagmus with OCR  IX: Gag reflex: / present  X:  cough reflex:  / present   XI: Normal shoulder shrug XII: Midline   Musculoskeletal: Can move his arms and legs spontaneously   tone: normal  Reflexes: 1+ in both arms and legs  Planters: mute  Coordination: No abnormal movement  Sensation:  withdrawal to pain  Gait/Posture: Cannot be examined due to intubation. Data:  LABS:   Lab Results   Component Value Date     02/23/2022    K 4.8 02/23/2022    K 4.7 02/22/2022     02/23/2022    CO2 24 02/23/2022    BUN 27 02/23/2022    CREATININE 0.8 02/23/2022    GFRAA >60 02/23/2022    GFRAA >60 12/13/2011    LABGLOM >60 02/23/2022    GLUCOSE 152 02/23/2022    MG 2.40 02/23/2022    CALCIUM 8.6 02/23/2022     Lab Results   Component Value Date    WBC 11.4 02/23/2022    RBC 3.52 02/23/2022    HGB 11.7 02/23/2022    HCT 34.8 02/23/2022    MCV 98.9 02/23/2022    RDW 12.6 02/23/2022     02/23/2022     Lab Results   Component Value Date    INR 1.15 (H) 02/23/2022    PROTIME 13.1 (H) 02/23/2022       Neuroimaging were independently reviewed by me  Reviewed notes from different physicians  Reviewed lab and blood testing    Copy of  records:  Initial visit history: This is a 64year old RH male with a history of DMT2 and chronic morphine for low back pain who presented to [facility] after a seizure on 1/25/17 and was found to have a left parietal-occipital ICH with IVH secondary to 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 He was discharged on 2/3/17, not on an AED and represented to [facility] in status epilepticus on 2/9/17. He was started on LEV 1g BID and discharged on 2/15/17. He has not had another seizure. His parents, who accompanied him to this appointment, reported that he had been feeling badly for about 2 months prior to his first seizure. He was mainly feeling malaise and like he had a cold. Then 2 weeks prior to his seizure, he was feeling even worse.  The day before his seizure, his

## 2022-02-23 NOTE — ED NOTES
Patient began moving around while in CT. Soft wrist restraints applied, see orders.       Parker Hernandez RN  02/22/22 0678

## 2022-02-23 NOTE — ED NOTES
Bed: 01  Expected date:   Expected time:   Means of arrival:   Comments[facility]  [facility]  02/22/22 2029

## 2022-02-23 NOTE — PROGRESS NOTES
02/23/22 0850   Vent Information   $Ventilation $Subsequent Day   Vent Type 980   Vent Mode PS   Pressure Support 10 cmH20   FiO2  40 %   SpO2 100 %   SpO2/FiO2 ratio 250   Sensitivity 3   PEEP/CPAP 5   Humidification Source HME   Vent Patient Data   Peak Inspiratory Pressure 15 cmH2O   Mean Airway Pressure 9.6 cmH20   Rate Measured 12 br/min   Spontaneous VT 1579 mL   Minute Volume 16.3 Liters   I:E Ratio 1:2.3   Total PEEP 5 cmH20   Auto PEEP 0 cmH20   Cough/Sputum   Sputum How Obtained Endotracheal   Sputum Amount Large   Sputum Color Brown   Tenacity Thick   Spontaneous Breathing Trial (SBT) RT Doc   Pulse 78   RSBI Calculated 7.6   Breath Sounds   Right Upper Lobe Diminished   Right Middle Lobe Diminished   Right Lower Lobe Diminished   Left Upper Lobe Diminished   Left Lower Lobe Diminished   Additional Respiratory  Assessments   Resp 8   End Tidal CO2 36 (%)   Position Semi-Soto's   Alarm Settings   High Pressure Alarm 40 cmH2O   Low Minute Volume Alarm 2 L/min   Apnea (secs) 20 secs   High Respiratory Rate 40 br/min   Low Exhaled Vt  200 mL   Patient Observation   Observations Day 2 vent   ETT (adult)   Placement Date/Time: 02/22/22 2040   Timeout: Patient  Preoxygenation: Yes  Mask Ventilation: Ventilated by mask (1)  Technique: Video laryngoscopy  Type: Cuffed  Tube Size: 8 mm  Laryngoscope: GlideScope  Blade Size: 4  Location: Oral  Placement Veri. ..    Secured at 24 cm   Measured From [address] [place] [address],[address] By [employer] tube araya        02/23/22 0850   Vent Information   $Ventilation $Subsequent Day   Vent Type 980   Vent Mode PS   Pressure Support 10 cmH20   FiO2  40 %   SpO2 100 %   SpO2/FiO2 ratio 250   Sensitivity 3   PEEP/CPAP 5   Humidification Source HME   Vent Patient Data   Peak Inspiratory Pressure 15 cmH2O   Mean Airway Pressure 9.6 cmH20   Rate Measured 12 br/min   Spontaneous VT 1579 mL   Minute Volume 16.3 Liters   I:E Ratio 1:2.3   Total PEEP 5 cmH20   Auto PEEP 0 cmH20   Cough/Sputum   Sputum How Obtained Endotracheal   Sputum Amount Large   Sputum Color Brown   Tenacity Thick   Spontaneous Breathing Trial (SBT) RT Doc   Pulse 78   RSBI Calculated 7.6   Breath Sounds   Right Upper Lobe Diminished   Right Middle Lobe Diminished   Right Lower Lobe Diminished   Left Upper Lobe Diminished   Left Lower Lobe Diminished   Additional Respiratory  Assessments   Resp 8   End Tidal CO2 36 (%)   Position Semi-Soto's   Alarm Settings   High Pressure Alarm 40 cmH2O   Low Minute Volume Alarm 2 L/min   Apnea (secs) 20 secs   High Respiratory Rate 40 br/min   Low Exhaled Vt  200 mL   Patient Observation   Observations Day 2 vent   ETT (adult)   Placement Date/Time: 02/22/22 2040   Timeout: Patient  Preoxygenation: Yes  Mask Ventilation: Ventilated by mask (1)  Technique: Video laryngoscopy  Type: Cuffed  Tube Size: 8 mm  Laryngoscope: GlideScope  Blade Size: 4  Location: Oral  Placement Veri. ..    Secured at 24 cm   Measured From [address] [place] [address],[address] By [employer] tube araya

## 2022-02-23 NOTE — RT PROTOCOL NOTE
RT Inhaler-Nebulizer Bronchodilator Protocol Note    There is a bronchodilator order in the chart from a provider indicating to follow the RT Bronchodilator Protocol and there is an Initiate RT Inhaler-Nebulizer Bronchodilator Protocol order as well (see protocol at bottom of note). CXR Findings:  XR CHEST PORTABLE    Result Date: 2/22/2022  1. Lines and tubes in expected position as above. 2. No acute cardiopulmonary abnormality. The findings from the last RT Protocol Assessment were as follows:   History Pulmonary Disease: Smoker 15 pack years or more  Respiratory Pattern: Regular pattern and RR 12-20 bpm  Breath Sounds: Slightly diminished and/or crackles  Cough: Strong, productive  Indication for Bronchodilator Therapy: Decreased or absent breath sounds  Bronchodilator Assessment Score: 4    Aerosolized bronchodilator medication orders have been revised according to the RT Inhaler-Nebulizer Bronchodilator Protocol below. Respiratory Therapist to perform RT Therapy Protocol Assessment initially then follow the protocol. Repeat RT Therapy Protocol Assessment PRN for score 0-3 or on second treatment, BID, and PRN for scores above 3. No Indications  adjust the frequency to every 6 hours PRN wheezing or bronchospasm, if no treatments needed after 48 hours then discontinue using Per Protocol order mode. If indication present, adjust the RT bronchodilator orders based on the Bronchodilator Assessment Score as indicated below. Use Inhaler orders unless patient has one or more of the following: on home nebulizer, not able to hold breath for 10 seconds, is not alert and oriented, cannot activate and use MDI correctly, or respiratory rate 25 breaths per minute or more, then use the equivalent nebulizer order(s) with same Frequency and PRN reasons based on the score. If a patient is on this medication at home then do not decrease Frequency below that used at home.     0-3  enter or revise RT bronchodilator order(s) to equivalent RT Bronchodilator order with Frequency of every 4 hours PRN for wheezing or increased work of breathing using Per Protocol order mode. 4-6  enter or revise RT Bronchodilator order(s) to two equivalent RT bronchodilator orders with one order with BID Frequency and one order with Frequency of every 4 hours PRN wheezing or increased work of breathing using Per Protocol order mode. 7-10  enter or revise RT Bronchodilator order(s) to two equivalent RT bronchodilator orders with one order with TID Frequency and one order with Frequency of every 4 hours PRN wheezing or increased work of breathing using Per Protocol order mode. 11-13  enter or revise RT Bronchodilator order(s) to one equivalent RT bronchodilator order with QID Frequency and an Albuterol order with Frequency of every 4 hours PRN wheezing or increased work of breathing using Per Protocol order mode. Greater than 13  enter or revise RT Bronchodilator order(s) to one equivalent RT bronchodilator order with every 4 hours Frequency and an Albuterol order with Frequency of every 2 hours PRN wheezing or increased work of breathing using Per Protocol order mode. RT to enter RT Home Evaluation for COPD & MDI Assessment order using Per Protocol order mode.     Electronically signed by NIKOS HURTADO RCP on 2/23/2022 at 1:06 AM

## 2022-02-23 NOTE — CONSULTS
Clinical Pharmacy Note: Pharmacy to Dose Vancomycin    Claudia Vargas is a 64 y.o. male started on Vancomycin for bacteremia consult received from Dr. Pj Tsang to manage therapy. Also receiving the following antibiotics: n/a    Goal AUC: 400-600 mg/L*hr    Assessment/Plan:  Enterococcus in blood culture  Initiate vancomycin 1000 mg IV every 12 hours. Bayesian modeling predicts an AUC of 500 mg/L*hr   A vancomycin random level has been ordered for 2/24 at 0600  Changes in regimen will be determined based on culture results, renal function, and clinical response. Pharmacy will continue to monitor and adjust regimen as necessary. Allergies:  No known allergies and Seasonal     Recent Labs     02/22/22 2039 02/23/22  0534   CREATININE 1.2 0.8       Recent Labs     02/22/22 2039 02/23/22  0534   WBC 11.9* 11.4*       Ht Readings from Last 1 Encounters:   01/12/22 5' 11\" (1.803 m)        Wt Readings from Last 1 Encounters:   02/23/22 161 lb 2.5 oz (73.1 kg)         Estimated Creatinine Clearance: 100 mL/min (based on SCr of 0.8 mg/dL).       Thank you for the consult,    Ifeoma RivasD, BCPS

## 2022-02-23 NOTE — PROGRESS NOTES
Set up for dinner. Pt. Refused dinner did have a couple cups nectar thick water which he tolerated well. Speech to see tomorrow and advance diet as tolerated.

## 2022-02-23 NOTE — RT PROTOCOL NOTE
RT Nebulizer Bronchodilator Protocol Note    There is a bronchodilator order in the chart from a provider indicating to follow the RT Bronchodilator Protocol and there is an Initiate RT Bronchodilator Protocol order as well (see protocol at bottom of note). CXR Findings:  XR CHEST PORTABLE    Result Date: 2/22/2022  1. Lines and tubes in expected position as above. 2. No acute cardiopulmonary abnormality. The findings from the last RT Protocol Assessment were as follows:  Smoking: Smoker 15 pack years or more  Respiratory Pattern: Regular pattern and RR 12-20 bpm  Breath Sounds: Slightly diminished and/or crackles  Cough: Strong, spontaneous, non-productive  Indication for Bronchodilator Therapy: Decreased or absent breath sounds  Bronchodilator Assessment Score: 3    Aerosolized bronchodilator medication orders have been revised according to the RT Nebulizer Bronchodilator Protocol below. Respiratory Therapist to perform RT Therapy Protocol Assessment initially then follow the protocol. Repeat RT Therapy Protocol Assessment PRN for score 0-3 or on second treatment, BID, and PRN for scores above 3. No Indications  adjust the frequency to every 6 hours PRN wheezing or bronchospasm, if no treatments needed after 48 hours then discontinue using Per Protocol order mode. If indication present, adjust the RT bronchodilator orders based on the Bronchodilator Assessment Score as indicated below. If a patient is on this medication at home then do not decrease Frequency below that used at home. 0-3  enter or revise RT bronchodilator order(s) to equivalent RT Bronchodilator order with Frequency of every 4 hours PRN for wheezing or increased work of breathing using Per Protocol order mode.        4-6  enter or revise RT Bronchodilator order(s) to two equivalent RT bronchodilator orders with one order with BID Frequency and one order with Frequency of every 4 hours PRN wheezing or increased work of breathing using Per Protocol order mode. 7-10  enter or revise RT Bronchodilator order(s) to two equivalent RT bronchodilator orders with one order with TID Frequency and one order with Frequency of every 4 hours PRN wheezing or increased work of breathing using Per Protocol order mode. 11-13  enter or revise RT Bronchodilator order(s) to one equivalent RT bronchodilator order with QID Frequency and an Albuterol order with Frequency of every 4 hours PRN wheezing or increased work of breathing using Per Protocol order mode. Greater than 13  enter or revise RT Bronchodilator order(s) to one equivalent RT bronchodilator order with every 4 hours Frequency and an Albuterol order with Frequency of every 2 hours PRN wheezing or increased work of breathing using Per Protocol order mode. RT to enter RT Home Evaluation for COPD & MDI Assessment order using Per Protocol order mode.     Electronically signed by Jefferson Mantilla RCP on 2/23/2022 at 4:18 PM

## 2022-02-23 NOTE — ED NOTES
Patient in by EMS from [facility] at the [facility]. Patient was found down on the ground by staff. Upon arrival patient was post ictal and unresponsive with EMS bagging patient and normal saline infusing. Patient has red marks noted to R arm, back and elbows. 2036 20 Etomidate   2036 100 Rocuronium  2037 Patient intubated, 24 at the lip positive color change, bilateral breath sounds. 2045 Tamayo in place  2055 OG tube    Patient has 20 R AC, 20 L AC and 22 R hand. EKG completed and labs collected at this time.         Emilee Sorenson RN  02/22/22 2107       Emilee Sorenson RN  02/22/22 2108

## 2022-02-23 NOTE — PROGRESS NOTES
Pt. Extubated at [address] [place] [address] after passing weaning protocol and evaluation by pulmonologist. Pt. Is alert and oriented X4. Pt. Tolerated extubation well. O2 placed on pt at 4L/NC. Pt. now on room air with oxygen saturation of 100%. Pt. Bathed with hair wash and linens changed. Pt. Thankful.

## 2022-02-23 NOTE — PROGRESS NOTES
4 Eyes Skin Assessment     NAME:  Sabino Apgar  YOB: 1960  MEDICAL RECORD NUMBER:  [id number]    The patient is being assess for  Admission    I agree that 2 RN's have performed a thorough Head to Toe Skin Assessment on the patient. ALL assessment sites listed below have been assessed. Areas assessed by both nurses:    Head, Face, Ears, Shoulders, Back, Chest, Arms, Elbows, Hands, Sacrum. Buttock, Coccyx, Ischium and Legs. Feet and Heels        Does the Patient have a Wound?  No noted wound(s)       Zi Prevention initiated:  Yes   Wound Care Orders initiated:  NA    Pressure Injury (Stage 3,4, Unstageable, DTI, NWPT, and Complex wounds) if present place consult order under [de-identified] NA    New and Established Ostomies if present place consult order under : NA      Nurse 1 eSignature: Electronically signed by Ambar Ghosh RN on 22 at 7:36 AM EST    **SHARE this note so that the co-signing nurse is able to place an eSignature**    Nurse 2 eSignature: Electronically signed by Megan Boeck, RN on 22 at 7:39 AM EST

## 2022-02-23 NOTE — PROGRESS NOTES
Hospitalist Progress Note      PCP: Kmiberly Vieira MD    Date of Admission: 2/22/2022    Chief Complaint:  Patient in by EMS from [facility] at the [facility]. Patient was found down, unknown how long and unresponsive, post ictal    Hospital Course:   Fani Calderon is a 64 y.o. male. He presented from [facility] at the [facility] by ambulance. He was found down and unresponsive by staff. He was supported noninvasively en route w/ breaths via BVM. He was intubated shortly after arrival primarily for airway protection considering his persistently depressed mental status.     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 He underwent craniotomy and aneurysm clipping at the time. 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  Per prior notes he is aphasic at baseline.        Subjective:  Seems to be doing well  Blood cultures are positive  Started on vanc      Medications:  Reviewed    Infusion Medications    sodium chloride 1,000 mL (02/23/22 1633)    propofol Stopped (02/23/22 0850)    fentaNYL Stopped (02/23/22 0850)     Scheduled Medications    methadone  5 mg Oral BID    lacosamide  200 mg Oral BID    atorvastatin  10 mg Oral Nightly    enoxaparin  40 mg SubCUTAneous Daily    levETIRAcetam  1,500 mg Oral BID    vancomycin  1,000 mg IntraVENous Q12H     PRN Meds: sodium chloride flush, sodium chloride, magnesium sulfate, ondansetron, acetaminophen **OR** acetaminophen, potassium chloride **OR** potassium chloride, LORazepam, perflutren lipid microspheres, albuterol      Intake/Output Summary (Last 24 hours) at 2/23/2022 1710  Last data filed at 2/23/2022 0545  Gross per 24 hour   Intake 192.59 ml   Output 225 ml   Net -32.41 ml       Physical Exam Performed:    BP (!) 111/56   Pulse 84   Temp 99 °F (37.2 °C) (Temporal)   Resp 12   Wt 161 lb 2.5 oz (73.1 kg)   SpO2 99%   BMI 22.48 kg/m²     General appearance: No apparent distress, appears stated age and cooperative. HEENT: Pupils equal, round, and reactive to light. Conjunctivae/corneas clear. Neck: Supple, with full range of motion. No jugular venous distention. Trachea midline. Respiratory:  Normal respiratory effort. Clear to auscultation, bilaterally without Rales/Wheezes/Rhonchi. Cardiovascular: Regular rate and rhythm 3/6  systolic murmur. Abdomen: Soft, non-tender, non-distended with normal bowel sounds. Musculoskeletal: No clubbing, cyanosis or edema bilaterally. Full range of motion without deformity. Skin: Skin color, texture, turgor normal.  No rashes or lesions. Neurologic:  Neurovascularly intact without any focal sensory/motor deficits. Cranial nerves: II-XII intact, grossly non-focal.  Psychiatric: Alert and oriented, thought content appropriate, normal insight  Capillary Refill: Brisk,3 seconds, normal   Peripheral Pulses: +2 palpable, equal bilaterally       Labs:   Recent Labs     02/22/22 2039 02/23/22  0534   WBC 11.9* 11.4*   HGB 13.5 11.7*   HCT 40.4* 34.8*    211     Recent Labs     02/22/22 2039 02/23/22  0534    140   K 4.7 4.8   CL 98* 106   CO2 17* 24   BUN 26* 27*   CREATININE 1.2 0.8   CALCIUM 9.8 8.6     Recent Labs     02/22/22 2039 02/23/22  0534   AST 26 25   ALT 26 22   BILITOT 0.4 0.5   ALKPHOS 72 63     Recent Labs     02/22/22 2039 02/23/22  0534   INR 1.18* 1.15*     Recent Labs     02/22/22 2039   CKTOTAL 73   TROPONINI <0.01       Urinalysis:      Lab Results   Component Value Date    NITRU Negative 02/22/2022    WBCUA 4 02/22/2022    BACTERIA 1+ 01/13/2022    RBCUA 11 02/22/2022    BLOODU MODERATE 02/22/2022    SPECGRAV 1.020 02/22/2022    GLUCOSEU Negative 02/22/2022       Radiology:  CT CHEST ABDOMEN PELVIS WO CONTRAST   Final Result   1. Patchy airway secretions, including suspected secretions in the left lower   lobe bronchus and in the basilar segmental bronchi.   An underlying   endobronchial lesion is not excluded but considered less likely. Consider   aspiration given patient status. 2. The sideport of a gastric tube is located at the gastroesophageal   junction. Consider advancement. 3. Indeterminate bilateral renal lesions potentially due to proteinaceous   cyst or solid neoplasm. Recommend further evaluation with renal protocol   abdomen MRI (preferred) or CT on a nonemergent basis. 4. Solid nodules in the bilateral lower lobes measure up to 0.7 cm and are   more likely infectious or inflammatory in etiology than neoplastic. Assuming   no malignancy is found in the kidneys on the above recommended study, then   follow-up chest CT would be recommended in 3-6 months as below. If   malignancy is found in the kidneys, then the below pulmonary nodule   recommendation would not apply. RECOMMENDATIONS:   Multiple pulmonary nodules. Most severe: 7 mm right solid pulmonary nodule. Recommend a non-contrast Chest CT at 3-6 months. If patient is high risk for   malignancy, recommend an additional non-contrast Chest CT at 18-24 months; if   patient is low risk for malignancy a non-contrast Chest CT at 18-24 months is   optional.      These guidelines do not apply to immunocompromised patients and patients with   cancer. Follow up in patients with significant comorbidities as clinically   warranted. For lung cancer screening, adhere to Lung-RADS guidelines. Reference: Radiology. 2017; 284(1):228-43. CT CERVICAL SPINE WO CONTRAST   Final Result   No acute fracture or traumatic malalignment of the cervical spine. CT HEAD WO CONTRAST   Final Result   1. No acute intracranial abnormality. 2. Chronic findings as above. XR CHEST PORTABLE   Final Result   1. Lines and tubes in expected position as above. 2. No acute cardiopulmonary abnormality.                  Assessment/Plan:    Active Hospital Problems    Diagnosis     NATALIA (acute kidney injury) ([facility] [N17.9]     Lactic acidosis [E87.2]     Unresponsive [R41.89]     Partial idiopathic epilepsy with seizures of localized onset, intractable, with status epilepticus ([facility] [G40.011]     Acute respiratory failure with hypoxia ([facility] [J96.01]     Partial symptomatic epilepsy with complex partial seizures, intractable, without status epilepticus ([facility] [G40.219]     Breakthrough seizure ([facility] [G40.919]     DM (diabetes mellitus), type 2, uncontrolled with complications ([facility] [D05.3, E11.65]      Break through seizure- not new. I have extensive seizure history  -Consult placed to    Lactic acidosis  -Secondary to seizure activity and should improve with this and seizure having ceased. Positive blood cultures  -With gram-positive cocci doubling strep as well as Enterococcus DNA detected  -Started on IV vancomycin  -Previous history of endocarditis in the past  -Echo has been ordered to rule out endocarditis. Diabetes mellitus 2  -He is on sliding scale as well as long-acting insulin    NATALIA  -Creatinine has improved with appropriate hydration resolved at this point. DVT Prophylaxis: Lovenox  Diet: ADULT DIET; Dysphagia - Soft and Bite Sized; Mildly Thick (Nectar); No Drinking Straws  Code Status: Full Code    PT/OT Eval Status: On treatment  Dispo -we will monitor overnight in ICU and likely be downgraded if no further seizure activity.     Dylon Ward MD

## 2022-02-23 NOTE — CARE COORDINATION
CM spoke to Bebo Morales at [facility] to confirm pt is a LTC resident. Pt is Ox3 at baseline, active in resident  and various activities at the facility. Pt is on a bed hold and can return w/out pre-cert as Pearley Handler is waiving pre-certs.     Electronically signed by Gela Hodgson RN on 2/23/2022 at 9:02 AM

## 2022-02-23 NOTE — CONSULTS
PULMONARY AND CRITICAL CARE MEDICINE CONSULT NOTE      Name: Theo Magallon  Sex: male  : 1960  MRN: [id number]  Admission Date: 2022  Admission Diagnosis: Lactic acidosis [E87.2]  Seizure ([address] [R56.9]  Unresponsive [R41.89]  Breakthrough seizure ([address] [G40.919]  Marcelo coma scale total score 3-8, at arrival to emergency department [facility]) [P10.7178]  Date of ICU admission: 2022    Reason for ICU admission: Altered mental status due to seizures    HPI: Patient is a 64 y.o. male with past medical history significant for refractory epilepsy due to left parieto-occipital mycotic aneurysm rupture in 2017 requiring craniotomy and aneurysm clipping at that time, history of mitral valve and aortic valve endocarditis, diabetes mellitus type 2 who presented from a facility after sustaining a seizure and a fall. Patient had GCS of 3 upon arrival and was intubated for airway protection. He was loaded with Keppra and continued on Keppra at home dose and as well as Vimpat. He was on propofol and fentanyl which was held and patient was able to follow commands. No repeat seizures noted. 14 point ROS could not be obtained due to patient factors.      Past Medical History:   Diagnosis Date    Acute intracerebral hemorrhage ([address] 2017    Anxiety     Back pain     Cerebral artery occlusion with cerebral infarction (HCC)     Chronic back pain greater than 3 months duration 2017    Diabetes mellitus ([address]     Dysphasia 2017    Dysphonia 2017    Endocarditis and heart valve disorders in diseases classified elsewhere 2017    Hydrocephalus ([address] 2017    Hypertension     IVH (intraventricular hemorrhage) ([address] 2017    NSTEMI (non-ST elevated myocardial infarction) ([address] 2017    Seizures ([address]      Past Surgical History:   Procedure Laterality Date    BRAIN ANEURYSM SURGERY Left 2017    COLONOSCOPY N/A 2019    COLONOSCOPY WITH MAC ANESTHESIA performed by [facility] MD at  [facility] Left 02/03/2017    LEG SURGERY      UPPER GASTROINTESTINAL ENDOSCOPY N/A 8/14/2019    EGD BIOPSY performed by Amanda Fountain MD at [address] History     Socioeconomic History    Marital status:      Spouse name: Not on file    Number of children: Not on file    Years of education: Not on file    Highest education level: Not on file   Occupational History    Not on file   Tobacco Use    Smoking status: Current Some Day Smoker     Packs/day: 0.50     Types: Cigarettes    Smokeless tobacco: Never Used    Tobacco comment: 6 per WEEK   Vaping Use    Vaping Use: Never used   Substance and Sexual Activity    Alcohol use: No    Drug use: No    Sexual activity: Not on file   Other Topics Concern    Not on file   Social History Narrative    Not on file     Social Determinants of Health     Financial Resource Strain:     Difficulty of Paying Living Expenses: Not on file   Food Insecurity:     Worried About Running Out of Food in the Last Year: Not on file    Joie of Food in the Last Year: Not on file   Transportation Needs:     Lack of Transportation (Medical): Not on file    Lack of Transportation (Non-Medical):  Not on file   Physical Activity:     Days of Exercise per Week: Not on file    Minutes of Exercise per Session: Not on file   Stress:     Feeling of Stress : Not on file   Social Connections:     Frequency of Communication with Friends and Family: Not on file    Frequency of Social Gatherings with Friends and Family: Not on file    Attends [employer] Services: Not on file    Active Member of Clubs or Organizations: Not on file    Attends Club or Organization Meetings: Not on file    Marital Status: Not on file   Intimate Partner Violence:     Fear of Current or Ex-Partner: Not on file    Emotionally Abused: Not on file    Physically Abused: Not on file    Sexually Abused: Not on file Housing Stability:     Unable to Pay for Housing in the Last Year: Not on file    Number of Places Lived in the Last Year: Not on file    Unstable Housing in the Last Year: Not on file     History reviewed. No pertinent family history. Allergies   Allergen Reactions    No Known Allergies     Seasonal        MEDICATIONS:     Scheduled Meds:     methadone  5 mg Oral BID    lacosamide  200 mg Oral BID    atorvastatin  10 mg Oral Nightly    enoxaparin  40 mg SubCUTAneous Daily    albuterol  2.5 mg Nebulization BID    levETIRAcetam  1,500 mg Oral BID     Current Infusions:    sodium chloride      propofol Stopped (02/23/22 0850)    fentaNYL Stopped (02/23/22 0850)       PRN meds:  sodium chloride flush, sodium chloride, magnesium sulfate, ondansetron, acetaminophen **OR** acetaminophen, potassium chloride **OR** potassium chloride, albuterol, LORazepam    PHYSICAL EXAM:    /63   Pulse 91   Temp 97.7 °F (36.5 °C) (Temporal)   Resp 8   Wt 161 lb 2.5 oz (73.1 kg)   SpO2 100%   BMI 22.48 kg/m²  I/O last 3 completed shifts: In: 192.6 [I.V.:102; IV Piggyback:90.6]  Out: 225 [Urine:225] No intake/output data recorded. Intake/Output Summary (Last 24 hours) at 2/23/2022 1423  Last data filed at 2/23/2022 0545  Gross per 24 hour   Intake 192.59 ml   Output 225 ml   Net -32.41 ml         CONSTITUTIONAL: He is a 64y.o.-year-old who appears his stated age. He is in no acute distress. CARDIOVASCULAR: S1 S2 RRR. Without murmer  RESPIRATORY & CHEST: Lungs are clear to auscultation and percussion. No wheezing, no crackles. Good air movement  GASTROINTESTINAL & ABDOMEN: Soft, nontender, positive bowel sounds in all quadrants, non-distended, without hepatosplenomegaly. GENITOURINARY: Deferred. MUSCULOSKELETAL: No tenderness to palpation of the axial skeleton. There is no clubbing. No cyanosis. No edema of the lower extremities. SKIN OF BODY: No rash or jaundice.    PSYCHIATRIC EVALUATION: Could not be assessed. HEMATOLOGIC/LYMPHATIC/ IMMUNOLOGIC: No palpable lymphadenopathy. NEUROLOGIC: Awake and following commands. Groslly non-focal. Motor strength is 5+/5 in all muscle groups. The patient has a normal sensorium globally. LABS:    Recent Labs     02/22/22 2039 02/23/22  0534   WBC 11.9* 11.4*   RBC 4.07* 3.52*   HGB 13.5 11.7*   HCT 40.4* 34.8*   MCH 33.2 33.3   MCHC 33.4 33.6   RDW 12.7 12.6    211   MPV 7.8 7.4   NEUTOPHILPCT 92.5 84.9   MONOPCT 2.8 8.8   BASOPCT 0.4 0.1     Recent Labs     02/22/22 2039 02/23/22  0534    140   K 4.7 4.8   CL 98* 106   ANIONGAP 22* 10   CO2 17* 24   BUN 26* 27*   CREATININE 1.2 0.8   CALCIUM 9.8 8.6   PROT 7.4 6.0*   BILITOT 0.4 0.5   ALKPHOS 72 63   ALT 26 22   AST 26 25   GLUCOSE 219* 152*     Recent Labs     02/23/22  0710   PHART 7.374   IUS7OCL 46.7*   PO2ART 167.0*   TSM8XMO 27.2   S1VKMPLD 99.7   BEART 1.5     Recent Labs     02/22/22 2039 02/23/22  0534   INR 1.18* 1.15*       Recent Labs     02/22/22 2039   CKTOTAL 68   TROPONINI <0.01       IMPRESSION:  Refractory epilepsy  Acute metabolic encephalopathy  On mechanically assisted ventilation  Diabetes mellitus type II      PLAN:  Patient was taken off all sedation. He was awake and alert and answering all questions. He has history of refractory seizures after left parieto-occipital mycotic aneurysm rupture in 2017 requiring craniotomy aneurysm clipping at that point. Patient has been on [address] since then. Likely had a breakthrough seizure due to refractory epilepsy. Patient states that he takes his medication as directed. In the past, he was advised to follow-up at [facility] for refractory epilepsy which he has not. Mechanical ventilation required for airway protection and GCS of 3 upon arrival.  Tolerating pressure support ventilation now. Extubated to nasal cannula without any complications. No infiltrates noted on chest x-ray.     Speech evaluation post extubation. Lovenox for DVT prophylaxis. Critical Care Time: 30 Minutes  Total critical care time caring for this patient with life threatening illness, including direct patient contact, management of life support systems, review of data including imaging and labs, discussions with other team members and physicians excluding procedures. Electronically signed by Danelle Saavedra MD on 2/23/22 at 2:23 PM EST     This note was completed using dragon medical speech recognition software. Grammatical errors, random word insertions, pronoun errors and incomplete sentences are occasional consequences of this technology due to software limitations. If there are questions or concerns about the content of this note of information contained within the body of this dictation, they should be addressed with the provider for clarification.

## 2022-02-23 NOTE — PROGRESS NOTES
02/23/22 0105   RT Protocol   History Pulmonary Disease 1   Respiratory pattern 0   Breath sounds 2   Cough 1   Indications for Bronchodilator Therapy Decreased or absent breath sounds   Bronchodilator Assessment Score 4

## 2022-02-23 NOTE — PROGRESS NOTES
Speech Language Pathology  Facility/Department: [facility] ICU  Initial Assessment  DYSPHAGIA BEDSIDE SWALLOW EVALUATION     Patient: Valerie Fernandez   : 1960   MRN: [id number]      Evaluation Date: 2022   Admitting Diagnosis: Lactic acidosis [E87.2]  Seizure ([facility] [R56.9]  Unresponsive [R41.89]  Breakthrough seizure ([address] [G40.919]  Melba coma scale total score 3-8, at arrival to emergency department [facility]) [Q13.9410]  Pain: Pt denies pain at this time                         H&P:The patient is a 64y.o. years old male with multiple medical problems who was admitted from his ECF yesterday after found unresponsive by nursing home staff. Unclear duration but could be minutes. Degree was severe. No other relieving or aggravating factors or clear triggers. No witnessed seizure but he had some agonal breathing requiring intubation and admission to the ICU. The patient was loaded with Keppra in the ED. No other relieving or aggravating factors. Today he is awake and alert but still intubated. Can follow direction.     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 Patient was diagnosed with epilepsy since that time and he was placed on different AED. Unable to get more history regarding seizure frequency or compliance. The patient is unable to give any more history. Other review of system was limited. Chest xray:  Impression   1. Lines and tubes in expected position as above. 2. No acute cardiopulmonary abnormality. Chest CT:   Impression   1. Patchy airway secretions, including suspected secretions in the left lower   lobe bronchus and in the basilar segmental bronchi.  An underlying   endobronchial lesion is not excluded but considered less likely.  Consider   aspiration given patient status.    2. The sideport of a gastric tube is located at the gastroesophageal   junction.  Consider advancement. 3. Indeterminate bilateral renal lesions potentially due to proteinaceous   cyst or solid neoplasm.  Recommend further evaluation with renal protocol   abdomen MRI (preferred) or CT on a nonemergent basis. 4. Solid nodules in the bilateral lower lobes measure up to 0.7 cm and are   more likely infectious or inflammatory in etiology than neoplastic.  Assuming   no malignancy is found in the kidneys on the above recommended study, then   follow-up chest CT would be recommended in 3-6 months as below.  If   malignancy is found in the kidneys, then the below pulmonary nodule   recommendation would not apply.       RECOMMENDATIONS:   Multiple pulmonary nodules. Most severe: 7 mm right solid pulmonary nodule. Recommend a non-contrast Chest CT at 3-6 months. If patient is high risk for   malignancy, recommend an additional non-contrast Chest CT at 18-24 months; if   patient is low risk for malignancy a non-contrast Chest CT at 18-24 months is   optional.       These guidelines do not apply to immunocompromised patients and patients with   cancer. Follow up in patients with significant comorbidities as clinically   warranted. For lung cancer screening, adhere to Lung-RADS guidelines. Reference: Radiology. 2017; 284(1):228-43. Head CT:   Impression   1. No acute intracranial abnormality. 2. Chronic findings as above.           Modified Barium Swallow Study:   1/31/17 at [address] [place] [address]:   IMPRESSION:   1.  Penetration with thin liquid and nectar. No evidence of tracheal aspiration. 2.  Significantly delayed initiation of swallowing.      6/16/19 at [facility]:  IMPRESSION   No significant abnormality on swallowing function study    History/Prior Level of Function:   Living Status: SNF    Prior Dysphagia History: Pt with a history of dysphagia per chart review with 2 prior MBS completed in the past (see MBS results, occasional prompting  Pt will advance to least restrictive diet as indicated   If clinical s/s of aspiration/penetration continue to be noted, Pt will participate in Modified Barium Swallow Study     Dysphagia Therapeutic Intervention:  Diet Tolerance Monitoring , Patient/Family Education     Discharge Recommendations: Discharge recommendations to be determined pending ongoing follow-up during acute care stay    Patient Positioning: Upright in bed     Current Diet Level (prior to evaluation): NPO  NPO     Respiratory Status:   []Room Air   [x]O2 via nasal cannula   []Other:    Dentition:  []Adequate  []Dentures   [x]Missing Many Teeth  []Edentulous  []Other:    Baseline Vocal Quality:  []Normal  []Dysphonic   []Aphonic   [x]Hoarse  []Wet  []Weak  []Other:    Volitional Cough:  []Strong  [x]Weak  []Wet  []Absent  []Congested  []Other:    Volitional Swallow:   []Absent   [x]Delayed     []Adequate     []Required use of drink     Oral Mechanism Exam:  []WFL [x]Mild   [] Moderate  []Severe  []To be assessed  Impaired:   []Left side      []Right side    [x]Labial ROM/Coordination    []Labial Symmetry   [x]Lingual ROM/Coordination   []Lingual Symmetry  []Gag  []Other:     Oral Phase: []WFL [x]Mild   [] Moderate  []Severe  []To be assessed   []Impaired/Prolonged Mastication:   []Spillage Left:   []Spillage Right:  []Pocketing Left:   []Pocketing Right:   [x]Decreased Anterior to Posterior Transit:   [x]Suspected Premature Bolus Loss:   []Lingual/Palatal Residue:   []Other:     Pharyngeal Phase: []WFL [x]Mild   [] Moderate  []Severe  []To be assessed   [x]Delayed Swallow:   []Suspected Pharyngeal Pooling:   [x]Decreased Laryngeal Elevation:   []Absent Swallow:  []Wet Vocal Quality:   []Throat Clearing-Immediate:   [x]Throat Clearing-Delayed:   []Cough-Immediate:   []Cough-Delayed:  []Change in Vital Signs:  [x]Suspected Delayed Pharyngeal Clearing:  []Other:       Eating Assistance:  []Independent  []Setup or clean-up assistance   [] Supervision or touching assistance   [x] Partial or moderate assistance   [] Substantial or maximal assistance  [] Dependent       EDUCATION:   Provided education regarding role of SLP, results of assessment, recommendations and general speech pathology plan of care. [x] Pt verbalized understanding and agreement   [] Pt requires ongoing learning   [] No evidence of comprehension     If patient discharges prior to next visit, this note will serve as discharge.      Timed Code Minutes:  0 min  Total Treatment Minutes: 30 min    Esther Barba [facility]#[id number]

## 2022-02-23 NOTE — ED PROVIDER NOTES
[facility] Emergency Department    CHIEF COMPLAINT  Chief Complaint   Patient presents with    Other     Patient in by EMS from [facility]. Patient was found down, unknown how long and unresponsive, post ictal.        HISTORY OF PRESENT ILLNESS  Christal Canada is a 64 y.o. male  who presents to the ED complaining of altered mental status. History is severely limited from the patient due to his condition. He does have a history of brain bleed, diabetes, endocarditis and seizures with status epilepticus in the past.  Unknown last known well time or approximate down-time from his nursing home. He does have some redness to his right elbow due to his left back into his right face. He apparently was found today to be seizing and then became unresponsive. On arrival, he was intubated immediately due to low GCS. His pupils were reported as dilated but symmetric by EMS. History is otherwise unobtainable at this point. EMS reports sugar was in the 200s. GCS 3 on arrival so patient was intubated. No other complaints, modifying factors or associated symptoms. I have reviewed the following from the nursing documentation.     Past Medical History:   Diagnosis Date    Acute intracerebral hemorrhage ([address] 02/03/2017    Anxiety     Back pain     Cerebral artery occlusion with cerebral infarction (HCC)     Chronic back pain greater than 3 months duration 02/03/2017    Diabetes mellitus ([address]     Dysphasia 02/03/2017    Dysphonia 02/03/2017    Endocarditis and heart valve disorders in diseases classified elsewhere 02/03/2017    Hydrocephalus ([address] 02/03/2017    Hypertension     IVH (intraventricular hemorrhage) ([address] 02/03/2017    NSTEMI (non-ST elevated myocardial infarction) ([address] 02/03/2017    Seizures ([address]      Past Surgical History:   Procedure Laterality Date    BRAIN ANEURYSM SURGERY Left 02/03/2017    COLONOSCOPY N/A 8/14/2019    COLONOSCOPY WITH MAC ANESTHESIA performed by Wendy Rincon MD at [facility] Left 02/03/2017    LEG SURGERY      UPPER GASTROINTESTINAL ENDOSCOPY N/A 8/14/2019    EGD BIOPSY performed by Wendy Rincon MD at [address] [place] [address] reviewed. No pertinent family history. Social History     Socioeconomic History    Marital status:      Spouse name: Not on file    Number of children: Not on file    Years of education: Not on file    Highest education level: Not on file   Occupational History    Not on file   Tobacco Use    Smoking status: Current Some Day Smoker     Packs/day: 0.50     Types: Cigarettes    Smokeless tobacco: Never Used    Tobacco comment: 6 per WEEK   Vaping Use    Vaping Use: Never used   Substance and Sexual Activity    Alcohol use: No    Drug use: No    Sexual activity: Not on file   Other Topics Concern    Not on file   Social History Narrative    Not on file     Social Determinants of Health     Financial Resource Strain:     Difficulty of Paying Living Expenses: Not on file   Food Insecurity:     Worried About Running Out of Food in the Last Year: Not on file    Joie of Food in the Last Year: Not on file   Transportation Needs:     Lack of Transportation (Medical): Not on file    Lack of Transportation (Non-Medical):  Not on file   Physical Activity:     Days of Exercise per Week: Not on file    Minutes of Exercise per Session: Not on file   Stress:     Feeling of Stress : Not on file   Social Connections:     Frequency of Communication with Friends and Family: Not on file    Frequency of Social Gatherings with Friends and Family: Not on file    Attends Anglican Services: Not on file    Active Member of Clubs or Organizations: Not on file    Attends Club or Organization Meetings: Not on file    Marital Status: Not on file   Intimate Partner Violence:     Fear of Current or Ex-Partner: Not on file    Emotionally Abused: Not on file    Physically Abused: Not on file  Sexually Abused: Not on file   Housing Stability:     Unable to Pay for Housing in the Last Year: Not on file    Number of Places Lived in the Last Year: Not on file    Unstable Housing in the Last Year: Not on file     Current Facility-Administered Medications   Medication Dose Route Frequency Provider Last Rate Last Admin    etomidate (AMIDATE) 2 MG/ML injection             rocuronium (ZEMURON) 50 MG/5ML injection             etomidate (AMIDATE) injection 20 mg  20 mg IntraVENous Once James Crespo MD        rocuronium [facility]) injection 100 mg  100 mg IntraVENous Once James Crespo MD        propofol injection  5-50 mcg/kg/min IntraVENous Titrated James Crespo MD 18.3 mL/hr at 02/22/22 2308 40 mcg/kg/min at 02/22/22 2308    fentaNYL 10 mcg/mL infusion   mcg/hr IntraVENous Continuous James Crespo MD 2.5 mL/hr at 02/22/22 2312 25 mcg/hr at 02/22/22 2312     Current Outpatient Medications   Medication Sig Dispense Refill    DULoxetine (CYMBALTA) 30 MG extended release capsule Take 30 mg by mouth daily      levETIRAcetam (KEPPRA) 500 MG tablet Take 1,500 mg by mouth 2 times daily      loratadine (CLARITIN) 10 MG tablet Take 10 mg by mouth daily      melatonin 3 MG TABS tablet Take 3 mg by mouth daily      ACIDOPHILUS LACTOBACILLUS PO Take 1 tablet by mouth 2 times daily      traZODone (DESYREL) 50 MG tablet Take 50 mg by mouth nightly      potassium chloride (KLOR-CON M) 20 MEQ extended release tablet Take 20 mEq by mouth 2 times daily      acetaminophen (TYLENOL) 325 MG tablet Take 650 mg by mouth every 4 hours as needed for Pain      mirtazapine (REMERON) 7.5 MG tablet Take 7.5 mg by mouth nightly      Cholecalciferol (VITAMIN D3) 5000 units TABS Take by mouth daily      lacosamide (VIMPAT) 200 MG tablet Take 200 mg by mouth 2 times daily.       calcium carbonate (TUMS) 500 MG chewable tablet Take 1 tablet by mouth 2 times daily       methadone (DOLOPHINE) 5 MG tablet Take 5 mg by mouth 2 times daily.  atorvastatin (LIPITOR) 10 MG tablet Take 10 mg by mouth nightly        Allergies   Allergen Reactions    No Known Allergies     Seasonal        REVIEW OF SYSTEMS  10 systems reviewed, pertinent positives per HPI otherwise noted to be negative. PHYSICAL EXAM  /64   Pulse 103   Temp 98.5 °F (36.9 °C) (Rectal)   Resp 24   Wt 168 lb 8 oz (76.4 kg)   SpO2 98%   BMI 23.50 kg/m²    GENERAL APPEARANCE: unwell appearing  GCS DURING INITIAL ASSESSMENT:  Eyes: +1 (no eye opening)  Verbal: +1 (none)  Motor: +1 (none)  Total: 3  HENT: Normocephalic. Small contusion to R temple noted. Mucous membranes are dry. NECK: Supple. EYES: Fixed, dilated to 7mm OU, symmetric. HEART/CHEST: Tachycardic, reg rhythm. No murmurs. No chest wall tenderness. LUNGS: Agonal respirations, rhonchi throughout. ABDOMEN: No tenderness. Soft. Non-distended. No masses. No organomegaly. No guarding or rebound. Normal bowel sounds throughout. MUSCULOSKELETAL: No extremity edema. Compartments soft. No deformity. No apparent tenderness in the extremities. All extremities neurovascularly intact. Redness noted to R elbow. Contusion noted to L mid-back. SKIN: Warm and dry. No acute rashes. NEUROLOGICAL: GCS 3 as above. No spontaneous movements. No corneal reflex. No gag reflex on my exam.  Pupils as noted above. No seizure-like activity witnessed by me. LABS  I have reviewed all labs for this visit.    Results for orders placed or performed during the hospital encounter of 02/22/22   CBC with Auto Differential   Result Value Ref Range    WBC 11.9 (H) 4.0 - 11.0 K/uL    RBC 4.07 (L) 4.20 - 5.90 M/uL    Hemoglobin 13.5 13.5 - 17.5 g/dL    Hematocrit 40.4 (L) 40.5 - 52.5 %    MCV 99.4 80.0 - 100.0 fL    MCH 33.2 26.0 - 34.0 pg    MCHC 33.4 31.0 - 36.0 g/dL    RDW 12.7 12.4 - 15.4 %    Platelets 265 580 - 795 K/uL    MPV 7.8 5.0 - 10.5 fL    Neutrophils % 92.5 %    Lymphocytes % 4.2 %    Monocytes % 2.8 %    Eosinophils % 0.1 %    Basophils % 0.4 %    Neutrophils Absolute 11.0 (H) 1.7 - 7.7 K/uL    Lymphocytes Absolute 0.5 (L) 1.0 - 5.1 K/uL    Monocytes Absolute 0.3 0.0 - 1.3 K/uL    Eosinophils Absolute 0.0 0.0 - 0.6 K/uL    Basophils Absolute 0.1 0.0 - 0.2 K/uL   Comprehensive Metabolic Panel w/ Reflex to MG   Result Value Ref Range    Sodium 137 136 - 145 mmol/L    Potassium reflex Magnesium 4.7 3.5 - 5.1 mmol/L    Chloride 98 (L) 99 - 110 mmol/L    CO2 17 (L) 21 - 32 mmol/L    Anion Gap 22 (H) 3 - 16    Glucose 219 (H) 70 - 99 mg/dL    BUN 26 (H) 7 - 20 mg/dL    CREATININE 1.2 0.8 - 1.3 mg/dL    GFR Non-African American >60 >60    GFR African American >60 >60    Calcium 9.8 8.3 - 10.6 mg/dL    Total Protein 7.4 6.4 - 8.2 g/dL    Albumin 3.4 3.4 - 5.0 g/dL    Albumin/Globulin Ratio 0.9 (L) 1.1 - 2.2    Total Bilirubin 0.4 0.0 - 1.0 mg/dL    Alkaline Phosphatase 72 40 - 129 U/L    ALT 26 10 - 40 U/L    AST 26 15 - 37 U/L   Lactic Acid   Result Value Ref Range    Lactic Acid 8.4 (HH) 0.4 - 2.0 mmol/L   Lactic Acid   Result Value Ref Range    Lactic Acid 3.5 (H) 0.4 - 2.0 mmol/L   CK   Result Value Ref Range    Total CK 73 39 - 308 U/L   Protime-INR   Result Value Ref Range    Protime 13.4 (H) 9.9 - 12.7 sec    INR 1.18 (H) 0.88 - 1.12   Levetiracetam Level   Result Value Ref Range    KEPPRA Dose Amt Unknown    Ethanol   Result Value Ref Range    Ethanol Lvl None Detected mg/dL   Acetaminophen Level   Result Value Ref Range    Acetaminophen Level <5 (L) 10 - 30 ug/mL   Salicylate   Result Value Ref Range    Salicylate, Serum <5.9 (L) 15.0 - 30.0 mg/dL   Urinalysis with Reflex to Culture    Specimen: Urine, clean catch   Result Value Ref Range    Color, UA YELLOW Straw/Yellow    Clarity, UA CLOUDY (A) Clear    Glucose, Ur Negative Negative mg/dL    Bilirubin Urine Negative Negative    Ketones, Urine Negative Negative mg/dL    Specific Gravity, UA 1.020 1.005 - 1.030    Blood, Urine MODERATE (A) Negative    pH, UA 6.5 5.0 - 8.0    Protein,  (A) Negative mg/dL    Urobilinogen, Urine 0.2 <2.0 E.U./dL    Nitrite, Urine Negative Negative    Leukocyte Esterase, Urine Negative Negative    Microscopic Examination YES     Urine Type NotGiven     Urine Reflex to Culture Not Indicated    Urine Drug Screen   Result Value Ref Range    Amphetamine Screen, Urine Neg Negative <1000ng/mL    Barbiturate Screen, Ur Neg Negative <200 ng/mL    Benzodiazepine Screen, Urine POSITIVE (A) Negative <200 ng/mL    Cannabinoid Scrn, Ur Neg Negative <50 ng/mL    Cocaine Metabolite Screen, Urine Neg Negative <300 ng/mL    Opiate Scrn, Ur Neg Negative <300 ng/mL    PCP Screen, Urine Neg Negative <25 ng/mL    Methadone Screen, Urine Neg Negative <300 ng/mL    Propoxyphene Scrn, Ur Neg Negative <300 ng/mL    Oxycodone Urine Neg Negative <100 ng/ml    pH, UA 6.5     Drug Screen Comment: see below    Blood gas, venous   Result Value Ref Range    pH, [place] 7.320 (L) 7.350 - 7.450    pCO2, [place] 36.3 (L) 40.0 - 50.0 mmHg    pO2, [place] 161.0 (H) 25.0 - 40.0 mmHg    HCO3, Venous 18.7 (L) 23.0 - 29.0 mmol/L    Base Excess, [place] -6.7 (L) -3.0 - 3.0 mmol/L    O2 Sat, [place] 99 Not Established %    Carboxyhemoglobin 4.4 (H) 0.0 - 1.5 %    MetHgb, [place] 0.2 <1.5 %    TC02 (Calc), [place] 44 Not Established mmol/L    O2 Content, [place] 19 Not Established VOL %    O2 Therapy Unknown    Troponin   Result Value Ref Range    Troponin <0.01 <0.01 ng/mL   Brain Natriuretic Peptide   Result Value Ref Range    Pro- (H) 0 - 124 pg/mL   Microscopic Urinalysis   Result Value Ref Range    Hyaline Casts, UA 3-5 (A) 0 - 2 /LPF    Urinalysis Comments see below     WBC, UA 4 0 - 5 /HPF    RBC, UA 11 (H) 0 - 4 /HPF    Epithelial Cells, UA 2 0 - 5 /HPF   EKG 12 Lead   Result Value Ref Range    Ventricular Rate 126 BPM    Atrial Rate 126 BPM    P-R Interval 164 ms    QRS Duration 104 ms    Q-T Interval 310 ms    QTc Calculation (Bazett) 448 ms    P Axis 65 degrees    R Axis -45 degrees    T Axis 54 degrees    Diagnosis       Sinus tachycardiaLeft axis deviationModerate voltage criteria for LVH, may be normal variantPossible Lateral infarct , age undeterminedInferior infarct , age undeterminedAbnormal ECG     The 12 lead EKG was interpreted by me as follows:  Rate: tachycardia with a rate of 126  Rhythm: sinus  Axis: left deviation  Intervals: normal ND, narrow QRS, normal QTc  ST segments: no ST elevations or depressions  T waves: no abnormal inversions  Non-specific T wave changes: present  Prior EKG comparison: EKG dated 1/12/22 is not significantly different, more tachycardic    RADIOLOGY    XR CHEST PORTABLE    Result Date: 2/22/2022  EXAMINATION: ONE XRAY VIEW OF THE CHEST 2/22/2022 8:57 pm COMPARISON: 01/12/2022 HISTORY: ORDERING SYSTEM PROVIDED HISTORY: intubated status epilepticus TECHNOLOGIST PROVIDED HISTORY: Reason for exam:->intubated status epilepticus FINDINGS: Endotracheal tube terminates 4 cm above the bella. Enteric tube is below the diaphragm with side port just below the GE junction. Cardiomediastinal silhouette is normal in size. There is no pleural effusion or pneumothorax. No pulmonary consolidation. There is no acute osseous abnormality. 1. Lines and tubes in expected position as above. 2. No acute cardiopulmonary abnormality. ED COURSE/MDM  Patient seen and evaluated. Old records reviewed. Labs and imaging reviewed. After initial evaluation, differential diagnostic considerations included: stroke, TIA, intracranial bleed, trauma, infection/sepsis, medication side effect, intoxication/withdrawal, metabolic/electrolyte abnormalities    The patient's ED workup was notable for unknown downtime/last known well, with possible seizure activity and history of this. Patient had a GCS of 3 on arrival with significant concern for brain injury more worrisome than simple postictal status. Patient's sugar was not profoundly low by EMS.   Patient's drug screen only positive for benzodiazepines, alcohol Tylenol salicylate all negative. Initial lactate elevation could be related to prolonged downtime and possible seizure, as the patient is afebrile and at this time there is little concern for an infectious process. Blood cultures were nonetheless obtained but do not feel the patient requires antibiotics. After fluids the patient's lactate and heart rate did improve somewhat. The patient's blood pressure was initially low but actually was hypertensive on multiple reassessments after that. He has multiple peripheral IVs.  He was intubated on arrival as per the procedure note below. Patient does not have an NATALIA and CK is normal.  Patient was loaded with Keppra and propofol can also be used for any ongoing potential seizure. He has not had seizure-like activity witnessed by me here although on reassessment he is starting to move his extremities and required a little more sedation. He was given dose of Versed and fentanyl infusion was also initiated. SEP-1 CORE MEASURE DATA      Sepsis Criteria   Severe Sepsis Criteria   Septic Shock Criteria     Must be confirmed or suspected to move forward with diagnosis of sepsis. Must meet 2:    [] Temperature > 100.9 F (38.3 C)        or < 96.8 F (36 C)  [] HR > 90  [] RR > 20  [] WBC > 12 or < 4 or 10% bands    AND:    [] Infection Confirmed or        Suspected.     OR:    [x] Exclude from SEP-1 because:    [x] No infection present or suspected  [] Does not have 2+ SIRS criteria but may have an incidental infection that requires treatment  [] May have sepsis, but does not meet criteria for severe sepsis or septic shock  [x] Alternative explanation for abnormal labs and/or vitals (see MDM)  [] Viral etiology found or highly suspected (including COVID-19) without concomitant bacterial infection   Must meet 1:    [] Lactate > 2       or   [] Signs of Organ Dysfunction:    - SBP < 90 or MAP < 65  - Altered mental status  - Creatinine > 2 or increased from      baseline  - Urine Output < 0.5 ml/kg/hr  - Bilirubin > 2  - INR > 1.5 (not anticoagulated)  - Platelets < 669,646  - Acute Respiratory Failure as     evidenced by new need for NIPPV     or mechanical ventilation        [] No criteria met for Severe Sepsis. Must meet 1:    [] Lactate > 4        or   [] SBP < 90 or MAP < 65 for at        least two readings in the first        hour after fluid bolus        administration      [] Vasopressors initiated (if hypotension persists after fluid resuscitation)                [] No criteria met for Septic Shock. Patient Vitals for the past 6 hrs:   BP Temp Pulse Resp SpO2 Weight Weight Method Percent Weight Change   02/22/22 2032      168 lb 8 oz (76.4 kg) Estimated 0   02/22/22 2048   121 14       02/22/22 2055   120 14 100 %      02/22/22 2056   123 14 100 %      02/22/22 2057   121 14 100 %      02/22/22 2058   118 14 100 %      02/22/22 2059   117 14 99 %      02/22/22 2100 (!) 86/52  118 14 99 %      02/22/22 2101   119 14 100 %      02/22/22 2102   118 14 100 %      02/22/22 2137 116/65  120        02/22/22 2141  98.5 °F (36.9 °C)         02/22/22 2310   107 23       02/22/22 2311   109 22 99 %      02/22/22 2312   109 22 98 %      02/22/22 2313   110 17 98 %      02/22/22 2314   112 24 100 %      02/22/22 2315 135/64  107 25 98 %      02/22/22 2316   107 26 98 %      02/22/22 2317   103 24 98 %         Recent Labs     02/22/22 2039 02/22/22 2142   WBC 11.9*  --    LACTA 8.4* 3.5*   CREATININE 1.2  --    BILITOT 0.4  --    INR 1.18*  --      --          Repeat lactate level: improving    Reassessment Exam:   Not applicable. Patient does not have septic shock.       Intubation Procedure Note    Indication: Respiratory failure, airway compromise and comatose state    Consent: Unable to be obtained due to the Rectal, resp. rate 24, weight 168 lb 8 oz (76.4 kg), SpO2 98 %. DISPOSITION  Shital Garcia was admitted in critical condition. The plan is to admit to the hospital at this time under the hospitalist service. Hospitalist accepted the patient and will take over the patient's care. The total critical care time I independently spent while evaluating and treating this patient was 80 minutes. This excludes time spent doing separately billable procedures. This includes time at the bedside, data interpretation, medication management, obtaining critical history from collateral sources if the patient is unable to provide it directly, and physician consultation. Specifics of interventions taken and potentially life-threatening diagnostic considerations are listed above in the medical decision making. If this was a shared visit with an ELANA, the time in this attestation is non-concurrent critical care time out of the total shared critical care time provided by the ELANA and myself. DISCLAIMER: This chart was created using Dragon dictation software. Efforts were made by me to ensure accuracy, however some errors may be present due to limitations of this technology and occasionally words are not transcribed correctly.         Elise Gonzalez MD  02/22/22 2155

## 2022-02-23 NOTE — H&P
Hospital Medicine History & Physical      Patient:  Martha Delatorre  :   1960  MRN:   [id number]  Date of Service: 22    Chief Complaint   Patient presents with    Other     Patient in by EMS from [facility] at the [facility]. Patient was found down, unknown how long and unresponsive, post ictal.       HISTORY OF PRESENT ILLNESS:    Martha Delatorre is a 64 y.o. male. He presented from [facility] at the [facility] by ambulance. He was found down and unresponsive by staff. He was supported noninvasively en route w/ breaths via BVM. He was intubated shortly after arrival primarily for airway protection considering his persistently depressed mental status. 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 He underwent craniotomy and aneurysm clipping at the time. 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 Per prior notes he is aphasic at baseline. Review of Systems:  Unobtainable. Patient is intubated.     Past Medical History:   Diagnosis Date    Acute intracerebral hemorrhage ([address] 2017    Anxiety     Back pain     Cerebral artery occlusion with cerebral infarction (HCC)     Chronic back pain greater than 3 months duration 2017    Diabetes mellitus ([address]     Dysphasia 2017    Dysphonia 2017    Endocarditis and heart valve disorders in diseases classified elsewhere 2017    Hydrocephalus ([address] 2017    Hypertension     IVH (intraventricular hemorrhage) ([address] 2017    NSTEMI (non-ST elevated myocardial infarction) ([address] 2017    Seizures ([address]        Past Surgical History:   Procedure Laterality Date    BRAIN ANEURYSM SURGERY Left 2017    COLONOSCOPY N/A 2019    COLONOSCOPY WITH MAC ANESTHESIA performed by Cj Melgar MD at [address] [place] [address] Left 2017    LEG SURGERY      UPPER GASTROINTESTINAL ENDOSCOPY N/A 8/14/2019    EGD BIOPSY performed by Alysa Vargas MD at [facility]         Prior to Admission medications    Medication Sig Start Date End Date Taking? Authorizing Provider   DULoxetine (CYMBALTA) 30 MG extended release capsule Take 30 mg by mouth daily    Historical Provider, MD   levETIRAcetam (KEPPRA) 500 MG tablet Take 1,500 mg by mouth 2 times daily    Historical Provider, MD   loratadine (CLARITIN) 10 MG tablet Take 10 mg by mouth daily    Historical Provider, MD   melatonin 3 MG TABS tablet Take 3 mg by mouth daily    Historical Provider, MD   ACIDOPHILUS LACTOBACILLUS PO Take 1 tablet by mouth 2 times daily    Historical Provider, MD   traZODone (DESYREL) 50 MG tablet Take 50 mg by mouth nightly    Historical Provider, MD   potassium chloride (KLOR-CON M) 20 MEQ extended release tablet Take 20 mEq by mouth 2 times daily    Historical Provider, MD   acetaminophen (TYLENOL) 325 MG tablet Take 650 mg by mouth every 4 hours as needed for Pain    Historical Provider, MD   mirtazapine (REMERON) 7.5 MG tablet Take 7.5 mg by mouth nightly    Historical Provider, MD   Cholecalciferol (VITAMIN D3) 5000 units TABS Take by mouth daily    Historical Provider, MD   lacosamide (VIMPAT) 200 MG tablet Take 200 mg by mouth 2 times daily. Historical Provider, MD   calcium carbonate (TUMS) 500 MG chewable tablet Take 1 tablet by mouth 2 times daily     Historical Provider, MD   methadone (DOLOPHINE) 5 MG tablet Take 5 mg by mouth 2 times daily. Historical Provider, MD   atorvastatin (LIPITOR) 10 MG tablet Take 10 mg by mouth nightly  2/3/17   Historical Provider, MD       Allergies:   No known allergies and Seasonal    Social:   reports that he has been smoking cigarettes. He has been smoking about 0.50 packs per day. He has never used smokeless tobacco.   reports no history of alcohol use. Social History     Substance and Sexual Activity   Drug Use No       History reviewed.  No pertinent family history. PHYSICAL EXAM:  I performed this physical examination. Vitals:  Patient Vitals for the past 24 hrs:   BP Temp Temp src Pulse Resp SpO2 Weight   02/22/22 2141  98.5 °F (36.9 °C) Rectal       02/22/22 2137 116/65   120      02/22/22 2102    118 14 100 %    02/22/22 2101    119 14 100 %    02/22/22 2100 (!) 86/52   118 14 99 %    02/22/22 2059    117 14 99 %    02/22/22 2058    118 14 100 %    02/22/22 2057    121 14 100 %    02/22/22 2056    123 14 100 %    02/22/22 2055    120 14 100 %    02/22/22 2048    121 14     02/22/22 2032       168 lb 8 oz (76.4 kg)     No intake or output data in the 24 hours ending 02/22/22 2317    Vent Settings:  Vent Mode: AC/VC Rate Set: 14 bmp/Vt Ordered: 500 mL/ /FiO2 : 100 %    GEN:  Appearance:  Age appropriate male on the vent . Level of Consciousness:  Moving spontaneously. Directs gaze toward examiner in response to his name . Orientation:  n/a    HEENT: Sclera anicteric.  no conjunctival chemosis. moist mucus membranes. no specific or diagnostic oral lesions. Contusion along the right supraorbital ridge laterally. No laceration. NECK:  no signs of meningismus. Jugular veins not distended. Carotid pulses  2+.  no cervical lymphadenopathy. no thyromegaly. CV:  regular rhythm. normal S1 & S2.    no murmur. no rub.  no gallop. PULM:  Chest excursion is symmetric. Breath sounds are vesicular. Adventitious sounds:  none    AB:  Abdominal shape is normal.  Bowel sounds are active. Generally soft to palpation. no tenderness is present. no involuntary guarding. no rebound guarding. EXTR:  Skin is warm. Capillary refill brisk. no specific or pathognomic rash. no clubbing. no pitting edema. no active wound or ulcer. Pulses 2+ x 4    NEURO: Patient is moving spontaneously x 4. Gaze is conjugate and roving. PERRL. Symmetric facial grimace w/ intact gag and cough. Patient is breathing over the vent. LABS:  Lab Results   Component Value Date    WBC 11.9 (H) 02/22/2022    HGB 13.5 02/22/2022    HCT 40.4 (L) 02/22/2022    MCV 99.4 02/22/2022     02/22/2022     Lab Results   Component Value Date    CREATININE 1.2 02/22/2022    BUN 26 (H) 02/22/2022     02/22/2022    K 4.7 02/22/2022    CL 98 (L) 02/22/2022    CO2 17 (L) 02/22/2022     Lab Results   Component Value Date    ALT 26 02/22/2022    AST 26 02/22/2022    ALKPHOS 72 02/22/2022    BILITOT 0.4 02/22/2022     Lab Results   Component Value Date    CKTOTAL 73 02/22/2022    TROPONINI <0.01 02/22/2022     No results for input(s): PHART, DYV6GLR, PO2ART in the last 72 hours. IMAGING:  XR CHEST PORTABLE    Result Date: 2/22/2022  EXAMINATION: ONE XRAY VIEW OF THE CHEST 2/22/2022 8:57 pm COMPARISON: 01/12/2022 HISTORY: ORDERING SYSTEM PROVIDED HISTORY: intubated status epilepticus TECHNOLOGIST PROVIDED HISTORY: Reason for exam:->intubated status epilepticus FINDINGS: Endotracheal tube terminates 4 cm above the bella. Enteric tube is below the diaphragm with side port just below the GE junction. Cardiomediastinal silhouette is normal in size. There is no pleural effusion or pneumothorax. No pulmonary consolidation. There is no acute osseous abnormality. 1. Lines and tubes in expected position as above. 2. No acute cardiopulmonary abnormality. CT Chest/abdomen/pelvis w/o contrast 2/22/2022    CHEST   MEDIASTINUM: Borderline cardiomegaly. No pericardial effusion. No mediastinal nor obvious hilar lymphadenopathy. LUNGS/PLEURA: Endotracheal tube in appropriate position with the tip above the bella at the level the great vessel origins. Secretions near the endotracheal tube tip. Additional patchy secretions extending from the bella into the bilateral mainstem bronchi and bronchus intermedius. Opacification of the left lower lobe bronchus as well as the basilar segmental bronchi.   Calcified granuloma in the left upper lobe. 0.7 cm x 0.6 cm noncalcified solid nodule in the posterior basilar segment of the right lower lobe, and 0.4 cm noncalcified solid nodule in the superior segment of the right lower lobe. Minimal gravity dependent atelectasis in the bilateral lower lobes. Normal variant left minor fissure and right lower lobe inferior accessory fissure. No pleural effusions nor pneumothoraces. SOFT TISSUES/BONES:   Atrophic thyroid. No supraclavicular nor axillary lymphadenopathy. No acute fractures nor suspicious bony lesions. ABDOMEN/PELVIS   ORGANS: Cholelithiasis without findings of acute cholecystitis. No intrahepatic nor extrahepatic biliary dilation. Moderate pancreatic atrophy. Exophytic lesions of indeterminate density in each kidney measuring up to 1.6 cm. Normal appearance of the liver, spleen, and adrenal glands. GI/BOWEL: Gastric tube sideport at the gastroesophageal junction with the tip near the junction of the gastric fundus and body. Normal course and caliber of the stomach, small bowel, colon, and rectum without obstruction. Normal appearance of the appendix with retrocecal location. Minimal stool. PELVIS: Tamayo catheter in place with associated iatrogenic intravesical gas. Mild prostatomegaly. PERITONEUM/RETROPERITONEUM: Minimal systemic atherosclerotic calcifications. No abdominal nor pelvic lymphadenopathy. No free intraperitoneal fluid nor gas. SOFT TISSUES/BONES:  No inguinal lymphadenopathy. No acute fractures nor suspicious bony lesions. IMPRESSION  1. Patchy airway secretions, including suspected secretions in the left lower lobe bronchus and in the basilar segmental bronchi. An underlying endobronchial lesion is not excluded but considered less likely. Consider aspiration given patient status. 2. The sideport of a gastric tube is located at the gastroesophageal junction. Consider advancement.  3. Indeterminate bilateral renal lesions potentially due to proteinaceous cyst or solid neoplasm. Recommend further evaluation with renal protocol abdomen MRI (preferred) or CT on a nonemergent basis. 4. Solid nodules in the bilateral lower lobes measure up to 0.7 cm and are more likely infectious or inflammatory in etiology than neoplastic. Assuming no malignancy is found in the kidneys on the above recommended study, then follow-up chest CT would be recommended in 3-6 months as below. If malignancy is found in the kidneys, then the below pulmonary nodule recommendation would not apply. I directly reviewed all recent imaging studies as well as pertinent prior studies. Radiology reports may or may not be available at the time of my review. EKG:  New and pertinent prior tracings were directly reviewed. My interpretation is as follows:  Sinus tachycardia with LAFB    Active Hospital Problems    Diagnosis Date Noted    NATALIA (acute kidney injury) ([facility] [N17.9] 02/23/2022    Lactic acidosis [E87.2] 02/23/2022    Partial symptomatic epilepsy with complex partial seizures, intractable, without status epilepticus ([address] [G40.219]     Breakthrough seizure ([address] [G40.919] 07/15/2020       ASSESSMENT & PLAN  Breakthrough Seizure  -  Details about what occurred at [facility] are scant but it is clear that the patient had a prolonged post ictal period which led to intubation. His neurologic examination has improved with time. He received 1g IV keppra in the ER. He is currently sedated with propofol and fentanyl. Per prior notes his compliance with his AED regimen has been questionable in the past.  -  No definite evidence of any complication of tonight's seizure such as aspiration pneumonitis or pneumonia. -  Will plan to liberate from vent as soon as feasible and resume home keppra and vimpat once able to take PO.  -  Critical care assistance requested. NATALIA, Lactic acidosis  -  Baseline creatinine < 0.5 and now 1.2. He now appears euvolemic.   He already received ~ 2.3 L NS in the ER. Continue w/ maintenance LR until patient can be extubated. Avoid nephrotoxin exposure where possible. -  Lactate is clearing with time and IV fluids. DVT prophylaxis: SCDs, lovenox  Stress ulcers:  IV famotidine. Code Status:  Full Code  Disposition:  Inpatient. Eventual d/c back to ECF anticipated.     Maciel Karimi MD MD

## 2022-02-23 NOTE — PROGRESS NOTES
02/23/22 0952   Vent Information   Vent Type 980   Vent Mode PS   Pressure Support 10 cmH20   FiO2  40 %   SpO2 100 %   SpO2/FiO2 ratio 250   Sensitivity 3   PEEP/CPAP 5   Humidification Source HME   Vent Patient Data   Peak Inspiratory Pressure 15 cmH2O   Mean Airway Pressure 8.9 cmH20   Rate Measured 8 br/min   Spontaneous  mL   Minute Volume 5.51 Liters   I:E Ratio 1:1.6   Total PEEP 5 cmH20   Auto PEEP 0 cmH20   Spontaneous Breathing Trial (SBT) RT Doc   Pulse 91   RSBI Calculated 8.92   Breath Sounds   Right Upper Lobe Diminished   Right Middle Lobe Diminished   Right Lower Lobe Diminished   Left Upper Lobe Diminished   Left Lower Lobe Diminished   Additional Respiratory  Assessments   Resp 8   End Tidal CO2 39 (%)   Alarm Settings   High Pressure Alarm 40 cmH2O   Low Minute Volume Alarm 2 L/min   Apnea (secs) 20 secs   High Respiratory Rate 40 br/min   Low Exhaled Vt  200 mL   Patient Observation   Observations   (+ audible, leak 55% )

## 2022-02-24 PROBLEM — E11.69 TYPE 2 DIABETES MELLITUS WITH OTHER SPECIFIED COMPLICATION (HCC): Status: ACTIVE | Noted: 2017-01-29

## 2022-02-24 PROBLEM — G40.919 BREAKTHROUGH SEIZURE (HCC): Status: ACTIVE | Noted: 2022-01-13

## 2022-02-24 LAB
A/G RATIO: 1.4 (ref 1.1–2.2)
ALBUMIN SERPL-MCNC: 3.4 G/DL (ref 3.4–5)
ALP BLD-CCNC: 64 U/L (ref 40–129)
ALT SERPL-CCNC: 20 U/L (ref 10–40)
ANION GAP SERPL CALCULATED.3IONS-SCNC: 7 MMOL/L (ref 3–16)
AST SERPL-CCNC: 31 U/L (ref 15–37)
BASOPHILS ABSOLUTE: 0 K/UL (ref 0–0.2)
BASOPHILS RELATIVE PERCENT: 0.4 %
BILIRUB SERPL-MCNC: 0.8 MG/DL (ref 0–1)
BUN BLDV-MCNC: 21 MG/DL (ref 7–20)
CALCIUM SERPL-MCNC: 8.6 MG/DL (ref 8.3–10.6)
CHLORIDE BLD-SCNC: 105 MMOL/L (ref 99–110)
CO2: 28 MMOL/L (ref 21–32)
CREAT SERPL-MCNC: 0.6 MG/DL (ref 0.8–1.3)
EOSINOPHILS ABSOLUTE: 0 K/UL (ref 0–0.6)
EOSINOPHILS RELATIVE PERCENT: 0.5 %
GFR AFRICAN AMERICAN: >60
GFR NON-AFRICAN AMERICAN: >60
GLUCOSE BLD-MCNC: 94 MG/DL (ref 70–99)
HCT VFR BLD CALC: 33.1 % (ref 40.5–52.5)
HEMOGLOBIN: 11.2 G/DL (ref 13.5–17.5)
INR BLD: 1.23 (ref 0.88–1.12)
LACTIC ACID: 0.6 MMOL/L (ref 0.4–2)
LV EF: 55 %
LVEF MODALITY: NORMAL
LYMPHOCYTES ABSOLUTE: 1.2 K/UL (ref 1–5.1)
LYMPHOCYTES RELATIVE PERCENT: 16.5 %
MAGNESIUM: 2.3 MG/DL (ref 1.8–2.4)
MCH RBC QN AUTO: 33 PG (ref 26–34)
MCHC RBC AUTO-ENTMCNC: 33.9 G/DL (ref 31–36)
MCV RBC AUTO: 97.3 FL (ref 80–100)
MONOCYTES ABSOLUTE: 0.5 K/UL (ref 0–1.3)
MONOCYTES RELATIVE PERCENT: 7.4 %
NEUTROPHILS ABSOLUTE: 5.4 K/UL (ref 1.7–7.7)
NEUTROPHILS RELATIVE PERCENT: 75.2 %
PDW BLD-RTO: 12.3 % (ref 12.4–15.4)
PLATELET # BLD: 211 K/UL (ref 135–450)
PMV BLD AUTO: 7.1 FL (ref 5–10.5)
POTASSIUM SERPL-SCNC: 3.9 MMOL/L (ref 3.5–5.1)
PROTHROMBIN TIME: 14 SEC (ref 9.9–12.7)
RBC # BLD: 3.4 M/UL (ref 4.2–5.9)
SODIUM BLD-SCNC: 140 MMOL/L (ref 136–145)
TOTAL PROTEIN: 5.9 G/DL (ref 6.4–8.2)
VANCOMYCIN RANDOM: <4 UG/ML
WBC # BLD: 7.2 K/UL (ref 4–11)

## 2022-02-24 PROCEDURE — 94660 CPAP INITIATION&MGMT: CPT

## 2022-02-24 PROCEDURE — 83605 ASSAY OF LACTIC ACID: CPT

## 2022-02-24 PROCEDURE — 6370000000 HC RX 637 (ALT 250 FOR IP): Performed by: INTERNAL MEDICINE

## 2022-02-24 PROCEDURE — 6360000002 HC RX W HCPCS: Performed by: INTERNAL MEDICINE

## 2022-02-24 PROCEDURE — 99233 SBSQ HOSP IP/OBS HIGH 50: CPT | Performed by: PSYCHIATRY & NEUROLOGY

## 2022-02-24 PROCEDURE — 85610 PROTHROMBIN TIME: CPT

## 2022-02-24 PROCEDURE — 93005 ELECTROCARDIOGRAM TRACING: CPT | Performed by: INTERNAL MEDICINE

## 2022-02-24 PROCEDURE — 83735 ASSAY OF MAGNESIUM: CPT

## 2022-02-24 PROCEDURE — B246ZZ4 ULTRASONOGRAPHY OF RIGHT AND LEFT HEART, TRANSESOPHAGEAL: ICD-10-PCS | Performed by: INTERNAL MEDICINE

## 2022-02-24 PROCEDURE — 2580000003 HC RX 258: Performed by: INTERNAL MEDICINE

## 2022-02-24 PROCEDURE — 92526 ORAL FUNCTION THERAPY: CPT

## 2022-02-24 PROCEDURE — 97129 THER IVNTJ 1ST 15 MIN: CPT

## 2022-02-24 PROCEDURE — 99233 SBSQ HOSP IP/OBS HIGH 50: CPT | Performed by: INTERNAL MEDICINE

## 2022-02-24 PROCEDURE — 2000000000 HC ICU R&B

## 2022-02-24 PROCEDURE — 80202 ASSAY OF VANCOMYCIN: CPT

## 2022-02-24 PROCEDURE — 80053 COMPREHEN METABOLIC PANEL: CPT

## 2022-02-24 PROCEDURE — 99223 1ST HOSP IP/OBS HIGH 75: CPT | Performed by: INTERNAL MEDICINE

## 2022-02-24 PROCEDURE — C8929 TTE W OR WO FOL WCON,DOPPLER: HCPCS

## 2022-02-24 PROCEDURE — 85025 COMPLETE CBC W/AUTO DIFF WBC: CPT

## 2022-02-24 RX ADMIN — METHADONE HYDROCHLORIDE 5 MG: 10 TABLET ORAL at 21:13

## 2022-02-24 RX ADMIN — LEVETIRACETAM 1500 MG: 100 SOLUTION ORAL at 21:13

## 2022-02-24 RX ADMIN — ENOXAPARIN SODIUM 40 MG: 40 INJECTION SUBCUTANEOUS at 08:31

## 2022-02-24 RX ADMIN — LACOSAMIDE 200 MG: 100 TABLET, FILM COATED ORAL at 08:31

## 2022-02-24 RX ADMIN — ATORVASTATIN CALCIUM 10 MG: 10 TABLET, FILM COATED ORAL at 21:13

## 2022-02-24 RX ADMIN — ACETAMINOPHEN 325MG 650 MG: 325 TABLET ORAL at 11:52

## 2022-02-24 RX ADMIN — LACOSAMIDE 200 MG: 100 TABLET, FILM COATED ORAL at 21:13

## 2022-02-24 RX ADMIN — ACETAMINOPHEN 325MG 650 MG: 325 TABLET ORAL at 04:27

## 2022-02-24 RX ADMIN — VANCOMYCIN HYDROCHLORIDE 1000 MG: 1 INJECTION, POWDER, LYOPHILIZED, FOR SOLUTION INTRAVENOUS at 16:24

## 2022-02-24 RX ADMIN — VANCOMYCIN HYDROCHLORIDE 1000 MG: 1 INJECTION, POWDER, LYOPHILIZED, FOR SOLUTION INTRAVENOUS at 04:32

## 2022-02-24 RX ADMIN — LEVETIRACETAM 1500 MG: 100 SOLUTION ORAL at 08:31

## 2022-02-24 RX ADMIN — CEFEPIME HYDROCHLORIDE 2000 MG: 2 INJECTION, POWDER, FOR SOLUTION INTRAVENOUS at 08:40

## 2022-02-24 RX ADMIN — METHADONE HYDROCHLORIDE 5 MG: 10 TABLET ORAL at 08:31

## 2022-02-24 RX ADMIN — SODIUM CHLORIDE, PRESERVATIVE FREE 10 ML: 5 INJECTION INTRAVENOUS at 08:31

## 2022-02-24 ASSESSMENT — PAIN DESCRIPTION - LOCATION
LOCATION: GENERALIZED
LOCATION: GENERALIZED

## 2022-02-24 ASSESSMENT — PAIN DESCRIPTION - PAIN TYPE
TYPE: ACUTE PAIN
TYPE: CHRONIC PAIN

## 2022-02-24 ASSESSMENT — PAIN SCALES - GENERAL
PAINLEVEL_OUTOF10: 7
PAINLEVEL_OUTOF10: 3
PAINLEVEL_OUTOF10: 4
PAINLEVEL_OUTOF10: 5
PAINLEVEL_OUTOF10: 5
PAINLEVEL_OUTOF10: 0
PAINLEVEL_OUTOF10: 5
PAINLEVEL_OUTOF10: 5
PAINLEVEL_OUTOF10: 0

## 2022-02-24 ASSESSMENT — ENCOUNTER SYMPTOMS
NAUSEA: 0
EYE REDNESS: 0
COUGH: 0
EYE DISCHARGE: 0
RHINORRHEA: 0
SHORTNESS OF BREATH: 0
BACK PAIN: 0
TROUBLE SWALLOWING: 0
WHEEZING: 0
DIARRHEA: 0
CONSTIPATION: 0
ABDOMINAL PAIN: 0
SORE THROAT: 0

## 2022-02-24 ASSESSMENT — PAIN DESCRIPTION - ONSET: ONSET: GRADUAL

## 2022-02-24 NOTE — PROGRESS NOTES
PULMONARY AND CRITICAL CARE MEDICINE PROGRESS NOTE    Subjective: No acute events overnight. Has tolerated vent weaning well. No more seizures noted. REVIEW OF SYSTEMS:   Constitutional symptoms: The patient denies fever, fatigue, night sweats, weight loss or weight gain. HEENT: No vision changes. No tinnitus, Denies sinus pain. No hoarseness, or dysphagia. Neck: Patient denies swelling in the neck. Cardiovascular: Denies chest pain, palpitation, syncope. Respiratory: Denies shortness of breath or cough. Gastrointestinal: Denies nausea, abdominal pain or change in bowel function. Genitourinary: Denies obstructive symptoms. No history of incontinence. Skin: No rashes or itching. Muskuloskeletal: Denies weakness or bone pain. Neurological: No headaches or seizures. Psychiatric: Denies mood swings or depression. MEDICATIONS:     Scheduled Meds:   methadone  5 mg Oral BID    lacosamide  200 mg Oral BID    atorvastatin  10 mg Oral Nightly    enoxaparin  40 mg SubCUTAneous Daily    levETIRAcetam  1,500 mg Oral BID    vancomycin  1,000 mg IntraVENous Q12H       Current Infusions:    sodium chloride Stopped (02/23/22 1843)    propofol Stopped (02/23/22 0847)    fentaNYL Stopped (02/23/22 0850)       PRN meds:  sodium chloride flush, sodium chloride, magnesium sulfate, ondansetron, acetaminophen **OR** acetaminophen, potassium chloride **OR** potassium chloride, perflutren lipid microspheres, albuterol    PHYSICAL EXAM:  /64   Pulse 72   Temp 97.6 °F (36.4 °C) (Temporal)   Resp 17   Ht 5' 11\" (1.803 m)   Wt 161 lb 9.6 oz (73.3 kg)   SpO2 98%   BMI 22.54 kg/m²  I/O last 3 completed shifts: In: 1199.3 [P.O.:355; I.V.:102; NG/GT:100; IV Piggyback:642.3]  Out: 1550 [Urine:1450; Emesis/NG output:100] I/O this shift:   In: 541.6 [P.O.:480; I.V.:11.6; IV Piggyback:50]  Out: 700 [Urine:700]      Intake/Output Summary (Last 24 hours) at 2/24/2022 1503  Last data filed at 2/24/2022 1256  Gross per 24 hour   Intake 1448.37 ml   Output 1525 ml   Net -76.63 ml       CONSTITUTIONAL: He is a 64y.o.-year-old who appears his stated age. He is alert and oriented x 3 and in no acute distress. CARDIOVASCULAR: S1 S2 RRR. Without murmer  RESPIRATORY & CHEST: Lungs are clear to auscultation and percussion. No wheezing, no crackles. Good air movement  GASTROINTESTINAL & ABDOMEN: Soft, nontender, positive bowel sounds in all quadrants, non-distended, without hepatosplenomegaly. GENITOURINARY: Deferred. MUSCULOSKELETAL: No tenderness to palpation of the axial skeleton. There is no clubbing. No cyanosis. No edema of the lower extremities. SKIN OF BODY: No rash or jaundice. PSYCHIATRIC EVALUATION: Normal affect. Patient answers questions appropriately. HEMATOLOGIC/LYMPHATIC/ IMMUNOLOGIC: No palpable lymphadenopathy. NEUROLOGIC: Alert and oriented x 3. Groslly non-focal. Motor strength is 5+/5 in all muscle groups. The patient has a normal sensorium globally.      LABS:    Recent Labs     02/22/22 2039 02/23/22  0534 02/24/22 0417   WBC 11.9* 11.4* 7.2   RBC 4.07* 3.52* 3.40*   HGB 13.5 11.7* 11.2*   HCT 40.4* 34.8* 33.1*   MCH 33.2 33.3 33.0   MCHC 33.4 33.6 33.9   RDW 12.7 12.6 12.3*    211 211   MPV 7.8 7.4 7.1   NEUTOPHILPCT 92.5 84.9 75.2   MONOPCT 2.8 8.8 7.4   BASOPCT 0.4 0.1 0.4     Recent Labs     02/22/22 2039 02/23/22  0534 02/24/22 0417    140 140   K 4.7 4.8 3.9   CL 98* 106 105   ANIONGAP 22* 10 7   CO2 17* 24 28   BUN 26* 27* 21*   CREATININE 1.2 0.8 0.6*   CALCIUM 9.8 8.6 8.6   PROT 7.4 6.0* 5.9*   BILITOT 0.4 0.5 0.8   ALKPHOS 72 63 64   ALT 26 22 20   AST 26 25 31   GLUCOSE 219* 152* 94     Recent Labs     02/23/22  0710   PHART 7.374   KUO0GTL 46.7*   PO2ART 167.0*   HFP8VGA 27.2   R6FMGETF 99.7   BEART 1.5          IMPRESSION:  Refractory epilepsy  Acute metabolic encephalopathy  On mechanically assisted ventilation  Diabetes mellitus type II  Enterococcus bacteremia        PLAN:  No motor seizures noted. He has history of refractory seizures after left parieto-occipital mycotic aneurysm rupture in 2017 requiring craniotomy aneurysm clipping at that point. Patient has been on [address] since then. Likely had a breakthrough seizure due to refractory epilepsy or seizures in the setting of bacteremia. Patient states that he takes his medication as directed. In the past, he was advised to follow-up at [facility] for refractory epilepsy which he has not.     Enterococcus bacteremia. Patient has history of aortic valve and mitral valve endocarditis in 2017. Obtain echocardiogram.  ID consultation has been placed. Pulmonary and critical care will sign off.       Linnette Medeiros MD  Pulmonary Critical Care and Sleep Medicine  2/24/2022, 3:03 PM    This note was completed using dragon medical speech recognition software. Grammatical errors, random word insertions, pronoun errors and incomplete sentences are occasional consequences of this technology due to software limitations. If there are questions or concerns about the content of this note of information contained within the body of this dictation they should be addressed with the provider for clarification.

## 2022-02-24 NOTE — PROGRESS NOTES
Iggy Prado  Neurology Follow-up  [facility] Neurology    Date of Service: 2/24/2022    Subjective:   CC: Follow up today regarding: Acute encephalopathy and recurrent seizure. Events noted. Chart and lab reviewed. The patient is extubated. He is awake and alert but not a good historian. On Keppra and Vimpat. EEG showed no epileptiform discharges. He denies any headache, chest pain, dysphagia or dysarthria. Other review of system was unremarkable. ROS : A 10-12 system review obtained and updated today and is unremarkable except as mentioned  in my interval history.      Family history: Noncontributory    Past Medical History:   Diagnosis Date    Acute intracerebral hemorrhage ([facility] [address] 02/03/2017    Anxiety     Back pain     Cerebral artery occlusion with cerebral infarction ([facility] [address]     Chronic back pain greater than 3 months duration 02/03/2017    Diabetes mellitus ([address]     Dysphasia 02/03/2017    Dysphonia 02/03/2017    Endocarditis and heart valve disorders in diseases classified elsewhere 02/03/2017    Hydrocephalus ([facility] [address] 02/03/2017    Hypertension     IVH (intraventricular hemorrhage) ([address] 02/03/2017    NSTEMI (non-ST elevated myocardial infarction) ([facility] [address] 02/03/2017    Seizures (HCC)      Current Facility-Administered Medications   Medication Dose Route Frequency Provider Last Rate Last Admin    methadone (DOLOPHINE) tablet 5 mg  5 mg Oral BID Kaitlynn Gonsalves MD   5 mg at 02/24/22 0831    lacosamide (VIMPAT) tablet 200 mg  200 mg Oral BID Kaitlynn Gonsalves MD   200 mg at 02/24/22 0831    atorvastatin (LIPITOR) tablet 10 mg  10 mg Oral Nightly Kaitlynn Gonsalves MD   10 mg at 02/23/22 2125    sodium chloride flush 0.9 % injection 10 mL  10 mL IntraVENous PRN Kaitlynn Gonsalves MD   10 mL at 02/24/22 0831    0.9 % sodium chloride infusion  25 mL IntraVENous PRN Kaitlynn Gonsalves MD   Stopped at 02/23/22 1843    magnesium sulfate 1000 mg in dextrose 5% 100 mL IVPB  1,000 mg IntraVENous PRGAL Zavala MD        enoxaparin (LOVENOX) injection 40 mg  40 mg SubCUTAneous Daily Demetrius Zavala MD   40 mg at 02/24/22 0831    [facility] injection 4 mg  4 mg IntraVENous Q6H PRN Demetrius Zavala MD        acetaminophen (TYLENOL) tablet 650 mg  650 mg Oral Q4H PRN Demetrius Zavala MD   650 mg at 02/24/22 1152    Or    acetaminophen (TYLENOL) suppository 650 mg  650 mg Rectal Q4H PRN Demetrius Zavala MD        potassium chloride 20 mEq/50 mL IVPB (Central Line)  20 mEq IntraVENous PRN Demetrius Zavala MD        Or    potassium chloride 10 mEq/100 mL IVPB (Peripheral Line)  10 mEq IntraVENous PRN Demetrius Zavala MD        levETIRAcetam (KEPPRA) 100 MG/ML solution 1,500 mg  1,500 mg Oral BID Bria Muniz MD   1,500 mg at 02/24/22 0831    vancomycin 1000 mg IVPB in 250 mL D5W addavial  1,000 mg IntraVENous Q12H Michael Vaughn MD   Stopped at 02/24/22 0613    perflutren lipid microspheres (DEFINITY) injection 1.65 mg  1.5 mL IntraVENous ONCE PRN Michael Vaughn MD        albuterol (PROVENTIL) nebulizer solution 2.5 mg  2.5 mg Nebulization Q4H PRN Michael Vaughn MD        propofol injection  5-50 mcg/kg/min IntraVENous Titrated Demetrius Zavala MD   Stopped at 02/23/22 8961    fentaNYL 10 mcg/mL infusion   mcg/hr IntraVENous Continuous Demetrius Zavala MD   Stopped at 02/23/22 6056     Allergies   Allergen Reactions    No Known Allergies     Seasonal       reports that he has been smoking cigarettes. He has been smoking about 0.50 packs per day. He has never used smokeless tobacco. He reports that he does not drink alcohol and does not use drugs.        Objective:  Exam:   Constitutional:   Vitals:    02/24/22 0430 02/24/22 0822 02/24/22 1141 02/24/22 1200   BP:  127/65 115/68 125/64   Pulse:  73 79 72   Resp: 19 13 13 17   Temp:  97.7 °F (36.5 °C) 97.6 °F (36.4 °C)    TempSrc:  Temporal Temporal    SpO2:       Weight: 161 lb 9.6 oz (73.3 kg)      Height: 5' 11\" (1.803 m)        General appearance:  Normal development and appear in no acute distress. Mental Status:   Oriented to person, but not to problem or time  Poor immediate recall and fund of knowledge  Fluent speech  Poor vocabulary  Normal attention today   cranial Nerves:   II: Visual fields: Full. Pupils: equal, round, reactive to light  III,IV,VI: Extra Ocular Movements are intact. No nystagmus  V: Facial sensation is intact  VII: Facial strength and movements: intact and symmetric  IX: Palate elevation is symmetric  XI: Shoulder shrug is intact  XII: Tongue movements are normal  Musculoskeletal: Generalized diffuse weakness with poor effort 4/5  Symmetric DTRs  No sensory disturbance        Data:  LABS:   Lab Results   Component Value Date     02/24/2022    K 3.9 02/24/2022    K 4.7 02/22/2022     02/24/2022    CO2 28 02/24/2022    BUN 21 02/24/2022    CREATININE 0.6 02/24/2022    GFRAA >60 02/24/2022    GFRAA >60 12/13/2011    LABGLOM >60 02/24/2022    GLUCOSE 94 02/24/2022    MG 2.30 02/24/2022    CALCIUM 8.6 02/24/2022     Lab Results   Component Value Date    WBC 7.2 02/24/2022    RBC 3.40 02/24/2022    HGB 11.2 02/24/2022    HCT 33.1 02/24/2022    MCV 97.3 02/24/2022    RDW 12.3 02/24/2022     02/24/2022     Lab Results   Component Value Date    INR 1.23 (H) 02/24/2022    PROTIME 14.0 (H) 02/24/2022       EEG was independently reviewed by me and discussed results with the patient  I reviewed blood testing and other test results and discussed results with the patient  +ve culture    Impression:  Acute encephalopathy and breakthrough seizure.  Could be secondary to septicemia and metabolic encephalopathy  Medically refractory epilepsy  Poor compliance and history of endocarditis  Positive blood culture  Acute respiratory failure with hypoxia, improving        Recommendation  MRI Brain  Echo  ID was consulted  PT and OT  Speech  Continue current AED including Vimpat 200 and x2 and Keppra 1500 Mg x2  Respiratory support  Follow blood culture results  DVT and GI prophylaxis  Continue antibiotics  He is on methadone  Limit sedation  Neurochecks  We will follow           Kennith Frankel, MD   [phone]      This dictation was generated by voice recognition computer software. Although all attempts are made to edit the dictation for accuracy, there may be errors in the transcription that are not intended.

## 2022-02-24 NOTE — PROGRESS NOTES
Speech Language Pathology  Dysphagia Treatment Note    Name:  Randolph Owens  :   1960  Medical Diagnosis:  Lactic acidosis [E87.2]  Seizure ([facility] [address] [R56.9]  Unresponsive [R41.89]  Breakthrough seizure ([facility] [address] [G40.919]  Marcelo coma scale total score 3-8, at arrival to emergency department [facility]) [P15.6156]  Treatment Diagnosis: Oropharyngeal Dysphagia  Pain level:  Pt denies pain at this time    Pt seen bedside for dysphagia treatment follow up to clinical swallow evaluation completed 22. This date, Pt's nurse reports noncompliance with recommended meds with puree. Upon discussion with the Pt, he demonstrates no recall of prior dysphagia assessment or rationale for recommended meds with puree. Therefore, cognitive goals will be established to improve recall of new learning in order to improve dysphagia safety. (see Cognitive goals, below)    Diet level prior to treatment: Dysphagia III Soft and Bite-Sized with Mildly thick (nectar) liquids po meds with applesauce    Tolerance of Current Diet Level:  No symptoms of aspiration reported on current diet level    Assessment of Texture Tolerance:  -Impressions: Pt much more alert and verbally responsive. Pt demonstrates poor recall of prior dysphagia assessment or recommendations with review provided. Improved oral motor strength, ROM and coordination noted with completion of OME. With po trials, Pt demonstrates improved bolus control and A-P propulsion with all textures. Clinical symptoms of improved timely swallow initiation and laryngeal excursion also noted with all textures. No throat clears, as were previously noted with thins and nectars, noted with any texture this date. Diet and Treatment Recommendations 2022:  Advance diet to regular with thins/no straws/meds 1-2 at a time with water    Compensatory strategies:  Alternate solids/liquids , Upright as possible with all PO intake , No straws , Eat/feed slowly    Dysphagia Goals:   Pt will functionally tolerate recommended diet with no overt clinical s/s of aspiration (ongoing 2/24/2022)   Pt will demonstrate understanding of aspiration risk and precautions via education/demonstration with occasional prompting (ongoing 2/24/2022)   Pt will advance to least restrictive diet as indicated (ongoing 2/24/2022)   If clinical s/s of aspiration/penetration continue to be noted, Pt will participate in Modified Barium Swallow Study (ongoing 2/24/2022)     Cognitive Assessment/Treatment Goals:  Reduced orientation and short term recall impacts carryover of compensatory techniques for swallow safety. Pt is oriented to self only. Short term recall of daily events and new information is moderately impaired, and further contributes to confused state. Therefore, improved orientation and techniques to improve short term recall will be incorporated in therapy, in order to improve recall of compensatory strategies, diet texture recommendations, and aspiration precautions. Goals:  1)Pt will improve orientation to x4, for improved awareness of surroundings and reduced confusion (ongoing 2/24/2022)   2)Pt will improve short term recall of daily events and newly learned information via graded tasks, to 80%, for improved safety with dysphagia recommendations. (ongoing 2/24/2022)      Plan:  Continued daily Dysphagia treatment with goals per plan of care. Patient/Family Education:Education given to the Pt and nurse, who verbalized understanding    Discharge Recommendations:  Pt will benefit from continued skilled Speech Therapy for Dysphagia services, prior to returning home. Timed Code Treatment: 10 min    Total Treatment Time: 25 min    If patient discharges prior to next session this note will serve as a discharge summary.      Kris Castro [id number]

## 2022-02-24 NOTE — PROCEDURES
Patient: Iggy Prado    MR Number: [id number]  YOB: 1960  Date of Visit: 2/23/2022    Clinical History:  The patient is a 64y.o. years old male with history of epilepsy no recurrent seizures. Medications reviewed. Method: The EEG was performed utilizing the international 10/20 of electrode placements of both referential and bipolar montages. The patient was awake and drowsy through out the recording. Photic stimulation was performed. Findings: The background of the EEG showed normal alpha posterior background of 8-9  HZ and amplitude of 20-40 UV. This background was asymmetric, waxing and waning, and reactive with eye opening and closure. As the patient became drowsy, generalized diffuse slowing of the mixture of delta and theta which was somewhat asymmetric's with continuous left delta slowing throughout the recording. No spike or sharp waves were seen. Photic stimulation did not activate EEG. Impression: This EEG is abnormal. The left hemispheric slowing is suggestive of structural dysfunction of that region which could be consistent with patient's history of encephalomalacia and prior surgery. There is no epileptiform discharges.       Yanique Milan MD      Board certified in clinical neurophysiology

## 2022-02-24 NOTE — CONSULTS
Infectious Diseases   Consult Note        Admission Date: 2/22/2022  Hospital Day: Hospital Day: 3   Attending: Rivka De Leon MD  Date of service: 2/24/22     Reason for admission: Lactic acidosis [E87.2]  Seizure ([facility] [R56.9]  Unresponsive [R41.89]  Breakthrough seizure ([facility] [G40.919]  Marcelo coma scale total score 3-8, at arrival to emergency department [facility]) [T30.2103]    Chief complaint/ Reason for consult: Enterococcus faecalis bacteremia    Microbiology:        I have reviewed allavailable micro lab data and cultures    · Blood culture (2/2) - collected on 2/22/2022: Enterococcus faecalis      Antibiotics and immunizations:       Current antibiotics: All antibiotics and their doses were reviewed by me    Recent Abx Admin                   cefepime (MAXIPIME) 2000 mg IVPB minibag (mg) 2,000 mg New Bag 02/24/22 0840    vancomycin 1000 mg IVPB in 250 mL D5W addavial (mg) 1,000 mg New Bag 02/24/22 0432     1,000 mg New Bag 02/23/22 1635                  Immunization History: All immunization history was reviewed by me today. Immunization History   Administered Date(s) Administered    COVID-19, Pfizer Purple top, DILUTE for use, 12+ yrs, 30mcg/0.3mL dose 12/21/2020, 01/11/2021, 10/21/2021       Known drug allergies: All allergies were reviewed and updated    Allergies   Allergen Reactions    No Known Allergies     Seasonal        Social history:     Social History:  All social andepidemiologic history was reviewed and updated by me today as needed. · Tobacco use:   reports that he has been smoking cigarettes. He has been smoking about 0.50 packs per day. He has never used smokeless tobacco.  · Alcohol use:   reports no history of alcohol use. · Currently lives in: [facility]  ·  reports no history of drug use.      COVID VACCINATION AND LAB RESULT RECORDS:     Internal Administration   First Dose COVID-19, Pfizer Purple top, DILUTE for use, 12+ yrs, 30mcg/0.3mL dose  12/21/2020 Second Dose COVID-19, Pfizer Purple top, DILUTE for use, 12+ yrs, 30mcg/0.3mL dose   01/11/2021       Last COVID Lab SARS-CoV-2, PCR (no units)   Date Value   07/15/2020 Not Detected     SARS-CoV-2, NAAT (no units)   Date Value   01/14/2022 Not Detected            Assessment:     The patient is a 64 y.o. old male who  has a past medical history of Acute intracerebral hemorrhage ([facility] (02/03/2017), Anxiety, Back pain, Cerebral artery occlusion with cerebral infarction ([facility] [facility] Chronic back pain greater than 3 months duration (02/03/2017), Diabetes mellitus ([facility] [facility] Dysphasia (02/03/2017), Dysphonia (02/03/2017), Endocarditis and heart valve disorders in diseases classified elsewhere (02/03/2017), Hydrocephalus ([facility] (02/03/2017), Hypertension, IVH (intraventricular hemorrhage) ([facility] (02/03/2017), NSTEMI (non-ST elevated myocardial infarction) ([facility] (02/03/2017), and Seizures ([facility] with following problems:    · Severe sepsis with fever, leukocytosis, lactic acidosis, hypotension on admission  · Enterococcus faecalis bacteremia   · History of streptococcal bacteremia and mitral and aortic valve endocarditis in 2017  · Positive urine drug screen for benzodiazepines  · Bilateral lung nodules  · Left parietal encephalomalacia  · History of stroke  · Type 2 diabetes mellitus  · Essential hypertension  · Seizure disorder  · Nursing home resident      Discussion:      The patient had a T-max of 100.1 yesterday. White cell count was 11,000 on admission. Is 40 improving and is under 200 today. 2 sets of blood cultures done on admission have grown Enterococcus faecalis. The patient history of stroke and is a nursing home resident and has a history of seizures    He has history of elevated endocarditis in 2017      The patient had a CT scan of chest abdomen and pelvis without contrast done yesterday. Showed multiple lung nodules.     Plan:     Diagnostic Workup:    · Will order 2 sets of blood cultures today  · Follow-up on sensitivities of Enterococcus isolated from the blood culture  · Follow-up on 2D echo  · Continue to follow fever curve, WBC count and blood cultures  · Follow up on liverand renal functions closely    Antimicrobials:    · Will continue IV vancomycin for Enterococcus faecalis bacteremia. · The patient was on ventilator yesterday but has been extubated and is currently saturating 99% on room air. · The chest imaging did not show any obvious pneumonias. I do not think IV cefepime is needed. We will discontinue today  Check Vancomycin trough before the 5th dose. Target vancomycin trough level of 15-20  mg/L or vancomycin area under curve (AUC) of 400-600 mg*h/L by Bayesian modeling method. If dosing vancomycin based on trough levels, keep vancomycin trough below 20 at all times. Avoid increasing the dose of vancomycin above a total of 4 grams in a 24-hour period in patients younger than 45 years and above 3 grams in a 24-hour period in a patient of age 39 years or older. Continue to monitor serum creatinine and Vanco levels closely, while the patient is on I/v Vancomycin. Recommend holding off on PICC line until blood cultures clear for at least 48 and preferably 72 hours. · Midline or regular central venous line okay for now, if needed for IV access. · We will follow up on the culture results and clinical progress and will make further recommendations accordingly. · Continue close vitals monitoring. · Maintain good glycemic control. · Fall precautions. Aspiration precautions. · Continue to watch for new fever or diarrhea. · DVT prophylaxis. · Discussed all above with patient and RN. Drug Monitoring:    · Continue serial monitoring for antibiotic toxicity as follows: Vancomycin trough, BMP  · Continue to watch for following: new or worsening fever, hypotension, hives, lip swelling and redness or purulence at vascular access sites.     I/v access Management:    · Continue to monitor i.v access sites for erythema, induration, discharge or tenderness. · As always, continue efforts to minimizetubes/lines/drains as clinically appropriate to reduce chances of line associated infections. Current isolation precautions: There are no current isolations documented for this patient. Level of complexity of consult: High     Risk of Complications/Morbidity: High     · Illness(es)/ Infection present that pose threat to life/bodily function. · There is potential for severe exacerbation of infection/side effects of treatment. · Therapy requires intensive monitoring for antimicrobial agent toxicity. Thank you for involving me in the care of your patient. I will continue to follow. If you have any additional questions, please do not hesitate to contact me. Subjective:     Presenting complaint in ER:     Chief Complaint   Patient presents with    Other     Patient in by EMS from [facility]. Patient was found down, unknown how long and unresponsive, post ictal.        HPI: Sj Leyva is a 64 y.o. male patient, who was seen at the request of Dr. Isaac Owen MD.    History was obtained from chart review and the patient. The patient was admitted on 2/22/2022. I have been consulted to see the patient for above mentioned reason(s). The patient has multiple medical comorbidities, and presented to the ER for altered mental state. The patient was seizing and became unresponsive at the nursing home. In the ER, the patient had elevated white cell count of 11,900. The patient was hospitalized. 2 sets of blood cultures were sent on 2/22/2022, which have come back positive for Enterococcus faecalis. I have been asked for my opinion for management for this patient. Past Medical History: All past medical history reviewed today.     Past Medical History:   Diagnosis Date    Acute intracerebral hemorrhage ([facility] 02/03/2017    Anxiety     Back pain  Cerebral artery occlusion with cerebral infarction ([facility]     Chronic back pain greater than 3 months duration 02/03/2017    Diabetes mellitus ([facility]     Dysphasia 02/03/2017    Dysphonia 02/03/2017    Endocarditis and heart valve disorders in diseases classified elsewhere 02/03/2017    Hydrocephalus ([facility] 02/03/2017    Hypertension     [facility] 02/03/2017    NSTEMI (non-ST elevated myocardial infarction) ([facility] 02/03/2017    Seizures ([facility]          Past Surgical History: All pastsurgical history was reviewed today. Past Surgical History:   Procedure Laterality Date    BRAIN ANEURYSM SURGERY Left 02/03/2017    COLONOSCOPY N/A 8/14/2019    COLONOSCOPY WITH MAC ANESTHESIA performed by Adelaide Rivera MD at [address] [place] [address] Left 02/03/2017    LEG SURGERY      UPPER GASTROINTESTINAL ENDOSCOPY N/A 8/14/2019    EGD BIOPSY performed by Adelaide Rivera MD at [facility] ENDOSCOPY         Family History: All family history was reviewed today. History reviewed. No pertinent family history. Medications: All current and past medications were reviewed. Medications Prior to Admission: levETIRAcetam (KEPPRA) 500 MG tablet, Take 500 mg by mouth daily Afternoon dose  citalopram (CELEXA) 20 MG tablet, Take 20 mg by mouth daily  OLANZapine (ZYPREXA) 2.5 MG tablet, Take 2.5 mg by mouth nightly  levETIRAcetam (KEPPRA) 500 MG tablet, Take 1,500 mg by mouth 2 times daily  potassium chloride (KLOR-CON M) 20 MEQ extended release tablet, Take 20 mEq by mouth 2 times daily  Cholecalciferol (VITAMIN D3) 5000 units TABS, Take by mouth daily  lacosamide (VIMPAT) 200 MG tablet, Take 200 mg by mouth 2 times daily. calcium carbonate (TUMS) 500 MG chewable tablet, Take 1 tablet by mouth 2 times daily   methadone (DOLOPHINE) 5 MG tablet, Take 5 mg by mouth 2 times daily.   [DISCONTINUED] DULoxetine (CYMBALTA) 30 MG extended release capsule, Take 30 mg by mouth daily  [DISCONTINUED] loratadine (CLARITIN) 10 MG tablet, Take 10 mg by mouth daily  [DISCONTINUED] melatonin 3 MG TABS tablet, Take 3 mg by mouth daily  [DISCONTINUED] ACIDOPHILUS LACTOBACILLUS PO, Take 1 tablet by mouth 2 times daily  [DISCONTINUED] traZODone (DESYREL) 50 MG tablet, Take 50 mg by mouth nightly  acetaminophen (TYLENOL) 325 MG tablet, Take 650 mg by mouth every 4 hours as needed for Pain  mirtazapine (REMERON) 7.5 MG tablet, Take 7.5 mg by mouth nightly  atorvastatin (LIPITOR) 10 MG tablet, Take 10 mg by mouth nightly      cefepime  2,000 mg IntraVENous Q8H    methadone  5 mg Oral BID    lacosamide  200 mg Oral BID    atorvastatin  10 mg Oral Nightly    enoxaparin  40 mg SubCUTAneous Daily    levETIRAcetam  1,500 mg Oral BID    vancomycin  1,000 mg IntraVENous Q12H          REVIEW OF SYSTEMS:       Review of Systems   Constitutional: Positive for fatigue and fever. Negative for chills and diaphoresis. HENT: Negative for ear discharge, ear pain, rhinorrhea, sore throat and trouble swallowing. Eyes: Negative for discharge and redness. Respiratory: Negative for cough, shortness of breath and wheezing. Cardiovascular: Negative for chest pain and leg swelling. Gastrointestinal: Negative for abdominal pain, constipation, diarrhea and nausea. Endocrine: Negative for polyuria. Genitourinary: Negative for dysuria, flank pain, frequency, hematuria and urgency. Musculoskeletal: Negative for back pain and myalgias. Skin: Negative for rash. Neurological: Negative for dizziness, seizures and headaches. Hematological: Does not bruise/bleed easily. Psychiatric/Behavioral: Negative for hallucinations and suicidal ideas. All other systems reviewed and are negative.          Objective:       PHYSICAL EXAM:      Vitals:   Vitals:    02/24/22 0400 02/24/22 0430 02/24/22 0822 02/24/22 1141   BP: 121/61  127/65 115/68   Pulse: 80  73 79   Resp: 18 19 13 13   Temp: 98.5 °F (36.9 °C)  97.7 °F (36.5 °C) 97.6 °F (36.4 °C)   TempSrc: Bladder  Temporal Temporal   SpO2: 98%      Weight:  161 lb 9.6 oz (73.3 kg)     Height:  5' 11\" (1.803 m)         Physical Exam  Vitals and nursing note reviewed. Constitutional:       Appearance: Normal appearance. He is well-developed. HENT:      Head: Normocephalic and atraumatic. Right Ear: External ear normal.      Left Ear: External ear normal.      Nose: Nose normal. No congestion or rhinorrhea. Mouth/Throat:      Mouth: Mucous membranes are moist.      Pharynx: No oropharyngeal exudate or posterior oropharyngeal erythema. Eyes:      General: No scleral icterus. Right eye: No discharge. Left eye: No discharge. Conjunctiva/sclera: Conjunctivae normal.      Pupils: Pupils are equal, round, and reactive to light. Cardiovascular:      Rate and Rhythm: Normal rate and regular rhythm. Pulses: Normal pulses. Heart sounds: No murmur heard. No friction rub. Pulmonary:      Effort: Pulmonary effort is normal. No respiratory distress. Breath sounds: Normal breath sounds. No stridor. No wheezing, rhonchi or rales. Abdominal:      General: Bowel sounds are normal.      Palpations: Abdomen is soft. Tenderness: There is no abdominal tenderness. There is no right CVA tenderness, left CVA tenderness, guarding or rebound. Musculoskeletal:         General: No swelling or tenderness. Normal range of motion. Cervical back: Normal range of motion and neck supple. No rigidity. No muscular tenderness. Lymphadenopathy:      Cervical: No cervical adenopathy. Skin:     General: Skin is warm and dry. Coloration: Skin is not jaundiced. Findings: No erythema or rash. Neurological:      General: No focal deficit present. Mental Status: He is alert and oriented to person, place, and time. Mental status is at baseline. Motor: No abnormal muscle tone.    Psychiatric:         Mood and Affect: Mood normal.         Behavior: Behavior normal.         Thought Content: Thought content normal.           Lines and drains: All vascular access sites are healthy with no local erythema, discharge or tenderness. Intake and output:     I/O last 3 completed shifts: In: 1199.3 [P.O.:355; I.V.:102; NG/GT:100; IV Piggyback:642.3]  Out: 1550 [Urine:1450; Emesis/NG output:100]    Lab Data:   All available labs were reviewed by me today. CBC:   Recent Labs     02/22/22 2039 02/23/22  0534 02/24/22  0417   WBC 11.9* 11.4* 7.2   RBC 4.07* 3.52* 3.40*   HGB 13.5 11.7* 11.2*   HCT 40.4* 34.8* 33.1*    211 211   MCV 99.4 98.9 97.3   MCH 33.2 33.3 33.0   MCHC 33.4 33.6 33.9   RDW 12.7 12.6 12.3*        BMP:  Recent Labs     02/22/22 2039 02/23/22  0534 02/24/22  0417    140 140   K 4.7 4.8 3.9   CL 98* 106 105   CO2 17* 24 28   BUN 26* 27* 21*   CREATININE 1.2 0.8 0.6*   CALCIUM 9.8 8.6 8.6   GLUCOSE 219* 152* 94        Hepatic FunctionPanel:   Lab Results   Component Value Date    ALKPHOS 64 02/24/2022    ALT 20 02/24/2022    AST 31 02/24/2022    PROT 5.9 02/24/2022    PROT 7.1 12/13/2011    BILITOT 0.8 02/24/2022    LABALBU 3.4 02/24/2022       CPK:   Lab Results   Component Value Date    CKTOTAL 73 02/22/2022     ESR: No results found for: SEDRATE  CRP: No results found for: CRP      Imaging: All pertinent images and reports for the current visit were reviewed by me during this visit. I reviewed the chest x-ray/CT scan/MRI images and independently interpreted the findings and results today. CT CHEST ABDOMEN PELVIS WO CONTRAST   Final Result   1. Patchy airway secretions, including suspected secretions in the left lower   lobe bronchus and in the basilar segmental bronchi. An underlying   endobronchial lesion is not excluded but considered less likely. Consider   aspiration given patient status. 2. The sideport of a gastric tube is located at the gastroesophageal   junction. Consider advancement.    3. Indeterminate bilateral renal lesions potentially due to proteinaceous   cyst or solid neoplasm. Recommend further evaluation with renal protocol   abdomen MRI (preferred) or CT on a nonemergent basis. 4. Solid nodules in the bilateral lower lobes measure up to 0.7 cm and are   more likely infectious or inflammatory in etiology than neoplastic. Assuming   no malignancy is found in the kidneys on the above recommended study, then   follow-up chest CT would be recommended in 3-6 months as below. If   malignancy is found in the kidneys, then the below pulmonary nodule   recommendation would not apply. RECOMMENDATIONS:   Multiple pulmonary nodules. Most severe: 7 mm right solid pulmonary nodule. Recommend a non-contrast Chest CT at 3-6 months. If patient is high risk for   malignancy, recommend an additional non-contrast Chest CT at 18-24 months; if   patient is low risk for malignancy a non-contrast Chest CT at 18-24 months is   optional.      These guidelines do not apply to immunocompromised patients and patients with   cancer. Follow up in patients with significant comorbidities as clinically   warranted. For lung cancer screening, adhere to Lung-RADS guidelines. Reference: Radiology. 2017; 284(1):228-43. CT CERVICAL SPINE WO CONTRAST   Final Result   No acute fracture or traumatic malalignment of the cervical spine. CT HEAD WO CONTRAST   Final Result   1. No acute intracranial abnormality. 2. Chronic findings as above. XR CHEST PORTABLE   Final Result   1. Lines and tubes in expected position as above. 2. No acute cardiopulmonary abnormality. Outside records:    Labs, Microbiology, Radiology and pertinent results from Care everywhere, if available, were reviewed as a part ofthe consultation.       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    Bacterial infection due to Streptococcus mutans A49.1    DM (diabetes mellitus), type 2, uncontrolled with complications (HCC) G97.9, E11.65    Protein calorie malnutrition (HCC) E46    Breakthrough seizure ([address] G40.919    Partial symptomatic epilepsy with complex partial seizures, intractable, without status epilepticus ([address] G40.219    Abnormal CT of the head R93.0    Acute encephalopathy G93.40    Remote history of stroke Z86.73    Aphasia R47.01    NATALIA (acute kidney injury) ([address] N17.9    Lactic acidosis E87.2    Unresponsive R41.89    Partial idiopathic epilepsy with seizures of localized onset, intractable, with status epilepticus ([address] G40.011    Acute respiratory failure with hypoxia ([address] J96.01         Please note that this chart was generated using Dragon dictation software. Although every effort was made to ensure the accuracy of this automated transcription, some errors in transcription may have occurred inadvertently. If you may need any clarification, please do not hesitate to contact me through EPIC or at the phone number provided below with my electronic signature. Any pictures or media included in this note were obtained after taking informed verbal consent from the patient and with their approval to include those in the patient's medical record.         Niles Finley MD, MPH, [address] [place] [address]  2/24/2022, 1:12 PM  [facility] Infectious Disease   [address] [place] [address], [address] [place] [address]  Office: [phone]  Fax: [phone]  Clinic days:  Tuesday & Thursday

## 2022-02-25 LAB
A/G RATIO: 1.2 (ref 1.1–2.2)
ALBUMIN SERPL-MCNC: 3.5 G/DL (ref 3.4–5)
ALP BLD-CCNC: 67 U/L (ref 40–129)
ALT SERPL-CCNC: 20 U/L (ref 10–40)
ANION GAP SERPL CALCULATED.3IONS-SCNC: 8 MMOL/L (ref 3–16)
AST SERPL-CCNC: 25 U/L (ref 15–37)
BASOPHILS ABSOLUTE: 0 K/UL (ref 0–0.2)
BASOPHILS RELATIVE PERCENT: 0.5 %
BILIRUB SERPL-MCNC: 0.6 MG/DL (ref 0–1)
BLOOD CULTURE, ROUTINE: ABNORMAL
BUN BLDV-MCNC: 14 MG/DL (ref 7–20)
CALCIUM SERPL-MCNC: 9.1 MG/DL (ref 8.3–10.6)
CHLORIDE BLD-SCNC: 104 MMOL/L (ref 99–110)
CO2: 27 MMOL/L (ref 21–32)
CREAT SERPL-MCNC: 0.5 MG/DL (ref 0.8–1.3)
CULTURE, BLOOD 2: ABNORMAL
CULTURE, BLOOD 2: ABNORMAL
EKG ATRIAL RATE: 75 BPM
EKG DIAGNOSIS: NORMAL
EKG P AXIS: 55 DEGREES
EKG P-R INTERVAL: 164 MS
EKG Q-T INTERVAL: 392 MS
EKG QRS DURATION: 114 MS
EKG QTC CALCULATION (BAZETT): 437 MS
EKG R AXIS: -41 DEGREES
EKG T AXIS: 10 DEGREES
EKG VENTRICULAR RATE: 75 BPM
EOSINOPHILS ABSOLUTE: 0.1 K/UL (ref 0–0.6)
EOSINOPHILS RELATIVE PERCENT: 1 %
GFR AFRICAN AMERICAN: >60
GFR NON-AFRICAN AMERICAN: >60
GLUCOSE BLD-MCNC: 104 MG/DL (ref 70–99)
HCT VFR BLD CALC: 34.7 % (ref 40.5–52.5)
HEMOGLOBIN: 12.1 G/DL (ref 13.5–17.5)
INR BLD: 1.19 (ref 0.88–1.12)
LV EF: 50 %
LVEF MODALITY: NORMAL
LYMPHOCYTES ABSOLUTE: 1.2 K/UL (ref 1–5.1)
LYMPHOCYTES RELATIVE PERCENT: 21.4 %
MAGNESIUM: 2.1 MG/DL (ref 1.8–2.4)
MCH RBC QN AUTO: 33.7 PG (ref 26–34)
MCHC RBC AUTO-ENTMCNC: 34.9 G/DL (ref 31–36)
MCV RBC AUTO: 96.4 FL (ref 80–100)
MONOCYTES ABSOLUTE: 0.4 K/UL (ref 0–1.3)
MONOCYTES RELATIVE PERCENT: 8.3 %
NEUTROPHILS ABSOLUTE: 3.7 K/UL (ref 1.7–7.7)
NEUTROPHILS RELATIVE PERCENT: 68.8 %
ORGANISM: ABNORMAL
PDW BLD-RTO: 12.5 % (ref 12.4–15.4)
PLATELET # BLD: 210 K/UL (ref 135–450)
PMV BLD AUTO: 7.3 FL (ref 5–10.5)
POTASSIUM SERPL-SCNC: 3.7 MMOL/L (ref 3.5–5.1)
PROTHROMBIN TIME: 13.6 SEC (ref 9.9–12.7)
RBC # BLD: 3.6 M/UL (ref 4.2–5.9)
SARS-COV-2, NAAT: NOT DETECTED
SODIUM BLD-SCNC: 139 MMOL/L (ref 136–145)
TOTAL PROTEIN: 6.4 G/DL (ref 6.4–8.2)
TROPONIN: <0.01 NG/ML
VANCOMYCIN RANDOM: 7.4 UG/ML
WBC # BLD: 5.4 K/UL (ref 4–11)

## 2022-02-25 PROCEDURE — 99233 SBSQ HOSP IP/OBS HIGH 50: CPT | Performed by: INTERNAL MEDICINE

## 2022-02-25 PROCEDURE — 97530 THERAPEUTIC ACTIVITIES: CPT

## 2022-02-25 PROCEDURE — 93320 DOPPLER ECHO COMPLETE: CPT

## 2022-02-25 PROCEDURE — 84484 ASSAY OF TROPONIN QUANT: CPT

## 2022-02-25 PROCEDURE — 6370000000 HC RX 637 (ALT 250 FOR IP): Performed by: INTERNAL MEDICINE

## 2022-02-25 PROCEDURE — 93312 ECHO TRANSESOPHAGEAL: CPT

## 2022-02-25 PROCEDURE — 85610 PROTHROMBIN TIME: CPT

## 2022-02-25 PROCEDURE — 99152 MOD SED SAME PHYS/QHP 5/>YRS: CPT

## 2022-02-25 PROCEDURE — 6360000002 HC RX W HCPCS: Performed by: INTERNAL MEDICINE

## 2022-02-25 PROCEDURE — 97129 THER IVNTJ 1ST 15 MIN: CPT

## 2022-02-25 PROCEDURE — 83735 ASSAY OF MAGNESIUM: CPT

## 2022-02-25 PROCEDURE — 92526 ORAL FUNCTION THERAPY: CPT

## 2022-02-25 PROCEDURE — 97535 SELF CARE MNGMENT TRAINING: CPT

## 2022-02-25 PROCEDURE — 97166 OT EVAL MOD COMPLEX 45 MIN: CPT

## 2022-02-25 PROCEDURE — 87635 SARS-COV-2 COVID-19 AMP PRB: CPT

## 2022-02-25 PROCEDURE — APPNB30 APP NON BILLABLE TIME 0-30 MINS: Performed by: NURSE PRACTITIONER

## 2022-02-25 PROCEDURE — 99223 1ST HOSP IP/OBS HIGH 75: CPT | Performed by: INTERNAL MEDICINE

## 2022-02-25 PROCEDURE — 87040 BLOOD CULTURE FOR BACTERIA: CPT

## 2022-02-25 PROCEDURE — 85025 COMPLETE CBC W/AUTO DIFF WBC: CPT

## 2022-02-25 PROCEDURE — 93010 ELECTROCARDIOGRAM REPORT: CPT | Performed by: INTERNAL MEDICINE

## 2022-02-25 PROCEDURE — 2060000000 HC ICU INTERMEDIATE R&B

## 2022-02-25 PROCEDURE — 93325 DOPPLER ECHO COLOR FLOW MAPG: CPT

## 2022-02-25 PROCEDURE — 36415 COLL VENOUS BLD VENIPUNCTURE: CPT

## 2022-02-25 PROCEDURE — 97161 PT EVAL LOW COMPLEX 20 MIN: CPT

## 2022-02-25 PROCEDURE — 80202 ASSAY OF VANCOMYCIN: CPT

## 2022-02-25 PROCEDURE — APPSS30 APP SPLIT SHARED TIME 16-30 MINUTES: Performed by: NURSE PRACTITIONER

## 2022-02-25 PROCEDURE — 7100000010 HC PHASE II RECOVERY - FIRST 15 MIN

## 2022-02-25 PROCEDURE — 2580000003 HC RX 258: Performed by: INTERNAL MEDICINE

## 2022-02-25 PROCEDURE — 97116 GAIT TRAINING THERAPY: CPT

## 2022-02-25 PROCEDURE — 80053 COMPREHEN METABOLIC PANEL: CPT

## 2022-02-25 RX ADMIN — ATORVASTATIN CALCIUM 10 MG: 10 TABLET, FILM COATED ORAL at 20:08

## 2022-02-25 RX ADMIN — LACOSAMIDE 200 MG: 100 TABLET, FILM COATED ORAL at 10:18

## 2022-02-25 RX ADMIN — LEVETIRACETAM 1500 MG: 100 SOLUTION ORAL at 10:18

## 2022-02-25 RX ADMIN — VANCOMYCIN HYDROCHLORIDE 1750 MG: 10 INJECTION, POWDER, LYOPHILIZED, FOR SOLUTION INTRAVENOUS at 17:53

## 2022-02-25 RX ADMIN — ACETAMINOPHEN 325MG 650 MG: 325 TABLET ORAL at 06:39

## 2022-02-25 RX ADMIN — METHADONE HYDROCHLORIDE 5 MG: 10 TABLET ORAL at 20:07

## 2022-02-25 RX ADMIN — LEVETIRACETAM 1500 MG: 100 SOLUTION ORAL at 20:07

## 2022-02-25 RX ADMIN — ENOXAPARIN SODIUM 40 MG: 40 INJECTION SUBCUTANEOUS at 10:18

## 2022-02-25 RX ADMIN — LACOSAMIDE 200 MG: 100 TABLET, FILM COATED ORAL at 20:07

## 2022-02-25 RX ADMIN — VANCOMYCIN HYDROCHLORIDE 1000 MG: 1 INJECTION, POWDER, LYOPHILIZED, FOR SOLUTION INTRAVENOUS at 04:17

## 2022-02-25 RX ADMIN — METHADONE HYDROCHLORIDE 5 MG: 10 TABLET ORAL at 10:18

## 2022-02-25 RX ADMIN — ACETAMINOPHEN 325MG 650 MG: 325 TABLET ORAL at 17:48

## 2022-02-25 RX ADMIN — ACETAMINOPHEN 325MG 650 MG: 325 TABLET ORAL at 00:28

## 2022-02-25 ASSESSMENT — ENCOUNTER SYMPTOMS
SHORTNESS OF BREATH: 0
TROUBLE SWALLOWING: 0
EYE DISCHARGE: 0
BACK PAIN: 0
EYE REDNESS: 0
DIARRHEA: 0
ABDOMINAL PAIN: 0
CONSTIPATION: 0
SORE THROAT: 0
WHEEZING: 0
RHINORRHEA: 0
COUGH: 0
NAUSEA: 0

## 2022-02-25 ASSESSMENT — PAIN DESCRIPTION - PAIN TYPE
TYPE: CHRONIC PAIN

## 2022-02-25 ASSESSMENT — PAIN SCALES - GENERAL
PAINLEVEL_OUTOF10: 4
PAINLEVEL_OUTOF10: 5
PAINLEVEL_OUTOF10: 6
PAINLEVEL_OUTOF10: 6
PAINLEVEL_OUTOF10: 4
PAINLEVEL_OUTOF10: 0
PAINLEVEL_OUTOF10: 4
PAINLEVEL_OUTOF10: 3
PAINLEVEL_OUTOF10: 5

## 2022-02-25 ASSESSMENT — PAIN DESCRIPTION - ORIENTATION
ORIENTATION: LOWER
ORIENTATION: LOWER

## 2022-02-25 ASSESSMENT — PAIN DESCRIPTION - ONSET: ONSET: ON-GOING

## 2022-02-25 ASSESSMENT — PAIN DESCRIPTION - DESCRIPTORS: DESCRIPTORS: CONSTANT

## 2022-02-25 ASSESSMENT — PAIN DESCRIPTION - LOCATION
LOCATION: BACK

## 2022-02-25 NOTE — PROGRESS NOTES
Hospitalist Progress Note      PCP: Jett Montalvo MD    Date of Admission: 2/22/2022    Chief Complaint:  Patient in by EMS from [facility] at the [facility]. Patient was found down, unknown how long and unresponsive, post ictal    Hospital Course:   Amada Yeh is a 64 y.o. male. He presented from [facility] at the [facility] by ambulance. He was found down and unresponsive by staff. He was supported noninvasively en route w/ breaths via BVM. He was intubated shortly after arrival primarily for airway protection considering his persistently depressed mental status.     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 He underwent craniotomy and aneurysm clipping at the time. 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  Per prior notes he is aphasic at baseline.        Subjective:  Has severe valve regurg      Medications:  Reviewed    Infusion Medications    sodium chloride Stopped (02/23/22 1843)    propofol Stopped (02/23/22 0847)    fentaNYL Stopped (02/23/22 0850)     Scheduled Medications    methadone  5 mg Oral BID    lacosamide  200 mg Oral BID    atorvastatin  10 mg Oral Nightly    enoxaparin  40 mg SubCUTAneous Daily    levETIRAcetam  1,500 mg Oral BID    vancomycin  1,000 mg IntraVENous Q12H     PRN Meds: sodium chloride flush, sodium chloride, magnesium sulfate, ondansetron, acetaminophen **OR** acetaminophen, potassium chloride **OR** potassium chloride, perflutren lipid microspheres, albuterol      Intake/Output Summary (Last 24 hours) at 2/24/2022 2040  Last data filed at 2/24/2022 1911  Gross per 24 hour   Intake 2510.37 ml   Output 1775 ml   Net 735.37 ml       Physical Exam Performed:    /64   Pulse 77   Temp 98 °F (36.7 °C) (Temporal)   Resp 18   Ht 5' 11\" (1.803 m)   Wt 161 lb 9.6 oz (73.3 kg)   SpO2 98%   BMI 22.54 kg/m²     General appearance: No apparent distress, appears stated age and cooperative. HEENT: Pupils equal, round, and reactive to light. Conjunctivae/corneas clear. Neck: Supple, with full range of motion. No jugular venous distention. Trachea midline. Respiratory:  Normal respiratory effort. Clear to auscultation, bilaterally without Rales/Wheezes/Rhonchi. Cardiovascular: Regular rate and rhythm 2-3/6 murmur, no  rubs or gallops. Abdomen: Soft, non-tender, non-distended with normal bowel sounds. Musculoskeletal: No clubbing, cyanosis or edema bilaterally. Full range of motion without deformity. Skin: Skin color, texture, turgor normal.  No rashes or lesions. Neurologic:  Neurovascularly intact without any focal sensory/motor deficits. Cranial nerves: II-XII intact, grossly non-focal.  Psychiatric: Alert and oriented, thought content appropriate, normal insight  Capillary Refill: Brisk,3 seconds, normal   Peripheral Pulses: +2 palpable, equal bilaterally       Labs:   Recent Labs     02/22/22 2039 02/23/22  0534 02/24/22 0417   WBC 11.9* 11.4* 7.2   HGB 13.5 11.7* 11.2*   HCT 40.4* 34.8* 33.1*    211 211     Recent Labs     02/22/22 2039 02/23/22  0534 02/24/22  0417    140 140   K 4.7 4.8 3.9   CL 98* 106 105   CO2 17* 24 28   BUN 26* 27* 21*   CREATININE 1.2 0.8 0.6*   CALCIUM 9.8 8.6 8.6     Recent Labs     02/22/22 2039 02/23/22  0534 02/24/22  0417   AST 26 25 31   ALT 26 22 20   BILITOT 0.4 0.5 0.8   ALKPHOS 72 63 64     Recent Labs     02/22/22 2039 02/23/22  0534 02/24/22  0418   INR 1.18* 1.15* 1.23*     Recent Labs     02/22/22 2039   CKTOTAL 73   TROPONINI <0.01       Urinalysis:      Lab Results   Component Value Date    NITRU Negative 02/22/2022    WBCUA 4 02/22/2022    BACTERIA 1+ 01/13/2022    RBCUA 11 02/22/2022    BLOODU MODERATE 02/22/2022    SPECGRAV 1.020 02/22/2022    GLUCOSEU Negative 02/22/2022       Radiology:  CT CHEST ABDOMEN PELVIS WO CONTRAST   Final Result   1.  Patchy airway secretions, including suspected secretions in the left lower   lobe bronchus and in the basilar segmental bronchi. An underlying   endobronchial lesion is not excluded but considered less likely. Consider   aspiration given patient status. 2. The sideport of a gastric tube is located at the gastroesophageal   junction. Consider advancement. 3. Indeterminate bilateral renal lesions potentially due to proteinaceous   cyst or solid neoplasm. Recommend further evaluation with renal protocol   abdomen MRI (preferred) or CT on a nonemergent basis. 4. Solid nodules in the bilateral lower lobes measure up to 0.7 cm and are   more likely infectious or inflammatory in etiology than neoplastic. Assuming   no malignancy is found in the kidneys on the above recommended study, then   follow-up chest CT would be recommended in 3-6 months as below. If   malignancy is found in the kidneys, then the below pulmonary nodule   recommendation would not apply. RECOMMENDATIONS:   Multiple pulmonary nodules. Most severe: 7 mm right solid pulmonary nodule. Recommend a non-contrast Chest CT at 3-6 months. If patient is high risk for   malignancy, recommend an additional non-contrast Chest CT at 18-24 months; if   patient is low risk for malignancy a non-contrast Chest CT at 18-24 months is   optional.      These guidelines do not apply to immunocompromised patients and patients with   cancer. Follow up in patients with significant comorbidities as clinically   warranted. For lung cancer screening, adhere to Lung-RADS guidelines. Reference: Radiology. 2017; 284(1):228-43. CT CERVICAL SPINE WO CONTRAST   Final Result   No acute fracture or traumatic malalignment of the cervical spine. CT HEAD WO CONTRAST   Final Result   1. No acute intracranial abnormality. 2. Chronic findings as above. XR CHEST PORTABLE   Final Result   1. Lines and tubes in expected position as above.    2. No acute cardiopulmonary abnormality. Assessment/Plan:    Active Hospital Problems    Diagnosis     Sepsis ([address] [A41.9]     Eloy coma scale total score 3-8 (HCC) [R40.2430]     Lung nodules [R91.8]     Essential hypertension [I10]     Enterococcal bacteremia [R78.81, B95.2]     NATALIA (acute kidney injury) ([address] [N17.9]     Lactic acidosis [E87.2]     Unresponsive [R41.89]     Partial idiopathic epilepsy with seizures of localized onset, intractable, with status epilepticus ([address] [G40.011]     Acute respiratory failure with hypoxia ([address] [J96.01]     Breakthrough seizure ([address] [G40.919]     History of stroke [Z86.73]     Partial symptomatic epilepsy with complex partial seizures, intractable, without status epilepticus ([address] [G40.219]     Seizure ([address] [R56.9]     Type 2 diabetes mellitus with other specified complication ([address] [J47.75]      Break through seizure- not new. I have extensive seizure history  -Consult placed to neuro    Lactic acidosis  -Secondary to seizure activity and should improve with fluids and seizure having ceased. Positive blood cultures  -With gram-positive cocci doubling strep as well as Enterococcus DNA detected  -Started on IV vancomycin  -Previous history of endocarditis in the past  -Echo has been ordered to rule out endocarditis. ID CONSULTED    SEVERE MITRAL REGURG.  - CHECK ANA MARIA.  - May have endocarditis vs leaflette issues,    Diabetes mellitus 2  -He is on sliding scale as well as long-acting insulin    NATALIA  -Creatinine has improved with appropriate hydration resolved at this point. DVT Prophylaxis: Lovenox  Diet: ADULT DIET; Regular; No Drinking Straws  Diet NPO  Code Status: Full Code    PT/OT Eval Status: On treatment  Dispo -we will monitor overnight in ICU and likely be downgraded if no further seizure activity.   Plan for ANA MARIA in the am    Dilan Cuevas MD

## 2022-02-25 NOTE — PROGRESS NOTES
Infectious Diseases   Progress Note      Admission Date: 2/22/2022  Hospital Day: Hospital Day: 4   Attending: Nilsa Pickard MD  Date of service: 2/25/2022     Chief complaint/ Reason for consult:     · Severe sepsis with fever, leukocytosis, lactic acidosis, hypotension on admission  · Enterococcus faecalis bacteremia   · History of streptococcal bacteremia and mitral and aortic valve endocarditis in 2017  · Positive urine drug screen for benzodiazepines  · Bilateral lung nodules  · Left parietal encephalomalacia    Microbiology:        I have reviewed allavailable micro lab data and cultures    · Blood culture (2/2) - collected on 2/22/2022: Enterococcus fecalis    Susceptibility     Enterococcus  faecalis     BACTERIAL SUSCEPTIBILITY PANEL BY ABIEL     ampicillin <=2 mcg/mL Sensitive     vancomycin 1 mcg/mL Sensitive               Antibiotics and immunizations:       Current antibiotics: All antibiotics and their doses were reviewed by me    Recent Abx Admin                   vancomycin 1000 mg IVPB in 250 mL D5W addavial (mg) 1,000 mg New Bag 02/25/22 0417     1,000 mg New Bag 02/24/22 1624                  Immunization History: All immunization history was reviewed by me today. Immunization History   Administered Date(s) Administered    COVID-19, Pfizer Purple top, DILUTE for use, 12+ yrs, 30mcg/0.3mL dose 12/21/2020, 01/11/2021, 10/21/2021       Known drug allergies: All allergies were reviewed and updated    Allergies   Allergen Reactions    No Known Allergies     Seasonal        Social history:     Social History:  All social andepidemiologic history was reviewed and updated by me today as needed. · Tobacco use:   reports that he has been smoking cigarettes. He has been smoking about 0.50 packs per day. He has never used smokeless tobacco.  · Alcohol use:   reports no history of alcohol use. · Currently lives in: [facility]  ·  reports no history of drug use.      COVID VACCINATION AND LAB RESULT RECORDS:     Internal Administration   First Dose COVID-19, Pfizer Purple top, DILUTE for use, 12+ yrs, 30mcg/0.3mL dose  12/21/2020   Second Dose COVID-19, Pfizer Purple top, DILUTE for use, 12+ yrs, 30mcg/0.3mL dose   01/11/2021       Last COVID Lab SARS-CoV-2, PCR (no units)   Date Value   07/15/2020 Not Detected     SARS-CoV-2, NAAT (no units)   Date Value   02/25/2022 Not Detected            Assessment:     The patient is a 64 y.o. old male who  has a past medical history of Acute intracerebral hemorrhage ([facility] [facility] (02/03/2017), Anxiety, Back pain, Cerebral artery occlusion with cerebral infarction ([facility] [address] [facility] Chronic back pain greater than 3 months duration (02/03/2017), Diabetes mellitus ([facility] [address] Dysphasia (02/03/2017), Dysphonia (02/03/2017), Endocarditis and heart valve disorders in diseases classified elsewhere (02/03/2017), Hydrocephalus ([facility] [facility] (02/03/2017), Hypertension, IVH (intraventricular hemorrhage) ([facility] [address] [facility] (02/03/2017), NSTEMI (non-ST elevated myocardial infarction) ([facility] [address] [facility] (02/03/2017), and Seizures ([facility] [address] with following problems:    · Severe sepsis with fever, leukocytosis, lactic acidosis, hypotension on admission-improving  · Enterococcus faecalis bacteremia-covered with IV vancomycin  · History of streptococcal bacteremia and mitral and aortic valve endocarditis in 2017  · Positive urine drug screen for benzodiazepines  · Bilateral lung nodules  · Left parietal encephalomalacia  · History of stroke  · Type 2 diabetes mellitus-maintain good glycemic control  · Essential hypertension  · Seizure disorder  · Nursing home resident  ·       Discussion:      The patient is afebrile. He is on IV vancomycin for Enterococcus faecalis bacteremia. COVID 19 NAAT test done earlier today was negative. Serum creatinine 0.5. White cell count is 5400.    2D echo done yesterday showed severe mitral vegetation.   A transesophageal echo was done by cardiology today no obvious vegetations are noted.  The patient has severe mitral and aortic valve regurgitation with left ventricular dilatation    The patient is on room air    Plan:     Diagnostic Workup:    · Will order 2 sets of repeat blood cultures today  · Continue to follow  fever curve, WBC count and blood cultures. · Continue to monitor blood counts, liver and renal function. Antimicrobials:    · Will continue IV vancomycin  Check Vancomycin trough before the 5th dose. Target vancomycin trough level of 15-20  mg/L or vancomycin area under curve (AUC) of 400-600 mg*h/L by Bayesian modeling method. If dosing vancomycin based on trough levels, keep vancomycin trough below 20 at all times. Avoid increasing the dose of vancomycin above a total of 4 grams in a 24-hour period in patients younger than 45 years and above 3 grams in a 24-hour period in a patient of age 39 years or older. Continue to monitor serum creatinine and Vanco levels closely, while the patient is on I/v Vancomycin. · IV ampicillin is also available as an alternative to vancomycin as the isolate was susceptible to both  · Cardiology input appreciated  Recommend holding off on PICC line until blood cultures clear for at least 48 and preferably 72 hours. · Midline or regular central venous line okay for now, if needed for IV access. · We will follow up on the culture results and clinical progress and will make further recommendations accordingly. · Continue close vitals monitoring. · Maintain good glycemic control. · Fall precautions. Aspiration precautions. · Continue to watch for new fever or diarrhea. · DVT prophylaxis. · Discussed all above with patient and RN. Drug Monitoring:    · Continue monitoring for antibiotic toxicity as follows: CBC, CMP, vancomycin trough  · Continue to watch for following: new or worsening fever, new hypotension, hives, lip swelling and redness or purulence at vascular access sites.      I/v access Management:    · Continue to monitor i.v access sites for erythema, induration, discharge or tenderness. · As always, continue efforts to minimize tubes/lines/drains as clinically appropriate to reduce chances of line associated infections. Patient education and counseling:        · The patient was educated in detail about the side-effects of various antibiotics and things to watch for like new rashes, lip swelling, severe reaction, worsening diarrhea, break through fever etc.  · Discussed patient's condition and what to expect. All of the patient's questions were addressed in a satisfactory manner and patient verbalized understanding all instructions. Level of complexity of visit: High     Risk of Complications/Morbidity: High     · Illness(es)/ Infection present that pose threat to life/bodily function. · There is potential for severe exacerbation of infection/side effects of treatment. · Therapy requires intensive monitoring for antimicrobial agent toxicity. Thank you for involving me in the care of your patient. I will continue to follow. If you have anyadditional questions, please do not hesitate to contact me. Subjective: Interval history: Interval history was obtained from chart review and patient/ RN. The patient is afebrile. He is tolerating antibiotics okay. Fever curve is coming down     REVIEW OF SYSTEMS:      Review of Systems   Constitutional: Positive for fatigue. Negative for chills, diaphoresis and fever. HENT: Negative for ear discharge, ear pain, rhinorrhea, sore throat and trouble swallowing. Eyes: Negative for discharge and redness. Respiratory: Negative for cough, shortness of breath and wheezing. Cardiovascular: Negative for chest pain and leg swelling. Gastrointestinal: Negative for abdominal pain, constipation, diarrhea and nausea. Endocrine: Negative for polyuria. Genitourinary: Negative for dysuria, flank pain, frequency, hematuria and urgency.    Musculoskeletal: Negative for back pain and myalgias. Skin: Negative for rash. Neurological: Negative for dizziness, seizures and headaches. Hematological: Does not bruise/bleed easily. Psychiatric/Behavioral: Negative for hallucinations and suicidal ideas. All other systems reviewed and are negative. Past Medical History: All past medical history reviewed today. Past Medical History:   Diagnosis Date    Acute intracerebral hemorrhage ([facility] [address] 02/03/2017    Anxiety     Back pain     Cerebral artery occlusion with cerebral infarction (HCC)     Chronic back pain greater than 3 months duration 02/03/2017    Diabetes mellitus ([facility] [address]     Dysphasia 02/03/2017    Dysphonia 02/03/2017    Endocarditis and heart valve disorders in diseases classified elsewhere 02/03/2017    Hydrocephalus ([facility] [address] 02/03/2017    Hypertension     IVH (intraventricular hemorrhage) ([facility] [address] [facility] 02/03/2017    NSTEMI (non-ST elevated myocardial infarction) ([facility] [address] 02/03/2017    Seizures ([facility] [address]        Past Surgical History: All past surgical history was reviewed today. Past Surgical History:   Procedure Laterality Date    BRAIN ANEURYSM SURGERY Left 02/03/2017    COLONOSCOPY N/A 8/14/2019    COLONOSCOPY WITH MAC ANESTHESIA performed by Lena Fischer MD at [address] [place] [address] Left 02/03/2017    LEG SURGERY      UPPER GASTROINTESTINAL ENDOSCOPY N/A 8/14/2019    EGD BIOPSY performed by Lena Fischer MD at [address] [place] [address] ENDOSCOPY       Family History: All family history was reviewed today. History reviewed. No pertinent family history. Objective:       PHYSICAL EXAM:      Vitals:   Vitals:    02/25/22 0400 02/25/22 0420 02/25/22 0800 02/25/22 1219   BP: 133/61  130/68 124/70   Pulse: 83  74 88   Resp: 16  15 12   Temp: 97.8 °F (36.6 °C)   97.6 °F (36.4 °C)   TempSrc: Temporal  Temporal Temporal   SpO2: 98%  95% 97%   Weight:  155 lb 6.8 oz (70.5 kg)     Height:  5' 11\" (1.803 m)         Physical Exam  Vitals and nursing note reviewed.    Constitutional: Appearance: Normal appearance. He is well-developed. HENT:      Head: Normocephalic and atraumatic. Right Ear: External ear normal.      Left Ear: External ear normal.      Nose: Nose normal. No congestion or rhinorrhea. Mouth/Throat:      Mouth: Mucous membranes are moist.      Pharynx: No oropharyngeal exudate or posterior oropharyngeal erythema. Eyes:      General: No scleral icterus. Right eye: No discharge. Left eye: No discharge. Conjunctiva/sclera: Conjunctivae normal.      Pupils: Pupils are equal, round, and reactive to light. Cardiovascular:      Rate and Rhythm: Normal rate and regular rhythm. Pulses: Normal pulses. Heart sounds: No murmur heard. No friction rub. Pulmonary:      Effort: Pulmonary effort is normal. No respiratory distress. Breath sounds: Normal breath sounds. No stridor. No wheezing, rhonchi or rales. Abdominal:      General: Bowel sounds are normal.      Palpations: Abdomen is soft. Tenderness: There is no abdominal tenderness. There is no right CVA tenderness, left CVA tenderness, guarding or rebound. Musculoskeletal:         General: No swelling or tenderness. Normal range of motion. Cervical back: Normal range of motion and neck supple. No rigidity. No muscular tenderness. Lymphadenopathy:      Cervical: No cervical adenopathy. Skin:     General: Skin is warm and dry. Coloration: Skin is not jaundiced. Findings: No erythema or rash. Neurological:      General: No focal deficit present. Mental Status: He is alert and oriented to person, place, and time. Mental status is at baseline. Motor: No abnormal muscle tone. Psychiatric:         Mood and Affect: Mood normal.         Behavior: Behavior normal.         Thought Content: Thought content normal.            Lines and drains: All vascular access sites are healthy with no local erythema, discharge or tenderness.       Intake and output:    I/O last 3 completed shifts: In: 2615.4 [P.O.:1752; I.V.:11.6; IV Piggyback:851.7]  Out: 2980 [Urine:2980]    Lab Data:   All available labs and old records have been reviewed by me. CBC:  Recent Labs     02/23/22  0534 02/24/22  0417 02/25/22  0415   WBC 11.4* 7.2 5.4   RBC 3.52* 3.40* 3.60*   HGB 11.7* 11.2* 12.1*   HCT 34.8* 33.1* 34.7*    211 210   MCV 98.9 97.3 96.4   MCH 33.3 33.0 33.7   MCHC 33.6 33.9 34.9   RDW 12.6 12.3* 12.5        BMP:  Recent Labs     02/23/22  0534 02/24/22  0417 02/25/22  0415    140 139   K 4.8 3.9 3.7    105 104   CO2 24 28 27   BUN 27* 21* 14   CREATININE 0.8 0.6* 0.5*   CALCIUM 8.6 8.6 9.1   GLUCOSE 152* 94 104*        Hepatic Function Panel:   Lab Results   Component Value Date    ALKPHOS 67 02/25/2022    ALT 20 02/25/2022    AST 25 02/25/2022    PROT 6.4 02/25/2022    PROT 7.1 12/13/2011    BILITOT 0.6 02/25/2022    LABALBU 3.5 02/25/2022       CPK:   Lab Results   Component Value Date    CKTOTAL 73 02/22/2022     ESR: No results found for: SEDRATE  CRP: No results found for: CRP        Imaging: All pertinent images and reports for the current visit were reviewed by me during this visit. I reviewed the chest x-ray/CT scan/MRI images and independently interpreted the findings and results today. CT CHEST ABDOMEN PELVIS WO CONTRAST   Final Result   1. Patchy airway secretions, including suspected secretions in the left lower   lobe bronchus and in the basilar segmental bronchi. An underlying   endobronchial lesion is not excluded but considered less likely. Consider   aspiration given patient status. 2. The sideport of a gastric tube is located at the gastroesophageal   junction. Consider advancement. 3. Indeterminate bilateral renal lesions potentially due to proteinaceous   cyst or solid neoplasm. Recommend further evaluation with renal protocol   abdomen MRI (preferred) or CT on a nonemergent basis.    4. Solid nodules in the bilateral lower lobes measure up to 0.7 cm and are   more likely infectious or inflammatory in etiology than neoplastic. Assuming   no malignancy is found in the kidneys on the above recommended study, then   follow-up chest CT would be recommended in 3-6 months as below. If   malignancy is found in the kidneys, then the below pulmonary nodule   recommendation would not apply. RECOMMENDATIONS:   Multiple pulmonary nodules. Most severe: 7 mm right solid pulmonary nodule. Recommend a non-contrast Chest CT at 3-6 months. If patient is high risk for   malignancy, recommend an additional non-contrast Chest CT at 18-24 months; if   patient is low risk for malignancy a non-contrast Chest CT at 18-24 months is   optional.      These guidelines do not apply to immunocompromised patients and patients with   cancer. Follow up in patients with significant comorbidities as clinically   warranted. For lung cancer screening, adhere to Lung-RADS guidelines. Reference: Radiology. 2017; 284(1):228-43. CT CERVICAL SPINE WO CONTRAST   Final Result   No acute fracture or traumatic malalignment of the cervical spine. CT HEAD WO CONTRAST   Final Result   1. No acute intracranial abnormality. 2. Chronic findings as above. XR CHEST PORTABLE   Final Result   1. Lines and tubes in expected position as above. 2. No acute cardiopulmonary abnormality. Medications: All current and past medications were reviewed.      vancomycin  1,750 mg IntraVENous Q12H    methadone  5 mg Oral BID    lacosamide  200 mg Oral BID    atorvastatin  10 mg Oral Nightly    enoxaparin  40 mg SubCUTAneous Daily    levETIRAcetam  1,500 mg Oral BID        sodium chloride Stopped (02/23/22 0327)       sodium chloride flush, sodium chloride, magnesium sulfate, ondansetron, acetaminophen **OR** acetaminophen, potassium chloride **OR** potassium chloride, perflutren lipid microspheres, albuterol      Problem list: Patient Active Problem List   Diagnosis Code    Psychosis ([facility] [address] [facility] F29    Acute intracerebral hemorrhage ([facility] [address] [facility] I61.9    Dysphagia R13.10    Dysphonia R49.0    Endocarditis, sumit and aortic valves I38    Endocarditis I38    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    Bacterial infection due to Streptococcus mutans A49.1    Type 2 diabetes mellitus with other specified complication ([facility]) K53.60    Protein calorie malnutrition ([facility] [address] [facility] E46    Seizure ([facility] [address] [facility] R56.9    Partial symptomatic epilepsy with complex partial seizures, intractable, without status epilepticus ([facility] [address] [facility] G40.219    Abnormal CT of the head R93.0    Breakthrough seizure ([facility] [address] [facility] G40.919    History of stroke Z86.73    Aphasia R47.01    NATALIA (acute kidney injury) ([facility] [address] [facility] N17.9    Lactic acidosis E87.2    Unresponsive R41.89    Partial idiopathic epilepsy with seizures of localized onset, intractable, with status epilepticus ([facility] [address] [facility] G40.011    Acute respiratory failure with hypoxia ([facility]) J96.01    Sepsis ([facility]) A41.9    Marcelo coma scale total score 3-8 ([facility]) R40.2430    Lung nodules R91.8    Essential hypertension I10    Enterococcal bacteremia R78.81, B95.2       Please note that this chart was generated using Dragon dictation software. Although every effort was made to ensure the accuracy of this automated transcription, some errors in transcription may have occurred inadvertently. If you may need any clarification, please do not hesitate to contact me through EPIC or at the phone number provided below with my electronic signature. Any pictures or media included in this note were obtained after taking informed verbal consent from the patient and with their approval to include those in the patient's medical record.       Kristopher Stinson MD, MPH, [facility]  2/25/2022, 12:28 PM  [facility]   [address] [place] [address], [address] [place] [address]  Office: [phone]  Fax: [phone]  Clinic days:  Tuesday & Thursday

## 2022-02-25 NOTE — PROGRESS NOTES
Came to see the patient. He went for [facility]  Discussed with ICU nurse  We will order MRI brain and will see him tomorrow.

## 2022-02-25 NOTE — PROGRESS NOTES
Clinical Pharmacy Note: Pharmacy to Dose Vancomcyin    Vancomycin Day: 3  Current Dosing Regimen: 1000 mg q12  Dosing Method: Bayesian Modeling    Random: 7.4    Recent Labs     02/24/22  0417 02/25/22  0415   BUN 21* 14       Recent Labs     02/24/22  0417 02/25/22  0415   CREATININE 0.6* 0.5*       Recent Labs     02/24/22  0417 02/25/22  0415   WBC 7.2 5.4         Intake/Output Summary (Last 24 hours) at 2/25/2022 1217  Last data filed at 2/25/2022 0930  Gross per 24 hour   Intake 1228.64 ml   Output 2355 ml   Net -1126.36 ml         Ht Readings from Last 1 Encounters:   02/25/22 5' 11\" (1.803 m)        Wt Readings from Last 1 Encounters:   02/25/22 155 lb 6.8 oz (70.5 kg)         Body mass index is 21.68 kg/m². Estimated Creatinine Clearance: 155 mL/min (A) (based on SCr of 0.5 mg/dL (L)). Assessment/Plan:  Vancomycin level is subtherapeutic. Level was drawn appropriately in respect to last dose given. Increase vancomycin regimen to 1750 mg every 12 hours. Bayesian Modeling predicts an AUC of 526 mg/L*hr and trough of 13 mg/L. A vancomycin random level has been ordered on 2/26 at 0600 for follow-up. Changes in regimen will be determined based on culture results, renal function, and clinical response. Pharmacy will continue to monitor and adjust regimen as necessary.     Thank you for the consult,    Carmen Morales, PharmD, [facility]

## 2022-02-25 NOTE — CONSULTS
infarction) ([facility] [address] 02/03/2017    Seizures [facility])        Past Surgical History:        Procedure Laterality Date    BRAIN ANEURYSM SURGERY Left 02/03/2017    COLONOSCOPY N/A 8/14/2019    COLONOSCOPY WITH MAC ANESTHESIA performed by Amanda Fountain MD at  [facility] Left 02/03/2017    LEG SURGERY      UPPER GASTROINTESTINAL ENDOSCOPY N/A 8/14/2019    EGD BIOPSY performed by Amanda Fountain MD at [facility] ENDOSCOPY       Medications:   Home Meds:   Prior to Admission medications    Medication Sig Start Date End Date Taking? Authorizing Provider   levETIRAcetam (KEPPRA) 500 MG tablet Take 500 mg by mouth daily Afternoon dose   Yes Historical Provider, MD   citalopram (CELEXA) 20 MG tablet Take 20 mg by mouth daily   Yes Historical Provider, MD   OLANZapine (ZYPREXA) 2.5 MG tablet Take 2.5 mg by mouth nightly   Yes Historical Provider, MD   levETIRAcetam (KEPPRA) 500 MG tablet Take 1,500 mg by mouth 2 times daily   Yes Historical Provider, MD   potassium chloride (KLOR-CON M) 20 MEQ extended release tablet Take 20 mEq by mouth 2 times daily   Yes Historical Provider, MD   Cholecalciferol (VITAMIN D3) 5000 units TABS Take by mouth daily   Yes Historical Provider, MD   lacosamide (VIMPAT) 200 MG tablet Take 200 mg by mouth 2 times daily. Yes Historical Provider, MD   calcium carbonate (TUMS) 500 MG chewable tablet Take 1 tablet by mouth 2 times daily    Yes Historical Provider, MD   methadone (DOLOPHINE) 5 MG tablet Take 5 mg by mouth 2 times daily.    Yes Historical Provider, MD   acetaminophen (TYLENOL) 325 MG tablet Take 650 mg by mouth every 4 hours as needed for Pain    Historical Provider, MD   mirtazapine (REMERON) 7.5 MG tablet Take 7.5 mg by mouth nightly    Historical Provider, MD   atorvastatin (LIPITOR) 10 MG tablet Take 10 mg by mouth nightly  2/3/17   Historical Provider, MD      Scheduled Meds:    methadone  5 mg Oral BID    lacosamide  200 mg Oral BID    atorvastatin  10 mg Oral Nightly    mg  1.5 mL IntraVENous ONCE PRN Estefania Eaton MD        albuterol (PROVENTIL) nebulizer solution 2.5 mg  2.5 mg Nebulization Q4H PRN Estefania Eaton MD        propofol injection  5-50 mcg/kg/min IntraVENous Titrated Rosa M Camejo MD   Stopped at 02/23/22 3935    fentaNYL 10 mcg/mL infusion   mcg/hr IntraVENous Continuous Rosa M Camejo MD   Stopped at 02/23/22 8400       Allergies:  No known allergies and Seasonal    Social History:    TOBACCO:   reports that he has been smoking cigarettes. He has been smoking about 0.50 packs per day. He has never used smokeless tobacco.  ETOH:   reports no history of alcohol use. DRUGS:   reports no history of drug use. LIFESTYLE: states he walks a lot, however resides in [facility]  MARITAL STATUS: single  OCCUPATION:  Does not work     Family History:    History reviewed. No pertinent family history. REVIEW OF SYSTEMS:    CONSTITUTIONAL:  No fevers or fatigue   DERMATOLOGICAL: negative  NEUROLOGICAL:  positive for CVA past- endorses no residual effects  EYES:  negative  HEENT:  negative  RESPIRATORY:  negative  CARDIOVASCULAR:  negative  GASTROINTESTINAL:  negative  GENITO-URINARY: no dysuria, trouble voiding, or hematuria  ENDOCRINE: negative  MUSCULOSKELETAL:  negative  HEMATOLOGICAL AND LYMPHATIC: negative  IMMUNOLOGICAL: negative  PSYCHOLOGICAL: negative    PHYSICAL EXAM:    VITALS:  /68   Pulse 74   Temp 97.8 °F (36.6 °C) (Temporal)   Resp 15   Ht 5' 11\" (1.803 m)   Wt 155 lb 6.8 oz (70.5 kg)   SpO2 95%   BMI 21.68 kg/m²     Eyes:  lids and lashes normal, pupils equal and round, extra ocular muscles intact, sclera clear, conjunctiva normal    Head/ENT:  Well healed left craniotomy scar.  Normocephalic, atraumatic, no teeth  residual dentition, normal gums, & palate, oropharynx without erythema or exudates    Neck:  supple, symmetrical, trachea midline, no lymphadenopathy, no jugular venous distension, no carotid bruits and MASSES:  no masses    Lungs:  no increased work of breathing, good air exchange, no retractions and clear to auscultation, no palpable / percussible abnormalities    Cardiovascular:  regular rate and rhythm, S1, S2 normal, no murmur, click, rub or gallop    Pulses:     carotid brachial radial femoral popliteal DP PT   RIGHT 2+ 2+ 2+ 2+ 2+ 2+ 2+   LEFT 2+ 2+ 2+ 2+ 2+ 2+ 2+     Abdomen:  Soft, normal bowel sounds, non-tender, no hepatosplenomegaly, aorta likely normal and bruits absent    Musculoskeletal:  Back is straight and non-tender,  No CVAT, full ROM of upper and lower extremities.     Extremities:   No clubbing, or cyanosis, or edema     Skin: warm and normal turgor, no ulcers, infections, or rashes, no rashes, no ecchymoses, no petechiae, no nodules, no jaundice, no purpura, no wounds    Neurological: awake, alert and oriented x 3, motor 5/5 bilateral upper and lower extremities, sensation grossly intact    Psychiatric: Mood and affect appear appropriate    LABS:  BMP:   Lab Results   Component Value Date     02/25/2022    K 3.7 02/25/2022    K 4.7 02/22/2022     02/25/2022    CO2 27 02/25/2022    BUN 14 02/25/2022    CREATININE 0.5 02/25/2022      No results found for: GLU  Lab Results   Component Value Date    MG 2.10 02/25/2022      CBC:   Lab Results   Component Value Date    WBC 5.4 02/25/2022    HGB 12.1 02/25/2022    HCT 34.7 02/25/2022    MCV 96.4 02/25/2022     02/25/2022      Coagulation:   Lab Results   Component Value Date    INR 1.19 02/25/2022     Cardiac markers:   Lab Results   Component Value Date    CKTOTAL 73 02/22/2022    TROPONINI <0.01 02/25/2022     HgbA1c:  No results found for: LABA1C   Hepatic Profile:  No results found for: ALB    Lab Results   Component Value Date    BILITOT 0.6 02/25/2022    AST 25 02/25/2022    ALT 20 02/25/2022    ALKPHOS 67 02/25/2022      Cholesterol:  No results found for: CHOL, HDL, LDLCALC, TRIG   UA:  Lab Results   Component Value Date    COLORU YELLOW 2022    PHUR 6.5 2022    PHUR 6.5 2022    WBCUA 4 2022    RBCUA 11 2022    MUCUS Rare 2017    BACTERIA 1+ 2022    CLARITYU CLOUDY 2022    SPECGRAV 1.020 2022    LEUKOCYTESUR Negative 2022    UROBILINOGEN 0.2 2022    BILIRUBINUR Negative 2022    BLOODU MODERATE 2022    GLUCOSEU Negative 2022    AMORPHOUS 2+ 2017       TESTING/IMAGING  EK2022  NSR    ECHO: 2022  Summary   -Left ventricular cavity size is severely dilated with normal left ventricular wall thickness.   -Overall left ventricular systolic function appears low normal. Ejection fraction is visually estimated to be 55%. -Grade I diastolic dysfunction with normal LV filling pressures. -Mitral valve leaflets appear mild-moderately thickened.  -Severe mitral regurgitation directed eccentrically posterior with systolic flow reversal in pulmonary veins.   -Moderate aortic regurgitation.  -Trivial tricuspid regurgitation with a PASP of 25 mmHg.   -Trivial pulmonic regurgitation. ANA MARIA: 2022 Dr. [address]  Findings: severe MR, papillary chord rupture, severe aortic regurgitation, severe LV dilation  No vegetations seen. Preserved LVEF    CT CHEST W/O CONTRAST: 2022  1. Patchy airway secretions, including suspected secretions in the left lower  lobe bronchus and in the basilar segmental bronchi.  An underlying  endobronchial lesion is not excluded but considered less likely.  Consider  aspiration given patient status. 2. The sideport of a gastric tube is located at the gastroesophageal  junction.  Consider advancement. 3. Indeterminate bilateral renal lesions potentially due to proteinaceous  cyst or solid neoplasm.  Recommend further evaluation with renal protocol  abdomen MRI (preferred) or CT on a nonemergent basis.   4. Solid nodules in the bilateral lower lobes measure up to 0.7 cm and are  more likely infectious or inflammatory in etiology than neoplastic.  Assuming  no malignancy is found in the kidneys on the above recommended study, then  follow-up chest CT would be recommended in 3-6 months as below.  If  malignancy is found in the kidneys, then the below pulmonary nodule  recommendation would not apply. CT HEAD: 2/22/2022  BRAIN/VENTRICLES:  No masses nor acute intracranial hemorrhage. Unchanged meningeal thickening subjacent to the craniotomy.  Unchanged encephalomalacia in the left parietal and occipital lobes subjacent to the craniotomy with exvacuo dilation of the underlying occipital horn of the left lateral ventricle.  Unchanged minimal encephalomalacia in the right superior frontal gyrus.  Otherwise intact gray/white matter differentiation without findings of acute ischemia.  No mass effect nor midline shift.  Patent basilar  cisterns and foramen magnum.  No hydrocephalus.  Minimal cerebral atrophy.     ORBITS:  Normal without acute abnormality.     SINUSES:  Frothy secretions in the right maxillary sinus.  Visualized  portions otherwise appear normally pneumatized and aerated.     SOFT TISSUES/SKULL:  No acute soft tissue abnormality.  1.8 cm x 1.4 cm  epidermal inclusion cyst in the right parietal scalp near the vertex. Changes of left parieto-occipital craniotomy.  No acute fracture. MRI BRAIN WO CONTRAST: 1/15/2022  1. No acute intracranial abnormality.  No acute infarction. 2. Microangiopathic change. 3. Left parietal encephalomalacia and scattered remote lacunar infarcts are  Noted. CXRAY:  2/22/2022  1. Lines and tubes in expected position as above. 2. No acute cardiopulmonary abnormality.     [id number] Score for Atrial Fibrillation Stroke Risk   Risk   Factors  Component Value   C CHF No 0   H HTN Yes 1   A2 Age >= 76 No,  (62 y.o.) 0   D DM Yes 1   S2 Prior Stroke/TIA Yes 2   V Vascular Disease No 0   A Age 74-69 No,  (62 y.o.) 0   Sc Sex male 0    [id number]  Score  4   Score last updated 2/25/22 11:00 PM EST    Click here for a link to the UpToDate guideline \"Atrial Fibrillation: Anticoagulation therapy to prevent embolization    Disclaimer: Risk Score calculation is dependent on accuracy of patient problem list and past encounter diagnosis. ASSESSMENT AND PLAN:    The patient was counseled at length about the risks of kari Covid-19 during their perioperative period and any recovery window from their procedure. The patient was made aware that kari Covid-19  may worsen their prognosis for recovering from their procedure  and lend to a higher morbidity and/or mortality risk. All material risks, benefits, and reasonable alternatives including postponing the procedure were discussed. The patient does not wish to proceed with the procedure at this time. Patient with a complicated medical and surgical history. ANA MARIA done today and Dr. Julia Wilson found severe aortic and mitral regurgitation. No vegetations seen. Echocardiogram reviewed by Dr. Roshni Willard. Dr. Roshni Willard discussed surgical options with patient including risk/alternatives/benefits/expectaions. I pointed out he could only become higher risk for surgery if EF gets worse or hyacinth CHF begins. Patient endorses no shortness of breath, chest pain, or fatigue. Patient currently refusing left heart angiogram and open heart surgery at this time. Recommend maximizing medical treatment and it patient starts having symptoms, we will be happy to see him in the future. Thank you Dr. Julia Wilson for the consultation. Electronically signed by SENAIT Morales CNP on 2/25/2022 at 12:10 PM    As above (edited).     Electronically signed by Ralf Atkinson MD on 2/25/2022 at 2:48 PM

## 2022-02-25 NOTE — PRE SEDATION
Sedation Pre-Procedure Note    Patient Name: Anand Logan   YOB: 1960  Room/Bed: HJD-2672/5396-10  Medical Record Number: [id number]  Date: 2/25/2022   Time: 11:21 AM       Indication:  endocarditits /ANA MARIA    Consent: I have discussed with the patient and/or the patient representative the indication, alternatives, and the possible risks and/or complications of the planned procedure and the anesthesia methods. The patient and/or patient representative appear to understand and agree to proceed. 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 Past Surgical History:   has a past surgical history that includes Leg Surgery; craniotomy (Left, 02/03/2017); Brain aneurysm surgery (Left, 02/03/2017); Upper gastrointestinal endoscopy (N/A, 8/14/2019); and Colonoscopy (N/A, 8/14/2019).     Medications:   Scheduled Meds:    methadone  5 mg Oral BID    lacosamide  200 mg Oral BID    atorvastatin  10 mg Oral Nightly    enoxaparin  40 mg SubCUTAneous Daily    levETIRAcetam  1,500 mg Oral BID    vancomycin  1,000 mg IntraVENous Q12H     Continuous Infusions:    sodium chloride Stopped (02/23/22 1843)    propofol Stopped (02/23/22 0847)    fentaNYL Stopped (02/23/22 0850)     PRN Meds: sodium chloride flush, sodium chloride, magnesium sulfate, ondansetron, acetaminophen **OR** acetaminophen, potassium chloride **OR** potassium chloride, perflutren lipid microspheres, albuterol  Home Meds:   Prior to Admission medications    Medication Sig Start Date End Date Taking? Authorizing Provider   levETIRAcetam (KEPPRA) 500 MG tablet Take 500 mg by mouth daily Afternoon dose   Yes Historical Provider, MD   citalopram (CELEXA) 20 MG tablet Take 20 mg by mouth daily   Yes Historical Provider, MD   OLANZapine (ZYPREXA) 2.5 MG tablet Take 2.5 mg by mouth nightly   Yes Historical Provider, MD   levETIRAcetam (KEPPRA) 500 MG tablet Take 1,500 mg by mouth 2 times daily   Yes Historical Provider, MD   potassium chloride (KLOR-CON M) 20 MEQ extended release tablet Take 20 mEq by mouth 2 times daily   Yes Historical Provider, MD   Cholecalciferol (VITAMIN D3) 5000 units TABS Take by mouth daily   Yes Historical Provider, MD   lacosamide (VIMPAT) 200 MG tablet Take 200 mg by mouth 2 times daily. Yes Historical Provider, MD   calcium carbonate (TUMS) 500 MG chewable tablet Take 1 tablet by mouth 2 times daily    Yes Historical Provider, MD   methadone (DOLOPHINE) 5 MG tablet Take 5 mg by mouth 2 times daily. Yes Historical Provider, MD   acetaminophen (TYLENOL) 325 MG tablet Take 650 mg by mouth every 4 hours as needed for Pain    Historical Provider, MD   mirtazapine (REMERON) 7.5 MG tablet Take 7.5 mg by mouth nightly    Historical Provider, MD   atorvastatin (LIPITOR) 10 MG tablet Take 10 mg by mouth nightly  2/3/17   Historical Provider, MD     Coumadin Use Last 7 Days:  no  Antiplatelet drug therapy use last 7 days: no  Other anticoagulant use last 7 days: no  Additional Medication Information:  IV antibiotics      Pre-Sedation Documentation and Exam:   I have personally completed a history, physical exam & review of systems for this patient (see notes).     Mallampati Airway Assessment:  Mallampati Class I - (soft palate, fauces, uvula & anterior/posterior tonsillar pillars are visible),     Prior History of Anesthesia Complications:   none    ASA Classification:  Class 3 - A patient with severe systemic disease that limits activity but is not incapacitating    Sedation/ Anesthesia Plan:   intravenous sedation    Medications Planned:   midazolam (Versed) intravenously and fentanyl intravenously    Patient is an appropriate candidate for plan of sedation: yes    Electronically signed by Jose Prince MD on 2/25/2022 at 11:21 AM

## 2022-02-25 NOTE — CONSULTS
[address] [facility]  [phone]      Chief Complaint   Patient presents with    Other     Patient in by EMS from Care Core at the [facility]. Patient was found down, unknown how long and unresponsive, post ictal.        Reason for consult:  Severe mitral regurgitation, enterococcus bacteremia    History of Present Illness:  Randolph Owens is a 64 y.o. patient who presented to the hospital after being found down by home. I have been asked to provide consultation regarding further management and testing. Randolph Owens is a 64 y.o. male. He presented from Care [facility] at the [facility] by ambulance. He was found down and unresponsive by staff. He was supported noninvasively en route w/ breaths via BVM. He was intubated shortly after arrival primarily for airway protection considering his persistently depressed mental status. He is now extubated and on room air.     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 (Uncertain which hospital this was at - this was not at [facility]). He underwent craniotomy and aneurysm clipping at the time. 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 Per prior notes he is aphasic at baseline. However he is able to carry on a normal conversation at this time, except he denies any history of epilepsy and doesn't think he has had a craniotomy. He has no chest pain or shortness of breath. He was found to have enterococcus bacteremia. Echo done yesterday showed severe MR. We have been asked to consult regarding ANA MARIA and other additional treatment. No fever/chills. No cough. No rashes. I explained to patient what a ANA MARIA is and he thinks he had one before, but can't remember when it was. I can't find any records in Care Everywhere. I presume it was probably done in 2017. He doesn't think he had heart surgery before.   He has no scar on his chest and no sternal wires on CXR. Past Medical History:   has a past medical history of Acute intracerebral hemorrhage ([facility] Anxiety, Back pain, Cerebral artery occlusion with cerebral infarction [facility]), Chronic back pain greater than 3 months duration, Diabetes mellitus ([facility] Dysphasia, Dysphonia, Endocarditis and heart valve disorders in diseases classified elsewhere, Hydrocephalus ([facility] Hypertension, [facility]ra[facility] hemorrhage) [facility] NSTEMI (non-ST elevated myocardial infarction) ([facility] and Seizures ([facility] Surgical History:   has a past surgical history that includes Leg Surgery; craniotomy (Left, 02/03/2017); Brain aneurysm surgery (Left, 02/03/2017); Upper gastrointestinal endoscopy (N/A, 8/14/2019); and Colonoscopy (N/A, 8/14/2019). Social History:   reports that he has been smoking cigarettes. He has been smoking about 0.50 packs per day. He has never used smokeless tobacco. He reports that he does not drink alcohol and does not use drugs. Family History:  Unknown    Home Medications:  Were reviewed and are listed in nursing record. and/or listed below  Prior to Admission medications    Medication Sig Start Date End Date Taking? Authorizing Provider   levETIRAcetam (KEPPRA) 500 MG tablet Take 500 mg by mouth daily Afternoon dose   Yes Historical Provider, MD   citalopram (CELEXA) 20 MG tablet Take 20 mg by mouth daily   Yes Historical Provider, MD   OLANZapine (ZYPREXA) 2.5 MG tablet Take 2.5 mg by mouth nightly   Yes Historical Provider, MD   levETIRAcetam (KEPPRA) 500 MG tablet Take 1,500 mg by mouth 2 times daily   Yes Historical Provider, MD   potassium chloride (KLOR-CON M) 20 MEQ extended release tablet Take 20 mEq by mouth 2 times daily   Yes Historical Provider, MD   Cholecalciferol (VITAMIN D3) 5000 units TABS Take by mouth daily   Yes Historical Provider, MD   lacosamide (VIMPAT) 200 MG tablet Take 200 mg by mouth 2 times daily.    Yes Historical Provider, MD   calcium carbonate (TUMS) 500 MG chewable tablet Take 1 tablet by mouth 2 times daily    Yes Historical Provider, MD   methadone (DOLOPHINE) 5 MG tablet Take 5 mg by mouth 2 times daily.    Yes Historical Provider, MD   acetaminophen (TYLENOL) 325 MG tablet Take 650 mg by mouth every 4 hours as needed for Pain    Historical Provider, MD   mirtazapine (REMERON) 7.5 MG tablet Take 7.5 mg by mouth nightly    Historical Provider, MD   atorvastatin (LIPITOR) 10 MG tablet Take 10 mg by mouth nightly  2/3/17   Historical Provider, MD        Current Medications:  Current Facility-Administered Medications   Medication Dose Route Frequency Provider Last Rate Last Admin    methadone (DOLOPHINE) tablet 5 mg  5 mg Oral BID Bailey Siu MD   5 mg at 02/24/22 2113    lacosamide (VIMPAT) tablet 200 mg  200 mg Oral BID Bailey Siu MD   200 mg at 02/24/22 2113    atorvastatin (LIPITOR) tablet 10 mg  10 mg Oral Nightly Bailey Siu MD   10 mg at 02/24/22 2113    sodium chloride flush 0.9 % injection 10 mL  10 mL IntraVENous PRN Bailey Siu MD   10 mL at 02/24/22 0831    0.9 % sodium chloride infusion  25 mL IntraVENous PRN Bailey Siu MD   Stopped at 02/23/22 1843    magnesium sulfate 1000 mg in dextrose 5% 100 mL IVPB  1,000 mg IntraVENous PRN Bailey Siu MD        enoxaparin (LOVENOX) injection 40 mg  40 mg SubCUTAneous Daily Bailey Siu MD   40 mg at 02/24/22 0831    ondansetron (ZOFRAN) injection 4 mg  4 mg IntraVENous Q6H PRN Bailey Siu MD        acetaminophen (TYLENOL) tablet 650 mg  650 mg Oral Q4H PRN Bailey Siu MD   650 mg at 02/25/22 2985    Or    acetaminophen (TYLENOL) suppository 650 mg  650 mg Rectal Q4H PRN Bailey Siu MD        potassium chloride 20 mEq/50 mL IVPB (Central Line)  20 mEq IntraVENous PRN Bailey Siu MD        Or    potassium chloride 10 mEq/100 mL IVPB (Peripheral Line)  10 mEq IntraVENous PRN Bailey Siu MD        levETIRAcetam (KEPPRA) 100 MG/ML solution 1,500 mg  1,500 mg Oral BID Kecia Menon MD   1,500 mg at 02/24/22 2113    vancomycin 1000 mg IVPB in 250 mL D5W addavial  1,000 mg IntraVENous Q12H Isaac Owen MD   Stopped at 02/25/22 5454    perflutren lipid microspheres (DEFINITY) injection 1.65 mg  1.5 mL IntraVENous ONCE PRN Isaac Owen MD        albuterol (PROVENTIL) nebulizer solution 2.5 mg  2.5 mg Nebulization Q4H PRN Isaac Owen MD        propofol injection  5-50 mcg/kg/min IntraVENous Titrated Chris Bingham MD   Stopped at 02/23/22 8057    fentaNYL 10 mcg/mL infusion   mcg/hr IntraVENous Continuous Chris Bingham MD   Stopped at 02/23/22 0994        Allergies:  No known allergies and Seasonal     Review of Systems:     · Constitutional: there has been no unanticipated weight loss. There's been no change in energy level, sleep pattern, or activity level. · Eyes: No visual changes or diplopia. No scleral icterus. · ENT: No Headaches, hearing loss or vertigo. No mouth sores or sore throat. · Cardiovascular: Reviewed in HPI  · Respiratory: No cough or wheezing, no sputum production. No hematemesis. · Gastrointestinal: No abdominal pain, appetite loss, blood in stools. No change in bowel or bladder habits. · Genitourinary: No dysuria, trouble voiding, or hematuria. · Musculoskeletal:  No gait disturbance, weakness or joint complaints. · Integumentary: No rash or pruritis. · Neurological: No headache, diplopia, change in muscle strength, numbness or tingling. No change in gait, balance, coordination, mood, affect, memory, mentation, behavior. · Psychiatric: No anxiety, no depression. · Endocrine: No malaise, fatigue or temperature intolerance. No excessive thirst, fluid intake, or urination. No tremor. · Hematologic/Lymphatic: No abnormal bruising or bleeding, blood clots or swollen lymph nodes. · Allergic/Immunologic: No nasal congestion or hives.   ·     Physical Examination:    Vitals:    02/25/22 0800   BP: 130/68   Pulse: 74   Resp: 15   Temp:    SpO2: 95%    Weight: 155 lb 6.8 oz (70.5 kg)         General Appearance:  Alert, cooperative, no distress, appears stated age   Head:  Normocephalic, without obvious abnormality, atraumatic   Eyes:  PERRL, conjunctiva/corneas clear       Nose: Nares normal, no drainage or sinus tenderness   Throat: Lips, mucosa, and tongue normal   Neck: Supple, symmetrical, trachea midline, no adenopathy, thyroid: not enlarged, symmetric, no tenderness/mass/nodules, no carotid bruit or JVD       Lungs:   Clear to auscultation bilaterally, respirations unlabored   Chest Wall:  No tenderness or deformity   Heart:  Regular rate and rhythm, S1, S2 normal, III/VI HSM loudest at apex   Abdomen:   Soft, non-tender, bowel sounds active all four quadrants,  no masses, no organomegaly           Extremities: Extremities normal, atraumatic, no cyanosis or edema   Pulses: 2+ and symmetric   Skin: Skin color, texture, turgor normal, no rashes or lesions   Psych: Normal mood and affect   Neurologic: Normal gross motor and sensory exam.         Labs  Lab Results   Component Value Date    PROBNP 759 02/22/2022       No results found for: CHOL, TRIG, HDL, LDLCALC, LABVLDL      CBC:   Lab Results   Component Value Date    WBC 5.4 02/25/2022    RBC 3.60 02/25/2022    HGB 12.1 02/25/2022    HCT 34.7 02/25/2022    MCV 96.4 02/25/2022    RDW 12.5 02/25/2022     02/25/2022     CMP:    Lab Results   Component Value Date     02/25/2022    K 3.7 02/25/2022    K 4.7 02/22/2022     02/25/2022    CO2 27 02/25/2022    BUN 14 02/25/2022    CREATININE 0.5 02/25/2022    GFRAA >60 02/25/2022    GFRAA >60 12/13/2011    AGRATIO 1.2 02/25/2022    LABGLOM >60 02/25/2022    GLUCOSE 104 02/25/2022    PROT 6.4 02/25/2022    PROT 7.1 12/13/2011    CALCIUM 9.1 02/25/2022    BILITOT 0.6 02/25/2022    ALKPHOS 67 02/25/2022    AST 25 02/25/2022    ALT 20 02/25/2022     PT/INR:  No results found for: PTINR  Lab Results   Component Value Date    CKTOTAL 73 02/22/2022    TROPONINI <0.01 02/22/2022       EKG:  I have reviewed EKG with the following interpretation:  Impression:      Normal sinus rhythm  Left axis deviation  Voltage criteria for left ventricular hypertrophy  Cannot rule out Septal infarct , age undetermined  Inferior infarct (cited on or before 08-FEB-2017)  Abnormal ECG  When compared with ECG of 24-FEB-2022 08:26,  Serial changes of Inferior infarct Present        Echo:     -Left ventricular cavity size is severely dilated with normal left ventricular wall thickness.  -Overall left ventricular systolic function appears low normal. Ejection fraction is visually estimated to be 55%. -Grade I diastolic dysfunction with normal LV filling pressures. -Mitral valve leaflets appear mild-moderately thickened.  -Severe mitral regurgitation directed eccentrically posterior with systolic flow reversal in pulmonary veins.  -Moderate aortic regurgitation.  -Trivial tricuspid regurgitation with a PASP of 25 mmHg.  -Trivial pulmonic regurgitation.     Cath: unknown, none on file here    Old notes reviewed  Telemetry reviewd  Ekg personally reviewed  Chest xray personally reviewed  Echo, Medications and labs reviewed    High complexity/medical decision making due to extensive data review, extensive history review, independent review of data    High risk due to acute illness, evaluation of drug-drug interactions, medication management and diagnostic interventions    Assessment  Patient Active Problem List   Diagnosis    Psychosis ([address]    Acute intracerebral hemorrhage ([address]    Dysphagia    Dysphonia    Endocarditis, sumit and aortic valves    Endocarditis    Mycotic aneurysm due to bacterial endocarditis    Bacterial infection due to Streptococcus mutans    Type 2 diabetes mellitus with other specified complication ([address]    Protein calorie malnutrition ([address]    Seizure ([address]    Partial symptomatic epilepsy with complex partial seizures, intractable, without status epilepticus ([address]    Abnormal CT of the head    Breakthrough seizure ([address]    History of stroke    Aphasia    NATALIA (acute kidney injury) ([address]    Lactic acidosis    Unresponsive    Partial idiopathic epilepsy with seizures of localized onset, intractable, with status epilepticus ([address]    Acute respiratory failure with hypoxia ([address]    Sepsis ([address]    Marcelo coma scale total score 3-8 (HCC)    Lung nodules    Essential hypertension    Enterococcal bacteremia           Assessment/Plan:  Acute respiratory failure with hypoxia (HCC) - resolved    Sepsis ([address] - improving on antibiotics     Essential hypertension      Enterococcal bacteremia      Severe MR/moderate AR with severely dilated LV and moderately dilated LA       Mild elevation of ntproBNP (759) at admission       Inferior infarct by EKG, possible old MI versus EKG artifact    PLAN  1. Will get ANA MARIA today. I had a a long discussion with patient about indications for ANA MARIA and high likelihood of needing cardiac surgery or possibly a swati clip. He initially vehemently stated \"I'd rather die than have heart surgery. \"  However, after discussing indications of ANA MARIA and potential benefits of ANA MARIA, as well as several bad things that may be more likely to occur without the information ANA MARIA would bring (I.e. help determine need for surgery, timing of surgery, length of antibiotics. . risk of embolic stroke if he has a large vegetation that we don't know about since we can't see it if he doesn't consent to ANA MARIA), he eventually consented to ANA MARIA. He would like to hold off on cath at this time. 2. I suspect his MR may be a longstanding problem given the severity of LV dilation, especially given that his LV function is still preserved. If he is found not to have vegetations he may be a candidate for MitraClip as an outpatient after an extended course of antibiotics.     3. Will need to address his AR by ANA MARIA and also rule out an aortic root abscess. I will address the patient's cardiac risk factors and adjusted pharmacologic treatment as needed. In addition, I have reinforced the need for patient directed risk factor modification. Thank you for allowing to us to participate in the care or Kiersten Sarmiento. Further evaluation will be based upon the patient's clinical course and testing results. All questions and concerns were addressed to the patient/family. Alternatives to my treatment were discussed. The note was completed using EMR. Every effort was made to ensure accuracy; however, inadvertent computerized transcription errors may be present.       Carlos Alberto Best MD, MD 2/25/2022 8:42 AM

## 2022-02-25 NOTE — PROGRESS NOTES
Physical Therapy    Facility/Department: [facility] ICU  Initial Assessment/Discharge Summary    NAME: Iggy Prado  : 1960  MRN: [id number]    Date of Service: 2022    Discharge Recommendations:Dakota Luciano scored a 24/24 on the AM-PAC short mobility form. At this time, no further PT is recommended upon discharge due to pt being independent with functional mobility. Recommend patient returns to prior setting with prior services in LTC setting. Pt requires supervision due to cognitive deficits. PT Equipment Recommendations  Equipment Needed: No    Assessment   Body structures, Functions, Activity limitations: Decreased cognition  Assessment: Pt demonstrated good activity tolerance, no LOB, no dizziness or SOB. Pt is supervision-indepedent with all mobility, requiring supervision due to cognitive deficits. Pt does not require further skilled PT services. Recommend discharge back to facility. Treatment Diagnosis: impaired cognition  Prognosis: Good  Decision Making: Low Complexity  PT Education: PT Role  Patient Education: Pt verbalized understanding; pt was pleasant and agreeable with PT/OT  Barriers to Learning: cognitive  REQUIRES PT FOLLOW UP: No  Activity Tolerance  Activity Tolerance: Patient Tolerated treatment well  Activity Tolerance: sit QC=112/75, no dizziness, HR in 120s with activity; HR in up to 140's; supine in bed BP: 133/66, HR 92, SpO2=96%       Patient Diagnosis(es): The primary encounter diagnosis was Unresponsive. Diagnoses of Kinde coma scale total score 3-8, at arrival to emergency department [facility]), Seizure [facility]), and Lactic acidosis were also pertinent to this visit.      has a past medical history of Acute intracerebral hemorrhage ([facility] Anxiety, Back pain, Cerebral artery occlusion with cerebral infarction [facility]), Chronic back pain greater than 3 months duration, Diabetes mellitus ([facility] Dysphasia, Dysphonia, Endocarditis and heart valve disorders in diseases classified elsewhere, Hydrocephalus ([facility] Hypertension, IVH (intraventricular hemorrhage) ([facility] NSTEMI (non-ST elevated myocardial infarction) ([facility] and Seizures ([facility] has a past surgical history that includes Leg Surgery; craniotomy (Left, 02/03/2017); Brain aneurysm surgery (Left, 02/03/2017); Upper gastrointestinal endoscopy (N/A, 8/14/2019); and Colonoscopy (N/A, 8/14/2019). Restrictions  Restrictions/Precautions  Restrictions/Precautions: High Fall Risk; Seizure precautions-pt returned to bed with seizure pads up after PT evaluation  Position Activity Restriction  Sternal Precautions: Nurse stated OK to get out of bed, but wants return to bed. No longer in seizure precations. Other position/activity restrictions: Date of Admission: 2/22/2022 Chief Complaint:  Patient in by EMS from [facility] the [facility]. Patient was found down, unknown how long and unresponsive, post ictal Hospital Course: Melissa Mitchell is a 64 y.o. male. He presented from [facility] at the [facility] by ambulance. He was found down and unresponsive by staff. He was supported noninvasively en route w/ breaths via BVM. He was intubated shortly after arrival primarily for airway protection considering his persistently depressed mental status. 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 He underwent craniotomy and aneurysm clipping at the time. 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 Per prior notes he is aphasic at baseline. Vision/Hearing  Vision: Within Functional Limits  Hearing: Exceptions to [facility]  Hearing Exceptions: Hard of hearing/hearing concerns; No hearing aid       Subjective  General  Chart Reviewed: Yes  Family / Caregiver Present: No  Diagnosis: Lactic Acidosis; Breakthrough Seizures  Follows Commands: Impaired  Other (Comment): pt able to follow most simple commands; difficulty with more complex and new activity with neuro testing; able to follow most demonstration cues  General Comment  Comments: Pt supine in bed upon arrival; agreeable to PT/OT  Subjective  Subjective: Pt reports chronic back pain; nursing aware; no reports of pain with mobility  Pain Screening  Patient Currently in Pain: Yes  Pain Assessment  Pain Assessment: 0-10  Pain Level: 4  Pain Type: Chronic pain  Pain Location: Back  Vital Signs  Patient Currently in Pain: Yes       Orientation  Orientation  Overall Orientation Status: Impaired  Orientation Level: Disoriented to time;Oriented to person;Disoriented to situation (Pt could state \"hospital\")     Social/Functional History  Social/Functional History  Type of Home: Facility ([facility] at the [place]; Long term care)  Bathroom Shower/Tub: Walk-in shower  ADL Assistance: Independent  Homemaking Responsibilities: No  Ambulation Assistance: Independent  Transfer Assistance: Independent  Active : No  Occupation: Retired  Type of occupation:   Leisure & Hobbies: waching tv  IADL Comments: Does not perform  Additional Comments: Patient is a LTC resident at [facility] [place].  Patient reports is indep with ADLs, does not perform any IADLs, ambulates without a device    Objective     Observation/Palpation  Observation: sit UN=755/75, no dizziness, HR in 120s with activity; HR in up to 140's; supine in bed BP: 133/66, HR 92, SpO2=96%    AROM RLE (degrees)  RLE AROM: WFL  AROM LLE (degrees)  LLE AROM : WFL  Strength RLE  Strength RLE: WFL  Strength LLE  Strength LLE: WFL  Tone RLE  RLE Tone: Normotonic  Tone LLE  LLE Tone: Normotonic  Coordination  Rapid Alternating Movements: Normal (toe tapping wfls)  Heel to Garcia:  (unable to complete due to motor planning, cognitive deficits)  Sensation  Overall Sensation Status: WFL  Bed mobility  Supine to Sit: Modified independent  Sit to Supine: Modified independent  Scooting: Modified independent  Transfers  Sit to Stand: Modified independent  Stand to sit: Modified independent  Ambulation  Ambulation?: Yes  Ambulation 1  Surface: level tile  Device: No Device  Assistance: Supervision  Gait Deviations: Decreased arm swing  Distance: 600 ft  Stairs/Curb  Stairs?: No     Balance  Posture: Good  Sitting - Static: Good  Sitting - Dynamic: Good  Standing - Static: Good  Standing - Dynamic: Good  Comments: Pt demonstrated good balance throughout session; able to reach all directions within CODY, reach towards the floor, don pull up, ADLs standing at sink; no LOB or dizziness        Plan   Plan  Times per week: discharge  Safety Devices  Type of devices:  All fall risk precautions in place,Call light within reach,Bed alarm in place,Gait belt,Patient at risk for falls,Left in bed,Nurse notified Deena Tsang RN)             AM-PAC Score  AM-PAC Inpatient Mobility Raw Score : 24 (02/25/22 0937)  AM-PAC Inpatient T-Scale Score : 61.14 (02/25/22 0937)  Mobility Inpatient CMS 0-100% Score: 0 (02/25/22 0937)  Mobility Inpatient CMS G-Code Modifier : [facility] (02/25/22 0604)        Therapy Time   Individual Concurrent Group Co-treatment   Time In 0843         Time Out 0924         Minutes 41         Timed Code Treatment Minutes: 1995 [facility], [address] [facility], [address] [facility]

## 2022-02-25 NOTE — PROGRESS NOTES
Speech Language Pathology  Dysphagia and Cognitive Treatment Note    Name:  Jack Stone  :   1960  Medical Diagnosis:  Lactic acidosis [E87.2]  Seizure ([facility] [R56.9]  Unresponsive [R41.89]  Breakthrough seizure ([facility] [G40.919]  Marcelo coma scale total score 3-8, at arrival to emergency department [facility]) [E15.4331]  Treatment Diagnosis: Oropharyngeal Dysphagia  Pain level:  Reported chronic back pain    Diet level prior to treatment: Advance diet to regular with thins/no straws/meds 1-2 at a time with water  Tolerance of Current Diet Level:  Per chart review and RN report, no noted difficulty with current diet. Assessment of Texture Tolerance:  -Impressions: Pt alert and upright in bed following return from ANA MARIA procedure. Pt accepting of thin liquids via cup and regular solids to assess texture tolerance. Thin liquids via cup revealed suspected premature spillage to pharynx and a mildly delayed swallow initiation. Functional mastication and effective oral clearance achieved with regular solids. No overt clinical symptoms of aspiration assessed with both consistencies. Diet and Treatment Recommendations 2022:  Recommend continuation of regular textures with thins/no straws/meds 1-2 at a time with water    Compensatory strategies:  Alternate solids/liquids , Upright as possible with all PO intake , No straws , Eat/feed slowly    Dysphagia Goals:   1.) Pt will functionally tolerate recommended diet with no overt clinical s/s of aspiration (ongoing 2022)   2.) Pt will demonstrate understanding of aspiration risk and precautions via education/demonstration with occasional prompting (ongoing 2022)   3.) Pt will advance to least restrictive diet as indicated (ongoing 2022)   4.) If clinical s/s of aspiration/penetration continue to be noted, Pt will participate in Modified Barium Swallow Study (ongoing 2022)     Cognitive Impressions/Treatment Goals:  Pt alert and oriented to person independently and month and year given f/2. Pt reported that he was at home and when SLP verbalized that pt was at [facility] in the ICU, he was able to report that this was not his home. Pt unable to recall procedure completed this date and reasoning for procedure despite max cues however pt was able to report that he did not have breakfast or lunch. Suspect cognition is nearing baseline at this time. Recommend continuation of POC for return to PLOF. Goals:   1.) Pt will improve orientation to x4, for improved awareness of surroundings and reduced confusion (ongoing 2/25/2022)   2.) Pt will improve short term recall of daily events and newly learned information via graded tasks, to 80%, for improved safety with dysphagia recommendations. (ongoing 2/25/2022)      Plan:  Continued daily Dysphagia treatment with goals per plan of care. Patient/Family Education:Education given to the Pt and nurse, who verbalized understanding    Discharge Recommendations:  Pt will benefit from continued skilled Speech Therapy for Dysphagia services, prior to returning home. Timed Code Treatment: 10 min    Total Treatment Time: 23 min    If patient discharges prior to next session this note will serve as a discharge summary.      Christina Hall M.A., Shy Rocha [id number]  Speech-Language Pathologist  2/25/2022 2:24 PM

## 2022-02-25 NOTE — PROGRESS NOTES
Occupational Therapy   Occupational Therapy Initial Assessment and Discharge Summary  Date: 2022   Patient Name: Nilson Glaser  MRN: [id number]     : 1960    Date of Service: 2022    Discharge Recommendations: Nislon Glaser scored a 22/24 on the AM-PAC ADL Inpatient form. At this time, no further OT is recommended upon discharge due to baseline for ADLs. Recommend patient returns to prior setting with prior services. ECF without OT   OT Equipment Recommendations  Equipment Needed: No    Assessment   Assessment: Patient admitted due to lactic acidosis, found unresponsive at LT facility. Patient is back to baseline for ADLs and mobility  Treatment Diagnosis: Debility due to lactic acidosis, unresponsive, probable seizure  Prognosis: Good  Decision Making: Medium Complexity  History: LTC resident, ambulated without device, reports indep with ADLs, cognitive deficits  Exam: Supervision/setup provided due to probable seizure  Assistance / Modification: Stable presentation  OT Education: OT Role;Plan of Care  Patient Education: Patient educated on ADLs and mobility-- needs repetition due to cognitive deficits  Barriers to Learning: Cognition  No Skilled OT: At baseline function; Safe to return home; No OT goals identified  REQUIRES OT FOLLOW UP: No  Activity Tolerance  Activity Tolerance: Patient Tolerated treatment well  Safety Devices  Safety Devices in place: Yes  Type of devices: All fall risk precautions in place;Nurse notified; Bed alarm in place;Call light within reach; Left in bed  Nurse stated to place back in bed with all bed rails up with seizure pads. Seizure precautions in place. Restraints  Initially in place: No           Patient Diagnosis(es): The primary encounter diagnosis was Unresponsive. Diagnoses of Marcelo coma scale total score 3-8, at arrival to emergency department [facility]), Seizure [facility]), and Lactic acidosis were also pertinent to this visit.      has a past medical history of Acute intracerebral hemorrhage (HCC), Anxiety, Back pain, Cerebral artery occlusion with cerebral infarction [facility]), Chronic back pain greater than 3 months duration, Diabetes mellitus ([facility] [address] Dysphasia, Dysphonia, Endocarditis and heart valve disorders in diseases classified elsewhere, Hydrocephalus ([facility] [address] Hypertension, IVH (intraventricular hemorrhage) ([facility] [address] NSTEMI (non-ST elevated myocardial infarction) ([facility] [address] and [facility]ures [facility] has a past surgical history that includes Leg Surgery; craniotomy (Left, 02/03/2017); Brain aneurysm surgery (Left, 02/03/2017); Upper gastrointestinal endoscopy (N/A, 8/14/2019); and Colonoscopy (N/A, 8/14/2019). Treatment Diagnosis: Debility due to lactic acidosis, unresponsive, probable seizure      Restrictions  Restrictions/Precautions  Restrictions/Precautions: Fall Risk  Position Activity Restriction  Sternal Precautions: Nurse stated OK to get out of bed, but wants return to bed. Return to bed at end of session with 4 bed rails up and seizure pads on bed. Other position/activity restrictions: Date of Admission: 2/22/2022 Chief Complaint:  Patient in by EMS from [facility]. Patient was found down, unknown how long and unresponsive, post ictal Hospital Course: Maxi Marin is a 64 y.o. male. He presented from [facility] by ambulance. He was found down and unresponsive by staff. He was supported noninvasively en route w/ breaths via BVM. He was intubated shortly after arrival primarily for airway protection considering his persistently depressed mental status. 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 He underwent craniotomy and aneurysm clipping at the time. 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 Per prior notes he is aphasic at baseline. Subjective   General  Chart Reviewed:  Yes  Additional Pertinent Hx: h/o CVA, hydrocephalus, seizures, craniotomy  Family / Caregiver Present: No  Diagnosis: Lactic acidosis, probable seizure  Subjective  Subjective: Patient pleasant and cooperative. Poor historian  Patient Currently in Pain: Yes  Pain Assessment  Pain Assessment: 0-10  Pain Level: 4  Pain Type: Chronic pain  Pain Location: Back  Oxygen Therapy  SpO2: 95 %  Pulse Oximeter Device Mode: Continuous  Pulse Oximeter Device Location: Finger  O2 Device: None (Room air)  Skin Assessment: Clean, dry, & intact  O2 Flow Rate (L/min): 0 L/min        Social/Functional History  Social/Functional History  Type of Home: Facility ([facility]; Long term care)  Bathroom Shower/Tub: Walk-in shower  ADL Assistance: Independent  Homemaking Responsibilities: No  Ambulation Assistance: Independent  Transfer Assistance: Independent  Active : No  Occupation: Retired  Type of occupation:   Leisure & Hobbies: waching tv  IADL Comments: Does not perform  Additional Comments: Patient is a LTC resident at [facility].  Patient reports is indep with ADLs, does not perform any IADLs, ambulates without a device       Objective        Orientation  Overall Orientation Status: Impaired  Orientation Level: Disoriented to situation;Disoriented to time;Disoriented to place;Oriented to person (knew in a hospital but not the name of hospital)  Observation/Palpation  Observation: sit RI=692/75, no dizziness, HR in 120s with activity; HR in up to 140's; supine in bed BP: 133/66, HR 92, SpO2=96%  Balance  Sitting Balance: Independent  Standing Balance: Supervision  Standing Balance  Time: @ 15 minutes  Activity: Ambulation 600 feet, stance at sink for sponge bathing  Comment: Supervision, no loss of balance  Functional Mobility  Functional - Mobility Device: No device  Activity: To/from bathroom  Assist Level: Supervision  Toilet Transfers  Toilet - Technique: Ambulating  Equipment Used: Standard toilet  Toilet Transfer: Supervision  ADL  Feeding: Independent  Grooming: Independent (brushing teeth standing at sink)  UE Bathing: Setup  LE Bathing: Setup  UE Dressing: Setup (gown)  LE Dressing: Setup (pullup attends, socks)  Toileting: Setup  Additional Comments: Patient stood at sink for brushing teeth, combing hair, sponge bathing at sink. Supervision needed, no assist provided. Tone RUE  RUE Tone: Normotonic  Tone LUE  LUE Tone: Normotonic  Coordination  Movements Are Fluid And Coordinated: Yes           Vision - Basic Assessment  Prior Vision: No visual deficits  Patient Visual Report: No visual complaint reported. Cognition  Overall Cognitive Status: Exceptions  Arousal/Alertness: Appropriate responses to stimuli  Following Commands: Follows one step commands consistently; Follows multistep commands consistently  Attention Span: Appears intact  Memory: Decreased recall of recent events;Decreased short term memory  Safety Judgement: Decreased awareness of need for assistance;Decreased awareness of need for safety  Insights: Decreased awareness of deficits  Cognition Comment: Patient with h/o craniotomy s/p acute intracerbral hemorrhage, decreased STM, oriented to name and knew in hospital only. LTC patient at a nursing home.   Perception  Overall Perceptual Status: WFL     Sensation  Overall Sensation Status: WFL        LUE AROM (degrees)  LUE AROM : WFL  Left Hand AROM (degrees)  Left Hand AROM: WFL  RUE AROM (degrees)  RUE AROM : WFL  Right Hand AROM (degrees)  Right Hand AROM: WFL  LUE Strength  Gross LUE Strength: WFL  RUE Strength  Gross RUE Strength: WFL                   Plan   Plan  Times per week: no further OT indicated    G-Code     OutComes Score                                                  AM-PAC Score        AM-PAC Inpatient Daily Activity Raw Score: 22 (02/25/22 0941)  AM-PAC Inpatient ADL T-Scale Score : 47.1 (02/25/22 0941)  ADL Inpatient CMS 0-100% Score: 25.8 (02/25/22 0941)  ADL Inpatient CMS G-Code Modifier : Darek Andersen (02/25/22 0941)    Goals  Short term goals  Time Frame for Short term goals: No OT indicated, baseline  Patient Goals   Patient goals : Go back to [facility]       Therapy Time   Individual Concurrent Group Co-treatment   Time In 0845         Time Out 2474         Minutes 40              Timed Code Treatment Minutes:  25 Minutes    Total Treatment Minutes:  [address], OTR/L [address]

## 2022-02-25 NOTE — PROGRESS NOTES
Hospitalist Progress Note      PCP: Chino Coronado MD    Date of Admission: 2/22/2022    Chief Complaint:  Patient in by EMS from [facility] at the [facility]. Patient was found down, unknown how long and unresponsive, post ictal    Hospital Course:   Valerie Fernandez is a 64 y.o. male. He presented from [facility] at the [facility] by ambulance. He was found down and unresponsive by staff. He was supported noninvasively en route w/ breaths via BVM. He was intubated shortly after arrival primarily for airway protection considering his persistently depressed mental status.     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 He underwent craniotomy and aneurysm clipping at the time. 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 Per prior notes he is aphasic at baseline.        Subjective:  Has severe valve regurg   ANA MARIA with no VEG      Medications:  Reviewed    Infusion Medications    sodium chloride Stopped (02/23/22 1843)     Scheduled Medications    vancomycin  1,750 mg IntraVENous Q12H    methadone  5 mg Oral BID    lacosamide  200 mg Oral BID    atorvastatin  10 mg Oral Nightly    enoxaparin  40 mg SubCUTAneous Daily    levETIRAcetam  1,500 mg Oral BID     PRN Meds: sodium chloride flush, sodium chloride, magnesium sulfate, ondansetron, acetaminophen **OR** acetaminophen, potassium chloride **OR** potassium chloride, perflutren lipid microspheres, albuterol      Intake/Output Summary (Last 24 hours) at 2/25/2022 1707  Last data filed at 2/25/2022 1452  Gross per 24 hour   Intake 829 ml   Output 1405 ml   Net -576 ml       Physical Exam Performed:    BP (!) 127/109   Pulse 91   Temp 97.6 °F (36.4 °C) (Temporal)   Resp 23   Ht 5' 11\" (1.803 m)   Wt 155 lb 6.8 oz (70.5 kg)   SpO2 95%   BMI 21.68 kg/m²     General appearance: No apparent distress, appears stated age and cooperative.   HEENT: Pupils equal, round, and reactive to light. Conjunctivae/corneas clear. Neck: Supple, with full range of motion. No jugular venous distention. Trachea midline. Respiratory:  Normal respiratory effort. Clear to auscultation, bilaterally without Rales/Wheezes/Rhonchi. Cardiovascular: Regular rate and rhythm 2-3/6 murmur, no  rubs or gallops. Abdomen: Soft, non-tender, non-distended with normal bowel sounds. Musculoskeletal: No clubbing, cyanosis or edema bilaterally. Full range of motion without deformity. Skin: Skin color, texture, turgor normal.  No rashes or lesions. Neurologic:  Neurovascularly intact without any focal sensory/motor deficits. Cranial nerves: II-XII intact, grossly non-focal.  Psychiatric: Alert and oriented, thought content appropriate, normal insight  Capillary Refill: Brisk,3 seconds, normal   Peripheral Pulses: +2 palpable, equal bilaterally       Labs:   Recent Labs     02/23/22  0534 02/24/22  0417 02/25/22  0415   WBC 11.4* 7.2 5.4   HGB 11.7* 11.2* 12.1*   HCT 34.8* 33.1* 34.7*    211 210     Recent Labs     02/23/22  0534 02/24/22  0417 02/25/22  0415    140 139   K 4.8 3.9 3.7    105 104   CO2 24 28 27   BUN 27* 21* 14   CREATININE 0.8 0.6* 0.5*   CALCIUM 8.6 8.6 9.1     Recent Labs     02/23/22  0534 02/24/22  0417 02/25/22  0415   AST 25 31 25   ALT 22 20 20   BILITOT 0.5 0.8 0.6   ALKPHOS 63 64 67     Recent Labs     02/23/22  0534 02/24/22  0418 02/25/22  0415   INR 1.15* 1.23* 1.19*     Recent Labs     02/22/22 2039 02/25/22  1037   CKTOTAL 73  --    TROPONINI <0.01 <0.01       Urinalysis:      Lab Results   Component Value Date    NITRU Negative 02/22/2022    WBCUA 4 02/22/2022    BACTERIA 1+ 01/13/2022    RBCUA 11 02/22/2022    BLOODU MODERATE 02/22/2022    SPECGRAV 1.020 02/22/2022    GLUCOSEU Negative 02/22/2022       Radiology:  CT CHEST ABDOMEN PELVIS WO CONTRAST   Final Result   1.  Patchy airway secretions, including suspected secretions in the left lower   lobe bronchus and in the basilar segmental bronchi. An underlying   endobronchial lesion is not excluded but considered less likely. Consider   aspiration given patient status. 2. The sideport of a gastric tube is located at the gastroesophageal   junction. Consider advancement. 3. Indeterminate bilateral renal lesions potentially due to proteinaceous   cyst or solid neoplasm. Recommend further evaluation with renal protocol   abdomen MRI (preferred) or CT on a nonemergent basis. 4. Solid nodules in the bilateral lower lobes measure up to 0.7 cm and are   more likely infectious or inflammatory in etiology than neoplastic. Assuming   no malignancy is found in the kidneys on the above recommended study, then   follow-up chest CT would be recommended in 3-6 months as below. If   malignancy is found in the kidneys, then the below pulmonary nodule   recommendation would not apply. RECOMMENDATIONS:   Multiple pulmonary nodules. Most severe: 7 mm right solid pulmonary nodule. Recommend a non-contrast Chest CT at 3-6 months. If patient is high risk for   malignancy, recommend an additional non-contrast Chest CT at 18-24 months; if   patient is low risk for malignancy a non-contrast Chest CT at 18-24 months is   optional.      These guidelines do not apply to immunocompromised patients and patients with   cancer. Follow up in patients with significant comorbidities as clinically   warranted. For lung cancer screening, adhere to Lung-RADS guidelines. Reference: Radiology. 2017; 284(1):228-43. CT CERVICAL SPINE WO CONTRAST   Final Result   No acute fracture or traumatic malalignment of the cervical spine. CT HEAD WO CONTRAST   Final Result   1. No acute intracranial abnormality. 2. Chronic findings as above. XR CHEST PORTABLE   Final Result   1. Lines and tubes in expected position as above. 2. No acute cardiopulmonary abnormality.          MRI BRAIN WO CONTRAST (Results Pending)           Assessment/Plan:    Active Hospital Problems    Diagnosis     Sepsis ([address] [A41.9]     Marcelo coma scale total score 3-8 (HCC) [R40.2430]     Lung nodules [R91.8]     Essential hypertension [I10]     Enterococcal bacteremia [R78.81, B95.2]     NATALIA (acute kidney injury) ([address] [N17.9]     Lactic acidosis [E87.2]     Unresponsive [R41.89]     Partial idiopathic epilepsy with seizures of localized onset, intractable, with status epilepticus ([address] [G40.011]     Acute respiratory failure with hypoxia ([address] [J96.01]     Breakthrough seizure ([address] [G40.919]     History of stroke [Z86.73]     Partial symptomatic epilepsy with complex partial seizures, intractable, without status epilepticus ([address] [G40.219]     Seizure ([address] [R56.9]     DM (diabetes mellitus), type 2, uncontrolled with complications ([address] [K92.6, E11.65]      Break through seizure- not new. I have extensive seizure history  -Consult placed to neuro    Lactic acidosis  -Secondary to seizure activity and should improve with fluids and seizure having ceased. Positive blood cultures  -With gram-positive cocci doubling strep as well as Enterococcus DNA detected  -Started on IV vancomycin  -Previous history of endocarditis in the past  -Echo has been ordered to rule out endocarditis. ID CONSULTED  Likely will need 6-8 weeks of IV abx    SEVERE MITRAL REGURG.  - CHECK ANA MARIA.  - May have endocarditis vs leaflette issues,  - he will need valve surgery. Diabetes mellitus 2  -He is on sliding scale as well as long-acting insulin    NATALIA  -Creatinine has improved with appropriate hydration resolved at this point. DVT Prophylaxis: Lovenox  Diet: ADULT DIET; Regular  Code Status: Full Code    PT/OT Eval Status: On treatment  Dispo -we will monitor overnight in ICU and likely be downgraded if no further seizure activity.   Improved and able to transfer out of ICU    Gelacio Trevizo MD

## 2022-02-25 NOTE — PROGRESS NOTES
Speech Language Pathology  Dysphagia Treatment Note  Attempt     Name:  Nilson Berkowitz  :   1960  Medical Diagnosis:  Lactic acidosis [E87.2]  Seizure ([facility] [R56.9]  Unresponsive [R41.89]  Breakthrough seizure ([facility] [O81.349]  Marcelo coma scale total score 3-8, at arrival to emergency department [facility]) [U01.3517]      Attempted to see pt for follow up dysphagia therapy. Per RN, pt NPO and leaving floor for ANA MARIA. Will hold therapy and resume when pt is able to participate. RN reported pt took pills this morning without difficulty. Hillary Campos M.A.  CCC-SLP SDollyP. [id number]  Speech-Language Pathologist   2022 10:29 AM

## 2022-02-25 NOTE — PROGRESS NOTES
Brief immediate postprocedure note       ANA MARIA performed w/o complications      Anesthesia: 1% viscous lidocaine, cetacaine spray   Sedation: 4 mg IV versed   Analgesia: 50 mcg IV fentanyl     Probe inserted easily and removed easily   Findings: severe MR, papillary chord rupture, severe aortic regurgitation, severe LV dilation  No vegetations seen. Preserved LVEF     Complications: none   EBL: none   Specimens: N/A       CONSCIOUS SEDATION: Conscious sedation was given. The medication documented above was administered during the study. There was a trained observer present throughout the   entire time during which sedation was administered. Moderate sedation time   was 20 minutes.      Recommendations: CT surgery consult

## 2022-02-25 NOTE — PROGRESS NOTES
Morning assessment complete. Patient oriented to self and place. ANA MARIA this morning, consent obtained and in patient's chart. VSS. Worked with PT/OT and did very well. Medications given per MAR. SisterHAILE, updated after receiving consent over the phone. Will continue to monitor.

## 2022-02-26 ENCOUNTER — APPOINTMENT (OUTPATIENT)
Dept: MRI IMAGING | Age: 62
DRG: 871 | End: 2022-02-26
Payer: COMMERCIAL

## 2022-02-26 LAB
A/G RATIO: 1.3 (ref 1.1–2.2)
ALBUMIN SERPL-MCNC: 3.6 G/DL (ref 3.4–5)
ALP BLD-CCNC: 65 U/L (ref 40–129)
ALT SERPL-CCNC: 18 U/L (ref 10–40)
ANION GAP SERPL CALCULATED.3IONS-SCNC: 10 MMOL/L (ref 3–16)
AST SERPL-CCNC: 22 U/L (ref 15–37)
BASOPHILS ABSOLUTE: 0 K/UL (ref 0–0.2)
BASOPHILS RELATIVE PERCENT: 0.7 %
BILIRUB SERPL-MCNC: 0.6 MG/DL (ref 0–1)
BUN BLDV-MCNC: 13 MG/DL (ref 7–20)
CALCIUM SERPL-MCNC: 9.1 MG/DL (ref 8.3–10.6)
CHLORIDE BLD-SCNC: 104 MMOL/L (ref 99–110)
CO2: 28 MMOL/L (ref 21–32)
CREAT SERPL-MCNC: 0.6 MG/DL (ref 0.8–1.3)
EOSINOPHILS ABSOLUTE: 0.1 K/UL (ref 0–0.6)
EOSINOPHILS RELATIVE PERCENT: 1.2 %
GFR AFRICAN AMERICAN: >60
GFR NON-AFRICAN AMERICAN: >60
GLUCOSE BLD-MCNC: 102 MG/DL (ref 70–99)
HCT VFR BLD CALC: 36 % (ref 40.5–52.5)
HEMOGLOBIN: 12.6 G/DL (ref 13.5–17.5)
INR BLD: 1.21 (ref 0.88–1.12)
LYMPHOCYTES ABSOLUTE: 1.1 K/UL (ref 1–5.1)
LYMPHOCYTES RELATIVE PERCENT: 18.8 %
MAGNESIUM: 2 MG/DL (ref 1.8–2.4)
MCH RBC QN AUTO: 33.6 PG (ref 26–34)
MCHC RBC AUTO-ENTMCNC: 35 G/DL (ref 31–36)
MCV RBC AUTO: 95.9 FL (ref 80–100)
MONOCYTES ABSOLUTE: 0.4 K/UL (ref 0–1.3)
MONOCYTES RELATIVE PERCENT: 7.4 %
NEUTROPHILS ABSOLUTE: 4.1 K/UL (ref 1.7–7.7)
NEUTROPHILS RELATIVE PERCENT: 71.9 %
PDW BLD-RTO: 12.2 % (ref 12.4–15.4)
PLATELET # BLD: 205 K/UL (ref 135–450)
PMV BLD AUTO: 7.3 FL (ref 5–10.5)
POTASSIUM SERPL-SCNC: 3.8 MMOL/L (ref 3.5–5.1)
PROTHROMBIN TIME: 13.8 SEC (ref 9.9–12.7)
RBC # BLD: 3.76 M/UL (ref 4.2–5.9)
SODIUM BLD-SCNC: 142 MMOL/L (ref 136–145)
TOTAL PROTEIN: 6.4 G/DL (ref 6.4–8.2)
VANCOMYCIN RANDOM: 15.8 UG/ML
WBC # BLD: 5.7 K/UL (ref 4–11)

## 2022-02-26 PROCEDURE — 6370000000 HC RX 637 (ALT 250 FOR IP): Performed by: INTERNAL MEDICINE

## 2022-02-26 PROCEDURE — 2580000003 HC RX 258: Performed by: INTERNAL MEDICINE

## 2022-02-26 PROCEDURE — 85025 COMPLETE CBC W/AUTO DIFF WBC: CPT

## 2022-02-26 PROCEDURE — 80053 COMPREHEN METABOLIC PANEL: CPT

## 2022-02-26 PROCEDURE — 70551 MRI BRAIN STEM W/O DYE: CPT

## 2022-02-26 PROCEDURE — 85610 PROTHROMBIN TIME: CPT

## 2022-02-26 PROCEDURE — 94760 N-INVAS EAR/PLS OXIMETRY 1: CPT

## 2022-02-26 PROCEDURE — 6360000002 HC RX W HCPCS: Performed by: INTERNAL MEDICINE

## 2022-02-26 PROCEDURE — 2060000000 HC ICU INTERMEDIATE R&B

## 2022-02-26 PROCEDURE — 99232 SBSQ HOSP IP/OBS MODERATE 35: CPT | Performed by: PSYCHIATRY & NEUROLOGY

## 2022-02-26 PROCEDURE — 83735 ASSAY OF MAGNESIUM: CPT

## 2022-02-26 PROCEDURE — 80202 ASSAY OF VANCOMYCIN: CPT

## 2022-02-26 RX ADMIN — ENOXAPARIN SODIUM 40 MG: 40 INJECTION SUBCUTANEOUS at 09:18

## 2022-02-26 RX ADMIN — LACOSAMIDE 200 MG: 100 TABLET, FILM COATED ORAL at 21:24

## 2022-02-26 RX ADMIN — ACETAMINOPHEN 325MG 650 MG: 325 TABLET ORAL at 09:19

## 2022-02-26 RX ADMIN — LACOSAMIDE 200 MG: 100 TABLET, FILM COATED ORAL at 09:18

## 2022-02-26 RX ADMIN — LEVETIRACETAM 1500 MG: 100 SOLUTION ORAL at 09:18

## 2022-02-26 RX ADMIN — METHADONE HYDROCHLORIDE 5 MG: 10 TABLET ORAL at 21:24

## 2022-02-26 RX ADMIN — ATORVASTATIN CALCIUM 10 MG: 10 TABLET, FILM COATED ORAL at 21:24

## 2022-02-26 RX ADMIN — LEVETIRACETAM 1500 MG: 100 SOLUTION ORAL at 21:24

## 2022-02-26 RX ADMIN — VANCOMYCIN HYDROCHLORIDE 1750 MG: 10 INJECTION, POWDER, LYOPHILIZED, FOR SOLUTION INTRAVENOUS at 06:42

## 2022-02-26 RX ADMIN — METHADONE HYDROCHLORIDE 5 MG: 10 TABLET ORAL at 09:19

## 2022-02-26 RX ADMIN — VANCOMYCIN HYDROCHLORIDE 1500 MG: 10 INJECTION, POWDER, LYOPHILIZED, FOR SOLUTION INTRAVENOUS at 21:33

## 2022-02-26 RX ADMIN — SODIUM CHLORIDE 25 ML: 9 INJECTION, SOLUTION INTRAVENOUS at 21:32

## 2022-02-26 ASSESSMENT — PAIN SCALES - GENERAL
PAINLEVEL_OUTOF10: 0
PAINLEVEL_OUTOF10: 0
PAINLEVEL_OUTOF10: 4
PAINLEVEL_OUTOF10: 0
PAINLEVEL_OUTOF10: 5
PAINLEVEL_OUTOF10: 0

## 2022-02-26 ASSESSMENT — PAIN DESCRIPTION - ONSET: ONSET: GRADUAL

## 2022-02-26 ASSESSMENT — PAIN DESCRIPTION - LOCATION
LOCATION: HEAD
LOCATION: BACK

## 2022-02-26 ASSESSMENT — PAIN - FUNCTIONAL ASSESSMENT
PAIN_FUNCTIONAL_ASSESSMENT: ACTIVITIES ARE NOT PREVENTED
PAIN_FUNCTIONAL_ASSESSMENT: ACTIVITIES ARE NOT PREVENTED

## 2022-02-26 ASSESSMENT — PAIN DESCRIPTION - ORIENTATION: ORIENTATION: LOWER

## 2022-02-26 ASSESSMENT — PAIN DESCRIPTION - PAIN TYPE
TYPE: CHRONIC PAIN
TYPE: ACUTE PAIN

## 2022-02-26 ASSESSMENT — PAIN DESCRIPTION - FREQUENCY: FREQUENCY: CONTINUOUS

## 2022-02-26 NOTE — PROGRESS NOTES
Hospitalist Progress Note      PCP: Gelacio Zuluaga MD    Date of Admission: 2/22/2022    Chief Complaint:  Patient in by EMS from [facility] at the [facility]. Patient was found down, unknown how long and unresponsive, post ictal    Hospital Course:   Ricco Koehler is a 64 y.o. male. He presented from [facility] at the [facility] by ambulance. He was found down and unresponsive by staff. He was supported noninvasively en route w/ breaths via BVM. He was intubated shortly after arrival primarily for airway protection considering his persistently depressed mental status.     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 He underwent craniotomy and aneurysm clipping at the time. 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 Per prior notes he is aphasic at baseline.        Subjective:  Has severe valve regurg   ANA MARIA with no VEG  Went to MRI today.       Medications:  Reviewed    Infusion Medications    sodium chloride Stopped (02/23/22 1843)     Scheduled Medications    vancomycin  1,750 mg IntraVENous Q12H    methadone  5 mg Oral BID    lacosamide  200 mg Oral BID    atorvastatin  10 mg Oral Nightly    enoxaparin  40 mg SubCUTAneous Daily    levETIRAcetam  1,500 mg Oral BID     PRN Meds: sodium chloride flush, sodium chloride, magnesium sulfate, ondansetron, acetaminophen **OR** acetaminophen, potassium chloride **OR** potassium chloride, perflutren lipid microspheres, albuterol      Intake/Output Summary (Last 24 hours) at 2/26/2022 1157  Last data filed at 2/26/2022 1020  Gross per 24 hour   Intake 1563.82 ml   Output 1800 ml   Net -236.18 ml       Physical Exam Performed:    BP (!) 143/67   Pulse 80   Temp 97.3 °F (36.3 °C) (Tympanic)   Resp 18   Ht 5' 11\" (1.803 m)   Wt 154 lb 15.7 oz (70.3 kg)   SpO2 95%   BMI 21.62 kg/m²     General appearance: No apparent distress, appears stated age and cooperative. HEENT: Pupils equal, round, and reactive to light. Conjunctivae/corneas clear. Neck: Supple, with full range of motion. No jugular venous distention. Trachea midline. Respiratory:  Normal respiratory effort. Clear to auscultation, bilaterally without Rales/Wheezes/Rhonchi. Cardiovascular: Regular rate and rhythm 2-3/6 murmur, no  rubs or gallops. Abdomen: Soft, non-tender, non-distended with normal bowel sounds. Musculoskeletal: No clubbing, cyanosis or edema bilaterally. Full range of motion without deformity. Skin: Skin color, texture, turgor normal.  No rashes or lesions. Neurologic:  Neurovascularly intact without any focal sensory/motor deficits. Cranial nerves: II-XII intact, grossly non-focal.  Psychiatric: Alert and oriented, thought content appropriate, normal insight  Capillary Refill: Brisk,3 seconds, normal   Peripheral Pulses: +2 palpable, equal bilaterally       Labs:   Recent Labs     02/24/22  0417 02/25/22 0415 02/26/22 0411   WBC 7.2 5.4 5.7   HGB 11.2* 12.1* 12.6*   HCT 33.1* 34.7* 36.0*    210 205     Recent Labs     02/24/22  0417 02/25/22  0415 02/26/22  0411    139 142   K 3.9 3.7 3.8    104 104   CO2 28 27 28   BUN 21* 14 13   CREATININE 0.6* 0.5* 0.6*   CALCIUM 8.6 9.1 9.1     Recent Labs     02/24/22  0417 02/25/22  0415 02/26/22  0411   AST 31 25 22   ALT 20 20 18   BILITOT 0.8 0.6 0.6   ALKPHOS 64 67 65     Recent Labs     02/24/22  0418 02/25/22  0415 02/26/22  0411   INR 1.23* 1.19* 1.21*     Recent Labs     02/25/22  1037   TROPONINI <0.01       Urinalysis:      Lab Results   Component Value Date    NITRU Negative 02/22/2022    WBCUA 4 02/22/2022    BACTERIA 1+ 01/13/2022    RBCUA 11 02/22/2022    BLOODU MODERATE 02/22/2022    SPECGRAV 1.020 02/22/2022    GLUCOSEU Negative 02/22/2022       Radiology:  CT CHEST ABDOMEN PELVIS WO CONTRAST   Final Result   1.  Patchy airway secretions, including suspected secretions in the left lower lobe bronchus and in the basilar segmental bronchi. An underlying   endobronchial lesion is not excluded but considered less likely. Consider   aspiration given patient status. 2. The sideport of a gastric tube is located at the gastroesophageal   junction. Consider advancement. 3. Indeterminate bilateral renal lesions potentially due to proteinaceous   cyst or solid neoplasm. Recommend further evaluation with renal protocol   abdomen MRI (preferred) or CT on a nonemergent basis. 4. Solid nodules in the bilateral lower lobes measure up to 0.7 cm and are   more likely infectious or inflammatory in etiology than neoplastic. Assuming   no malignancy is found in the kidneys on the above recommended study, then   follow-up chest CT would be recommended in 3-6 months as below. If   malignancy is found in the kidneys, then the below pulmonary nodule   recommendation would not apply. RECOMMENDATIONS:   Multiple pulmonary nodules. Most severe: 7 mm right solid pulmonary nodule. Recommend a non-contrast Chest CT at 3-6 months. If patient is high risk for   malignancy, recommend an additional non-contrast Chest CT at 18-24 months; if   patient is low risk for malignancy a non-contrast Chest CT at 18-24 months is   optional.      These guidelines do not apply to immunocompromised patients and patients with   cancer. Follow up in patients with significant comorbidities as clinically   warranted. For lung cancer screening, adhere to Lung-RADS guidelines. Reference: Radiology. 2017; 284(1):228-43. CT CERVICAL SPINE WO CONTRAST   Final Result   No acute fracture or traumatic malalignment of the cervical spine. CT HEAD WO CONTRAST   Final Result   1. No acute intracranial abnormality. 2. Chronic findings as above. XR CHEST PORTABLE   Final Result   1. Lines and tubes in expected position as above. 2. No acute cardiopulmonary abnormality.          MRI BRAIN WO CONTRAST    (Results Pending)           Assessment/Plan:    Active Hospital Problems    Diagnosis     Sepsis ([address] [A41.9]     Marcelo coma scale total score 3-8 (HCC) [R40.2430]     Lung nodules [R91.8]     Essential hypertension [I10]     Enterococcal bacteremia [R78.81, B95.2]     NATALIA (acute kidney injury) ([address] [N17.9]     Lactic acidosis [E87.2]     Unresponsive [R41.89]     Partial idiopathic epilepsy with seizures of localized onset, intractable, with status epilepticus ([address] [G40.011]     Acute respiratory failure with hypoxia ([address] [J96.01]     Breakthrough seizure ([address] [G40.919]     History of stroke [Z86.73]     Partial symptomatic epilepsy with complex partial seizures, intractable, without status epilepticus ([address] [G40.219]     Seizure ([address] [R56.9]     DM (diabetes mellitus), type 2, uncontrolled with complications ([address] [X48.6, E11.65]      Break through seizure- not new. I have extensive seizure history  -Consult placed to neuro, MRI done today, await read    Lactic acidosis  -Secondary to seizure activity and should improve with fluids and seizure having ceased. Positive blood cultures  -With gram-positive cocci doubling strep as well as Enterococcus DNA detected  -Started on IV vancomycin  -Previous history of endocarditis in the past  -Echo has been ordered to rule out endocarditis. ID CONSULTED  Likely will need 6-8 weeks of IV abx    SEVERE MITRAL REGURG.  - CHECK ANA MARIA.  - May have endocarditis vs leaflette issues,  - he will need valve surgery. Diabetes mellitus 2  -He is on sliding scale as well as long-acting insulin    NATALIA  -Creatinine has improved with appropriate hydration resolved at this point. DVT Prophylaxis: Lovenox  Diet: ADULT DIET; Regular  Code Status: Full Code    PT/OT Eval Status: On treatment  Dispo -we will monitor overnight in ICU and likely be downgraded if no further seizure activity.   Improved and able to transfer out of ICU    Tarah Pace MD

## 2022-02-26 NOTE — PROGRESS NOTES
Clinical Pharmacy Note: Pharmacy to Dose Vancomcyin    Vancomycin Day: 4  Current Dosing Regimen: 1750 mg Q 12 H  Dosing Method: Bayesian Modeling    Random: 15.8    Recent Labs     02/25/22  0415 02/26/22  0411   BUN 14 13       Recent Labs     02/25/22  0415 02/26/22  0411   CREATININE 0.5* 0.6*       Recent Labs     02/25/22  0415 02/26/22  0411   WBC 5.4 5.7         Intake/Output Summary (Last 24 hours) at 2/26/2022 1255  Last data filed at 2/26/2022 1020  Gross per 24 hour   Intake 1563.82 ml   Output 1800 ml   Net -236.18 ml         Ht Readings from Last 1 Encounters:   02/26/22 5' 11\" (1.803 m)        Wt Readings from Last 1 Encounters:   02/26/22 154 lb 15.7 oz (70.3 kg)         Body mass index is 21.62 kg/m². Estimated Creatinine Clearance: 129 mL/min (A) (based on SCr of 0.6 mg/dL (L)). Assessment/Plan:  Vancomycin level is supratherapeutic. Level was drawn appropriately in respect to last dose given. Decrease vancomycin regimen to 1500 mg every 12 hours. Bayesian Modeling predicts an AUC of 556 mg/L*hr and trough of 16.1 mg/L. A vancomycin random level has been ordered on 2/27 at 0600 for follow-up. Changes in regimen will be determined based on culture results, renal function, and clinical response. Pharmacy will continue to monitor and adjust regimen as necessary.     Thank you for the consult,    Aileen Mcclellan, PharmD  Pharmacy Resident   [id number]

## 2022-02-26 NOTE — PROGRESS NOTES
Shital Garcia  Neurology Follow-up  [facility]    Date of Service: 2/26/2022    Subjective:   CC: Follow up today regarding: Acute encephalopathy and recurrent seizures. Events noted. Chart and lab reviewed. The patient had his ANA MARIA which showed no evidence of vegetation. He is currently on Keppra and Vimpat. No seizure overnight. Just came from MRI. Denies any new symptoms. No severe headache or chest pain or focal weakness. ROS : A 10-12 system review obtained and updated today and is unremarkable except as mentioned  in my interval history.      Family history: Noncontributory    Past Medical History:   Diagnosis Date    Acute intracerebral hemorrhage ([address] 02/03/2017    Anxiety     Back pain     Cerebral artery occlusion with cerebral infarction ([address]     Chronic back pain greater than 3 months duration 02/03/2017    Diabetes mellitus ([address]     Dysphasia 02/03/2017    Dysphonia 02/03/2017    Endocarditis and heart valve disorders in diseases classified elsewhere 02/03/2017    Hydrocephalus ([address] 02/03/2017    Hypertension     IVH (intraventricular hemorrhage) ([address] 02/03/2017    NSTEMI (non-ST elevated myocardial infarction) ([address] 02/03/2017    Seizures (HCC)      Current Facility-Administered Medications   Medication Dose Route Frequency Provider Last Rate Last Admin    vancomycin (VANCOCIN) 1,750 mg in dextrose 5 % 500 mL IVPB  1,750 mg IntraVENous Q12H Meagan Patel MD   Stopped at 02/26/22 5032    methadone (DOLOPHINE) tablet 5 mg  5 mg Oral BID Carolina De La Cruz MD   5 mg at 02/26/22 0919    lacosamide (VIMPAT) tablet 200 mg  200 mg Oral BID Carolina De La Cruz MD   200 mg at 02/26/22 3091    atorvastatin (LIPITOR) tablet 10 mg  10 mg Oral Nightly Carolina De La Cruz MD   10 mg at 02/25/22 2008    sodium chloride flush 0.9 % injection 10 mL  10 mL IntraVENous PRN Carolina De La Cruz MD   10 mL at 02/24/22 0831    0.9 % sodium chloride infusion  25 mL IntraVENous PRN Shivani Sampson MD   Stopped at 02/23/22 1843    magnesium sulfate 1000 mg in dextrose 5% 100 mL IVPB  1,000 mg IntraVENous PRN Shivani Sampson MD        enoxaparin (LOVENOX) injection 40 mg  40 mg SubCUTAneous Daily Shivani Sampson MD   40 mg at 02/26/22 0918    ondansetron (ZOFRAN) injection 4 mg  4 mg IntraVENous Q6H PRN Shivani Sampson MD        acetaminophen (TYLENOL) tablet 650 mg  650 mg Oral Q4H PRN Shivani Sampson MD   650 mg at 02/26/22 1388    Or    acetaminophen (TYLENOL) suppository 650 mg  650 mg Rectal Q4H PRN Shivani Sampson MD        potassium chloride 20 mEq/50 mL IVPB (Central Line)  20 mEq IntraVENous PRN Shivani Sampson MD        Or    potassium chloride 10 mEq/100 mL IVPB (Peripheral Line)  10 mEq IntraVENous PRN Shivani Sampson MD        levETIRAcetam (KEPPRA) 100 MG/ML solution 1,500 mg  1,500 mg Oral BID Candace Nathan MD   1,500 mg at 02/26/22 4579    perflutren lipid microspheres (DEFINITY) injection 1.65 mg  1.5 mL IntraVENous ONCE PRN Dilan Cuevas MD        albuterol (PROVENTIL) nebulizer solution 2.5 mg  2.5 mg Nebulization Q4H PRN Dilan Cuevas MD         Allergies   Allergen Reactions    No Known Allergies     Seasonal       reports that he has been smoking cigarettes. He has been smoking about 0.50 packs per day. He has never used smokeless tobacco. He reports that he does not drink alcohol and does not use drugs. Objective:  Exam:   Constitutional:   Vitals:    02/26/22 0400 02/26/22 0919 02/26/22 0949 02/26/22 1155   BP:  (!) 143/67  121/66   Pulse:  80  75   Resp:  17 18 17   Temp: 98.4 °F (36.9 °C) 97.3 °F (36.3 °C)  97.7 °F (36.5 °C)   TempSrc: Temporal Tympanic  Tympanic   SpO2:    95%   Weight: 154 lb 15.7 oz (70.3 kg)      Height: 5' 11\" (1.803 m)        General appearance: Awake and alert.    Mental Status: No changes  AAO times three  Poor immediate recall and fund of knowledge  Fluent speech  Normal attention today   cranial Nerves:   II: Visual fields: Full. Pupils: equal, round, reactive to light  III,IV,VI: Extra Ocular Movements are intact. No nystagmus  V: Facial sensation is intact  VII: Facial strength and movements: intact and symmetric  IX: Palate elevation is symmetric  XI: Shoulder shrug is intact  XII: Tongue movements are normal  Musculoskeletal: Generalized diffuse weakness with poor effort 4/5  Symmetric DTRs both arms and legs  No sensory disturbance  No abnormal movement        Data:  LABS:   Lab Results   Component Value Date     02/26/2022    K 3.8 02/26/2022    K 4.7 02/22/2022     02/26/2022    CO2 28 02/26/2022    BUN 13 02/26/2022    CREATININE 0.6 02/26/2022    GFRAA >60 02/26/2022    GFRAA >60 12/13/2011    LABGLOM >60 02/26/2022    GLUCOSE 102 02/26/2022    MG 2.00 02/26/2022    CALCIUM 9.1 02/26/2022     Lab Results   Component Value Date    WBC 5.7 02/26/2022    RBC 3.76 02/26/2022    HGB 12.6 02/26/2022    HCT 36.0 02/26/2022    MCV 95.9 02/26/2022    RDW 12.2 02/26/2022     02/26/2022     Lab Results   Component Value Date    INR 1.21 (H) 02/26/2022    PROTIME 13.8 (H) 02/26/2022     Reviewed MRI brain which showed left hemispheric encephalomalacia but no new stroke  Reviewed recent blood testing and notes from different physician      Impression:  Acute encephalopathy and breakthrough seizure. Could be secondary to septicemia and metabolic encephalopathy  Medically refractory epilepsy  Poor compliance and history of endocarditis  Positive blood culture  DM        Recommendation  Continue Keppra and Vimpat the same dose  MRI brain reviewed. awaiting final report. Follow culture results  PT, OT and speech  Tele  DVT and GI px  ISS  Continue Methadone  Statin  DC planning and follow with  neurology  Will follow from the periphery             Valarie Henson MD   [phone]      This dictation was generated by voice recognition computer software.  Although all attempts are made to edit the dictation for accuracy, there may be errors in the transcription that are not intended.

## 2022-02-26 NOTE — RT PROTOCOL NOTE
RT Inhaler-Nebulizer Bronchodilator Protocol Note    There is a bronchodilator order in the chart from a provider indicating to follow the RT Bronchodilator Protocol and there is an Initiate RT Inhaler-Nebulizer Bronchodilator Protocol order as well (see protocol at bottom of note). CXR Findings:  No results found. The findings from the last RT Protocol Assessment were as follows:   History Pulmonary Disease: Chronic pulmonary disease  Respiratory Pattern: Regular pattern and RR 12-20 bpm  Breath Sounds: Clear breath sounds  Cough: Strong, spontaneous, non-productive  Indication for Bronchodilator Therapy: Decreased or absent breath sounds  Bronchodilator Assessment Score: 2    Aerosolized bronchodilator medication orders have been revised according to the RT Inhaler-Nebulizer Bronchodilator Protocol below. Respiratory Therapist to perform RT Therapy Protocol Assessment initially then follow the protocol. Repeat RT Therapy Protocol Assessment PRN for score 0-3 or on second treatment, BID, and PRN for scores above 3. No Indications  adjust the frequency to every 6 hours PRN wheezing or bronchospasm, if no treatments needed after 48 hours then discontinue using Per Protocol order mode. If indication present, adjust the RT bronchodilator orders based on the Bronchodilator Assessment Score as indicated below. Use Inhaler orders unless patient has one or more of the following: on home nebulizer, not able to hold breath for 10 seconds, is not alert and oriented, cannot activate and use MDI correctly, or respiratory rate 25 breaths per minute or more, then use the equivalent nebulizer order(s) with same Frequency and PRN reasons based on the score. If a patient is on this medication at home then do not decrease Frequency below that used at home.     0-3  enter or revise RT bronchodilator order(s) to equivalent RT Bronchodilator order with Frequency of every 4 hours PRN for wheezing or increased work of breathing using Per Protocol order mode. 4-6  enter or revise RT Bronchodilator order(s) to two equivalent RT bronchodilator orders with one order with BID Frequency and one order with Frequency of every 4 hours PRN wheezing or increased work of breathing using Per Protocol order mode. 7-10  enter or revise RT Bronchodilator order(s) to two equivalent RT bronchodilator orders with one order with TID Frequency and one order with Frequency of every 4 hours PRN wheezing or increased work of breathing using Per Protocol order mode. 11-13  enter or revise RT Bronchodilator order(s) to one equivalent RT bronchodilator order with QID Frequency and an Albuterol order with Frequency of every 4 hours PRN wheezing or increased work of breathing using Per Protocol order mode. Greater than 13  enter or revise RT Bronchodilator order(s) to one equivalent RT bronchodilator order with every 4 hours Frequency and an Albuterol order with Frequency of every 2 hours PRN wheezing or increased work of breathing using Per Protocol order mode. RT to enter RT Home Evaluation for COPD & MDI Assessment order using Per Protocol order mode.     Electronically signed by Carisa Milian RCP on 2/26/2022 at 4:39 PM

## 2022-02-27 LAB
A/G RATIO: 1.3 (ref 1.1–2.2)
ALBUMIN SERPL-MCNC: 3.6 G/DL (ref 3.4–5)
ALP BLD-CCNC: 65 U/L (ref 40–129)
ALT SERPL-CCNC: 21 U/L (ref 10–40)
ANION GAP SERPL CALCULATED.3IONS-SCNC: 10 MMOL/L (ref 3–16)
AST SERPL-CCNC: 26 U/L (ref 15–37)
BASOPHILS ABSOLUTE: 0 K/UL (ref 0–0.2)
BASOPHILS RELATIVE PERCENT: 0.7 %
BILIRUB SERPL-MCNC: 0.6 MG/DL (ref 0–1)
BUN BLDV-MCNC: 12 MG/DL (ref 7–20)
CALCIUM SERPL-MCNC: 9.1 MG/DL (ref 8.3–10.6)
CHLORIDE BLD-SCNC: 103 MMOL/L (ref 99–110)
CO2: 27 MMOL/L (ref 21–32)
CREAT SERPL-MCNC: 0.5 MG/DL (ref 0.8–1.3)
EOSINOPHILS ABSOLUTE: 0.1 K/UL (ref 0–0.6)
EOSINOPHILS RELATIVE PERCENT: 1.3 %
GFR AFRICAN AMERICAN: >60
GFR NON-AFRICAN AMERICAN: >60
GLUCOSE BLD-MCNC: 113 MG/DL (ref 70–99)
HCT VFR BLD CALC: 35.8 % (ref 40.5–52.5)
HEMOGLOBIN: 12.7 G/DL (ref 13.5–17.5)
INR BLD: 1.19 (ref 0.88–1.12)
LYMPHOCYTES ABSOLUTE: 1.2 K/UL (ref 1–5.1)
LYMPHOCYTES RELATIVE PERCENT: 21.3 %
MAGNESIUM: 2.2 MG/DL (ref 1.8–2.4)
MCH RBC QN AUTO: 33.6 PG (ref 26–34)
MCHC RBC AUTO-ENTMCNC: 35.3 G/DL (ref 31–36)
MCV RBC AUTO: 95.2 FL (ref 80–100)
MONOCYTES ABSOLUTE: 0.4 K/UL (ref 0–1.3)
MONOCYTES RELATIVE PERCENT: 6.9 %
NEUTROPHILS ABSOLUTE: 4.1 K/UL (ref 1.7–7.7)
NEUTROPHILS RELATIVE PERCENT: 69.8 %
PDW BLD-RTO: 12.3 % (ref 12.4–15.4)
PLATELET # BLD: 206 K/UL (ref 135–450)
PMV BLD AUTO: 7.4 FL (ref 5–10.5)
POTASSIUM SERPL-SCNC: 4 MMOL/L (ref 3.5–5.1)
PROTHROMBIN TIME: 13.6 SEC (ref 9.9–12.7)
RBC # BLD: 3.76 M/UL (ref 4.2–5.9)
SODIUM BLD-SCNC: 140 MMOL/L (ref 136–145)
TOTAL PROTEIN: 6.4 G/DL (ref 6.4–8.2)
VANCOMYCIN RANDOM: 21.1 UG/ML
WBC # BLD: 5.8 K/UL (ref 4–11)

## 2022-02-27 PROCEDURE — 6360000002 HC RX W HCPCS: Performed by: INTERNAL MEDICINE

## 2022-02-27 PROCEDURE — 6370000000 HC RX 637 (ALT 250 FOR IP): Performed by: INTERNAL MEDICINE

## 2022-02-27 PROCEDURE — 2060000000 HC ICU INTERMEDIATE R&B

## 2022-02-27 PROCEDURE — 99233 SBSQ HOSP IP/OBS HIGH 50: CPT | Performed by: INTERNAL MEDICINE

## 2022-02-27 PROCEDURE — 2580000003 HC RX 258: Performed by: INTERNAL MEDICINE

## 2022-02-27 PROCEDURE — 80202 ASSAY OF VANCOMYCIN: CPT

## 2022-02-27 PROCEDURE — 85610 PROTHROMBIN TIME: CPT

## 2022-02-27 PROCEDURE — 83735 ASSAY OF MAGNESIUM: CPT

## 2022-02-27 PROCEDURE — 80053 COMPREHEN METABOLIC PANEL: CPT

## 2022-02-27 PROCEDURE — 87040 BLOOD CULTURE FOR BACTERIA: CPT

## 2022-02-27 PROCEDURE — 36415 COLL VENOUS BLD VENIPUNCTURE: CPT

## 2022-02-27 PROCEDURE — 85025 COMPLETE CBC W/AUTO DIFF WBC: CPT

## 2022-02-27 RX ORDER — LACTOBACILLUS RHAMNOSUS GG 10B CELL
1 CAPSULE ORAL 2 TIMES DAILY WITH MEALS
Status: DISCONTINUED | OUTPATIENT
Start: 2022-02-27 | End: 2022-03-02 | Stop reason: HOSPADM

## 2022-02-27 RX ADMIN — Medication 1 CAPSULE: at 17:10

## 2022-02-27 RX ADMIN — SODIUM CHLORIDE, PRESERVATIVE FREE 10 ML: 5 INJECTION INTRAVENOUS at 21:11

## 2022-02-27 RX ADMIN — METHADONE HYDROCHLORIDE 5 MG: 10 TABLET ORAL at 08:50

## 2022-02-27 RX ADMIN — ACETAMINOPHEN 325MG 650 MG: 325 TABLET ORAL at 02:25

## 2022-02-27 RX ADMIN — ACETAMINOPHEN 325MG 650 MG: 325 TABLET ORAL at 22:40

## 2022-02-27 RX ADMIN — AMPICILLIN SODIUM 2000 MG: 2 INJECTION, POWDER, FOR SOLUTION INTRAMUSCULAR; INTRAVENOUS at 17:12

## 2022-02-27 RX ADMIN — ENOXAPARIN SODIUM 40 MG: 40 INJECTION SUBCUTANEOUS at 08:51

## 2022-02-27 RX ADMIN — METHADONE HYDROCHLORIDE 5 MG: 10 TABLET ORAL at 21:09

## 2022-02-27 RX ADMIN — LEVETIRACETAM 1500 MG: 100 SOLUTION ORAL at 08:51

## 2022-02-27 RX ADMIN — ACETAMINOPHEN 325MG 650 MG: 325 TABLET ORAL at 15:29

## 2022-02-27 RX ADMIN — AMPICILLIN SODIUM 2000 MG: 2 INJECTION, POWDER, FOR SOLUTION INTRAMUSCULAR; INTRAVENOUS at 21:09

## 2022-02-27 RX ADMIN — VANCOMYCIN HYDROCHLORIDE 1500 MG: 10 INJECTION, POWDER, LYOPHILIZED, FOR SOLUTION INTRAVENOUS at 11:33

## 2022-02-27 RX ADMIN — LACOSAMIDE 200 MG: 100 TABLET, FILM COATED ORAL at 08:50

## 2022-02-27 RX ADMIN — LACOSAMIDE 200 MG: 100 TABLET, FILM COATED ORAL at 21:09

## 2022-02-27 RX ADMIN — CEFTRIAXONE 2000 MG: 2 INJECTION, POWDER, FOR SOLUTION INTRAMUSCULAR; INTRAVENOUS at 15:28

## 2022-02-27 RX ADMIN — ATORVASTATIN CALCIUM 10 MG: 10 TABLET, FILM COATED ORAL at 21:09

## 2022-02-27 RX ADMIN — LEVETIRACETAM 1500 MG: 100 SOLUTION ORAL at 21:09

## 2022-02-27 ASSESSMENT — PAIN DESCRIPTION - LOCATION
LOCATION: BACK
LOCATION: BACK

## 2022-02-27 ASSESSMENT — ENCOUNTER SYMPTOMS
SORE THROAT: 0
RHINORRHEA: 0
DIARRHEA: 0
EYE DISCHARGE: 0
EYE REDNESS: 0
NAUSEA: 0
WHEEZING: 0
COUGH: 0
TROUBLE SWALLOWING: 0
BACK PAIN: 0
ABDOMINAL PAIN: 0
SHORTNESS OF BREATH: 0
CONSTIPATION: 0

## 2022-02-27 ASSESSMENT — PAIN SCALES - GENERAL
PAINLEVEL_OUTOF10: 0
PAINLEVEL_OUTOF10: 0
PAINLEVEL_OUTOF10: 2
PAINLEVEL_OUTOF10: 0
PAINLEVEL_OUTOF10: 0
PAINLEVEL_OUTOF10: 6
PAINLEVEL_OUTOF10: 0
PAINLEVEL_OUTOF10: 3
PAINLEVEL_OUTOF10: 0
PAINLEVEL_OUTOF10: 8
PAINLEVEL_OUTOF10: 6

## 2022-02-27 ASSESSMENT — PAIN DESCRIPTION - PAIN TYPE
TYPE: CHRONIC PAIN
TYPE: CHRONIC PAIN

## 2022-02-27 ASSESSMENT — PAIN DESCRIPTION - ORIENTATION: ORIENTATION: LOWER

## 2022-02-27 ASSESSMENT — PAIN DESCRIPTION - DESCRIPTORS: DESCRIPTORS: CONSTANT

## 2022-02-27 ASSESSMENT — PAIN DESCRIPTION - FREQUENCY: FREQUENCY: CONTINUOUS

## 2022-02-27 NOTE — PROGRESS NOTES
Hospitalist Progress Note      PCP: Zenobia Cano MD    Date of Admission: 2/22/2022    Chief Complaint:  Patient in by EMS from [facility] at the [facility]. Patient was found down, unknown how long and unresponsive, post ictal    Hospital Course:   Paul Reese is a 64 y.o. male. He presented from [facility] at the [facility] by ambulance. He was found down and unresponsive by staff. He was supported noninvasively en route w/ breaths via BVM. He was intubated shortly after arrival primarily for airway protection considering his persistently depressed mental status.     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 He underwent craniotomy and aneurysm clipping at the time. 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 Per prior notes he is aphasic at baseline.        Subjective:  Has severe valve regurg   ANA MARIA with no VEG  Went to MRI yesterday.       Medications:  Reviewed    Infusion Medications    sodium chloride 25 mL (02/26/22 2132)     Scheduled Medications    ampicillin IV  2,000 mg IntraVENous 6 times per day    cefTRIAXone (ROCEPHIN) IV  2,000 mg IntraVENous Q12H    lactobacillus  1 capsule Oral BID WC    methadone  5 mg Oral BID    lacosamide  200 mg Oral BID    atorvastatin  10 mg Oral Nightly    enoxaparin  40 mg SubCUTAneous Daily    levETIRAcetam  1,500 mg Oral BID     PRN Meds: sodium chloride flush, sodium chloride, magnesium sulfate, ondansetron, acetaminophen **OR** acetaminophen, potassium chloride **OR** potassium chloride, perflutren lipid microspheres      Intake/Output Summary (Last 24 hours) at 2/27/2022 1529  Last data filed at 2/27/2022 0807  Gross per 24 hour   Intake 240 ml   Output 675 ml   Net -435 ml       Physical Exam Performed:    /62   Pulse 69   Temp 97.8 °F (36.6 °C) (Temporal)   Resp 13   Ht 5' 11\" (1.803 m)   Wt 155 lb 6.8 oz (70.5 kg) SpO2 97%   BMI 21.68 kg/m²     General appearance: No apparent distress, appears stated age and cooperative. HEENT: Pupils equal, round, and reactive to light. Conjunctivae/corneas clear. Neck: Supple, with full range of motion. No jugular venous distention. Trachea midline. Respiratory:  Normal respiratory effort. Clear to auscultation, bilaterally without Rales/Wheezes/Rhonchi. Cardiovascular: Regular rate and rhythm 2-3/6 murmur, no  rubs or gallops. Abdomen: Soft, non-tender, non-distended with normal bowel sounds. Musculoskeletal: No clubbing, cyanosis or edema bilaterally. Full range of motion without deformity. Skin: Skin color, texture, turgor normal.  No rashes or lesions. Neurologic:  Neurovascularly intact without any focal sensory/motor deficits. Cranial nerves: II-XII intact, grossly non-focal.  Psychiatric: Alert and oriented, thought content appropriate, normal insight  Capillary Refill: Brisk,3 seconds, normal   Peripheral Pulses: +2 palpable, equal bilaterally       Labs:   Recent Labs     02/25/22 0415 02/26/22 0411 02/27/22  0354   WBC 5.4 5.7 5.8   HGB 12.1* 12.6* 12.7*   HCT 34.7* 36.0* 35.8*    205 206     Recent Labs     02/25/22 0415 02/26/22  0411 02/27/22  0354    142 140   K 3.7 3.8 4.0    104 103   CO2 27 28 27   BUN 14 13 12   CREATININE 0.5* 0.6* 0.5*   CALCIUM 9.1 9.1 9.1     Recent Labs     02/25/22 0415 02/26/22  0411 02/27/22  0354   AST 25 22 26   ALT 20 18 21   BILITOT 0.6 0.6 0.6   ALKPHOS 67 65 65     Recent Labs     02/25/22 0415 02/26/22  0411 02/27/22  0354   INR 1.19* 1.21* 1.19*     Recent Labs     02/25/22  1037   TROPONINI <0.01       Urinalysis:      Lab Results   Component Value Date    NITRU Negative 02/22/2022    WBCUA 4 02/22/2022    BACTERIA 1+ 01/13/2022    RBCUA 11 02/22/2022    BLOODU MODERATE 02/22/2022    SPECGRAV 1.020 02/22/2022    GLUCOSEU Negative 02/22/2022       Radiology:  MRI BRAIN WO CONTRAST   Final Result   1.  No evidence of acute infarct. 2. Left-sided craniotomy with underlying encephalomalacia in the posterior   left cerebral hemisphere, unchanged. 3. Cerebral parenchymal volume loss with chronic microvascular white matter   ischemic disease. Chronic infarcts are noted in the right frontal lobe and   cerebellar hemispheres. CT CHEST ABDOMEN PELVIS WO CONTRAST   Final Result   1. Patchy airway secretions, including suspected secretions in the left lower   lobe bronchus and in the basilar segmental bronchi. An underlying   endobronchial lesion is not excluded but considered less likely. Consider   aspiration given patient status. 2. The sideport of a gastric tube is located at the gastroesophageal   junction. Consider advancement. 3. Indeterminate bilateral renal lesions potentially due to proteinaceous   cyst or solid neoplasm. Recommend further evaluation with renal protocol   abdomen MRI (preferred) or CT on a nonemergent basis. 4. Solid nodules in the bilateral lower lobes measure up to 0.7 cm and are   more likely infectious or inflammatory in etiology than neoplastic. Assuming   no malignancy is found in the kidneys on the above recommended study, then   follow-up chest CT would be recommended in 3-6 months as below. If   malignancy is found in the kidneys, then the below pulmonary nodule   recommendation would not apply. RECOMMENDATIONS:   Multiple pulmonary nodules. Most severe: 7 mm right solid pulmonary nodule. Recommend a non-contrast Chest CT at 3-6 months. If patient is high risk for   malignancy, recommend an additional non-contrast Chest CT at 18-24 months; if   patient is low risk for malignancy a non-contrast Chest CT at 18-24 months is   optional.      These guidelines do not apply to immunocompromised patients and patients with   cancer. Follow up in patients with significant comorbidities as clinically   warranted. For lung cancer screening, adhere to Lung-RADS guidelines. Reference: Radiology. 2017; 284(1):228-43. CT CERVICAL SPINE WO CONTRAST   Final Result   No acute fracture or traumatic malalignment of the cervical spine. CT HEAD WO CONTRAST   Final Result   1. No acute intracranial abnormality. 2. Chronic findings as above. XR CHEST PORTABLE   Final Result   1. Lines and tubes in expected position as above. 2. No acute cardiopulmonary abnormality. Assessment/Plan:    Active Hospital Problems    Diagnosis     Sepsis ([address] [A41.9]     [place] coma scale total score 3-8 (HCC) [R40.2430]     Lung nodules [R91.8]     Essential hypertension [I10]     Enterococcal bacteremia [R78.81, B95.2]     NATALIA (acute kidney injury) ([address] [N17.9]     Lactic acidosis [E87.2]     Unresponsive [R41.89]     Partial idiopathic epilepsy with seizures of localized onset, intractable, with status epilepticus ([address] [G40.011]     Acute respiratory failure with hypoxia ([address] [J96.01]     Breakthrough seizure ([address] [G40.919]     History of stroke [Z86.73]     Partial symptomatic epilepsy with complex partial seizures, intractable, without status epilepticus ([address] [G40.219]     Seizure ([address] [R56.9]     Type 2 diabetes mellitus with other specified complication ([address] [W17.76]      Break through seizure- not new. I have extensive seizure history  -Consult placed to neuro, MRI done shows old chronic infarcts of the the right frontal and cerebellar hemisphere but no acute infarcts. Lactic acidosis  -Secondary to seizure activity and should improve with fluids and seizure having ceased. - resolved. Positive blood cultures  -With gram-positive cocci doubling strep as well as Enterococcus DNA detected  -Started on IV vancomycin  -Previous history of endocarditis in the past  -Echo has been ordered to rule out endocarditis.   ID CONSULTED  Likely will need 6-8 weeks of IV abx  ID is following    SEVERE MITRAL REGURG.  - ANA MARIA done  No vegetations. - May have endocarditis vs leaflette issues, appears to be related to previous bout of endocarditis in the past.   - he will need valve surgery. Patient currently refusing left heart angiogram and open heart surgery at this time per CT surgery. Diabetes mellitus 2  -He is on sliding scale as well as long-acting insulin    NATALIA  -Creatinine has improved with appropriate hydration resolved at this point. DVT Prophylaxis: Lovenox  Diet: ADULT DIET; Regular  Code Status: Full Code    PT/OT Eval Status:  On treatment  Dispo-Improved and able to transfer out of ICU    Gelacio Trevizo MD

## 2022-02-27 NOTE — PROGRESS NOTES
Clinical Pharmacy Note: Pharmacy to Dose Vancomcyin    Vancomycin Day: 5  Current Dosing Regimen: 1500 mg Q 12H   Dosing Method: Bayesian Modeling    Random: 21.1    Recent Labs     02/26/22  0411 02/27/22  0354   BUN 13 12       Recent Labs     02/26/22  0411 02/27/22  0354   CREATININE 0.6* 0.5*       Recent Labs     02/26/22  0411 02/27/22  0354   WBC 5.7 5.8         Intake/Output Summary (Last 24 hours) at 2/27/2022 9333  Last data filed at 2/27/2022 0413  Gross per 24 hour   Intake 880.63 ml   Output 1350 ml   Net -469.37 ml         Ht Readings from Last 1 Encounters:   02/26/22 5' 11\" (1.803 m)        Wt Readings from Last 1 Encounters:   02/27/22 155 lb 6.8 oz (70.5 kg)         Body mass index is 21.68 kg/m². Estimated Creatinine Clearance: 155 mL/min (A) (based on SCr of 0.5 mg/dL (L)). Assessment/Plan:  Vancomycin level is therapeutic. Level was drawn appropriately in respect to last dose given. Continue vancomycin regimen to 1500 mg every 12 hours. Bayesian Modeling predicts an AUC of 505 mg/L*hr and trough of 13.9 mg/L. Changes in regimen will be determined based on culture results, renal function, and clinical response. Pharmacy will continue to monitor and adjust regimen as necessary.     Thank you for the consult,    An Ocampo, PharmD  Pharmacy Resident   [id number]

## 2022-02-27 NOTE — PROGRESS NOTES
Infectious Diseases   Progress Note      Admission Date: 2/22/2022  Hospital Day: Hospital Day: 6   Attending: Erby Hodgkin, MD  Date of service: 2/27/2022     Chief complaint/ Reason for consult:     · Severe sepsis with fever, leukocytosis, lactic acidosis, hypotension on admission  · Enterococcus faecalis bacteremia   · History of streptococcal bacteremia and mitral and aortic valve endocarditis in 2017  · Positive urine drug screen for benzodiazepines  · Bilateral lung nodules  · Left parietal encephalomalacia    Microbiology:        I have reviewed allavailable micro lab data and cultures    · Blood culture (2/2) - collected on 2/22/2022: Enterococcus fecalis    Susceptibility     Enterococcus  faecalis     BACTERIAL SUSCEPTIBILITY PANEL BY ABIEL     ampicillin <=2 mcg/mL Sensitive     vancomycin 1 mcg/mL Sensitive               Antibiotics and immunizations:       Current antibiotics: All antibiotics and their doses were reviewed by me    Recent Abx Admin                   vancomycin (VANCOCIN) 1,500 mg in dextrose 5 % 250 mL IVPB (mg) 1,500 mg New Bag 02/27/22 1133     1,500 mg New Bag 02/26/22 2133                  Immunization History: All immunization history was reviewed by me today. Immunization History   Administered Date(s) Administered    COVID-19, Pfizer Purple top, DILUTE for use, 12+ yrs, 30mcg/0.3mL dose 12/21/2020, 01/11/2021, 10/21/2021       Known drug allergies: All allergies were reviewed and updated    Allergies   Allergen Reactions    Seasonal        Social history:     Social History:  All social andepidemiologic history was reviewed and updated by me today as needed. · Tobacco use:   reports that he has been smoking cigarettes. He has been smoking about 0.50 packs per day. He has never used smokeless tobacco.  · Alcohol use:   reports no history of alcohol use. · Currently lives in: [facility]  ·  reports no history of drug use.      [address] AND LAB RESULT RECORDS:     Internal Administration   First Dose COVID-19, Pfizer Purple top, DILUTE for use, 12+ yrs, 30mcg/0.3mL dose  12/21/2020   Second Dose COVID-19, Pfizer Purple top, DILUTE for use, 12+ yrs, 30mcg/0.3mL dose   01/11/2021       Last COVID Lab SARS-CoV-2, PCR (no units)   Date Value   07/15/2020 Not Detected     SARS-CoV-2, NAAT (no units)   Date Value   02/25/2022 Not Detected            Assessment:     The patient is a 64 y.o. old male who  has a past medical history of Acute intracerebral hemorrhage ([facility] [address] (02/03/2017), Anxiety, Back pain, Cerebral artery occlusion with cerebral infarction ([facility] [address] Chronic back pain greater than 3 months duration (02/03/2017), Diabetes mellitus ([facility] [address] Dysphasia (02/03/2017), Dysphonia (02/03/2017), Endocarditis and heart valve disorders in diseases classified elsewhere (02/03/2017), Hydrocephalus ([facility] [address] (02/03/2017), Hypertension, IVH (intraventricular hemorrhage) ([address] (02/03/2017), NSTEMI (non-ST elevated myocardial infarction) ([facility] [address] (02/03/2017), and Seizures ([facility] [address] with following problems:    · Severe sepsis with fever, leukocytosis, lactic acidosis, hypotension on admission-resolved  · Enterococcus faecalis bacteremia-has persistent bacteremia, currently on IV vancomycin  · History of streptococcal bacteremia and mitral and aortic valve endocarditis in 2017  · Positive urine drug screen for benzodiazepines  · Bilateral lung nodules  · Left parietal encephalomalacia  · History of stroke  · Type 2 diabetes mellitus-maintaining good glycemic control  · Essential hypertension  · Seizure disorder  · Nursing home resident  ·       Discussion:      The patient is afebrile. He is on IV vancomycin. He is tolerating antibiotics okay blood cultures from 2/22/2022 were positive for Enterococcus faecalis.     Unfortunately, blood cultures from 2/25/2022 are also growing Streptococcus-like organism, which may very well be Enterococcus    Persistent bacteremia despite IV vancomycin is quite concerning    Serum creatinine 0.5. Transesophageal echo on 2/25/2022 did not show vegetations but did show perforation of the aortic valve and a pericardial area of the mitral valve leaflet with severe mitral and aortic regurgitation. I think the patient will need to be treated as endocarditis    Plan:     Diagnostic Workup:    · Repeat 2 sets of blood cultures today  · Continue to follow  fever curve, WBC count and blood cultures. · Continue to monitor blood counts, liver and renal function. Antimicrobials:    · Will switch IV vancomycin to IV ampicillin 2 g every 4 hours  · Start IV ceftriaxone 2 g every 12 hours for synergistic effect for suspected enterococcal endocarditis  Recommend holding off on PICC line until blood cultures clear for at least 48 and preferably 72 hours. · Midline or regular central venous line okay for now, if needed for IV access. · Anticipate at least 6-week course of IV antibiotics from date of negative blood cultures  · We will follow up on the culture results and clinical progress and will make further recommendations accordingly. · Continue close vitals monitoring. · Start PO probiotics twice daily  · Maintain good glycemic control. · Fall precautions. · Aspiration precautions. · Continue to watch for new fever or diarrhea. · DVT prophylaxis. · Discussed all above with patient and RN. Drug Monitoring:    · Continue monitoring for antibiotic toxicity as follows: CBC, CMP   · Continue to watch for following: new or worsening fever, new hypotension, hives, lip swelling and redness or purulence at vascular access sites. I/v access Management:    · Continue to monitor i.v access sites for erythema, induration, discharge or tenderness. · As always, continue efforts to minimize tubes/lines/drains as clinically appropriate to reduce chances of line associated infections.     Patient education and counseling:        · The patient was educated in detail about the side-effects of various antibiotics and things to watch for like new rashes, lip swelling, severe reaction, worsening diarrhea, break through fever etc.  · Discussed patient's condition and what to expect. All of the patient's questions were addressed in a satisfactory manner and patient verbalized understanding all instructions. Level of complexity of visit: High     Risk of Complications/Morbidity: High     · Illness(es)/ Infection present that pose threat to life/bodily function. · There is potential for severe exacerbation of infection/side effects of treatment. · Therapy requires intensive monitoring for antimicrobial agent toxicity. TIME SPENT TODAY:     - Spent over  36 minutes on visit (including interval history, physical exam, review of data including labs, cultures, imaging, development and implementation of treatment plan and coordination of complex care). More than 50 percent of this includes face-to-face time spent with the patient for counseling and coordination of care. Thank you for involving me in the care of your patient. I will continue to follow. If you have anyadditional questions, please do not hesitate to contact me. Subjective: Interval history: Interval history was obtained from chart review and patient/ RN. He is afebrile. He is tolerating antibiotics ok. No diarrhea     REVIEW OF SYSTEMS:      Review of Systems   Constitutional: Negative for chills, diaphoresis and fever. HENT: Negative for ear discharge, ear pain, rhinorrhea, sore throat and trouble swallowing. Eyes: Negative for discharge and redness. Respiratory: Negative for cough, shortness of breath and wheezing. Cardiovascular: Negative for chest pain and leg swelling. Gastrointestinal: Negative for abdominal pain, constipation, diarrhea and nausea. Endocrine: Negative for polyuria. Genitourinary: Negative for dysuria, flank pain, frequency, hematuria and urgency. Musculoskeletal: Negative for back pain and myalgias. Skin: Negative for rash. Neurological: Negative for dizziness, seizures and headaches. Hematological: Does not bruise/bleed easily. Psychiatric/Behavioral: Negative for hallucinations and suicidal ideas. All other systems reviewed and are negative. Past Medical History: All past medical history reviewed today. Past Medical History:   Diagnosis Date    Acute intracerebral hemorrhage ([facility] [address] 02/03/2017    Anxiety     Back pain     Cerebral artery occlusion with cerebral infarction (HCC)     Chronic back pain greater than 3 months duration 02/03/2017    Diabetes mellitus ([address]     Dysphasia 02/03/2017    Dysphonia 02/03/2017    Endocarditis and heart valve disorders in diseases classified elsewhere 02/03/2017    Hydrocephalus ([facility] [address] 02/03/2017    Hypertension     IVH (intraventricular hemorrhage) ([facility] [address] 02/03/2017    NSTEMI (non-ST elevated myocardial infarction) ([facility] [address] 02/03/2017    Seizures ([facility] [address]        Past Surgical History: All past surgical history was reviewed today. Past Surgical History:   Procedure Laterality Date    BRAIN ANEURYSM SURGERY Left 02/03/2017    COLONOSCOPY N/A 8/14/2019    COLONOSCOPY WITH MAC ANESTHESIA performed by Vivien Jaramillo MD at [address] [place] [address] Left 02/03/2017    LEG SURGERY      UPPER GASTROINTESTINAL ENDOSCOPY N/A 8/14/2019    EGD BIOPSY performed by Vivien Jaramillo MD at [facility] ENDOSCOPY       Family History: All family history was reviewed today. History reviewed. No pertinent family history.     Objective:       PHYSICAL EXAM:      Vitals:   Vitals:    02/26/22 1600 02/26/22 2000 02/27/22 0000 02/27/22 0400   BP: 117/64 120/63 122/69 111/62   Pulse: 68 76 72 69   Resp: 13 20 15 13   Temp: 97.4 °F (36.3 °C) 98.1 °F (36.7 °C) 97.4 °F (36.3 °C) 97.8 °F (36.6 °C)   TempSrc: Temporal Temporal Temporal Temporal   SpO2: 96% 98% 97% 97%   Weight:    155 lb 6.8 oz (70.5 kg)   Height: Physical Exam  Vitals and nursing note reviewed. Constitutional:       Appearance: Normal appearance. He is well-developed. HENT:      Head: Normocephalic and atraumatic. Right Ear: External ear normal.      Left Ear: External ear normal.      Nose: Nose normal. No congestion or rhinorrhea. Mouth/Throat:      Mouth: Mucous membranes are moist.      Pharynx: No oropharyngeal exudate or posterior oropharyngeal erythema. Eyes:      General: No scleral icterus. Right eye: No discharge. Left eye: No discharge. Conjunctiva/sclera: Conjunctivae normal.      Pupils: Pupils are equal, round, and reactive to light. Cardiovascular:      Rate and Rhythm: Normal rate and regular rhythm. Pulses: Normal pulses. Heart sounds: Murmur heard. No friction rub. Pulmonary:      Effort: Pulmonary effort is normal. No respiratory distress. Breath sounds: Normal breath sounds. No stridor. No wheezing, rhonchi or rales. Abdominal:      General: Bowel sounds are normal.      Palpations: Abdomen is soft. Tenderness: There is no abdominal tenderness. There is no right CVA tenderness, left CVA tenderness, guarding or rebound. Musculoskeletal:         General: No swelling or tenderness. Normal range of motion. Cervical back: Normal range of motion and neck supple. No rigidity. No muscular tenderness. Lymphadenopathy:      Cervical: No cervical adenopathy. Skin:     General: Skin is warm and dry. Coloration: Skin is not jaundiced. Findings: No erythema or rash. Neurological:      General: No focal deficit present. Mental Status: He is alert and oriented to person, place, and time. Mental status is at baseline. Motor: No abnormal muscle tone. Psychiatric:         Mood and Affect: Mood normal.         Behavior: Behavior normal.         Thought Content:  Thought content normal.       *    Lines and drains: All vascular access sites are healthy with no local erythema, discharge or tenderness. Intake and output:    I/O last 3 completed shifts: In: 1683.8 [P.O.:890; IV Piggyback:793.8]  Out: 2500 [Urine:2500]    Lab Data:   All available labs and old records have been reviewed by me. CBC:  Recent Labs     02/25/22 0415 02/26/22  0411 02/27/22  0354   WBC 5.4 5.7 5.8   RBC 3.60* 3.76* 3.76*   HGB 12.1* 12.6* 12.7*   HCT 34.7* 36.0* 35.8*    205 206   MCV 96.4 95.9 95.2   MCH 33.7 33.6 33.6   MCHC 34.9 35.0 35.3   RDW 12.5 12.2* 12.3*        BMP:  Recent Labs     02/25/22 0415 02/26/22  0411 02/27/22  0354    142 140   K 3.7 3.8 4.0    104 103   CO2 27 28 27   BUN 14 13 12   CREATININE 0.5* 0.6* 0.5*   CALCIUM 9.1 9.1 9.1   GLUCOSE 104* 102* 113*        Hepatic Function Panel:   Lab Results   Component Value Date    ALKPHOS 65 02/27/2022    ALT 21 02/27/2022    AST 26 02/27/2022    PROT 6.4 02/27/2022    PROT 7.1 12/13/2011    BILITOT 0.6 02/27/2022    LABALBU 3.6 02/27/2022       CPK:   Lab Results   Component Value Date    CKTOTAL 73 02/22/2022     ESR: No results found for: SEDRATE  CRP: No results found for: CRP        Imaging: All pertinent images and reports for the current visit were reviewed by me during this visit. I reviewed the chest x-ray/CT scan/MRI images and independently interpreted the findings and results today. MRI BRAIN WO CONTRAST   Final Result   1. No evidence of acute infarct. 2. Left-sided craniotomy with underlying encephalomalacia in the posterior   left cerebral hemisphere, unchanged. 3. Cerebral parenchymal volume loss with chronic microvascular white matter   ischemic disease. Chronic infarcts are noted in the right frontal lobe and   cerebellar hemispheres. CT CHEST ABDOMEN PELVIS WO CONTRAST   Final Result   1. Patchy airway secretions, including suspected secretions in the left lower   lobe bronchus and in the basilar segmental bronchi.   An underlying   endobronchial lesion is not excluded but considered less likely. Consider   aspiration given patient status. 2. The sideport of a gastric tube is located at the gastroesophageal   junction. Consider advancement. 3. Indeterminate bilateral renal lesions potentially due to proteinaceous   cyst or solid neoplasm. Recommend further evaluation with renal protocol   abdomen MRI (preferred) or CT on a nonemergent basis. 4. Solid nodules in the bilateral lower lobes measure up to 0.7 cm and are   more likely infectious or inflammatory in etiology than neoplastic. Assuming   no malignancy is found in the kidneys on the above recommended study, then   follow-up chest CT would be recommended in 3-6 months as below. If   malignancy is found in the kidneys, then the below pulmonary nodule   recommendation would not apply. RECOMMENDATIONS:   Multiple pulmonary nodules. Most severe: 7 mm right solid pulmonary nodule. Recommend a non-contrast Chest CT at 3-6 months. If patient is high risk for   malignancy, recommend an additional non-contrast Chest CT at 18-24 months; if   patient is low risk for malignancy a non-contrast Chest CT at 18-24 months is   optional.      These guidelines do not apply to immunocompromised patients and patients with   cancer. Follow up in patients with significant comorbidities as clinically   warranted. For lung cancer screening, adhere to Lung-RADS guidelines. Reference: Radiology. 2017; 284(1):228-43. CT CERVICAL SPINE WO CONTRAST   Final Result   No acute fracture or traumatic malalignment of the cervical spine. CT HEAD WO CONTRAST   Final Result   1. No acute intracranial abnormality. 2. Chronic findings as above. XR CHEST PORTABLE   Final Result   1. Lines and tubes in expected position as above. 2. No acute cardiopulmonary abnormality. Medications: All current and past medications were reviewed.      vancomycin  1,500 mg IntraVENous Q12H    methadone  5 mg Oral BID    lacosamide  200 mg Oral BID    atorvastatin  10 mg Oral Nightly    enoxaparin  40 mg SubCUTAneous Daily    levETIRAcetam  1,500 mg Oral BID        sodium chloride 25 mL (02/26/22 2132)       sodium chloride flush, sodium chloride, magnesium sulfate, ondansetron, acetaminophen **OR** acetaminophen, potassium chloride **OR** potassium chloride, perflutren lipid microspheres      Problem list:       Patient Active Problem List   Diagnosis Code    Psychosis ([facility] [address] F29    Acute intracerebral hemorrhage (HCC) I61.9    Dysphagia R13.10    Dysphonia R49.0    Endocarditis, sumit and aortic valves I38    Endocarditis I38    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    Bacterial infection due to Streptococcus mutans A49.1    DM (diabetes mellitus), type 2, uncontrolled with complications ([facility]) Q51.0, E11.65    Protein calorie malnutrition ([facility] [address] E46    Seizure ([facility] [address] R56.9    Partial symptomatic epilepsy with complex partial seizures, intractable, without status epilepticus ([facility] [address] G40.219    Abnormal CT of the head R93.0    Breakthrough seizure ([address] G40.919    History of stroke Z86.73    Aphasia R47.01    NATALIA (acute kidney injury) ([facility] [address] N17.9    Lactic acidosis E87.2    Unresponsive R41.89    Partial idiopathic epilepsy with seizures of localized onset, intractable, with status epilepticus ([facility] [address] G40.011    Acute respiratory failure with hypoxia ([facility]) J96.01    Sepsis ([facility] [address] A41.9    Pocola coma scale total score 3-8 ([facility]) R40.2430    Lung nodules R91.8    Essential hypertension I10    Enterococcal bacteremia R78.81, B95.2       Please note that this chart was generated using Dragon dictation software. Although every effort was made to ensure the accuracy of this automated transcription, some errors in transcription may have occurred inadvertently.  If you may need any clarification, please do not hesitate to contact me through EPIC or at the phone number provided below with my electronic signature. Any pictures or media included in this note were obtained after taking informed verbal consent from the patient and with their approval to include those in the patient's medical record.       Amanda Fabian MD, MPH, [address] [place] [address]  2/27/2022, 2:08 PM  [facility] Infectious Disease   [address] [place] [address], [address] [place] [address]  Office: [phone]  Fax: [phone]  Clinic days:  Tuesday & Thursday

## 2022-02-28 LAB
A/G RATIO: 1.3 (ref 1.1–2.2)
ALBUMIN SERPL-MCNC: 3.4 G/DL (ref 3.4–5)
ALP BLD-CCNC: 64 U/L (ref 40–129)
ALT SERPL-CCNC: 26 U/L (ref 10–40)
ANION GAP SERPL CALCULATED.3IONS-SCNC: 9 MMOL/L (ref 3–16)
AST SERPL-CCNC: 28 U/L (ref 15–37)
BASOPHILS ABSOLUTE: 0.1 K/UL (ref 0–0.2)
BASOPHILS RELATIVE PERCENT: 1 %
BILIRUB SERPL-MCNC: 0.3 MG/DL (ref 0–1)
BUN BLDV-MCNC: 16 MG/DL (ref 7–20)
C-REACTIVE PROTEIN: 12.1 MG/L (ref 0–5.1)
CALCIUM SERPL-MCNC: 8.9 MG/DL (ref 8.3–10.6)
CHLORIDE BLD-SCNC: 106 MMOL/L (ref 99–110)
CO2: 27 MMOL/L (ref 21–32)
CREAT SERPL-MCNC: 0.6 MG/DL (ref 0.8–1.3)
EOSINOPHILS ABSOLUTE: 0.2 K/UL (ref 0–0.6)
EOSINOPHILS RELATIVE PERCENT: 3.7 %
GFR AFRICAN AMERICAN: >60
GFR NON-AFRICAN AMERICAN: >60
GLUCOSE BLD-MCNC: 106 MG/DL (ref 70–99)
HCT VFR BLD CALC: 35.1 % (ref 40.5–52.5)
HEMOGLOBIN: 12.3 G/DL (ref 13.5–17.5)
INR BLD: 1.23 (ref 0.88–1.12)
LYMPHOCYTES ABSOLUTE: 1.1 K/UL (ref 1–5.1)
LYMPHOCYTES RELATIVE PERCENT: 20.5 %
MAGNESIUM: 2 MG/DL (ref 1.8–2.4)
MCH RBC QN AUTO: 33.9 PG (ref 26–34)
MCHC RBC AUTO-ENTMCNC: 35.1 G/DL (ref 31–36)
MCV RBC AUTO: 96.5 FL (ref 80–100)
MONOCYTES ABSOLUTE: 0.3 K/UL (ref 0–1.3)
MONOCYTES RELATIVE PERCENT: 6.5 %
NEUTROPHILS ABSOLUTE: 3.6 K/UL (ref 1.7–7.7)
NEUTROPHILS RELATIVE PERCENT: 68.3 %
PDW BLD-RTO: 12.5 % (ref 12.4–15.4)
PLATELET # BLD: 204 K/UL (ref 135–450)
PMV BLD AUTO: 7.3 FL (ref 5–10.5)
POTASSIUM SERPL-SCNC: 3.7 MMOL/L (ref 3.5–5.1)
PRO-BNP: 435 PG/ML (ref 0–124)
PROTHROMBIN TIME: 14 SEC (ref 9.9–12.7)
RBC # BLD: 3.64 M/UL (ref 4.2–5.9)
SEDIMENTATION RATE, ERYTHROCYTE: 30 MM/HR (ref 0–20)
SODIUM BLD-SCNC: 142 MMOL/L (ref 136–145)
TOTAL PROTEIN: 6.1 G/DL (ref 6.4–8.2)
WBC # BLD: 5.3 K/UL (ref 4–11)

## 2022-02-28 PROCEDURE — 6370000000 HC RX 637 (ALT 250 FOR IP): Performed by: INTERNAL MEDICINE

## 2022-02-28 PROCEDURE — 99233 SBSQ HOSP IP/OBS HIGH 50: CPT | Performed by: INTERNAL MEDICINE

## 2022-02-28 PROCEDURE — 6360000002 HC RX W HCPCS: Performed by: INTERNAL MEDICINE

## 2022-02-28 PROCEDURE — 2580000003 HC RX 258: Performed by: INTERNAL MEDICINE

## 2022-02-28 PROCEDURE — 2060000000 HC ICU INTERMEDIATE R&B

## 2022-02-28 PROCEDURE — C1751 CATH, INF, PER/CENT/MIDLINE: HCPCS

## 2022-02-28 PROCEDURE — 85610 PROTHROMBIN TIME: CPT

## 2022-02-28 PROCEDURE — 94760 N-INVAS EAR/PLS OXIMETRY 1: CPT

## 2022-02-28 PROCEDURE — 86140 C-REACTIVE PROTEIN: CPT

## 2022-02-28 PROCEDURE — 36569 INSJ PICC 5 YR+ W/O IMAGING: CPT

## 2022-02-28 PROCEDURE — 80053 COMPREHEN METABOLIC PANEL: CPT

## 2022-02-28 PROCEDURE — 85652 RBC SED RATE AUTOMATED: CPT

## 2022-02-28 PROCEDURE — 83880 ASSAY OF NATRIURETIC PEPTIDE: CPT

## 2022-02-28 PROCEDURE — 85025 COMPLETE CBC W/AUTO DIFF WBC: CPT

## 2022-02-28 PROCEDURE — 83735 ASSAY OF MAGNESIUM: CPT

## 2022-02-28 RX ORDER — LANOLIN ALCOHOL/MO/W.PET/CERES
3 CREAM (GRAM) TOPICAL NIGHTLY PRN
Status: DISCONTINUED | OUTPATIENT
Start: 2022-03-01 | End: 2022-02-28

## 2022-02-28 RX ORDER — SODIUM CHLORIDE 0.9 % (FLUSH) 0.9 %
5-40 SYRINGE (ML) INJECTION PRN
Status: DISCONTINUED | OUTPATIENT
Start: 2022-02-28 | End: 2022-03-02 | Stop reason: HOSPADM

## 2022-02-28 RX ORDER — LIDOCAINE HYDROCHLORIDE 10 MG/ML
5 INJECTION, SOLUTION EPIDURAL; INFILTRATION; INTRACAUDAL; PERINEURAL ONCE
Status: DISCONTINUED | OUTPATIENT
Start: 2022-02-28 | End: 2022-03-02 | Stop reason: HOSPADM

## 2022-02-28 RX ORDER — SODIUM CHLORIDE 0.9 % (FLUSH) 0.9 %
5-40 SYRINGE (ML) INJECTION EVERY 12 HOURS SCHEDULED
Status: DISCONTINUED | OUTPATIENT
Start: 2022-02-28 | End: 2022-03-02 | Stop reason: HOSPADM

## 2022-02-28 RX ORDER — LANOLIN ALCOHOL/MO/W.PET/CERES
3 CREAM (GRAM) TOPICAL NIGHTLY PRN
Status: DISCONTINUED | OUTPATIENT
Start: 2022-02-28 | End: 2022-03-02 | Stop reason: HOSPADM

## 2022-02-28 RX ORDER — LISINOPRIL 5 MG/1
2.5 TABLET ORAL DAILY
Status: DISCONTINUED | OUTPATIENT
Start: 2022-02-28 | End: 2022-03-02 | Stop reason: HOSPADM

## 2022-02-28 RX ORDER — SODIUM CHLORIDE 9 MG/ML
25 INJECTION, SOLUTION INTRAVENOUS PRN
Status: DISCONTINUED | OUTPATIENT
Start: 2022-02-28 | End: 2022-03-02

## 2022-02-28 RX ORDER — TORSEMIDE 20 MG/1
10 TABLET ORAL DAILY
Status: DISCONTINUED | OUTPATIENT
Start: 2022-02-28 | End: 2022-03-02 | Stop reason: HOSPADM

## 2022-02-28 RX ADMIN — LEVETIRACETAM 1500 MG: 100 SOLUTION ORAL at 21:07

## 2022-02-28 RX ADMIN — ENOXAPARIN SODIUM 40 MG: 40 INJECTION SUBCUTANEOUS at 10:32

## 2022-02-28 RX ADMIN — Medication 1 CAPSULE: at 18:22

## 2022-02-28 RX ADMIN — SODIUM CHLORIDE, PRESERVATIVE FREE 10 ML: 5 INJECTION INTRAVENOUS at 12:21

## 2022-02-28 RX ADMIN — AMPICILLIN SODIUM 2000 MG: 2 INJECTION, POWDER, FOR SOLUTION INTRAMUSCULAR; INTRAVENOUS at 17:43

## 2022-02-28 RX ADMIN — Medication 1 CAPSULE: at 10:30

## 2022-02-28 RX ADMIN — LISINOPRIL 2.5 MG: 5 TABLET ORAL at 10:31

## 2022-02-28 RX ADMIN — LEVETIRACETAM 1500 MG: 100 SOLUTION ORAL at 10:30

## 2022-02-28 RX ADMIN — SODIUM CHLORIDE, PRESERVATIVE FREE 10 ML: 5 INJECTION INTRAVENOUS at 21:07

## 2022-02-28 RX ADMIN — ATORVASTATIN CALCIUM 10 MG: 10 TABLET, FILM COATED ORAL at 21:07

## 2022-02-28 RX ADMIN — CEFTRIAXONE 2000 MG: 2 INJECTION, POWDER, FOR SOLUTION INTRAMUSCULAR; INTRAVENOUS at 18:21

## 2022-02-28 RX ADMIN — MELATONIN TAB 3 MG 3 MG: 3 TAB at 22:05

## 2022-02-28 RX ADMIN — TORSEMIDE 10 MG: 20 TABLET ORAL at 12:20

## 2022-02-28 RX ADMIN — LACOSAMIDE 200 MG: 100 TABLET, FILM COATED ORAL at 21:07

## 2022-02-28 RX ADMIN — CEFTRIAXONE 2000 MG: 2 INJECTION, POWDER, FOR SOLUTION INTRAMUSCULAR; INTRAVENOUS at 02:55

## 2022-02-28 RX ADMIN — AMPICILLIN SODIUM 2000 MG: 2 INJECTION, POWDER, FOR SOLUTION INTRAMUSCULAR; INTRAVENOUS at 21:07

## 2022-02-28 RX ADMIN — LACOSAMIDE 200 MG: 100 TABLET, FILM COATED ORAL at 10:30

## 2022-02-28 RX ADMIN — AMPICILLIN SODIUM 2000 MG: 2 INJECTION, POWDER, FOR SOLUTION INTRAMUSCULAR; INTRAVENOUS at 01:02

## 2022-02-28 RX ADMIN — AMPICILLIN SODIUM 2000 MG: 2 INJECTION, POWDER, FOR SOLUTION INTRAMUSCULAR; INTRAVENOUS at 10:55

## 2022-02-28 RX ADMIN — METHADONE HYDROCHLORIDE 5 MG: 10 TABLET ORAL at 10:31

## 2022-02-28 RX ADMIN — AMPICILLIN SODIUM 2000 MG: 2 INJECTION, POWDER, FOR SOLUTION INTRAMUSCULAR; INTRAVENOUS at 04:39

## 2022-02-28 RX ADMIN — METHADONE HYDROCHLORIDE 5 MG: 10 TABLET ORAL at 21:07

## 2022-02-28 ASSESSMENT — PAIN SCALES - GENERAL
PAINLEVEL_OUTOF10: 4
PAINLEVEL_OUTOF10: 10
PAINLEVEL_OUTOF10: 10
PAINLEVEL_OUTOF10: 0
PAINLEVEL_OUTOF10: 6
PAINLEVEL_OUTOF10: 0

## 2022-02-28 ASSESSMENT — ENCOUNTER SYMPTOMS
SORE THROAT: 0
CONSTIPATION: 0
WHEEZING: 0
ABDOMINAL PAIN: 0
BACK PAIN: 0
NAUSEA: 0
SHORTNESS OF BREATH: 0
EYE REDNESS: 0
TROUBLE SWALLOWING: 0
RHINORRHEA: 0
DIARRHEA: 0
EYE DISCHARGE: 0
COUGH: 0

## 2022-02-28 ASSESSMENT — PAIN DESCRIPTION - LOCATION: LOCATION: BACK

## 2022-02-28 ASSESSMENT — PAIN DESCRIPTION - PAIN TYPE: TYPE: CHRONIC PAIN

## 2022-02-28 NOTE — PROGRESS NOTES
Infectious Diseases   Progress Note      Admission Date: 2/22/2022  Hospital Day: Hospital Day: 7   Attending: Herman Quick MD  Date of service: 2/28/2022     Chief complaint/ Reason for consult:     · Severe sepsis with fever, leukocytosis, lactic acidosis, hypotension on admission  · Enterococcus faecalis bacteremia   · History of streptococcal bacteremia and mitral and aortic valve endocarditis in 2017  · Positive urine drug screen for benzodiazepines  · Bilateral lung nodules  · Left parietal encephalomalacia    Microbiology:        I have reviewed allavailable micro lab data and cultures    · Blood culture (2/2) - collected on 2/22/2022: Enterococcus fecalis    Susceptibility     Enterococcus  faecalis     BACTERIAL SUSCEPTIBILITY PANEL BY ABIEL     ampicillin <=2 mcg/mL Sensitive     vancomycin 1 mcg/mL Sensitive               Antibiotics and immunizations:       Current antibiotics: All antibiotics and their doses were reviewed by me    Recent Abx Admin                   ampicillin 2000 mg ivpb mini bag (mg) 2,000 mg New Bag 02/28/22 1055     2,000 mg New Bag  0439     2,000 mg New Bag  0102     2,000 mg New Bag 02/27/22 2109     2,000 mg New Bag  1712    cefTRIAXone (ROCEPHIN) 2000 mg IVPB in D5W 50ml minibag (mg) 2,000 mg New Bag 02/28/22 0255     2,000 mg New Bag 02/27/22 1528                  Immunization History: All immunization history was reviewed by me today. Immunization History   Administered Date(s) Administered    COVID-19, Pfizer Purple top, DILUTE for use, 12+ yrs, 30mcg/0.3mL dose 12/21/2020, 01/11/2021, 10/21/2021       Known drug allergies: All allergies were reviewed and updated    Allergies   Allergen Reactions    Seasonal        Social history:     Social History:  All social andepidemiologic history was reviewed and updated by me today as needed. · Tobacco use:   reports that he has been smoking cigarettes. He has been smoking about 0.50 packs per day.  He has never used smokeless tobacco.  · Alcohol use:   reports no history of alcohol use. · [facility] lives in: [facility]  ·  reports no history of drug use. COVID VACCINATION AND LAB RESULT RECORDS:     Internal Administration   First Dose COVID-19, Pfizer Purple top, DILUTE for use, 12+ yrs, 30mcg/0.3mL dose  12/21/2020   Second Dose COVID-19, Pfizer Purple top, DILUTE for use, 12+ yrs, 30mcg/0.3mL dose   01/11/2021       Last COVID Lab SARS-CoV-2, PCR (no units)   Date Value   07/15/2020 Not Detected     SARS-CoV-2, NAAT (no units)   Date Value   02/25/2022 Not Detected            Assessment:     The patient is a 64 y.o. old male who  has a past medical history of Acute intracerebral hemorrhage ([facility] (02/03/2017), Anxiety, Back pain, Cerebral artery occlusion with cerebral infarction ([facility] [address] Chronic back pain greater than 3 months duration (02/03/2017), Diabetes mellitus ([address] Dysphasia (02/03/2017), Dysphonia (02/03/2017), Endocarditis and heart valve disorders in diseases classified elsewhere (02/03/2017), Hydrocephalus ([address] (02/03/2017), Hypertension, IVH (intraventricular hemorrhage) ([facility] [address] (02/03/2017), NSTEMI (non-ST elevated myocardial infarction) ([facility] [address] (02/03/2017), and Seizures ([facility] [address] with following problems:    · Severe sepsis with fever, leukocytosis, lactic acidosis, hypotension on admission-this is resolved  · Enterococcus faecalis bacteremia-persistent bacteremia, now on IV ampicillin and IV ceftriaxone  · History of streptococcal bacteremia and mitral and aortic valve endocarditis in 2017  · Positive urine drug screen for benzodiazepines  · Bilateral lung nodules  · Left parietal encephalomalacia  · History of stroke  · Type 2 diabetes mellitus-maintaining good glucose control  · Essential hypertension-blood pressure okay  · Seizure disorder  · Nursing home resident  ·       Discussion:      The patient is afebrile.     His blood cultures from 2/25/2022 are still positive for Enterococcus faecalis. I had switched his antibiotic to IV ampicillin and IV ceftriaxone for treatment as endocarditis given persistent bacteremia in setting of history of endocarditis and aortic valve rupture and severe mitral and aortic valve regurgitations. Serum creatinine 0.6 today. Sed rate is 30. Repeat blood cultures have been sent  yesterday    Plan:     Diagnostic Workup:    · Follow-up on repeat blood cultures from yesterday  · Continue to follow  fever curve, WBC count and blood cultures. · Continue to monitor blood counts, liver and renal function. Antimicrobials:    · Will continue IV ampicillin 2 g every 4 hours  · Continue IV ceftriaxone 2 g every 12 hours  Recommend holding off on PICC line until blood cultures clear for at least  72 hours. · Midline or regular central venous line okay for now, if needed for IV access. · Given persistent bacteremia, anticipate at least 6-day course of IV antibiotics from date of negative blood cultures  · Start oral probiotic twice daily  · We will follow up on the culture results and clinical progress and will make further recommendations accordingly. · Continue close vitals monitoring. · Maintain good glycemic control. · Fall precautions. · Aspiration precautions. · Continue to watch for new fever or diarrhea. · DVT prophylaxis. · Discussed all above with patient and RN. Drug Monitoring:    · Continue monitoring for antibiotic toxicity as follows: CBC, CMP   · Continue to watch for following: new or worsening fever, new hypotension, hives, lip swelling and redness or purulence at vascular access sites. I/v access Management:    · Continue to monitor i.v access sites for erythema, induration, discharge or tenderness. · As always, continue efforts to minimize tubes/lines/drains as clinically appropriate to reduce chances of line associated infections.     Patient education and counseling:        · The patient was educated in detail about the side-effects of various antibiotics and things to watch for like new rashes, lip swelling, severe reaction, worsening diarrhea, break through fever etc.  · Discussed patient's condition and what to expect. All of the patient's questions were addressed in a satisfactory manner and patient verbalized understanding all instructions. Level of complexity of visit: High     Risk of Complications/Morbidity: High     · Illness(es)/ Infection present that pose threat to life/bodily function. · There is potential for severe exacerbation of infection/side effects of treatment. · Therapy requires intensive monitoring for antimicrobial agent toxicity. TIME SPENT TODAY:     - Spent over  36 minutes on visit (including interval history, physical exam, review of data including labs, cultures, imaging, development and implementation of treatment plan and coordination of complex care). More than 50 percent of this includes face-to-face time spent with the patient for counseling and coordination of care. Thank you for involving me in the care of your patient. I will continue to follow. If you have anyadditional questions, please do not hesitate to contact me. Subjective: Interval history: Interval history was obtained from chart review and patient/ RN. The patient is afebrile. He is tolerating antibiotics okay. No diarrhea     REVIEW OF SYSTEMS:      Review of Systems   Constitutional: Negative for chills, diaphoresis and fever. HENT: Negative for ear discharge, ear pain, rhinorrhea, sore throat and trouble swallowing. Eyes: Negative for discharge and redness. Respiratory: Negative for cough, shortness of breath and wheezing. Cardiovascular: Negative for chest pain and leg swelling. Gastrointestinal: Negative for abdominal pain, constipation, diarrhea and nausea. Endocrine: Negative for polyuria. Genitourinary: Negative for dysuria, flank pain, frequency, hematuria and urgency. Musculoskeletal: Negative for back pain and myalgias. Skin: Negative for rash. Neurological: Negative for dizziness, seizures and headaches. Hematological: Does not bruise/bleed easily. Psychiatric/Behavioral: Negative for hallucinations and suicidal ideas. All other systems reviewed and are negative. Past Medical History: All past medical history reviewed today. Past Medical History:   Diagnosis Date    Acute intracerebral hemorrhage ([facility] 02/03/2017    Anxiety     Back pain     Cerebral artery occlusion with cerebral infarction (HCC)     Chronic back pain greater than 3 months duration 02/03/2017    Diabetes mellitus ([address]     Dysphasia 02/03/2017    Dysphonia 02/03/2017    Endocarditis and heart valve disorders in diseases classified elsewhere 02/03/2017    Hydrocephalus ([facility] [facility] 02/03/2017    Hypertension     IVH (intraventricular hemorrhage) ([facility] [address] [facility] 02/03/2017    NSTEMI (non-ST elevated myocardial infarction) ([facility] [address] 02/03/2017    Seizures ([facility] [address]        Past Surgical History: All past surgical history was reviewed today. Past Surgical History:   Procedure Laterality Date    BRAIN ANEURYSM SURGERY Left 02/03/2017    COLONOSCOPY N/A 8/14/2019    COLONOSCOPY WITH MAC ANESTHESIA performed by Esteban Lundberg MD at [address] [place] [address] Left 02/03/2017    LEG SURGERY      UPPER GASTROINTESTINAL ENDOSCOPY N/A 8/14/2019    EGD BIOPSY performed by Esteban Lundberg MD at [facility]       Family History: All family history was reviewed today. History reviewed. No pertinent family history. Objective:       PHYSICAL EXAM:      Vitals:   Vitals:    02/28/22 0400 02/28/22 0600 02/28/22 0852 02/28/22 1000   BP: 122/65      Pulse: 71 69  79   Resp: 19 16  14   Temp: 97.2 °F (36.2 °C)   97.6 °F (36.4 °C)   TempSrc: Temporal   Temporal   SpO2: 96% 94% 94% 96%   Weight: 154 lb 14.4 oz (70.3 kg)      Height:           Physical Exam  Vitals and nursing note reviewed.    Constitutional: Appearance: Normal appearance. He is well-developed. HENT:      Head: Normocephalic and atraumatic. Right Ear: External ear normal.      Left Ear: External ear normal.      Nose: Nose normal. No congestion or rhinorrhea. Mouth/Throat:      Mouth: Mucous membranes are moist.      Pharynx: No oropharyngeal exudate or posterior oropharyngeal erythema. Eyes:      General: No scleral icterus. Right eye: No discharge. Left eye: No discharge. Conjunctiva/sclera: Conjunctivae normal.      Pupils: Pupils are equal, round, and reactive to light. Cardiovascular:      Rate and Rhythm: Normal rate and regular rhythm. Pulses: Normal pulses. Heart sounds: No murmur heard. No friction rub. Pulmonary:      Effort: Pulmonary effort is normal. No respiratory distress. Breath sounds: Normal breath sounds. No stridor. No wheezing, rhonchi or rales. Abdominal:      General: Bowel sounds are normal.      Palpations: Abdomen is soft. Tenderness: There is no abdominal tenderness. There is no right CVA tenderness, left CVA tenderness, guarding or rebound. Musculoskeletal:         General: No swelling or tenderness. Normal range of motion. Cervical back: Normal range of motion and neck supple. No rigidity. No muscular tenderness. Lymphadenopathy:      Cervical: No cervical adenopathy. Skin:     General: Skin is warm and dry. Coloration: Skin is not jaundiced. Findings: No erythema or rash. Neurological:      General: No focal deficit present. Mental Status: He is alert and oriented to person, place, and time. Mental status is at baseline. Motor: No abnormal muscle tone. Psychiatric:         Mood and Affect: Mood normal.         Behavior: Behavior normal.         Thought Content: Thought content normal.             *    Lines and drains: All vascular access sites are healthy with no local erythema, discharge or tenderness.       Intake and output:    I/O last 3 completed shifts: In: 2204.3 [P.O.:1080; I.V.:124.3; IV Piggyback:1000]  Out: 1500 [Urine:1500]    Lab Data:   All available labs and old records have been reviewed by me. CBC:  Recent Labs     02/26/22  0411 02/27/22  0354 02/28/22 0416   WBC 5.7 5.8 5.3   RBC 3.76* 3.76* 3.64*   HGB 12.6* 12.7* 12.3*   HCT 36.0* 35.8* 35.1*    206 204   MCV 95.9 95.2 96.5   MCH 33.6 33.6 33.9   MCHC 35.0 35.3 35.1   RDW 12.2* 12.3* 12.5        BMP:  Recent Labs     02/26/22 0411 02/27/22  0354 02/28/22 0416    140 142   K 3.8 4.0 3.7    103 106   CO2 28 27 27   BUN 13 12 16   CREATININE 0.6* 0.5* 0.6*   CALCIUM 9.1 9.1 8.9   GLUCOSE 102* 113* 106*        Hepatic Function Panel:   Lab Results   Component Value Date    ALKPHOS 64 02/28/2022    ALT 26 02/28/2022    AST 28 02/28/2022    PROT 6.1 02/28/2022    PROT 7.1 12/13/2011    BILITOT 0.3 02/28/2022    LABALBU 3.4 02/28/2022       CPK:   Lab Results   Component Value Date    CKTOTAL 73 02/22/2022     ESR:   Lab Results   Component Value Date    SEDRATE 30 (H) 02/28/2022     CRP:   Lab Results   Component Value Date    CRP 12.1 (H) 02/28/2022           Imaging: All pertinent images and reports for the current visit were reviewed by me during this visit. I reviewed the chest x-ray/CT scan/MRI images and independently interpreted the findings and results today. MRI BRAIN WO CONTRAST   Final Result   1. No evidence of acute infarct. 2. Left-sided craniotomy with underlying encephalomalacia in the posterior   left cerebral hemisphere, unchanged. 3. Cerebral parenchymal volume loss with chronic microvascular white matter   ischemic disease. Chronic infarcts are noted in the right frontal lobe and   cerebellar hemispheres. CT CHEST ABDOMEN PELVIS WO CONTRAST   Final Result   1. Patchy airway secretions, including suspected secretions in the left lower   lobe bronchus and in the basilar segmental bronchi.   An underlying endobronchial lesion is not excluded but considered less likely. Consider   aspiration given patient status. 2. The sideport of a gastric tube is located at the gastroesophageal   junction. Consider advancement. 3. Indeterminate bilateral renal lesions potentially due to proteinaceous   cyst or solid neoplasm. Recommend further evaluation with renal protocol   abdomen MRI (preferred) or CT on a nonemergent basis. 4. Solid nodules in the bilateral lower lobes measure up to 0.7 cm and are   more likely infectious or inflammatory in etiology than neoplastic. Assuming   no malignancy is found in the kidneys on the above recommended study, then   follow-up chest CT would be recommended in 3-6 months as below. If   malignancy is found in the kidneys, then the below pulmonary nodule   recommendation would not apply. RECOMMENDATIONS:   Multiple pulmonary nodules. Most severe: 7 mm right solid pulmonary nodule. Recommend a non-contrast Chest CT at 3-6 months. If patient is high risk for   malignancy, recommend an additional non-contrast Chest CT at 18-24 months; if   patient is low risk for malignancy a non-contrast Chest CT at 18-24 months is   optional.      These guidelines do not apply to immunocompromised patients and patients with   cancer. Follow up in patients with significant comorbidities as clinically   warranted. For lung cancer screening, adhere to Lung-RADS guidelines. Reference: Radiology. 2017; 284(1):228-43. CT CERVICAL SPINE WO CONTRAST   Final Result   No acute fracture or traumatic malalignment of the cervical spine. CT HEAD WO CONTRAST   Final Result   1. No acute intracranial abnormality. 2. Chronic findings as above. XR CHEST PORTABLE   Final Result   1. Lines and tubes in expected position as above. 2. No acute cardiopulmonary abnormality. Medications: All current and past medications were reviewed.      lisinopril  2.5 mg Oral Daily    lidocaine 1 % injection  5 mL IntraDERmal Once    sodium chloride flush  5-40 mL IntraVENous 2 times per day    torsemide  10 mg Oral Daily    ampicillin IV  2,000 mg IntraVENous 6 times per day    cefTRIAXone (ROCEPHIN) IV  2,000 mg IntraVENous Q12H    lactobacillus  1 capsule Oral BID WC    methadone  5 mg Oral BID    lacosamide  200 mg Oral BID    atorvastatin  10 mg Oral Nightly    enoxaparin  40 mg SubCUTAneous Daily    levETIRAcetam  1,500 mg Oral BID        sodium chloride      sodium chloride 10 mL/hr at 02/27/22 1131       sodium chloride flush, sodium chloride, sodium chloride flush, sodium chloride, magnesium sulfate, ondansetron, acetaminophen **OR** acetaminophen, potassium chloride **OR** potassium chloride, perflutren lipid microspheres      Problem list:       Patient Active Problem List   Diagnosis Code    Psychosis ([facility] F29    Acute intracerebral hemorrhage (HCC) I61.9    Dysphagia R13.10    Dysphonia R49.0    Endocarditis, sumit and aortic valves I38    Endocarditis I38    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) V11.36    Protein calorie malnutrition ([facility]) E46    Seizure ([facility] [address] R56.9    Partial symptomatic epilepsy with complex partial seizures, intractable, without status epilepticus ([facility] [address] G40.219    Abnormal CT of the head R93.0    Breakthrough seizure ([facility] [address] G40.919    History of stroke Z86.73    Aphasia R47.01    NATALIA (acute kidney injury) ([facility] [address] N17.9    Lactic acidosis E87.2    Unresponsive R41.89    Partial idiopathic epilepsy with seizures of localized onset, intractable, with status epilepticus ([facility] [address] G40.011    Acute respiratory failure with hypoxia ([facility]) J96.01    Sepsis ([facility] [address] A41.9    [place] coma scale total score 3-8 ([facility]) R40.2430    Lung nodules R91.8    Essential hypertension I10    Enterococcal bacteremia R78.81, B95.2       Please note that this chart was generated using Dragon dictation software. Although every effort was made to ensure the accuracy of this automated transcription, some errors in transcription may have occurred inadvertently. If you may need any clarification, please do not hesitate to contact me through EPIC or at the phone number provided below with my electronic signature. Any pictures or media included in this note were obtained after taking informed verbal consent from the patient and with their approval to include those in the patient's medical record.       Ally Preston MD, MPH, [facility]  2/28/2022, 11:49 AM  [facility] Infectious Disease   [address] [place] [address], [address] [place] [address]  Office: [phone]  Fax: [phone]  Clinic days:  Tuesday & Thursday

## 2022-02-28 NOTE — PROGRESS NOTES
Date of Admission: 2/22/2022  Attending Physician: Dr. Nayan Cardoza  Primary problem: Seizure    Changes from previous shift: Patient blood cultures positive for gram positive cocci, vancomycin discontinued and changed to ampicillin/rocephin, lactobacillus PO started and administered per protocol, patient does verbalize chronic back pain, PRN tylenol administered per orders, patient has good appetite, ate all meals today. Neuro:   Pain: 3-10, PRN tylenol administered per orders. RASS: 0    Cardiac:   Rhythm: NSR   Gtt: None    Respiratory:    Oxygen: Room air   Breath sounds: Clear/diminished breath sounds. GI/:    Tamayo: None   Last BM: 2/25   Diet: Regular   Appetite: Good    LDAs: x2 PIV    Continuous Infusions: Tracy Clement for intermittent antibiotics.

## 2022-02-28 NOTE — DISCHARGE INSTR - COC
Continuity of Care Form    Patient Name: Cee Smart   :  1960  MRN:  [id number]    Admit date:  2022  Discharge date:  2022    Code Status Order: Full Code   Advance Directives:      Admitting Physician:  Rosa M Camejo MD  PCP: Griselda Krone, MD    Discharging Nurse: YAS  [facility] Unit/Room#: YZX-6081/4694-22  Discharging Unit Phone Number: [phone]    Emergency Contact:   Extended Emergency Contact Information  Primary Emergency Contact: Vidhya Montalvo, [id number] [place] [address] Phone: [phone]  Mobile Phone: [phone]  Relation: Brother/Sister  Secondary Emergency Contact: Della Napier  Home Phone: [phone]  Relation: Other    Past Surgical History:  Past Surgical History:   Procedure Laterality Date    BRAIN ANEURYSM SURGERY Left 2017    COLONOSCOPY N/A 2019    COLONOSCOPY WITH MAC ANESTHESIA performed by Marlon Morse MD at [address] [place] [address] Left 2017    LEG SURGERY      UPPER GASTROINTESTINAL ENDOSCOPY N/A 2019    EGD BIOPSY performed by Marlon Morse MD at [facility] ENDOSCOPY       Immunization History:   Immunization History   Administered Date(s) Administered    COVID-19, [employer] top, DILUTE for use, 12+ yrs, 30mcg/0.3mL dose 2020, 2021, 10/21/2021       Active Problems:  Patient Active Problem List   Diagnosis Code    Psychosis ([address] F29    Acute intracerebral hemorrhage ([address] I61.9    Dysphagia R13.10    Dysphonia R49.0    Endocarditis, sumit and aortic valves I38    Endocarditis I38    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    Bacterial infection due to Streptococcus mutans A49.1    Type 2 diabetes mellitus with other specified complication (HCC) W46.23    Protein calorie malnutrition ([address] E46    Seizure ([address] R56.9    Partial symptomatic epilepsy with complex partial seizures, intractable, without status epilepticus ([address] G40.219    Abnormal CT of the head R93.0    Breakthrough seizure ([address] G40.919 History of stroke Z86.73    Aphasia R47.01    NATALIA (acute kidney injury) ([facility] N17.9    Lactic acidosis E87.2    Unresponsive R41.89    Partial idiopathic epilepsy with seizures of localized onset, intractable, with status epilepticus ([facility] G40.011    Acute respiratory failure with hypoxia (HCC) J96.01    Sepsis ([facility] A41.9    [place] coma scale total score 3-8 ([facility] R40.2430    Lung nodules R91.8    Essential hypertension I10    Enterococcal bacteremia R78.81, B95.2       Isolation/Infection:   Isolation            No Isolation          Patient Infection Status       Infection Onset Added Last Indicated Last Indicated By Review Planned Expiration Resolved Resolved By    None active    Resolved    COVID-19 (Rule Out) 01/03/21 01/03/21 01/03/21 COVID-19 (Ordered)   01/03/21 Rule-Out Test Resulted    COVID-19 (Rule Out) 07/15/20 07/15/20 07/15/20 COVID-19 (Ordered)   07/16/20 Rule-Out Test Resulted            Nurse Assessment:  Last Vital Signs: /65   Pulse 69   Temp 97.2 °F (36.2 °C) (Temporal)   Resp 16   Ht 5' 11\" (1.803 m)   Wt 154 lb 14.4 oz (70.3 kg)   SpO2 94%   BMI 21.60 kg/m²     Last documented pain score (0-10 scale): Pain Level: 6  Last Weight:   Wt Readings from Last 1 Encounters:   02/28/22 154 lb 14.4 oz (70.3 kg)     Mental Status:  oriented and alert    IV Access:  - PICC - site  L Basilic, insertion date: 03/02/2022    Nursing Mobility/ADLs:  Walking   Assisted  Transfer  Assisted  Bathing  Assisted  Dressing  Assisted  Toileting  Assisted  Feeding  Assisted  Med Admin  Assisted  Med Delivery   whole    Wound Care Documentation and Therapy:  Incision 02/03/17 Head Left;Posterior (Active)   Number of days: 1850        Elimination:  Continence:    Bowel: Yes  Bladder: Yes  Urinary Catheter: None   Colostomy/Ileostomy/Ileal Conduit: No       Date of Last BM: 03/01/2022    Intake/Output Summary (Last 24 hours) at 2/28/2022 0910  Last data filed at 2/28/2022 0759  Gross per 24 hour   Intake 1724.26 ml   Output 825 ml   Net 899.26 ml     I/O last 3 completed shifts: In: 2204.3 [P.O.:1080; I.V.:124.3; IV Piggyback:1000]  Out: 1500 [Urine:1500]    Safety Concerns: At Risk for Falls and History of Seizures    Impairments/Disabilities:      None    Nutrition Therapy:  Current Nutrition Therapy:   - Oral Diet:  General    Routes of Feeding: Oral  Liquids: Thin Liquids  Daily Fluid Restriction: no  Last Modified Barium Swallow with Video (Video Swallowing Test): not done    Treatments at the Time of Hospital Discharge:   Respiratory Treatments: N/A  Oxygen Therapy:  is not on home oxygen therapy.   Ventilator:    - No ventilator support    Rehab Therapies: Physical Therapy and Occupational Therapy  Weight Bearing Status/Restrictions: No weight bearing restirctions  Other Medical Equipment (for information only, NOT a DME order):  N/A  Other Treatments: N/A    Patient's personal belongings (please select all that are sent with patient):  None    RN SIGNATURE:  Electronically signed by Asiya Sarah RN on 3/2/22 at 1:08 PM EST    CASE MANAGEMENT/SOCIAL WORK SECTION    Inpatient Status Date:2/22/2022    Readmission Risk Assessment Score:  Readmission Risk              Risk of Unplanned Readmission:  21           Discharging to Facility/ Agency   Name: Discharging to [address] [place] [address]  R: [place] Neigh: [phone]       :    / signature: DHARMESH Carrero, CCM, RN  [address]    Prognosis: Fair    Condition at Discharge: Stable    Rehab Potential (if transferring to Rehab): Good    Recommended Labs or Other Treatments After Discharge:                           Out-patient antibiotic therapy (OPAT)/ Antibiotic Infusion orders          Diagnosis: Enterococcus faecalis bacteremia, high suspicion for endocarditis       Organism/ culture: See above     Name and dose of Antimicrobial: IV ampicillin 2 g every 4 hour, IV ceftriaxone 2 g every 12 hour     Antimicrobial start date: calculated from 2/27/2022     Antimicrobial completion date planned: April 11, 2022     Lab monitoring: CBC, Chem 12 once a week, to be       collected every Monday or Tuesday morning until the patient is off IV  antibiotics. Fax weekly lab results to Nina Charles MD's office at        [phone]. Please maintain PICC line until the patient is on IV antibiotics. Ok to administer PICC line normal saline flushes as needed. ** Please notify Nina Charles MD's office with any change in patient's        status, transfer out of facility or to hospital by calling [phone]           Outpatient Follow up: Due to the Irven Dunn 19 pandemic, plan for a follow-up virtual video visit in 5-6 weeks. Call my office at [phone] to make an appointment. Physician Signature:  Electronically signed by Nina Charles MD on                                                      3/1/22 at 2:48 PM EST         Physician Certification: I certify the above information and transfer of Kaveh Davalos  is necessary for the continuing treatment of the diagnosis listed and that he requires [facility] for greater 30 days.      Update Admission H&P: No change in H&P    PHYSICIAN SIGNATURE:  Electronically signed by Jasmeet Arredondo MD on 3/2/22 at 10:55 AM EST

## 2022-02-28 NOTE — PLAN OF CARE
Problem: Skin Integrity:  Goal: Will show no infection signs and symptoms  Description: Will show no infection signs and symptoms  Outcome: Ongoing  Goal: Absence of new skin breakdown  Description: Absence of new skin breakdown  Outcome: Ongoing     Problem: Falls - Risk of:  Goal: Will remain free from falls  Description: Will remain free from falls  Outcome: Ongoing  Goal: Absence of physical injury  Description: Absence of physical injury  Outcome: Ongoing     Problem: Pain:  Goal: Pain level will decrease  Description: Pain level will decrease  Outcome: Ongoing  Goal: Control of chronic pain  Description: Control of chronic pain  Outcome: Ongoing

## 2022-02-28 NOTE — PROGRESS NOTES
[facility] Daily Progress Note      Admit Date:  2/22/2022    Chief Complaint   Patient presents with    Other     Patient in by EMS from [facility] at the [facility]. Patient was found down, unknown how long and unresponsive, post ictal.        Subjective:  Mr. Elaine Kumar feels better. Doesn't want heart surgery. No fever. No chest pain or shortness of breath. Getting IV abx per PIV, awaiting recs from ID at what point PICC can be inserted.     Objective:   /65   Pulse 69   Temp 97.2 °F (36.2 °C) (Temporal)   Resp 16   Ht 5' 11\" (1.803 m)   Wt 154 lb 14.4 oz (70.3 kg)   SpO2 94%   BMI 21.60 kg/m²       Intake/Output Summary (Last 24 hours) at 2/28/2022 0857  Last data filed at 2/28/2022 3833  Gross per 24 hour   Intake 1964.26 ml   Output 825 ml   Net 1139.26 ml       TELEMETRY: Sinus     Physical Exam:  General:  Awake, alert, oriented x 3, NAD  Skin:  Warm and dry  Neck:  JVD none  Chest:  normal air entry  Cardiovascular:  RRR S1S2, no S3, 3/6 holosystolic at apex  Abdomen:  Soft, ND, NT, No HSM  Extremities:  No edema    Medications:    ampicillin IV  2,000 mg IntraVENous 6 times per day    cefTRIAXone (ROCEPHIN) IV  2,000 mg IntraVENous Q12H    lactobacillus  1 capsule Oral BID WC    methadone  5 mg Oral BID    lacosamide  200 mg Oral BID    atorvastatin  10 mg Oral Nightly    enoxaparin  40 mg SubCUTAneous Daily    levETIRAcetam  1,500 mg Oral BID      sodium chloride 10 mL/hr at 02/27/22 1131     sodium chloride flush, sodium chloride, magnesium sulfate, ondansetron, acetaminophen **OR** acetaminophen, potassium chloride **OR** potassium chloride, perflutren lipid microspheres    Lab Data:  CBC:   Recent Labs     02/26/22  0411 02/27/22  0354 02/28/22  0416   WBC 5.7 5.8 5.3   HGB 12.6* 12.7* 12.3*   HCT 36.0* 35.8* 35.1*   MCV 95.9 95.2 96.5    206 204     BMP:   Recent Labs     02/26/22  0411 02/27/22  0354 02/28/22  0416    140 142   K 3.8 4.0 3.7    103 106   CO2 28 27 27   BUN 13 12 16   CREATININE 0.6* 0.5* 0.6*     LIVER PROFILE:   Recent Labs     02/26/22  0411 02/27/22  0354 02/28/22  0416   AST 22 26 28   ALT 18 21 26   BILITOT 0.6 0.6 0.3   ALKPHOS 65 65 64     PT/INR:   Recent Labs     02/26/22  0411 02/27/22  0354 02/28/22  0416   PROTIME 13.8* 13.6* 14.0*   INR 1.21* 1.19* 1.23*     Lab Results   Component Value Date    PROBNP 435 02/28/2022    PROBNP 759 02/22/2022     CRP - 12.1  ESR - 30    Assessment/Plan:  Principal Problem:      Severe mitral regurgitation - likely secondary to old endocarditis. Preserved LV function but severe LV dilation. I emphasized to patient that he has an indication for surgery, however he has made it clear he doesn't want surgery. Will plan to see him in outpatient setting for heart failure management and revisit the issue. Start lisinopril 2.5 mg po daily   Repeat BNP today is slightly decreased but still elevated    Given torrential amount of MR he will need a maintenance diuretic    -start torsemide 10 mg po daily      Essential hypertension   Start lisinopril 2.5 mg po daily (to promote forward flow from MR)      Enterococcal bacteremia   Per primary team + ID     Will continue to follow as requested. Please keep us abreast of discharge plan so we can schedule him accordingly in our office.         Francis Canada MD, MD 2/28/2022 8:57 AM

## 2022-02-28 NOTE — FLOWSHEET NOTE
Vascular Access Note:   A Power Midline has been placed in the right BASILIC vein using sterile technique . A power midline is an   long peripheral catheter and may receive medications that are allowed through a peripheral vein. Power midline is approved by the FDA for blood draws. As of now it draws blood briskly without a tourniquet. It is approved to be in place for 29 days. It may, in time, be more difficult to draw blood from it and you may place a tourniquet above it to assist with blood draws- remembering it is 11 cm long and a tourniquet needs to be placed well above the tip of the catheter in order not to cut off flow or fracture the line.      Thank you,  Lani Ceja RN, BSN, MSHI,  VA-BC, Elena [address]  Vascular Access/PICC Team RN

## 2022-02-28 NOTE — PROGRESS NOTES
Assessment complete, vitals obtained. Pt A/O to self and place, disoriented to time. C/o pain 8/10, due meds and PRN tylenol given, see MAR. Pt denies SOB, lungs clear / diminished. Ab flat / soft. + bowel sounds. Skin warm and dry, scattered bruises. peripheral pulses palpable. Incontinent episode, cleaned. Pt independently wipes up with bath wipes, new linens and gown. Pt repositioning self. Denies further needs. Call light in reach. Safety precautions in place.

## 2022-02-28 NOTE — PROGRESS NOTES
Reassessment complete, vitals obtained. Due meds given, see MAR. Pt resting eyes closed, denies acute needs. Call light in reach.  Safety precautions in place

## 2022-02-28 NOTE — PROGRESS NOTES
Nutrition Note    RECOMMENDATIONS  1. PO Diet: Continue current diet   2. ONS: None ordered     NUTRITION ASSESSMENT   Pt assessed for nutrition risk. Pt with good PO intake, consuming % of most meals on a regular diet. Deemed to be at low nutrition risk at this time. Will continue to monitor for changes in status.  Nutrition Related Findings: Oriented to person/place. LBM 2/25.  Wounds: None   Nutrition Education:  Education not indicated    Nutrition Goals:       MALNUTRITION ASSESSMENT   Malnutrition Status: No malnutrition    NUTRITION DIAGNOSIS   No nutrition diagnosis at this time     CURRENT NUTRITION THERAPIES  ADULT DIET; Regular     PO Intake: 51-75%,%   PO Supplement Intake:None Ordered    ANTHROPOMETRICS   Current Height: 5' 11\" (180.3 cm)   Current Weight: 154 lb 14.4 oz (70.3 kg)     Ideal Body Weight (IBW): 172 lbs  (78 kg)        BMI: 21.6    The patient will be monitored per nutrition standards of care. Consult dietitian if additional nutrition interventions are needed prior to RD reassessment.      Minetta Spurling, [address] [place] [address],     Contact: [phone]

## 2022-02-28 NOTE — PROGRESS NOTES
Hospitalist Progress Note      PCP: Jett Montalvo MD    Date of Admission: 2/22/2022    Chief Complaint:  Patient in by EMS from [facility] at the [facility]. Patient was found down, unknown how long and unresponsive, post ictal    Hospital Course:   Amada Yeh is a 64 y.o. male. He presented from [facility] at the [facility] by ambulance. He was found down and unresponsive by staff. He was supported noninvasively en route w/ breaths via BVM. He was intubated shortly after arrival primarily for airway protection considering his persistently depressed mental status.     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 He underwent craniotomy and aneurysm clipping at the time. 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 Per prior notes he is aphasic at baseline.        Subjective:  Has severe valve regurg   ANA MARIA with no VEG  Went to MRI yesterday.       Medications:  Reviewed    Infusion Medications    sodium chloride      sodium chloride 10 mL/hr at 02/27/22 1131     Scheduled Medications    lisinopril  2.5 mg Oral Daily    lidocaine 1 % injection  5 mL IntraDERmal Once    sodium chloride flush  5-40 mL IntraVENous 2 times per day    torsemide  10 mg Oral Daily    ampicillin IV  2,000 mg IntraVENous 6 times per day    cefTRIAXone (ROCEPHIN) IV  2,000 mg IntraVENous Q12H    lactobacillus  1 capsule Oral BID WC    methadone  5 mg Oral BID    lacosamide  200 mg Oral BID    atorvastatin  10 mg Oral Nightly    enoxaparin  40 mg SubCUTAneous Daily    levETIRAcetam  1,500 mg Oral BID     PRN Meds: sodium chloride flush, sodium chloride, sodium chloride flush, sodium chloride, magnesium sulfate, ondansetron, acetaminophen **OR** acetaminophen, potassium chloride **OR** potassium chloride, perflutren lipid microspheres      Intake/Output Summary (Last 24 hours) at 2/28/2022 7008  Last data filed at 2/28/2022 0608  Gross per 24 hour   Intake 1484.26 ml   Output 625 ml   Net 859.26 ml       Physical Exam Performed:    BP (!) 117/58   Pulse 78   Temp 97.4 °F (36.3 °C) (Temporal)   Resp 11   Ht 5' 11\" (1.803 m)   Wt 154 lb 14.4 oz (70.3 kg)   SpO2 96%   BMI 21.60 kg/m²     General appearance: No apparent distress, appears stated age and cooperative. HEENT: Pupils equal, round, and reactive to light. Conjunctivae/corneas clear. Neck: Supple, with full range of motion. No jugular venous distention. Trachea midline. Respiratory:  Normal respiratory effort. Clear to auscultation, bilaterally without Rales/Wheezes/Rhonchi. Cardiovascular: Regular rate and rhythm 2-3/6 murmur, no  rubs or gallops. Abdomen: Soft, non-tender, non-distended with normal bowel sounds. Musculoskeletal: No clubbing, cyanosis or edema bilaterally. Full range of motion without deformity. Skin: Skin color, texture, turgor normal.  No rashes or lesions. Neurologic:  Neurovascularly intact without any focal sensory/motor deficits. Cranial nerves: II-XII intact, grossly non-focal.  Psychiatric: Alert and oriented, thought content appropriate, normal insight  Capillary Refill: Brisk,3 seconds, normal   Peripheral Pulses: +2 palpable, equal bilaterally       Labs:   Recent Labs     02/26/22 0411 02/27/22  0354 02/28/22 0416   WBC 5.7 5.8 5.3   HGB 12.6* 12.7* 12.3*   HCT 36.0* 35.8* 35.1*    206 204     Recent Labs     02/26/22  0411 02/27/22  0354 02/28/22  0416    140 142   K 3.8 4.0 3.7    103 106   CO2 28 27 27   BUN 13 12 16   CREATININE 0.6* 0.5* 0.6*   CALCIUM 9.1 9.1 8.9     Recent Labs     02/26/22  0411 02/27/22  0354 02/28/22  0416   AST 22 26 28   ALT 18 21 26   BILITOT 0.6 0.6 0.3   ALKPHOS 65 65 64     Recent Labs     02/26/22 0411 02/27/22  0354 02/28/22  0416   INR 1.21* 1.19* 1.23*     No results for input(s): Michael Killings in the last 72 hours.     Urinalysis:      Lab Results Component Value Date    NITRU Negative 02/22/2022    WBCUA 4 02/22/2022    BACTERIA 1+ 01/13/2022    RBCUA 11 02/22/2022    BLOODU MODERATE 02/22/2022    SPECGRAV 1.020 02/22/2022    GLUCOSEU Negative 02/22/2022       Radiology:  MRI BRAIN WO CONTRAST   Final Result   1. No evidence of acute infarct. 2. Left-sided craniotomy with underlying encephalomalacia in the posterior   left cerebral hemisphere, unchanged. 3. Cerebral parenchymal volume loss with chronic microvascular white matter   ischemic disease. Chronic infarcts are noted in the right frontal lobe and   cerebellar hemispheres. CT CHEST ABDOMEN PELVIS WO CONTRAST   Final Result   1. Patchy airway secretions, including suspected secretions in the left lower   lobe bronchus and in the basilar segmental bronchi. An underlying   endobronchial lesion is not excluded but considered less likely. Consider   aspiration given patient status. 2. The sideport of a gastric tube is located at the gastroesophageal   junction. Consider advancement. 3. Indeterminate bilateral renal lesions potentially due to proteinaceous   cyst or solid neoplasm. Recommend further evaluation with renal protocol   abdomen MRI (preferred) or CT on a nonemergent basis. 4. Solid nodules in the bilateral lower lobes measure up to 0.7 cm and are   more likely infectious or inflammatory in etiology than neoplastic. Assuming   no malignancy is found in the kidneys on the above recommended study, then   follow-up chest CT would be recommended in 3-6 months as below. If   malignancy is found in the kidneys, then the below pulmonary nodule   recommendation would not apply. RECOMMENDATIONS:   Multiple pulmonary nodules. Most severe: 7 mm right solid pulmonary nodule. Recommend a non-contrast Chest CT at 3-6 months.  If patient is high risk for   malignancy, recommend an additional non-contrast Chest CT at 18-24 months; if   patient is low risk for malignancy a non-contrast Chest CT at 18-24 months is   optional.      These guidelines do not apply to immunocompromised patients and patients with   cancer. Follow up in patients with significant comorbidities as clinically   warranted. For lung cancer screening, adhere to Lung-RADS guidelines. Reference: Radiology. 2017; 284(1):228-43. CT CERVICAL SPINE WO CONTRAST   Final Result   No acute fracture or traumatic malalignment of the cervical spine. CT HEAD WO CONTRAST   Final Result   1. No acute intracranial abnormality. 2. Chronic findings as above. XR CHEST PORTABLE   Final Result   1. Lines and tubes in expected position as above. 2. No acute cardiopulmonary abnormality. Assessment/Plan:    Active Hospital Problems    Diagnosis     Sepsis ([address] [A41.9]     [place] coma scale total score 3-8 (HCC) [R40.2430]     Lung nodules [R91.8]     Essential hypertension [I10]     Enterococcal bacteremia [R78.81, B95.2]     NATALIA (acute kidney injury) ([address] [N17.9]     Lactic acidosis [E87.2]     Unresponsive [R41.89]     Partial idiopathic epilepsy with seizures of localized onset, intractable, with status epilepticus ([address] [G40.011]     Acute respiratory failure with hypoxia ([address] [J96.01]     Breakthrough seizure ([address] [G40.919]     History of stroke [Z86.73]     Partial symptomatic epilepsy with complex partial seizures, intractable, without status epilepticus ([address] [G40.219]     Seizure ([address] [R56.9]     DM (diabetes mellitus), type 2, uncontrolled with complications ([address] [H64.5, E11.65]      Break through seizure- not new. I have extensive seizure history  -Consult placed to neuro, MRI done shows old chronic infarcts of the the right frontal and cerebellar hemisphere but no acute infarcts. No further seizure activity has been witnessed. Lactic acidosis  -Secondary to seizure activity and should improve with fluids and seizure having ceased. - resolved. Positive blood cultures  -With gram-positive cocci doubling strep as well as Enterococcus DNA detected  -Started on IV vancomycin  -Previous history of endocarditis in the past  -Echo has been ordered to rule out endocarditis. ID CONSULTED  Likely will need 6-8 weeks of IV abx  ID is following  Repeat blood cultures done on the 25th were still positive cultures were drawn again yesterday 2/27. A midline has been placed. The blood cultures from the 27th remain negative patient can likely DC with us midline in place. Antibiotic recommendations per ID    SEVERE MITRAL REGURG.  - ANA MARIA done  No vegetations. - May have endocarditis vs leaflette issues, appears to be related to previous bout of endocarditis in the past.   - he will need valve surgery. Patient currently refusing left heart angiogram and open heart surgery at this time per CT surgery. Diabetes mellitus 2  -He is on sliding scale as well as long-acting insulin    NATALIA  -Creatinine has improved with appropriate hydration resolved at this point. DVT Prophylaxis: Lovenox  Diet: ADULT DIET; Regular  Code Status: Full Code    PT/OT Eval Status:  On treatment  Dispo-Improved and able to transfer out of ICU    Wyn Homans, MD

## 2022-02-28 NOTE — PROGRESS NOTES
Physician Progress Note      Rafael David  CSN #:                  [id number]  :                       1960  ADMIT DATE:       2022 8:29 PM  [address] [place] DATE:  RESPONDING  PROVIDER #:        Courtney Gonsalves MD          QUERY TEXT:    Pt admitted with Seizure. Noted documentation of Severe sepsis with fever,   leukocytosis, lactic acidosis, hypotension on admission. on 2022 by   ordered ID consultant. If possible, please document in progress notes and   discharge summary:    The medical record reflects the following:  Risk Factors: History of mitral and aortic valve endocarditis d/t   streptococcus mutans  Clinical Indicators: B/P down to 86/52 on admit, , WBC 11.9,  LA 3.5,   Blood cultures positive, Per ID: Severe sepsis with fever, leukocytosis,   lactic acidosis, hypotension on admission. Treatment: ID consult, IV Fluids, IV ampicillin, IV Rocephin, IV Vanc  Options provided:  -- Sepsis confirmed present on admission  -- Sepsis confirmed not present on admission but developed during stay  -- Sepsis ruled out  -- Other - I will add my own diagnosis  -- Disagree - Not applicable / Not valid  -- Disagree - Clinically unable to determine / Unknown  -- Refer to Clinical Documentation Reviewer    PROVIDER RESPONSE TEXT:    The diagnosis of Sepsis was confirmed as present on admission.     Query created by: Russ Ballard on 2022 10:09 AM      Electronically signed by:  Courtney Gonsalves MD 2022 2:39 PM

## 2022-03-01 LAB
A/G RATIO: 1.3 (ref 1.1–2.2)
ALBUMIN SERPL-MCNC: 3.5 G/DL (ref 3.4–5)
ALP BLD-CCNC: 63 U/L (ref 40–129)
ALT SERPL-CCNC: 30 U/L (ref 10–40)
ANION GAP SERPL CALCULATED.3IONS-SCNC: 8 MMOL/L (ref 3–16)
AST SERPL-CCNC: 26 U/L (ref 15–37)
BASOPHILS ABSOLUTE: 0 K/UL (ref 0–0.2)
BASOPHILS RELATIVE PERCENT: 0.7 %
BILIRUB SERPL-MCNC: 0.3 MG/DL (ref 0–1)
BLOOD CULTURE, ROUTINE: ABNORMAL
BUN BLDV-MCNC: 15 MG/DL (ref 7–20)
CALCIUM SERPL-MCNC: 9 MG/DL (ref 8.3–10.6)
CHLORIDE BLD-SCNC: 105 MMOL/L (ref 99–110)
CO2: 30 MMOL/L (ref 21–32)
CREAT SERPL-MCNC: 0.6 MG/DL (ref 0.8–1.3)
CULTURE, BLOOD 2: ABNORMAL
EOSINOPHILS ABSOLUTE: 0.2 K/UL (ref 0–0.6)
EOSINOPHILS RELATIVE PERCENT: 3.1 %
GFR AFRICAN AMERICAN: >60
GFR NON-AFRICAN AMERICAN: >60
GLUCOSE BLD-MCNC: 107 MG/DL (ref 70–99)
HCT VFR BLD CALC: 34.4 % (ref 40.5–52.5)
HEMOGLOBIN: 12.3 G/DL (ref 13.5–17.5)
INR BLD: 1.15 (ref 0.88–1.12)
LYMPHOCYTES ABSOLUTE: 0.9 K/UL (ref 1–5.1)
LYMPHOCYTES RELATIVE PERCENT: 16.3 %
MAGNESIUM: 2 MG/DL (ref 1.8–2.4)
MCH RBC QN AUTO: 34.2 PG (ref 26–34)
MCHC RBC AUTO-ENTMCNC: 35.7 G/DL (ref 31–36)
MCV RBC AUTO: 96 FL (ref 80–100)
MONOCYTES ABSOLUTE: 0.4 K/UL (ref 0–1.3)
MONOCYTES RELATIVE PERCENT: 6.6 %
NEUTROPHILS ABSOLUTE: 4.2 K/UL (ref 1.7–7.7)
NEUTROPHILS RELATIVE PERCENT: 73.3 %
ORGANISM: ABNORMAL
ORGANISM: ABNORMAL
PDW BLD-RTO: 13 % (ref 12.4–15.4)
PLATELET # BLD: 197 K/UL (ref 135–450)
PMV BLD AUTO: 7.6 FL (ref 5–10.5)
POTASSIUM SERPL-SCNC: 3.6 MMOL/L (ref 3.5–5.1)
PROTHROMBIN TIME: 13.1 SEC (ref 9.9–12.7)
RBC # BLD: 3.58 M/UL (ref 4.2–5.9)
SODIUM BLD-SCNC: 143 MMOL/L (ref 136–145)
TOTAL PROTEIN: 6.1 G/DL (ref 6.4–8.2)
WBC # BLD: 5.7 K/UL (ref 4–11)

## 2022-03-01 PROCEDURE — 92526 ORAL FUNCTION THERAPY: CPT

## 2022-03-01 PROCEDURE — 80053 COMPREHEN METABOLIC PANEL: CPT

## 2022-03-01 PROCEDURE — 6370000000 HC RX 637 (ALT 250 FOR IP): Performed by: INTERNAL MEDICINE

## 2022-03-01 PROCEDURE — 83735 ASSAY OF MAGNESIUM: CPT

## 2022-03-01 PROCEDURE — 6360000002 HC RX W HCPCS: Performed by: INTERNAL MEDICINE

## 2022-03-01 PROCEDURE — 85610 PROTHROMBIN TIME: CPT

## 2022-03-01 PROCEDURE — 85025 COMPLETE CBC W/AUTO DIFF WBC: CPT

## 2022-03-01 PROCEDURE — 2060000000 HC ICU INTERMEDIATE R&B

## 2022-03-01 PROCEDURE — 99233 SBSQ HOSP IP/OBS HIGH 50: CPT | Performed by: INTERNAL MEDICINE

## 2022-03-01 PROCEDURE — 2580000003 HC RX 258: Performed by: INTERNAL MEDICINE

## 2022-03-01 PROCEDURE — 99233 SBSQ HOSP IP/OBS HIGH 50: CPT | Performed by: NURSE PRACTITIONER

## 2022-03-01 RX ADMIN — AMPICILLIN SODIUM 2000 MG: 2 INJECTION, POWDER, FOR SOLUTION INTRAMUSCULAR; INTRAVENOUS at 14:16

## 2022-03-01 RX ADMIN — Medication 1 CAPSULE: at 17:45

## 2022-03-01 RX ADMIN — AMPICILLIN SODIUM 2000 MG: 2 INJECTION, POWDER, FOR SOLUTION INTRAMUSCULAR; INTRAVENOUS at 21:34

## 2022-03-01 RX ADMIN — SODIUM CHLORIDE, PRESERVATIVE FREE 10 ML: 5 INJECTION INTRAVENOUS at 20:14

## 2022-03-01 RX ADMIN — AMPICILLIN SODIUM 2000 MG: 2 INJECTION, POWDER, FOR SOLUTION INTRAMUSCULAR; INTRAVENOUS at 17:47

## 2022-03-01 RX ADMIN — LACOSAMIDE 200 MG: 100 TABLET, FILM COATED ORAL at 09:23

## 2022-03-01 RX ADMIN — LEVETIRACETAM 1500 MG: 100 SOLUTION ORAL at 20:12

## 2022-03-01 RX ADMIN — LISINOPRIL 2.5 MG: 5 TABLET ORAL at 09:21

## 2022-03-01 RX ADMIN — CEFTRIAXONE 2000 MG: 2 INJECTION, POWDER, FOR SOLUTION INTRAMUSCULAR; INTRAVENOUS at 14:17

## 2022-03-01 RX ADMIN — ATORVASTATIN CALCIUM 10 MG: 10 TABLET, FILM COATED ORAL at 20:12

## 2022-03-01 RX ADMIN — AMPICILLIN SODIUM 2000 MG: 2 INJECTION, POWDER, FOR SOLUTION INTRAMUSCULAR; INTRAVENOUS at 04:40

## 2022-03-01 RX ADMIN — AMPICILLIN SODIUM 2000 MG: 2 INJECTION, POWDER, FOR SOLUTION INTRAMUSCULAR; INTRAVENOUS at 10:03

## 2022-03-01 RX ADMIN — ENOXAPARIN SODIUM 40 MG: 40 INJECTION SUBCUTANEOUS at 09:20

## 2022-03-01 RX ADMIN — METHADONE HYDROCHLORIDE 5 MG: 10 TABLET ORAL at 09:24

## 2022-03-01 RX ADMIN — CEFTRIAXONE 2000 MG: 2 INJECTION, POWDER, FOR SOLUTION INTRAMUSCULAR; INTRAVENOUS at 03:24

## 2022-03-01 RX ADMIN — Medication 1 CAPSULE: at 09:23

## 2022-03-01 RX ADMIN — AMPICILLIN SODIUM 2000 MG: 2 INJECTION, POWDER, FOR SOLUTION INTRAMUSCULAR; INTRAVENOUS at 01:03

## 2022-03-01 RX ADMIN — ACETAMINOPHEN 325MG 650 MG: 325 TABLET ORAL at 06:19

## 2022-03-01 RX ADMIN — LACOSAMIDE 200 MG: 100 TABLET, FILM COATED ORAL at 20:12

## 2022-03-01 RX ADMIN — METHADONE HYDROCHLORIDE 5 MG: 10 TABLET ORAL at 20:12

## 2022-03-01 RX ADMIN — LEVETIRACETAM 1500 MG: 100 SOLUTION ORAL at 09:32

## 2022-03-01 RX ADMIN — TORSEMIDE 10 MG: 20 TABLET ORAL at 09:22

## 2022-03-01 ASSESSMENT — ENCOUNTER SYMPTOMS
SORE THROAT: 0
TROUBLE SWALLOWING: 0
BACK PAIN: 0
EYE DISCHARGE: 0
ABDOMINAL PAIN: 0
COUGH: 0
DIARRHEA: 0
RHINORRHEA: 0
CONSTIPATION: 0
WHEEZING: 0
EYE REDNESS: 0
SHORTNESS OF BREATH: 0
NAUSEA: 0

## 2022-03-01 ASSESSMENT — PAIN SCALES - GENERAL
PAINLEVEL_OUTOF10: 5
PAINLEVEL_OUTOF10: 0
PAINLEVEL_OUTOF10: 8
PAINLEVEL_OUTOF10: 9
PAINLEVEL_OUTOF10: 0
PAINLEVEL_OUTOF10: 0
PAINLEVEL_OUTOF10: 8

## 2022-03-01 ASSESSMENT — PAIN DESCRIPTION - DESCRIPTORS: DESCRIPTORS: CONSTANT

## 2022-03-01 ASSESSMENT — PAIN DESCRIPTION - LOCATION
LOCATION: BACK
LOCATION: BACK

## 2022-03-01 ASSESSMENT — PAIN DESCRIPTION - PAIN TYPE
TYPE: CHRONIC PAIN
TYPE: CHRONIC PAIN

## 2022-03-01 ASSESSMENT — PAIN DESCRIPTION - FREQUENCY: FREQUENCY: CONTINUOUS

## 2022-03-01 ASSESSMENT — PAIN - FUNCTIONAL ASSESSMENT: PAIN_FUNCTIONAL_ASSESSMENT: PREVENTS OR INTERFERES SOME ACTIVE ACTIVITIES AND ADLS

## 2022-03-01 ASSESSMENT — PAIN DESCRIPTION - ONSET: ONSET: ON-GOING

## 2022-03-01 ASSESSMENT — PAIN DESCRIPTION - ORIENTATION
ORIENTATION: LOWER
ORIENTATION: LOWER

## 2022-03-01 NOTE — PROGRESS NOTES
Shift assessment completed, VSS, scheduled medications given per MAR, pt c/o pain 5/10 in the lower back, scheduled methadone given per [facility], pt repositioned to promote comfort and rest, all questions asked and answered, pt verbalized understanding, call light within reach, brakes locked and bed in lowest position.

## 2022-03-01 NOTE — PROGRESS NOTES
Assessment complete, vitals obtained. Pt alert, oriented to self and place. C/o pain 10/10 to back. Due meds given, see MAR. Pt denies sob, on room air. Lungs clear / diminished. Ab flat / soft. + bowel sounds. Skin warm and dry, peripheral pulses palpable. Pt voiding per urinal. Stood @ bedside w/ nursing and bathed. Pt tolerated well. PRN melatonin given per request, pt denies further needs at this time. Repositioning self. Call light in reach. Safety precautions in place.

## 2022-03-01 NOTE — PROGRESS NOTES
Infectious Diseases   Progress Note      Admission Date: 2/22/2022  Hospital Day: Hospital Day: 8   Attending: Geneva Thompson MD  Date of service: 3/1/2022     Chief complaint/ Reason for consult:     · Severe sepsis with fever, leukocytosis, lactic acidosis, hypotension on admission  · Enterococcus faecalis bacteremia   · History of streptococcal bacteremia and mitral and aortic valve endocarditis in 2017  · Positive urine drug screen for benzodiazepines  · Bilateral lung nodules  · Left parietal encephalomalacia    Microbiology:        I have reviewed allavailable micro lab data and cultures    · Blood culture (2/2) - collected on 2/22/2022: Enterococcus fecalis    Susceptibility     Enterococcus  faecalis     BACTERIAL SUSCEPTIBILITY PANEL BY ABIEL     ampicillin <=2 mcg/mL Sensitive     vancomycin 1 mcg/mL Sensitive               Antibiotics and immunizations:       Current antibiotics: All antibiotics and their doses were reviewed by me    Recent Abx Admin                   cefTRIAXone (ROCEPHIN) 2000 mg IVPB in D5W 50ml minibag (mg) 2,000 mg New Bag 03/01/22 1417     2,000 mg New Bag  0324     2,000 mg New Bag 02/28/22 1821    ampicillin 2000 mg ivpb mini bag (mg) 2,000 mg New Bag 03/01/22 1416     2,000 mg New Bag  1003     2,000 mg New Bag  0440     2,000 mg New Bag  0103     2,000 mg New Bag 02/28/22 2107     2,000 mg New Bag  1743                  Immunization History: All immunization history was reviewed by me today. Immunization History   Administered Date(s) Administered    COVID-19, Pfizer Purple top, DILUTE for use, 12+ yrs, 30mcg/0.3mL dose 12/21/2020, 01/11/2021, 10/21/2021       Known drug allergies: All allergies were reviewed and updated    Allergies   Allergen Reactions    Seasonal        Social history:     Social History:  All social andepidemiologic history was reviewed and updated by me today as needed. · Tobacco use:   reports that he has been smoking cigarettes.  He has been smoking about 0.50 packs per day. He has never used smokeless tobacco.  · Alcohol use:   reports no history of alcohol use. · Currently lives in: [facility]  ·  reports no history of drug use. COVID VACCINATION AND LAB RESULT RECORDS:     Internal Administration   First Dose COVID-19, Pfizer Purple top, DILUTE for use, 12+ yrs, 30mcg/0.3mL dose  12/21/2020   Second Dose COVID-19, Pfizer Purple top, DILUTE for use, 12+ yrs, 30mcg/0.3mL dose   01/11/2021       Last COVID Lab SARS-CoV-2, PCR (no units)   Date Value   07/15/2020 Not Detected     SARS-CoV-2, NAAT (no units)   Date Value   02/25/2022 Not Detected            Assessment:     The patient is a 64 y.o. old male who  has a past medical history of Acute intracerebral hemorrhage ([facility] (02/03/2017), Anxiety, Back pain, Cerebral artery occlusion with cerebral infarction ([facility] [facility] Chronic back pain greater than 3 months duration (02/03/2017), Diabetes mellitus ([address] Dysphasia (02/03/2017), Dysphonia (02/03/2017), Endocarditis and heart valve disorders in diseases classified elsewhere (02/03/2017), Hydrocephalus ([address] (02/03/2017), Hypertension, IVH (intraventricular hemorrhage) ([address] (02/03/2017), NSTEMI (non-ST elevated myocardial infarction) ([facility] (02/03/2017), and Seizures ([facility] with following problems:    · Severe sepsis with fever, leukocytosis, lactic acidosis, hypotension on admission-resolved  · Enterococcus faecalis bacteremia-improving, covered with IV ampicillin and IV ceftriaxone  · History of streptococcal bacteremia and mitral and aortic valve endocarditis in 2017  · Positive urine drug screen for benzodiazepines  · Bilateral lung nodules  · Left parietal encephalomalacia  · History of stroke  · Type 2 diabetes mellitus-maintaining good glycemic control  · Essential hypertension-BP controlled  · Seizure disorder  · Nursing home resident  ·       Discussion:      The patient is afebrile.   Blood cultures from 2/25/2022 are growing Enterococcus faecalis. Serum creatinine 0.6 today. Liver functions are okay. White cell count is 5700    Plan:     Diagnostic Workup:    · Continue to follow  fever curve, WBC count and blood cultures. · Continue to monitor blood counts, liver and renal function. Antimicrobials:    · The patient is on IV ampicillin and IV ceftriaxone for enterococcal bacteremia  · Continue oral probiotics twice daily  · Blood cultures from 2/27/2022 remain negative so far  · If blood cultures are negative till tomorrow, plan for PICC line placement  · Plan for total of 6 weeks of IV antibiotics  · We will follow up on the culture results and clinical progress and will make further recommendations accordingly. · Continue close vitals monitoring. · Maintain good glycemic control. · Fall precautions. · Aspiration precautions. · Continue to watch for new fever or diarrhea. · DVT prophylaxis. · Discussed all above with patient and RN. EDINSON has been updated with infusion orders as follows: Out-patient antibiotic therapy (OPAT)/ Antibiotic Infusion orders          Diagnosis: Enterococcus faecalis bacteremia, high suspicion for endocarditis       Organism/ culture: See above     Name and dose of Antimicrobial: IV ampicillin 2 g every 4 hour, IV ceftriaxone 2 g every 12 hour     Antimicrobial start date: calculated from 2/27/2022     Antimicrobial completion date planned: April 11, 2022     Lab monitoring: CBC, Chem 12 once a week, to be       collected every Monday or Tuesday morning until the patient is off IV  antibiotics. Fax weekly lab results to Monet Curran MD's office at        [phone]. Please maintain PICC line until the patient is on IV antibiotics. Ok to administer PICC line normal saline flushes as needed.         ** Please notify Monet Curran MD's office with any change in patient's        status, transfer out of facility or to hospital by calling [phone] Outpatient Follow up: Due to the [place] 19 pandemic, plan for a follow-up virtual video visit in 5-6 weeks. Call my office at [phone] to make an appointment. Physician Signature:  Electronically signed by Leti Knott MD on                                                      3/1/22 at 2:48 PM EST             Drug Monitoring:    · Continue monitoring for antibiotic toxicity as follows: CBC, CMP   · Continue to watch for following: new or worsening fever, new hypotension, hives, lip swelling and redness or purulence at vascular access sites. I/v access Management:    · Continue to monitor i.v access sites for erythema, induration, discharge or tenderness. · As always, continue efforts to minimize tubes/lines/drains as clinically appropriate to reduce chances of line associated infections. Patient education and counseling:        · The patient was educated in detail about the side-effects of various antibiotics and things to watch for like new rashes, lip swelling, severe reaction, worsening diarrhea, break through fever etc.  · Discussed patient's condition and what to expect. All of the patient's questions were addressed in a satisfactory manner and patient verbalized understanding all instructions. Level of complexity of visit: High     TIME SPENT TODAY:     - Spent over  36 minutes on visit (including interval history, physical exam, review of data including labs, cultures, imaging, development and implementation of treatment plan and coordination of complex care). More than 50 percent of this includes face-to-face time spent with the patient for counseling and coordination of care. Thank you for involving me in the care of your patient. I will continue to follow. If you have anyadditional questions, please do not hesitate to contact me. Subjective: Interval history: Interval history was obtained from chart review and patient/ RN. He is afebrile.   He is tolerating antibiotics okay. No diarrhea     REVIEW OF SYSTEMS:      Review of Systems   Constitutional: Negative for chills, diaphoresis and fever. HENT: Negative for ear discharge, ear pain, rhinorrhea, sore throat and trouble swallowing. Eyes: Negative for discharge and redness. Respiratory: Negative for cough, shortness of breath and wheezing. Cardiovascular: Negative for chest pain and leg swelling. Gastrointestinal: Negative for abdominal pain, constipation, diarrhea and nausea. Endocrine: Negative for polyuria. Genitourinary: Negative for dysuria, flank pain, frequency, hematuria and urgency. Musculoskeletal: Negative for back pain and myalgias. Skin: Negative for rash. Neurological: Negative for dizziness, seizures and headaches. Hematological: Does not bruise/bleed easily. Psychiatric/Behavioral: Negative for hallucinations and suicidal ideas. All other systems reviewed and are negative. Past Medical History: All past medical history reviewed today. Past Medical History:   Diagnosis Date    Acute intracerebral hemorrhage ([address] 02/03/2017    Anxiety     Back pain     Cerebral artery occlusion with cerebral infarction (HCC)     Chronic back pain greater than 3 months duration 02/03/2017    Diabetes mellitus ([address]     Dysphasia 02/03/2017    Dysphonia 02/03/2017    Endocarditis and heart valve disorders in diseases classified elsewhere 02/03/2017    Hydrocephalus ([address] 02/03/2017    Hypertension     IVH (intraventricular hemorrhage) ([address] 02/03/2017    NSTEMI (non-ST elevated myocardial infarction) ([address] 02/03/2017    Seizures ([address]        Past Surgical History: All past surgical history was reviewed today.     Past Surgical History:   Procedure Laterality Date    BRAIN ANEURYSM SURGERY Left 02/03/2017    COLONOSCOPY N/A 8/14/2019    COLONOSCOPY WITH MAC ANESTHESIA performed by Roxie Rosas MD at [address] [place] [address] Left 02/03/2017    LEG SURGERY      UPPER GASTROINTESTINAL ENDOSCOPY N/A 8/14/2019    EGD BIOPSY performed by Roxie Rosas MD at [facility]       Family History: All family history was reviewed today. History reviewed. No pertinent family history. Objective:       PHYSICAL EXAM:      Vitals:   Vitals:    03/01/22 0433 03/01/22 0900 03/01/22 1200 03/01/22 1416   BP: 110/60 (!) 110/42  (!) 98/53   Pulse: 80 81 80 99   Resp: 16 18 13 20   Temp: 98.5 °F (36.9 °C) 96.5 °F (35.8 °C)  98 °F (36.7 °C)   TempSrc: Temporal Temporal  Temporal   SpO2: 94% 94%  96%   Weight: 154 lb 6.2 oz (70 kg)      Height:           Physical Exam  Vitals and nursing note reviewed. Constitutional:       Appearance: Normal appearance. He is well-developed. HENT:      Head: Normocephalic and atraumatic. Right Ear: External ear normal.      Left Ear: External ear normal.      Nose: Nose normal. No congestion or rhinorrhea. Mouth/Throat:      Mouth: Mucous membranes are moist.      Pharynx: No oropharyngeal exudate or posterior oropharyngeal erythema. Eyes:      General: No scleral icterus. Right eye: No discharge. Left eye: No discharge. Conjunctiva/sclera: Conjunctivae normal.      Pupils: Pupils are equal, round, and reactive to light. Cardiovascular:      Rate and Rhythm: Normal rate and regular rhythm. Pulses: Normal pulses. Heart sounds: No murmur heard. No friction rub. Pulmonary:      Effort: Pulmonary effort is normal. No respiratory distress. Breath sounds: Normal breath sounds. No stridor. No wheezing, rhonchi or rales. Abdominal:      General: Bowel sounds are normal.      Palpations: Abdomen is soft. Tenderness: There is no abdominal tenderness. There is no right CVA tenderness, left CVA tenderness, guarding or rebound. Musculoskeletal:         General: No swelling or tenderness. Normal range of motion. Cervical back: Normal range of motion and neck supple. No rigidity.  No muscular tenderness. Lymphadenopathy:      Cervical: No cervical adenopathy. Skin:     General: Skin is warm and dry. Coloration: Skin is not jaundiced. Findings: No erythema or rash. Neurological:      General: No focal deficit present. Mental Status: He is alert and oriented to person, place, and time. Mental status is at baseline. Motor: No abnormal muscle tone. Psychiatric:         Mood and Affect: Mood normal.         Behavior: Behavior normal.         Thought Content: Thought content normal.               *    Lines and drains: All vascular access sites are healthy with no local erythema, discharge or tenderness. Intake and output:    I/O last 3 completed shifts: In: 2268.7 [P.O.:240; I.V.:429.3; IV Piggyback:1599.4]  Out: 1425 [Urine:1425]    Lab Data:   All available labs and old records have been reviewed by me. CBC:  Recent Labs     02/27/22  0354 02/28/22  0416 03/01/22  0435   WBC 5.8 5.3 5.7   RBC 3.76* 3.64* 3.58*   HGB 12.7* 12.3* 12.3*   HCT 35.8* 35.1* 34.4*    204 197   MCV 95.2 96.5 96.0   MCH 33.6 33.9 34.2*   MCHC 35.3 35.1 35.7   RDW 12.3* 12.5 13.0        BMP:  Recent Labs     02/27/22  0354 02/28/22  0416 03/01/22  0435    142 143   K 4.0 3.7 3.6    106 105   CO2 27 27 30   BUN 12 16 15   CREATININE 0.5* 0.6* 0.6*   CALCIUM 9.1 8.9 9.0   GLUCOSE 113* 106* 107*        Hepatic Function Panel:   Lab Results   Component Value Date    ALKPHOS 63 03/01/2022    ALT 30 03/01/2022    AST 26 03/01/2022    PROT 6.1 03/01/2022    PROT 7.1 12/13/2011    BILITOT 0.3 03/01/2022    LABALBU 3.5 03/01/2022       CPK:   Lab Results   Component Value Date    CKTOTAL 73 02/22/2022     ESR:   Lab Results   Component Value Date    SEDRATE 30 (H) 02/28/2022     CRP:   Lab Results   Component Value Date    CRP 12.1 (H) 02/28/2022           Imaging: All pertinent images and reports for the current visit were reviewed by me during this visit.   I reviewed the chest x-ray/CT scan/MRI images and independently interpreted the findings and results today. MRI BRAIN WO CONTRAST   Final Result   1. No evidence of acute infarct. 2. Left-sided craniotomy with underlying encephalomalacia in the posterior   left cerebral hemisphere, unchanged. 3. Cerebral parenchymal volume loss with chronic microvascular white matter   ischemic disease. Chronic infarcts are noted in the right frontal lobe and   cerebellar hemispheres. CT CHEST ABDOMEN PELVIS WO CONTRAST   Final Result   1. Patchy airway secretions, including suspected secretions in the left lower   lobe bronchus and in the basilar segmental bronchi. An underlying   endobronchial lesion is not excluded but considered less likely. Consider   aspiration given patient status. 2. The sideport of a gastric tube is located at the gastroesophageal   junction. Consider advancement. 3. Indeterminate bilateral renal lesions potentially due to proteinaceous   cyst or solid neoplasm. Recommend further evaluation with renal protocol   abdomen MRI (preferred) or CT on a nonemergent basis. 4. Solid nodules in the bilateral lower lobes measure up to 0.7 cm and are   more likely infectious or inflammatory in etiology than neoplastic. Assuming   no malignancy is found in the kidneys on the above recommended study, then   follow-up chest CT would be recommended in 3-6 months as below. If   malignancy is found in the kidneys, then the below pulmonary nodule   recommendation would not apply. RECOMMENDATIONS:   Multiple pulmonary nodules. Most severe: 7 mm right solid pulmonary nodule. Recommend a non-contrast Chest CT at 3-6 months. If patient is high risk for   malignancy, recommend an additional non-contrast Chest CT at 18-24 months; if   patient is low risk for malignancy a non-contrast Chest CT at 18-24 months is   optional.      These guidelines do not apply to immunocompromised patients and patients with   cancer. E46    Seizure ([facility] R56.9    Partial symptomatic epilepsy with complex partial seizures, intractable, without status epilepticus ([address] G40.219    Abnormal CT of the head R93.0    Breakthrough seizure ([facility] G40.919    History of stroke Z86.73    Aphasia R47.01    NATALIA (acute kidney injury) ([facility] N17.9    Lactic acidosis E87.2    Unresponsive R41.89    Partial idiopathic epilepsy with seizures of localized onset, intractable, with status epilepticus ([facility] G40.011    Acute respiratory failure with hypoxia (HCC) J96.01    Sepsis ([facility] A41.9    [place] coma scale total score 3-8 (HCC) R40.2430    Lung nodules R91.8    Essential hypertension I10    Enterococcal bacteremia R78.81, B95.2       Please note that this chart was generated using Dragon dictation software. Although every effort was made to ensure the accuracy of this automated transcription, some errors in transcription may have occurred inadvertently. If you may need any clarification, please do not hesitate to contact me through EPIC or at the phone number provided below with my electronic signature. Any pictures or media included in this note were obtained after taking informed verbal consent from the patient and with their approval to include those in the patient's medical record.       Jim Daniel MD, MPH, [address] [place] [address]  3/1/2022, 2:46 PM  [facility]   [address] [place] [address], [address] [place] [address]  Office: [phone]  Fax: [phone]  Clinic days:  Tuesday & Thursday

## 2022-03-01 NOTE — CARE COORDINATION
CM spoke with Isrrael/ Ja Escobar to verify that patient will return on IV antibiotics. Patient is presently on Ampicillin 2 gm q4h and Rocephin iv daily. CM inquires if there is adequate nursing coverage to manage the frequency of IV antibiotics. She is verifying this w/ the DON. CM advised we do not yet have ID final recommendations but the above medications are the current regimen.       Valery Silva, BSN, CCM, RN  [facility]  [phone]

## 2022-03-01 NOTE — PROGRESS NOTES
[address]   Cardiology Progress Note     Date: 3/1/2022  Admit Date: 2/22/2022     Reason for consultation: severe MR     Chief Complaint:   Chief Complaint   Patient presents with    Other     Patient in by EMS from [facility] at the [facility]. Patient was found down, unknown how long and unresponsive, post ictal.       History of Present Illness: History obtained from patient and medical record. Ricco Koehler is a 64 y.o. male presented to the hospital after being found down by home.     Ute Villagomez a 64 y. o. male.   He presented from [facility] at the [facility] by ambulance. [facility] was found down and unresponsive by staff. [facility] was supported noninvasively en route w/ breaths via [place] was intubated shortly after arrival primarily for airway protection considering his persistently depressed mental status. He is now extubated and on room air.     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 (Uncertain which hospital this was at - this was not at [facility] underwent craniotomy and aneurysm clipping at the time. 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 Per prior notes he is aphasic at baseline. However he is able to carry on a normal conversation at this time, except he denies any history of epilepsy and doesn't think he has had a craniotomy.     He has no chest pain or shortness of breath. He was found to have enterococcus bacteremia. Echo done yesterday showed severe MR. We have been asked to consult regarding ANA MARIA and other additional treatment.     No fever/chills. No cough. No rashes.     Interval Hx: Today, he is feeling ok. No complaints of CP or SOB. Only complaint is chronic lower back pain. Patient seen and examined. Clinical notes reviewed. Telemetry reviewed. No new complaints today. No major events overnight.    Denies having chest pain, palpitations, shortness of breath, orthopnea/PND, cough, or dizziness at the time of this visit. Past Medical History:  Past Medical History:   Diagnosis Date    Acute intracerebral hemorrhage ([facility] 02/03/2017    Anxiety     Back pain     Cerebral artery occlusion with cerebral infarction (HCC)     Chronic back pain greater than 3 months duration 02/03/2017    Diabetes mellitus ([facility]     Dysphasia 02/03/2017    Dysphonia 02/03/2017    Endocarditis and heart valve disorders in diseases classified elsewhere 02/03/2017    Hydrocephalus ([facility] 02/03/2017    Hypertension     IVH (intraventricular hemorrhage) ([facility] 02/03/2017    NSTEMI (non-ST elevated myocardial infarction) ([facility] 02/03/2017    Seizures ([facility]         Past Surgical History:    has a past surgical history that includes Leg Surgery; craniotomy (Left, 02/03/2017); Brain aneurysm surgery (Left, 02/03/2017); Upper gastrointestinal endoscopy (N/A, 8/14/2019); and Colonoscopy (N/A, 8/14/2019). Social History:  Reviewed. reports that he has been smoking cigarettes. He has been smoking about 0.50 packs per day. He has never used smokeless tobacco. He reports that he does not drink alcohol and does not use drugs. Allergies: Allergies   Allergen Reactions    Seasonal        Family History:  Reviewed. family history is not on file. Denies family history of sudden cardiac death, arrhythmia, premature CAD    Home Meds:  Prior to Visit Medications    Medication Sig Taking?  Authorizing Provider   levETIRAcetam (KEPPRA) 500 MG tablet Take 500 mg by mouth daily Afternoon dose Yes Historical Provider, MD   citalopram (CELEXA) 20 MG tablet Take 20 mg by mouth daily Yes Historical Provider, MD   OLANZapine (ZYPREXA) 2.5 MG tablet Take 2.5 mg by mouth nightly Yes Historical Provider, MD   levETIRAcetam (KEPPRA) 500 MG tablet Take 1,500 mg by mouth 2 times daily Yes Historical Provider, MD   potassium chloride (KLOR-CON M) 20 MEQ extended release tablet Take 20 mEq by mouth 2 times daily Yes Historical Provider, MD   Cholecalciferol (VITAMIN D3) 5000 units TABS Take by mouth daily Yes Historical Provider, MD   lacosamide (VIMPAT) 200 MG tablet Take 200 mg by mouth 2 times daily. Yes Historical Provider, MD   calcium carbonate (TUMS) 500 MG chewable tablet Take 1 tablet by mouth 2 times daily  Yes Historical Provider, MD   methadone (DOLOPHINE) 5 MG tablet Take 5 mg by mouth 2 times daily.  Yes Historical Provider, MD   acetaminophen (TYLENOL) 325 MG tablet Take 650 mg by mouth every 4 hours as needed for Pain  Historical Provider, MD   mirtazapine (REMERON) 7.5 MG tablet Take 7.5 mg by mouth nightly  Historical Provider, MD   atorvastatin (LIPITOR) 10 MG tablet Take 10 mg by mouth nightly   Historical Provider, MD        Scheduled Meds:   lisinopril  2.5 mg Oral Daily    lidocaine 1 % injection  5 mL IntraDERmal Once    sodium chloride flush  5-40 mL IntraVENous 2 times per day    torsemide  10 mg Oral Daily    ampicillin IV  2,000 mg IntraVENous 6 times per day    cefTRIAXone (ROCEPHIN) IV  2,000 mg IntraVENous Q12H    lactobacillus  1 capsule Oral BID WC    methadone  5 mg Oral BID    lacosamide  200 mg Oral BID    atorvastatin  10 mg Oral Nightly    enoxaparin  40 mg SubCUTAneous Daily    levETIRAcetam  1,500 mg Oral BID     Continuous Infusions:   sodium chloride      sodium chloride 10 mL/hr at 02/27/22 1131     PRN Meds:sodium chloride flush, sodium chloride, melatonin, sodium chloride flush, sodium chloride, magnesium sulfate, ondansetron, acetaminophen **OR** acetaminophen, potassium chloride **OR** potassium chloride, perflutren lipid microspheres     Review of Systems:  · Constitutional: Negative for fever, night sweats, chills,  · Skin: Negative for rash, pruritus, bleeding, or bruising    · HEENT: Negative for vision changes, ringing in the ears, dysphagia  · Respiratory: Reviewed in HPI  · Cardiovascular: Reviewed in HPI  · Gastrointestinal: Negative for abdominal pain, N/V/D, constipation, or black/tarry stools  · Genito-Urinary: Negative for dysuria, incontinence, or hematuria  · Musculoskeletal: Negative for joint swelling, muscle pain, or injuries  · Neurological/Psych: Negative for confusion, seizures, headaches, or TIA-like symptoms. No anxiety or depression. Physical Examination:  Vitals:    03/01/22 1200   BP:    Pulse: 80   Resp: 13   Temp:    SpO2:       In: 1624.4 [P.O.:720; I.V.:305]  Out: 1000    Wt Readings from Last 3 Encounters:   03/01/22 154 lb 6.2 oz (70 kg)   01/16/22 168 lb 8 oz (76.4 kg)   01/03/21 180 lb (81.6 kg)       Intake/Output Summary (Last 24 hours) at 3/1/2022 1358  Last data filed at 3/1/2022 1300  Gross per 24 hour   Intake 1624.43 ml   Output 1000 ml   Net 624.43 ml       Telemetry: Personally Reviewed  - Sinus rhythm   · Constitutional: Cooperative and in no apparent distress, and appears frail   · Skin: Warm and pink; no pallor, cyanosis, clubbing, or bruising   · HEENT: Symmetric and normocephalic. PERRL. Conjunctiva pink with clear sclera. Mucus membranes moist.   · Cardiovascular: Regular rate and rhythm. S1/S2 present without murmurs, no rubs or gallops. Peripheral pulses 2+, capillary refill < 3 seconds. No elevation of JVP. No peripheral edema  · Respiratory: Respirations symmetric and unlabored. Lungs clear to auscultation bilaterally, no wheezing, crackles, or rhonchi  · Gastrointestinal: Abdomen soft and round. Bowel sounds active without tenderness. · Musculoskeletal: Bilateral upper and lower extremity strength with generalized weakness   · Neurologic/Psych: Awake and orientated to person, place and time.  Calm affect, appropriate mood    Pertinent labs, diagnostic, device, and imaging results reviewed as a part of this visit    Labs:    BMP:   Recent Labs     02/27/22  0354 02/28/22  0416 03/01/22  0435    142 143   K 4.0 3.7 3.6    106 105   CO2 27 27 30   BUN 12 16 15   CREATININE 0.5* 0.6* 0.6* MG 2.20 2.00 2.00     Estimated Creatinine Clearance: 128 mL/min (A) (based on SCr of 0.6 mg/dL (L)). CBC:   Recent Labs     22  0354 22  0416 22  0435   WBC 5.8 5.3 5.7   HGB 12.7* 12.3* 12.3*   HCT 35.8* 35.1* 34.4*   MCV 95.2 96.5 96.0    204 197     Thyroid: No results found for: TSH, W2SRSGI, Y5UWAEN, THYROIDAB  Lipids: No results found for: CHOL, HDL, TRIG  LFTS:   Lab Results   Component Value Date    ALT 30 2022    AST 26 2022    ALKPHOS 63 2022    PROT 6.1 2022    PROT 7.1 2011    AGRATIO 1.3 2022    BILITOT 0.3 2022     Cardiac Enzymes:   Lab Results   Component Value Date    CKTOTAL 73 2022    TROPONINI <0.01 2022    TROPONINI <0.01 2022    TROPONINI 0.03 2022     Coags:   Lab Results   Component Value Date    PROTIME 13.1 2022    INR 1.15 2022       EC22  Normal sinus rhythm  Left axis deviation  Voltage criteria for left ventricular hypertrophy  Intra-ventricular conduction delay  Cannot rule out Septal infarct , age undetermined  Inferior infarct (cited on or before 2017)  Abnormal ECG  When compared with ECG of 2022 08:26,  QRS has widene    Echo: 10/15/18  Summary:   The left ventricular wall motion is normal.   Overall left ventricular ejection fraction is estimated to be 55-60%. There is borderline mitral valve prolapse. There is severe mitral regurgitation. Severe aortic regurgitation. Right ventricular systolic pressure is normal at <35 mmHg. ECHO: 22  Summary   -Left ventricular cavity size is severely dilated with normal left   ventricular wall thickness.   -Overall left ventricular systolic function appears low normal. Ejection   fraction is visually estimated to be 55%. -Grade I diastolic dysfunction with normal LV filling pressures.    -Mitral valve leaflets appear mild-moderately thickened.   -Severe mitral regurgitation directed eccentrically posterior with systolic   flow reversal in pulmonary veins.   -Moderate aortic regurgitation.   -Trivial tricuspid regurgitation with a PASP of 25 mmHg.   -Trivial pulmonic regurgitation. ANA MARIA:  2/25/22  Summary   LV is severely dilated with low-normal systolic function. Severe MR, severe AR. Mechanism of MR appears to be primarily ruptured chorda of anterior mitral   leaflet. Possible perforation of aortic valve. Poor central coaptation of aortic   valve. No vegetations seen. Problem List:   Patient Active Problem List    Diagnosis Date Noted    Sepsis ([address]     Marcelo coma scale total score 3-8 ([address]     Lung nodules     Essential hypertension     Enterococcal bacteremia     NATALIA (acute kidney injury) ([address] 02/23/2022    Lactic acidosis 02/23/2022    Unresponsive     Partial idiopathic epilepsy with seizures of localized onset, intractable, with status epilepticus ([address]     Acute respiratory failure with hypoxia ([address]     Breakthrough seizure ([address] 01/13/2022    History of stroke     Aphasia     Partial symptomatic epilepsy with complex partial seizures, intractable, without status epilepticus ([address]     Abnormal CT of the head     Seizure ([address] 07/15/2020    Protein calorie malnutrition ([address] 02/04/2017    Bacterial infection due to Streptococcus mutans 02/02/2017    Dysphagia 02/01/2017    Dysphonia 02/01/2017    DM (diabetes mellitus), type 2, uncontrolled with complications ([address] 22/21/7961    Endocarditis, sumit and aortic valves 01/28/2017    Endocarditis 01/28/2017    Mycotic aneurysm due to bacterial endocarditis 01/28/2017    Acute intracerebral hemorrhage ([address] 01/25/2017    Psychosis ([address] 12/15/2011        Assessment and Plan:     Severe mitral regurgitation    ~ Hx of mitral and aortic valve endocarditis 3/2017  ~ ANA MARIA with preserved LV function but severe LV dilation, severe MR and AR. No vegetations.   ~ Likely secondary to old endocarditis per Dr. Chava Siddiqui.    ~ Evaluated by CTS. Pt refusing LHC and OHS.   ~ Plan for medical management. Started lisinopril 2.5mg daily and torsemide 10mg daily. Tolerating well. No room for up titration of lisinopril d/t BP. Enterococcal bacteremia   ~ on abx per ID    DM  Hx of left parieto-occipital mycotic aneurysm rupture - s/p craniotomy and aneurysm clipping 3/2017  Refractory epilepsy     Pt stable from a cardiac standpoint. Multiple medical conditions with risk of decompensation. All pertinent information and plan of care discussed with the physician. All questions and concerns were addressed to the patient. Alternatives to my treatment were discussed. I have discussed the above stated plan with patient and the nurse. The patient verbalized understanding and agreed with the plan. Thank you for allowing to us to participate in the care of Claudia Vargas. Total visit time > 35 minutes; > 50% spend counseling / coordinating care. I reviewed interval history, physical exam, review of data including labs, imaging, development and implementation of treatment plan and coordination of complex care. Counseled on risk factor modifications. Margo SAPP-CONOR  [facility]   Office: [phone]    NOTE:  This report was transcribed using voice recognition software. Every effort was made to ensure accuracy; however, inadvertent computerized transcription errors may be present.

## 2022-03-02 VITALS
RESPIRATION RATE: 20 BRPM | SYSTOLIC BLOOD PRESSURE: 108 MMHG | TEMPERATURE: 98.2 F | BODY MASS INDEX: 21.51 KG/M2 | HEART RATE: 71 BPM | WEIGHT: 153.66 LBS | HEIGHT: 71 IN | OXYGEN SATURATION: 94 % | DIASTOLIC BLOOD PRESSURE: 59 MMHG

## 2022-03-02 LAB
A/G RATIO: 1.3 (ref 1.1–2.2)
ALBUMIN SERPL-MCNC: 3.5 G/DL (ref 3.4–5)
ALP BLD-CCNC: 66 U/L (ref 40–129)
ALT SERPL-CCNC: 31 U/L (ref 10–40)
ANION GAP SERPL CALCULATED.3IONS-SCNC: 10 MMOL/L (ref 3–16)
AST SERPL-CCNC: 26 U/L (ref 15–37)
BASOPHILS ABSOLUTE: 0 K/UL (ref 0–0.2)
BASOPHILS RELATIVE PERCENT: 0.6 %
BILIRUB SERPL-MCNC: 0.3 MG/DL (ref 0–1)
BUN BLDV-MCNC: 17 MG/DL (ref 7–20)
CALCIUM SERPL-MCNC: 9.1 MG/DL (ref 8.3–10.6)
CHLORIDE BLD-SCNC: 103 MMOL/L (ref 99–110)
CO2: 29 MMOL/L (ref 21–32)
CREAT SERPL-MCNC: 0.6 MG/DL (ref 0.8–1.3)
EOSINOPHILS ABSOLUTE: 0.2 K/UL (ref 0–0.6)
EOSINOPHILS RELATIVE PERCENT: 2.7 %
GFR AFRICAN AMERICAN: >60
GFR NON-AFRICAN AMERICAN: >60
GLUCOSE BLD-MCNC: 111 MG/DL (ref 70–99)
HCT VFR BLD CALC: 36.1 % (ref 40.5–52.5)
HEMOGLOBIN: 12.6 G/DL (ref 13.5–17.5)
INR BLD: 1.23 (ref 0.88–1.12)
LYMPHOCYTES ABSOLUTE: 1 K/UL (ref 1–5.1)
LYMPHOCYTES RELATIVE PERCENT: 16.4 %
MAGNESIUM: 2 MG/DL (ref 1.8–2.4)
MCH RBC QN AUTO: 33.7 PG (ref 26–34)
MCHC RBC AUTO-ENTMCNC: 34.8 G/DL (ref 31–36)
MCV RBC AUTO: 96.8 FL (ref 80–100)
MONOCYTES ABSOLUTE: 0.4 K/UL (ref 0–1.3)
MONOCYTES RELATIVE PERCENT: 6.8 %
NEUTROPHILS ABSOLUTE: 4.7 K/UL (ref 1.7–7.7)
NEUTROPHILS RELATIVE PERCENT: 73.5 %
PDW BLD-RTO: 13.1 % (ref 12.4–15.4)
PLATELET # BLD: 200 K/UL (ref 135–450)
PMV BLD AUTO: 7.7 FL (ref 5–10.5)
POTASSIUM SERPL-SCNC: 3.4 MMOL/L (ref 3.5–5.1)
PROTHROMBIN TIME: 14 SEC (ref 9.9–12.7)
RBC # BLD: 3.73 M/UL (ref 4.2–5.9)
SODIUM BLD-SCNC: 142 MMOL/L (ref 136–145)
TOTAL PROTEIN: 6.1 G/DL (ref 6.4–8.2)
WBC # BLD: 6.3 K/UL (ref 4–11)

## 2022-03-02 PROCEDURE — 6370000000 HC RX 637 (ALT 250 FOR IP): Performed by: INTERNAL MEDICINE

## 2022-03-02 PROCEDURE — 83735 ASSAY OF MAGNESIUM: CPT

## 2022-03-02 PROCEDURE — 99233 SBSQ HOSP IP/OBS HIGH 50: CPT | Performed by: INTERNAL MEDICINE

## 2022-03-02 PROCEDURE — 2580000003 HC RX 258: Performed by: INTERNAL MEDICINE

## 2022-03-02 PROCEDURE — 85610 PROTHROMBIN TIME: CPT

## 2022-03-02 PROCEDURE — 6360000002 HC RX W HCPCS: Performed by: INTERNAL MEDICINE

## 2022-03-02 PROCEDURE — 36569 INSJ PICC 5 YR+ W/O IMAGING: CPT

## 2022-03-02 PROCEDURE — 85025 COMPLETE CBC W/AUTO DIFF WBC: CPT

## 2022-03-02 PROCEDURE — C1751 CATH, INF, PER/CENT/MIDLINE: HCPCS

## 2022-03-02 PROCEDURE — 80053 COMPREHEN METABOLIC PANEL: CPT

## 2022-03-02 PROCEDURE — 36415 COLL VENOUS BLD VENIPUNCTURE: CPT

## 2022-03-02 RX ORDER — SODIUM CHLORIDE 0.9 % (FLUSH) 0.9 %
5-40 SYRINGE (ML) INJECTION EVERY 12 HOURS SCHEDULED
Status: DISCONTINUED | OUTPATIENT
Start: 2022-03-02 | End: 2022-03-02

## 2022-03-02 RX ORDER — LISINOPRIL 2.5 MG/1
2.5 TABLET ORAL DAILY
Qty: 30 TABLET | Refills: 3 | Status: SHIPPED | OUTPATIENT
Start: 2022-03-02

## 2022-03-02 RX ORDER — SODIUM CHLORIDE 0.9 % (FLUSH) 0.9 %
5-40 SYRINGE (ML) INJECTION PRN
Status: DISCONTINUED | OUTPATIENT
Start: 2022-03-02 | End: 2022-03-02

## 2022-03-02 RX ORDER — TORSEMIDE 10 MG/1
10 TABLET ORAL DAILY
Qty: 30 TABLET | Refills: 3 | Status: SHIPPED | OUTPATIENT
Start: 2022-03-02

## 2022-03-02 RX ORDER — LIDOCAINE HYDROCHLORIDE 10 MG/ML
5 INJECTION, SOLUTION EPIDURAL; INFILTRATION; INTRACAUDAL; PERINEURAL ONCE
Status: DISCONTINUED | OUTPATIENT
Start: 2022-03-02 | End: 2022-03-02 | Stop reason: HOSPADM

## 2022-03-02 RX ORDER — LACTOBACILLUS RHAMNOSUS GG 10B CELL
1 CAPSULE ORAL 2 TIMES DAILY WITH MEALS
Qty: 90 CAPSULE | Refills: 0 | Status: SHIPPED | OUTPATIENT
Start: 2022-03-02 | End: 2022-04-16

## 2022-03-02 RX ORDER — SODIUM CHLORIDE 9 MG/ML
25 INJECTION, SOLUTION INTRAVENOUS PRN
Status: DISCONTINUED | OUTPATIENT
Start: 2022-03-02 | End: 2022-03-02

## 2022-03-02 RX ADMIN — METHADONE HYDROCHLORIDE 5 MG: 10 TABLET ORAL at 08:46

## 2022-03-02 RX ADMIN — CEFTRIAXONE 2000 MG: 2 INJECTION, POWDER, FOR SOLUTION INTRAMUSCULAR; INTRAVENOUS at 12:46

## 2022-03-02 RX ADMIN — CEFTRIAXONE 2000 MG: 2 INJECTION, POWDER, FOR SOLUTION INTRAMUSCULAR; INTRAVENOUS at 02:29

## 2022-03-02 RX ADMIN — LEVETIRACETAM 1500 MG: 100 SOLUTION ORAL at 08:45

## 2022-03-02 RX ADMIN — SODIUM CHLORIDE, PRESERVATIVE FREE 10 ML: 5 INJECTION INTRAVENOUS at 08:46

## 2022-03-02 RX ADMIN — LISINOPRIL 2.5 MG: 5 TABLET ORAL at 08:46

## 2022-03-02 RX ADMIN — AMPICILLIN SODIUM 2000 MG: 2 INJECTION, POWDER, FOR SOLUTION INTRAMUSCULAR; INTRAVENOUS at 00:49

## 2022-03-02 RX ADMIN — TORSEMIDE 10 MG: 20 TABLET ORAL at 08:46

## 2022-03-02 RX ADMIN — AMPICILLIN SODIUM 2000 MG: 2 INJECTION, POWDER, FOR SOLUTION INTRAMUSCULAR; INTRAVENOUS at 05:02

## 2022-03-02 RX ADMIN — Medication 1 CAPSULE: at 08:46

## 2022-03-02 RX ADMIN — LACOSAMIDE 200 MG: 100 TABLET, FILM COATED ORAL at 08:46

## 2022-03-02 RX ADMIN — MELATONIN TAB 3 MG 3 MG: 3 TAB at 00:51

## 2022-03-02 RX ADMIN — AMPICILLIN SODIUM 2000 MG: 2 INJECTION, POWDER, FOR SOLUTION INTRAMUSCULAR; INTRAVENOUS at 09:10

## 2022-03-02 RX ADMIN — ENOXAPARIN SODIUM 40 MG: 40 INJECTION SUBCUTANEOUS at 08:45

## 2022-03-02 RX ADMIN — AMPICILLIN SODIUM 2000 MG: 2 INJECTION, POWDER, FOR SOLUTION INTRAMUSCULAR; INTRAVENOUS at 12:52

## 2022-03-02 ASSESSMENT — ENCOUNTER SYMPTOMS
WHEEZING: 0
DIARRHEA: 0
ABDOMINAL PAIN: 0
RHINORRHEA: 0
SHORTNESS OF BREATH: 0
COUGH: 0
CONSTIPATION: 0
NAUSEA: 0
EYE REDNESS: 0
BACK PAIN: 0
TROUBLE SWALLOWING: 0
EYE DISCHARGE: 0
SORE THROAT: 0

## 2022-03-02 ASSESSMENT — PAIN DESCRIPTION - ORIENTATION: ORIENTATION: LOWER

## 2022-03-02 ASSESSMENT — PAIN DESCRIPTION - DESCRIPTORS: DESCRIPTORS: CONSTANT;ACHING

## 2022-03-02 ASSESSMENT — PAIN DESCRIPTION - PAIN TYPE: TYPE: CHRONIC PAIN

## 2022-03-02 ASSESSMENT — PAIN SCALES - GENERAL
PAINLEVEL_OUTOF10: 5
PAINLEVEL_OUTOF10: 0

## 2022-03-02 ASSESSMENT — PAIN DESCRIPTION - LOCATION: LOCATION: BACK

## 2022-03-02 ASSESSMENT — PAIN - FUNCTIONAL ASSESSMENT: PAIN_FUNCTIONAL_ASSESSMENT: PREVENTS OR INTERFERES SOME ACTIVE ACTIVITIES AND ADLS

## 2022-03-02 ASSESSMENT — PAIN DESCRIPTION - ONSET: ONSET: ON-GOING

## 2022-03-02 ASSESSMENT — PAIN DESCRIPTION - FREQUENCY: FREQUENCY: CONTINUOUS

## 2022-03-02 NOTE — PROGRESS NOTES
Pt discharged to transport at this time, all patient belongings with pt at time of discharge, all questions asked and answered, pt and transport team all verbalized understanding.

## 2022-03-02 NOTE — PROGRESS NOTES
Called and spoke with Roya. RN unreachable. Probably in a room. Roya will notify her to call PICC at [phone].

## 2022-03-02 NOTE — PROGRESS NOTES
Spoke with NP regarding PICC order. Will need PICC placed prior to discharge arranged for 1 pm. PICC has been oked to be placed with 48 hr negative blood cultures by Dr. Vizcaino Co and Dr. Kamaljit Chavez. Called ICU, patient care RN not available but monitor tech will tell them to get procedural consent for PICC placement and call [phone] as soon as complete. Will plan on attempting PICC placement at that time.

## 2022-03-02 NOTE — PROGRESS NOTES
Shift assessment completed, VSS, scheduled medications given per [facility], pt c/o pain 5/10, in the lower back, scheduled methadone given per [facility], pt gave consent for PICC placement, consent signed and placed on chart, all questions asked and answered, pt verbalized understanding, call light within reach, brakes locked and bed in lowest position.

## 2022-03-02 NOTE — PROGRESS NOTES
Assessment complete, vitals obtained. NSR. Pt alert, oriented to self and place, unsure of time. C/o pain 8/10 chornic to lower back. Due meds given, see MAR. No SOB, on room air. Lungs clear. Ab flat / soft. + bowel sounds. Skin warm and dry, no edema, scattered bruises. Pulses palpable. Voided 250 ml per urinal. Pt repositioning self, denies needs.  Call light in reach safety precautions in place

## 2022-03-02 NOTE — CARE COORDINATION
CM notified patient's sister Eimly Mayorga of discharge today. CM faxed IV antibiotic order to [facility] to have meds ready when patient admits. Per [facility] patient is due Ampicillin IV prior to discharge at 1300 and Rocephin around 1400.      Camron Fountain, BOBN, CCM, RN  [facility]  [phone]

## 2022-03-02 NOTE — PROGRESS NOTES
Infectious Diseases   Progress Note      Admission Date: 2/22/2022  Hospital Day: Hospital Day: 9   Attending: Dilan Cuevas MD  Date of service: 3/2/2022     Chief complaint/ Reason for consult:     · Severe sepsis with fever, leukocytosis, lactic acidosis, hypotension on admission  · Enterococcus faecalis bacteremia   · History of streptococcal bacteremia and mitral and aortic valve endocarditis in 2017  · Positive urine drug screen for benzodiazepines  · Bilateral lung nodules  · Left parietal encephalomalacia    Microbiology:        I have reviewed allavailable micro lab data and cultures    · Blood culture (2/2) - collected on 2/22/2022: Enterococcus fecalis    Susceptibility     Enterococcus  faecalis     BACTERIAL SUSCEPTIBILITY PANEL BY ABIEL     ampicillin <=2 mcg/mL Sensitive     vancomycin 1 mcg/mL Sensitive               Antibiotics and immunizations:       Current antibiotics: All antibiotics and their doses were reviewed by me    Recent Abx Admin                   ampicillin 2000 mg ivpb mini bag (mg) 2,000 mg New Bag 03/02/22 0910     2,000 mg New Bag  0502     2,000 mg New Bag  0049     2,000 mg New Bag 03/01/22 2134     2,000 mg New Bag  1747     2,000 mg New Bag  1416    cefTRIAXone (ROCEPHIN) 2000 mg IVPB in D5W 50ml minibag (mg) 2,000 mg New Bag 03/02/22 0229     2,000 mg New Bag 03/01/22 1417                  Immunization History: All immunization history was reviewed by me today. Immunization History   Administered Date(s) Administered    COVID-19, Pfizer Purple top, DILUTE for use, 12+ yrs, 30mcg/0.3mL dose 12/21/2020, 01/11/2021, 10/21/2021       Known drug allergies: All allergies were reviewed and updated    Allergies   Allergen Reactions    Seasonal        Social history:     Social History:  All social andepidemiologic history was reviewed and updated by me today as needed. · Tobacco use:   reports that he has been smoking cigarettes.  He has been smoking about 0.50 packs per day. He has never used smokeless tobacco.  · Alcohol use:   reports no history of alcohol use. · Currently lives in: [facility]  ·  reports no history of drug use. COVID VACCINATION AND LAB RESULT RECORDS:     Internal Administration   First Dose COVID-19, Pfizer Purple top, DILUTE for use, 12+ yrs, 30mcg/0.3mL dose  12/21/2020   Second Dose COVID-19, Pfizer Purple top, DILUTE for use, 12+ yrs, 30mcg/0.3mL dose   01/11/2021       Last COVID Lab SARS-CoV-2, PCR (no units)   Date Value   07/15/2020 Not Detected     SARS-CoV-2, NAAT (no units)   Date Value   02/25/2022 Not Detected            Assessment:     The patient is a 64 y.o. old male who  has a past medical history of Acute intracerebral hemorrhage ([facility] (02/03/2017), Anxiety, Back pain, Cerebral artery occlusion with cerebral infarction ([facility] [facility] Chronic back pain greater than 3 months duration (02/03/2017), Diabetes mellitus ([facility] Dysphasia (02/03/2017), Dysphonia (02/03/2017), Endocarditis and heart valve disorders in diseases classified elsewhere (02/03/2017), Hydrocephalus ([facility] (02/03/2017), Hypertension, IVH (intraventricular hemorrhage) ([facility] (02/03/2017), NSTEMI (non-ST elevated myocardial infarction) ([facility] (02/03/2017), and Seizures ([facility] with following problems:    · Severe sepsis with fever, leukocytosis, lactic acidosis, hypotension on admission-resolved  · Enterococcus faecalis bacteremia-improving, covered with IV ampicillin and IV ceftriaxone  · History of streptococcal bacteremia and mitral and aortic valve endocarditis in 2017  · Positive urine drug screen for benzodiazepines  · Bilateral lung nodules  · Left parietal encephalomalacia  · History of stroke  · Type 2 diabetes mellitus-maintaining good glycemic control  · Essential hypertension-BP controlled  · Seizure disorder  · Nursing home resident  ·       Discussion:      The patient is afebrile. He is on IV ampicillin and IV ceftriaxone.       Serum creatinine is 0.6 today.  Liver functions are okay. White cell count is 6300. Plan:     Diagnostic Workup:      · Continue to follow  fever curve, WBC count and blood cultures. · Continue to monitor blood counts, liver and renal function. Antimicrobials:    · Continue IV ampicillin 2 g every 4 hour  · Continue IV ceftriaxone 2 g every 12 hours  · Plan to continue both antibiotics until April 11, 2022  · My infusion orders are in the [facility] [place]  · Recommend taking oral probiotic twice daily while on antibiotics  · PICC line has been placed today  · Continue close vitals monitoring. · Maintain good glycemic control. · Fall precautions. · Aspiration precautions. · Continue to watch for new fever or diarrhea. · DVT prophylaxis. · Discussed all above with patient and RN. Drug Monitoring:    · Continue monitoring for antibiotic toxicity as follows: CBC, CMP   · Continue to watch for following: new or worsening fever, new hypotension, hives, lip swelling and redness or purulence at vascular access sites. I/v access Management:    · Continue to monitor i.v access sites for erythema, induration, discharge or tenderness. · As always, continue efforts to minimize tubes/lines/drains as clinically appropriate to reduce chances of line associated infections. Patient education and counseling:        · The patient was educated in detail about the side-effects of various antibiotics and things to watch for like new rashes, lip swelling, severe reaction, worsening diarrhea, break through fever etc.  · Discussed patient's condition and what to expect. All of the patient's questions were addressed in a satisfactory manner and patient verbalized understanding all instructions. TIME SPENT TODAY:     - Spent over  26 minutes on visit (including interval history, physical exam, review of data including labs, cultures, imaging, development and implementation of treatment plan and coordination of complex care).  More than 50 percent of this includes face-to-face time spent with the patient for counseling and coordination of care. Thank you for involving me in the care of your patient. I will continue to follow. If you have anyadditional questions, please do not hesitate to contact me. Subjective: Interval history: Interval history was obtained from chart review and patient/ RN. The patient is afebrile. He is on IV ampicillin and IV ceftriaxone. He is tolerating antibiotics ok     REVIEW OF SYSTEMS:      Review of Systems   Constitutional: Negative for chills, diaphoresis and fever. HENT: Negative for ear discharge, ear pain, rhinorrhea, sore throat and trouble swallowing. Eyes: Negative for discharge and redness. Respiratory: Negative for cough, shortness of breath and wheezing. Cardiovascular: Negative for chest pain and leg swelling. Gastrointestinal: Negative for abdominal pain, constipation, diarrhea and nausea. Endocrine: Negative for polyuria. Genitourinary: Negative for dysuria, flank pain, frequency, hematuria and urgency. Musculoskeletal: Negative for back pain and myalgias. Skin: Negative for rash. Neurological: Negative for dizziness, seizures and headaches. Hematological: Does not bruise/bleed easily. Psychiatric/Behavioral: Negative for hallucinations and suicidal ideas. All other systems reviewed and are negative. Past Medical History: All past medical history reviewed today.     Past Medical History:   Diagnosis Date    Acute intracerebral hemorrhage ([address] 02/03/2017    Anxiety     Back pain     Cerebral artery occlusion with cerebral infarction (HCC)     Chronic back pain greater than 3 months duration 02/03/2017    Diabetes mellitus ([address]     Dysphasia 02/03/2017    Dysphonia 02/03/2017    Endocarditis and heart valve disorders in diseases classified elsewhere 02/03/2017    Hydrocephalus ([address] 02/03/2017    Hypertension     IVH (intraventricular hemorrhage) ([address] 02/03/2017    NSTEMI (non-ST elevated myocardial infarction) ([facility] 02/03/2017    Seizures ([facility]        Past Surgical History: All past surgical history was reviewed today. Past Surgical History:   Procedure Laterality Date    BRAIN ANEURYSM SURGERY Left 02/03/2017    COLONOSCOPY N/A 8/14/2019    COLONOSCOPY WITH MAC ANESTHESIA performed by Denise Veloz MD at [address] [place] [address] Left 02/03/2017    LEG SURGERY      UPPER GASTROINTESTINAL ENDOSCOPY N/A 8/14/2019    EGD BIOPSY performed by Denise Veloz MD at [facility]       Family History: All family history was reviewed today. History reviewed. No pertinent family history. Objective:       PHYSICAL EXAM:      Vitals:   Vitals:    03/02/22 0048 03/02/22 0458 03/02/22 0500 03/02/22 0842   BP: 113/65 104/64 104/64 (!) 108/59   Pulse: 73 74 79 71   Resp: 17 17 17 20   Temp: 98.5 °F (36.9 °C) 98.2 °F (36.8 °C)     TempSrc: Temporal Temporal     SpO2: 95% 94% 99% 94%   Weight:   153 lb 10.6 oz (69.7 kg)    Height:           Physical Exam  Vitals and nursing note reviewed. Constitutional:       Appearance: Normal appearance. He is well-developed. HENT:      Head: Normocephalic and atraumatic. Right Ear: External ear normal.      Left Ear: External ear normal.      Nose: Nose normal. No congestion or rhinorrhea. Mouth/Throat:      Mouth: Mucous membranes are moist.      Pharynx: No oropharyngeal exudate or posterior oropharyngeal erythema. Eyes:      General: No scleral icterus. Right eye: No discharge. Left eye: No discharge. Conjunctiva/sclera: Conjunctivae normal.      Pupils: Pupils are equal, round, and reactive to light. Cardiovascular:      Rate and Rhythm: Normal rate and regular rhythm. Pulses: Normal pulses. Heart sounds: No murmur heard. No friction rub. Pulmonary:      Effort: Pulmonary effort is normal. No respiratory distress. Breath sounds: Normal breath sounds. No stridor.  No wheezing, rhonchi or rales. Abdominal:      General: Bowel sounds are normal.      Palpations: Abdomen is soft. Tenderness: There is no abdominal tenderness. There is no right CVA tenderness, left CVA tenderness, guarding or rebound. Musculoskeletal:         General: No swelling or tenderness. Normal range of motion. Cervical back: Normal range of motion and neck supple. No rigidity. No muscular tenderness. Lymphadenopathy:      Cervical: No cervical adenopathy. Skin:     General: Skin is warm and dry. Coloration: Skin is not jaundiced. Findings: No erythema or rash. Neurological:      General: No focal deficit present. Mental Status: He is alert and oriented to person, place, and time. Mental status is at baseline. Motor: No abnormal muscle tone. Psychiatric:         Mood and Affect: Mood normal.         Behavior: Behavior normal.         Thought Content: Thought content normal.               Lines and drains: All vascular access sites are healthy with no local erythema, discharge or tenderness. Intake and output:    I/O last 3 completed shifts: In: 2356.1 [P.O.:720; I.V.:536; IV Piggyback:1100.1]  Out: 2150 [Urine:2150]    Lab Data:   All available labs and old records have been reviewed by me.     CBC:  Recent Labs     02/28/22 0416 03/01/22  0435 03/02/22  0510   WBC 5.3 5.7 6.3   RBC 3.64* 3.58* 3.73*   HGB 12.3* 12.3* 12.6*   HCT 35.1* 34.4* 36.1*    197 200   MCV 96.5 96.0 96.8   MCH 33.9 34.2* 33.7   MCHC 35.1 35.7 34.8   RDW 12.5 13.0 13.1        BMP:  Recent Labs     02/28/22 0416 03/01/22  0435 03/02/22  0510    143 142   K 3.7 3.6 3.4*    105 103   CO2 27 30 29   BUN 16 15 17   CREATININE 0.6* 0.6* 0.6*   CALCIUM 8.9 9.0 9.1   GLUCOSE 106* 107* 111*        Hepatic Function Panel:   Lab Results   Component Value Date    ALKPHOS 66 03/02/2022    ALT 31 03/02/2022    AST 26 03/02/2022    PROT 6.1 03/02/2022    PROT 7.1 12/13/2011    JERROD 0.3 03/02/2022    LABALBU 3.5 03/02/2022       CPK:   Lab Results   Component Value Date    CKTOTAL 73 02/22/2022     ESR:   Lab Results   Component Value Date    SEDRATE 30 (H) 02/28/2022     CRP:   Lab Results   Component Value Date    CRP 12.1 (H) 02/28/2022           Imaging: All pertinent images and reports for the current visit were reviewed by me during this visit. I reviewed the chest x-ray/CT scan/MRI images and independently interpreted the findings and results today. MRI BRAIN WO CONTRAST   Final Result   1. No evidence of acute infarct. 2. Left-sided craniotomy with underlying encephalomalacia in the posterior   left cerebral hemisphere, unchanged. 3. Cerebral parenchymal volume loss with chronic microvascular white matter   ischemic disease. Chronic infarcts are noted in the right frontal lobe and   cerebellar hemispheres. CT CHEST ABDOMEN PELVIS WO CONTRAST   Final Result   1. Patchy airway secretions, including suspected secretions in the left lower   lobe bronchus and in the basilar segmental bronchi. An underlying   endobronchial lesion is not excluded but considered less likely. Consider   aspiration given patient status. 2. The sideport of a gastric tube is located at the gastroesophageal   junction. Consider advancement. 3. Indeterminate bilateral renal lesions potentially due to proteinaceous   cyst or solid neoplasm. Recommend further evaluation with renal protocol   abdomen MRI (preferred) or CT on a nonemergent basis. 4. Solid nodules in the bilateral lower lobes measure up to 0.7 cm and are   more likely infectious or inflammatory in etiology than neoplastic. Assuming   no malignancy is found in the kidneys on the above recommended study, then   follow-up chest CT would be recommended in 3-6 months as below. If   malignancy is found in the kidneys, then the below pulmonary nodule   recommendation would not apply.       RECOMMENDATIONS:   Multiple pulmonary nodules. Most severe: 7 mm right solid pulmonary nodule. Recommend a non-contrast Chest CT at 3-6 months. If patient is high risk for   malignancy, recommend an additional non-contrast Chest CT at 18-24 months; if   patient is low risk for malignancy a non-contrast Chest CT at 18-24 months is   optional.      These guidelines do not apply to immunocompromised patients and patients with   cancer. Follow up in patients with significant comorbidities as clinically   warranted. For lung cancer screening, adhere to Lung-RADS guidelines. Reference: Radiology. 2017; 284(1):228-43. CT CERVICAL SPINE WO CONTRAST   Final Result   No acute fracture or traumatic malalignment of the cervical spine. CT HEAD WO CONTRAST   Final Result   1. No acute intracranial abnormality. 2. Chronic findings as above. XR CHEST PORTABLE   Final Result   1. Lines and tubes in expected position as above. 2. No acute cardiopulmonary abnormality. Medications: All current and past medications were reviewed.      lidocaine 1 % injection  5 mL IntraDERmal Once    lisinopril  2.5 mg Oral Daily    lidocaine 1 % injection  5 mL IntraDERmal Once    sodium chloride flush  5-40 mL IntraVENous 2 times per day    torsemide  10 mg Oral Daily    ampicillin IV  2,000 mg IntraVENous 6 times per day    cefTRIAXone (ROCEPHIN) IV  2,000 mg IntraVENous Q12H    lactobacillus  1 capsule Oral BID WC    methadone  5 mg Oral BID    lacosamide  200 mg Oral BID    atorvastatin  10 mg Oral Nightly    enoxaparin  40 mg SubCUTAneous Daily    levETIRAcetam  1,500 mg Oral BID        sodium chloride 10 mL/hr at 02/27/22 1131       sodium chloride flush, melatonin, sodium chloride flush, sodium chloride, magnesium sulfate, ondansetron, acetaminophen **OR** acetaminophen, potassium chloride **OR** potassium chloride, perflutren lipid microspheres      Problem list:       Patient Active Problem List   Diagnosis Code    Psychosis ([address] F29    Acute intracerebral hemorrhage (HCC) I61.9    Dysphagia R13.10    Dysphonia R49.0    Endocarditis, sumit and aortic valves I38    Endocarditis I38    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    Bacterial infection due to Streptococcus mutans A49.1    Type 2 diabetes mellitus with other specified complication ([facility]) S05.79    Protein calorie malnutrition (HCC) E46    Seizure ([facility]) R56.9    Partial symptomatic epilepsy with complex partial seizures, intractable, without status epilepticus ([address] G40.219    Abnormal CT of the head R93.0    Breakthrough seizure ([address] G40.919    History of stroke Z86.73    Aphasia R47.01    NATALIA (acute kidney injury) ([address] N17.9    Lactic acidosis E87.2    Unresponsive R41.89    Partial idiopathic epilepsy with seizures of localized onset, intractable, with status epilepticus ([address] G40.011    Acute respiratory failure with hypoxia ([facility]) J96.01    Sepsis ([address] A41.9    Marcelo coma scale total score 3-8 ([facility]) R40.2430    Lung nodules R91.8    Essential hypertension I10    Enterococcal bacteremia R78.81, B95.2    Receiving intravenous antibiotic treatment as outpatient Z79.2    Complex care coordination Z71.89       Please note that this chart was generated using Dragon dictation software. Although every effort was made to ensure the accuracy of this automated transcription, some errors in transcription may have occurred inadvertently. If you may need any clarification, please do not hesitate to contact me through EPIC or at the phone number provided below with my electronic signature. Any pictures or media included in this note were obtained after taking informed verbal consent from the patient and with their approval to include those in the patient's medical record.       Mildred Billingsley MD, MPH, FACP, FID  3/2/2022, 11:37 AM  [facility] Disease   [address] [place] [address], [facility], [address] [place] [address]  Office: [phone]  Fax: [phone]  Clinic days:  Tuesday & Thursday

## 2022-03-02 NOTE — PROGRESS NOTES
2132  Gross per 24 hour   Intake 1558.07 ml   Output 1900 ml   Net -341.93 ml       Physical Exam Performed:    BP (!) 105/57   Pulse 74   Temp 98.4 °F (36.9 °C) (Temporal)   Resp 14   Ht 5' 11\" (1.803 m)   Wt 154 lb 6.2 oz (70 kg)   SpO2 100%   BMI 21.53 kg/m²     General appearance: No apparent distress, appears stated age and cooperative. HEENT: Pupils equal, round, and reactive to light. Conjunctivae/corneas clear. Neck: Supple, with full range of motion. No jugular venous distention. Trachea midline. Respiratory:  Normal respiratory effort. Clear to auscultation, bilaterally without Rales/Wheezes/Rhonchi. Cardiovascular: Regular rate and rhythm 2-3/6 murmur, no  rubs or gallops. Abdomen: Soft, non-tender, non-distended with normal bowel sounds. Musculoskeletal: No clubbing, cyanosis or edema bilaterally. Full range of motion without deformity. Skin: Skin color, texture, turgor normal.  No rashes or lesions. Neurologic:  Neurovascularly intact without any focal sensory/motor deficits. Cranial nerves: II-XII intact, grossly non-focal.  Psychiatric: Alert and oriented, thought content appropriate, normal insight  Capillary Refill: Brisk,3 seconds, normal   Peripheral Pulses: +2 palpable, equal bilaterally       Labs:   Recent Labs     02/27/22 0354 02/28/22 0416 03/01/22 0435   WBC 5.8 5.3 5.7   HGB 12.7* 12.3* 12.3*   HCT 35.8* 35.1* 34.4*    204 197     Recent Labs     02/27/22 0354 02/28/22 0416 03/01/22  0435    142 143   K 4.0 3.7 3.6    106 105   CO2 27 27 30   BUN 12 16 15   CREATININE 0.5* 0.6* 0.6*   CALCIUM 9.1 8.9 9.0     Recent Labs     02/27/22 0354 02/28/22 0416 03/01/22  0435   AST 26 28 26   ALT 21 26 30   BILITOT 0.6 0.3 0.3   ALKPHOS 65 64 63     Recent Labs     02/27/22 0354 02/28/22 0416 03/01/22  0435   INR 1.19* 1.23* 1.15*     No results for input(s): Mirtha Nichols in the last 72 hours.     Urinalysis:      Lab Results   Component Value Date NITRU Negative 02/22/2022    WBCUA 4 02/22/2022    BACTERIA 1+ 01/13/2022    RBCUA 11 02/22/2022    BLOODU MODERATE 02/22/2022    SPECGRAV 1.020 02/22/2022    GLUCOSEU Negative 02/22/2022       Radiology:  MRI BRAIN WO CONTRAST   Final Result   1. No evidence of acute infarct. 2. Left-sided craniotomy with underlying encephalomalacia in the posterior   left cerebral hemisphere, unchanged. 3. Cerebral parenchymal volume loss with chronic microvascular white matter   ischemic disease. Chronic infarcts are noted in the right frontal lobe and   cerebellar hemispheres. CT CHEST ABDOMEN PELVIS WO CONTRAST   Final Result   1. Patchy airway secretions, including suspected secretions in the left lower   lobe bronchus and in the basilar segmental bronchi. An underlying   endobronchial lesion is not excluded but considered less likely. Consider   aspiration given patient status. 2. The sideport of a gastric tube is located at the gastroesophageal   junction. Consider advancement. 3. Indeterminate bilateral renal lesions potentially due to proteinaceous   cyst or solid neoplasm. Recommend further evaluation with renal protocol   abdomen MRI (preferred) or CT on a nonemergent basis. 4. Solid nodules in the bilateral lower lobes measure up to 0.7 cm and are   more likely infectious or inflammatory in etiology than neoplastic. Assuming   no malignancy is found in the kidneys on the above recommended study, then   follow-up chest CT would be recommended in 3-6 months as below. If   malignancy is found in the kidneys, then the below pulmonary nodule   recommendation would not apply. RECOMMENDATIONS:   Multiple pulmonary nodules. Most severe: 7 mm right solid pulmonary nodule. Recommend a non-contrast Chest CT at 3-6 months.  If patient is high risk for   malignancy, recommend an additional non-contrast Chest CT at 18-24 months; if   patient is low risk for malignancy a non-contrast Chest CT at 18-24 months is   optional.      These guidelines do not apply to immunocompromised patients and patients with   cancer. Follow up in patients with significant comorbidities as clinically   warranted. For lung cancer screening, adhere to Lung-RADS guidelines. Reference: Radiology. 2017; 284(1):228-43. CT CERVICAL SPINE WO CONTRAST   Final Result   No acute fracture or traumatic malalignment of the cervical spine. CT HEAD WO CONTRAST   Final Result   1. No acute intracranial abnormality. 2. Chronic findings as above. XR CHEST PORTABLE   Final Result   1. Lines and tubes in expected position as above. 2. No acute cardiopulmonary abnormality.                  Assessment/Plan:    Active Hospital Problems    Diagnosis     Receiving intravenous antibiotic treatment as outpatient [Z79.2]     Complex care coordination [Z71.89]     Sepsis ([address] [A41.9]     Marcelo coma scale total score 3-8 ([address] [R40.2430]     Lung nodules [R91.8]     Essential hypertension [I10]     Enterococcal bacteremia [R78.81, B95.2]     NATALIA (acute kidney injury) ([address] [N17.9]     Lactic acidosis [E87.2]     Unresponsive [R41.89]     Partial idiopathic epilepsy with seizures of localized onset, intractable, with status epilepticus ([address] [G40.011]     Acute respiratory failure with hypoxia ([address] [J96.01]     Breakthrough seizure ([address] [G40.919]     History of stroke [Z86.73]     Partial symptomatic epilepsy with complex partial seizures, intractable, without status epilepticus ([address] [G40.219]     Seizure ([address] [R56.9]     Type 2 diabetes mellitus with other specified complication ([address] [X88.09]      Break through seizure: Neurology consulted MRI shows old chronic infarcts, no acute infarct, continue seizure precautions    Lactic acidosis : Likely secondary to seizure, this is has been resolved    Positive blood cultures: Enterococcus bacteremia, has been started on Rocephin and ampicillin, previous history of endocarditis, ID following appreciate recommendation, antibiotic per ID no evidence of valve vegetation on ANA MARIA    Severe mitral regurg: Status post ANA MARIA, patient refusing left heart cath or open heart surgery per CT surgery. Continue medical management    Diabetes mellitus 2: Sliding scale and Lantus blood glucose has been controlled    NATALIA: Patient has been improved with appropriate hydration    DVT Prophylaxis: Lovenox  Diet: ADULT DIET; Easy to Chew; No Drinking Straws  Code Status: Full Code    PT/OT Eval Status: For evaluation    Dispo-transfer out of ICU to progressive care unit will consider DC in a.m.     Micah Kenney MD

## 2022-03-02 NOTE — PROGRESS NOTES
Spoke with Dr. Angella Wilkerson. Patient will be receiving 2 incompatible IV antibiotics which cannot be given simultaneously. Per Nica Alcala RN, patient will be going to [facility] and this should not be an issue.

## 2022-03-02 NOTE — FLOWSHEET NOTE
PICC line education:    -Risks  -Benefits  -Alternatives  -Procedure    Discussed the above with patient, verbalized understanding, answered all questions. Provided with information on PICC care to review. PICC tip verified via [employer] (Ok to use). Reported off to patient's  Nurse Sherrill Mayer RN.

## 2022-03-03 LAB
CULTURE, BLOOD 2: NORMAL
CULTURE, BLOOD 3: NORMAL

## 2022-03-04 NOTE — PROGRESS NOTES
[address]  3/11/22  Referring: was seen in hospital    REASON FOR CONSULT/CHIEF COMPLAINT/HPI     Reason for visit/ Chief complaint   Hospital Inpatient Follow Up    HPI Amada Yeh is a 64 y.o. 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 No witnessed seizure, loaded with Keppra in ER. It is unknown how long he was down. Upon arrival to ER, he was intubated for GCS 3. Transesophageal echocardiogram on 2/25 found severe MR, papillary chord rupture, severe aortic regurgitation, and severe LV dysfunction. He finished his antibiotics for his enterococcus infection. Today he feels much better. He is here with a  from his group home who isn't a caretaker. We have no notes from his group home about what meds he is actually taking. I discussed the possibility of MV surgery with him again and he says \"I don't want to be cut all the way open. \"  I mentioned to him the possibility of a minimally invasive repair, and he interested in considering that. Patient is adherent with medications and is tolerating them well without side effects     HISTORY/ALLERGIES/ROS     MedHx:  has a past medical history of Acute intracerebral hemorrhage ([address] Anxiety, Back pain, Cerebral artery occlusion with cerebral infarction [facility]), Chronic back pain greater than 3 months duration, Diabetes mellitus ([address] Dysphasia, Dysphonia, Endocarditis and heart valve disorders in diseases classified elsewhere, Hydrocephalus ([address] Hypertension, IVH (intraventricular hemorrhage) ([address] NSTEMI (non-ST elevated myocardial infarction) ([address] and Seizures ([address]   SurgHx:  has a past surgical history that includes Leg Surgery; craniotomy (Left, 02/03/2017); Brain aneurysm surgery (Left, 02/03/2017); Upper gastrointestinal endoscopy (N/A, 8/14/2019); and Colonoscopy (N/A, 8/14/2019). SocHx:  reports that he has been smoking cigarettes. He has been smoking about 0.50 packs per day. He has never used smokeless tobacco. He reports current alcohol use of about 1.0 standard drink of alcohol per week. He reports previous drug use. Drug: Marijuana [place]). Allergies: Seasonal     MEDICATIONS      Prior to Admission medications    Medication Sig Start Date End Date Taking? Authorizing Provider   lactobacillus (CULTURELLE) capsule Take 1 capsule by mouth 2 times daily (with meals) 3/2/22 4/16/22  Brain MD Vera   lisinopril (PRINIVIL;ZESTRIL) 2.5 MG tablet Take 1 tablet by mouth daily 3/2/22   Brain MD Vera   torsemide [facility]) 10 MG tablet Take 1 tablet by mouth daily 3/2/22   Brain Otis OrchardsMD phil   ampicillin (OMNIPEN) infusion Infuse 2,000 mg intravenously every 4 hours Compound per protocol.  3/1/22 4/11/22  Zachary Robles MD   cefTRIAXone (ROCEPHIN) infusion Infuse 1,000 mg intravenously every 12 hours Compound per protocol  Patient not taking: Reported on 3/11/2022 3/1/22 4/11/22  Alessandra Kan MD   citalopram (CELEXA) 20 MG tablet Take 20 mg by mouth daily    Historical Provider, MD   OLANZapine (ZYPREXA) 2.5 MG tablet Take 2.5 mg by mouth nightly    Historical Provider, MD   levETIRAcetam (KEPPRA) 500 MG tablet Take 1,500 mg by mouth 2 times daily    Historical Provider, MD   potassium chloride (KLOR-CON M) 20 MEQ extended release tablet Take 20 mEq by mouth 2 times daily    Historical Provider, MD   acetaminophen (TYLENOL) 325 MG tablet Take 650 mg by mouth every 4 hours as needed for Pain    Historical Provider, MD   mirtazapine (REMERON) 7.5 MG tablet Take 7.5 mg by mouth nightly    Historical Provider, MD   Cholecalciferol (VITAMIN D3) 5000 units TABS Take by mouth daily    Historical Provider, MD   lacosamide (VIMPAT) 200 MG tablet Take 200 mg by mouth 2 times daily. Historical Provider, MD   calcium carbonate (TUMS) 500 MG chewable tablet Take 1 tablet by mouth 2 times daily     Historical Provider, MD   methadone (DOLOPHINE) 5 MG tablet Take 5 mg by mouth 2 times daily. Historical Provider, MD   atorvastatin (LIPITOR) 10 MG tablet Take 10 mg by mouth nightly  2/3/17   Historical Provider, MD       PHYSICAL EXAM        Vitals:    03/11/22 1340   BP: 110/66   Pulse: 113   Resp: 19   SpO2: 96%    Weight: 161 lb (73 kg)     Gen Alert, cooperative, no distress Heart  Regular rate and rhythm, loud HSM murmur   Head Normocephalic, atraumatic, no abnormalities Abd  Soft, NT, +BS, no mass, no OM   Eyes PERRLA, conj/corn clear Ext  Ext nl, AT, no C/C, no edema   Nose Nares normal, no drain age, Non-tender Pulse 2+ and symmetric   Throat Lips, mucosa, tongue normal Skin Color/text/turg nl, no rash/lesions   Neck S/S, TM, NT, no bruit Psych Nl mood and affect   Lung CTA-B, unlabored, no DTP     Ch wall NT, no deform       LABS and Imaging     Relevant and available CV data reviewed    EKG personally interpreted: 2/24/2022 SR HR 75 Normal sinus rhythm,Left axis deviationVoltage criteria for left ventricular hypertrophy, Intra-ventricular conduction delay, Cannot rule out Septal infarct , age undeterminedInferior infarct    Pro BNP 2/28/2022 - 435    Troponin 2/25/2022 < 0.01    Echo 2/25/2022    LV is severely dilated with low-normal systolic function. Severe MR, severe AR. Mechanism of MR appears to be primarily ruptured chorda of anterior mitral   leaflet. Possible perforation of aortic valve. Poor central coaptation of aortic   valve. No vegetations seen.     Stress: none    Cath: none    Holter:none      High Risk  High Complexity/Medical Decision Making    Outside/Care everywhere records Reviewed  Labs Reviewed  Prior Imaging, ekg, echo reviewed when available  Medications reviewed  Old Notes reviewed  ASSESSMENT AND PLAN 1. 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 Sepsis   Enterococcus faecalis bacteremia. History of streptococcal bacteremia and mitral and aortic valve endocarditis in 2017  Bacteremia without vegetations 2022  S/p antibiotic course    3. Severe MR, papillary chord rupture, severe aortic regurgitation, and severe LV dysfunction  CVTS was consulted for surgical evaluation for mitral valve replacement/repair and aortic vavle replacement  He has no interest in [facility] or full sternotomy heart surgery. He is interested in minimally invasive surgery   Plan: Referral to [facility] for MV repair     4. HTN  BP Today 110/66  Lisinopril  Plan: Continue per PCP    5.DM Type 2  Plan: Continue per PCP    Patient counseled on lifestyle modification, diet, and exercise. Follow Up: Return in about 3 months (around 6/11/2022). Diana Gutierrez MD  Cardiologist Rebecca Schneider    Scribe's Attestation: This note was scribed in the presence of Dr. Geoff Rivera MD by Zachary Aleman RN. 03/11/22     Physician Attestation  The scribe wrote this note in the presence of me Diana Gutierrez MD). The scribe may have prepopulated components of this note with my historical  intellectual property under my direct supervision. Any additions to this intellectual property were performed in my presence and at my direction. Furthermore, the content and accuracy of this note have been reviewed by me with edits by me as needed.   Diana Gutierrez MD 3/11/2022 3:15 PM

## 2022-03-11 ENCOUNTER — OFFICE VISIT (OUTPATIENT)
Dept: CARDIOLOGY CLINIC | Age: 62
End: 2022-03-11
Payer: COMMERCIAL

## 2022-03-11 VITALS
SYSTOLIC BLOOD PRESSURE: 110 MMHG | OXYGEN SATURATION: 96 % | HEART RATE: 113 BPM | WEIGHT: 161 LBS | RESPIRATION RATE: 19 BRPM | DIASTOLIC BLOOD PRESSURE: 66 MMHG | BODY MASS INDEX: 22.54 KG/M2 | HEIGHT: 71 IN

## 2022-03-11 DIAGNOSIS — I10 ESSENTIAL HYPERTENSION: ICD-10-CM

## 2022-03-11 DIAGNOSIS — I34.0 NONRHEUMATIC MITRAL VALVE REGURGITATION: ICD-10-CM

## 2022-03-11 DIAGNOSIS — I38 ENDOCARDITIS, VALVE: ICD-10-CM

## 2022-03-11 DIAGNOSIS — I50.9 CONGESTIVE HEART FAILURE, UNSPECIFIED HF CHRONICITY, UNSPECIFIED HEART FAILURE TYPE (HCC): Primary | ICD-10-CM

## 2022-03-11 PROCEDURE — 99215 OFFICE O/P EST HI 40 MIN: CPT | Performed by: INTERNAL MEDICINE

## 2022-03-11 PROCEDURE — G8420 CALC BMI NORM PARAMETERS: HCPCS | Performed by: INTERNAL MEDICINE

## 2022-03-11 PROCEDURE — 1111F DSCHRG MED/CURRENT MED MERGE: CPT | Performed by: INTERNAL MEDICINE

## 2022-03-11 PROCEDURE — G8427 DOCREV CUR MEDS BY ELIG CLIN: HCPCS | Performed by: INTERNAL MEDICINE

## 2022-03-11 PROCEDURE — 4004F PT TOBACCO SCREEN RCVD TLK: CPT | Performed by: INTERNAL MEDICINE

## 2022-03-11 PROCEDURE — 2022F DILAT RTA XM EVC RTNOPTHY: CPT | Performed by: INTERNAL MEDICINE

## 2022-03-11 PROCEDURE — 3017F COLORECTAL CA SCREEN DOC REV: CPT | Performed by: INTERNAL MEDICINE

## 2022-03-11 PROCEDURE — 3046F HEMOGLOBIN A1C LEVEL >9.0%: CPT | Performed by: INTERNAL MEDICINE

## 2022-03-11 PROCEDURE — G8484 FLU IMMUNIZE NO ADMIN: HCPCS | Performed by: INTERNAL MEDICINE

## 2022-03-11 RX ORDER — DULOXETIN HYDROCHLORIDE 30 MG/1
60 CAPSULE, DELAYED RELEASE ORAL DAILY
COMMUNITY

## 2022-03-11 RX ORDER — LORATADINE 10 MG/1
10 TABLET ORAL DAILY
COMMUNITY
End: 2022-06-13 | Stop reason: ALTCHOICE

## 2022-03-11 RX ORDER — TRAZODONE HYDROCHLORIDE 50 MG/1
50 TABLET ORAL NIGHTLY
COMMUNITY

## 2022-03-11 NOTE — PATIENT INSTRUCTIONS
Make an appointment with Dr Jeni Cortes Cardiothoracic surgeon  Office visit with Dr Jammie Ramirez in 3 months   Continue current medications

## 2022-03-17 ENCOUNTER — TELEPHONE (OUTPATIENT)
Dept: INFECTIOUS DISEASES | Age: 62
End: 2022-03-17

## 2022-03-17 NOTE — TELEPHONE ENCOUNTER
Contacted [facility] and requested lab results from this week  Nurse will fax to my attn IF she finds results

## 2022-03-23 ENCOUNTER — TELEPHONE (OUTPATIENT)
Dept: INFECTIOUS DISEASES | Age: 62
End: 2022-03-23

## 2022-03-23 NOTE — TELEPHONE ENCOUNTER
Call placed to [facility], requesting weekly CMP drawn not BMP. Nurse verbalized understanding.     Thanks

## 2022-04-07 NOTE — DISCHARGE SUMMARY
Hospital Medicine Discharge Summary    Patient: Jazmine Tidwell     Gender: male  : 1960   Age: 64 y.o. MRN: [id number]    Admitting Physician: Tao Gonzalez MD  Discharge Physician: Maya Dow MD     Code Status: Prior     Admit Date: 2022   Discharge Date: 3/2/2022      Disposition:  Core care    Discharge Diagnoses: Active Hospital Problems    Diagnosis Date Noted    Receiving intravenous antibiotic treatment as outpatient [Z79.2]     Complex care coordination [Z71.89]     Sepsis ([address] [A41.9]     Irwin coma scale total score 3-8 ([address] [R40.2430]     Lung nodules [R91.8]     Essential hypertension [I10]     Enterococcal bacteremia [R78.81, B95.2]     NATALIA (acute kidney injury) ([address] [N17.9] 2022    Lactic acidosis [E87.2] 2022    Unresponsive [R41.89]     Partial idiopathic epilepsy with seizures of localized onset, intractable, with status epilepticus ([address] [G40.011]     Acute respiratory failure with hypoxia ([address] [J96.01]     Breakthrough seizure ([address] [facility]    History of stroke [Z86.73]     Partial symptomatic epilepsy with complex partial seizures, intractable, without status epilepticus ([address] [G40.219]     Seizure ([address] [R56.9] 07/15/2020    DM (diabetes mellitus), type 2, uncontrolled with complications ([address] [F13.8, E11.65] 2017       Follow-up appointments:  one week    Outpatient to do list: follow up    Condition at Discharge:  [address] Course:   Pj Supriyajabari a 64 y. o. male.   He [facility] by ambulance. [facility] was found down and unresponsive by staff. [facility] was supported noninvasively en route w/ breaths via BVM. [facility] was intubated shortly after arrival primarily for airway protection considering his persistently depressed mental status.     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 [facility] underwent craniotomy and aneurysm clipping at the time. 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 Per prior notes he is aphasic at baseline.      Break through seizure: Neurology consulted MRI shows old chronic infarcts, no acute infarct, continue seizure precautions     Lactic acidosis : Likely secondary to seizure, this is has been resolved     Positive blood cultures: Enterococcus bacteremia, has been started on Rocephin and ampicillin, previous history of endocarditis, ID following appreciate recommendation, antibiotic per ID no evidence of valve vegetation on ANA MARIA     Severe mitral regurg: Status post ANA MARIA, patient refusing left heart cath or open heart surgery per CT surgery.   Continue medical management     Diabetes mellitus 2: Sliding scale and Lantus blood glucose has been controlled     NATALIA: Patient has been improved with appropriate hydration       Discharge Medications:   Discharge Medication List as of 3/2/2022  1:09 PM      START taking these medications    Details   lactobacillus (CULTURELLE) capsule Take 1 capsule by mouth 2 times daily (with meals), Disp-90 capsule, R-0Print      lisinopril (PRINIVIL;ZESTRIL) 2.5 MG tablet Take 1 tablet by mouth daily, Disp-30 tablet, R-3Print      torsemide (DEMADEX) 10 MG tablet Take 1 tablet by mouth daily, Disp-30 tablet, R-3Print      cefTRIAXone (ROCEPHIN) infusion Infuse 1,000 mg intravenously every 12 hours Compound per protocol, Disp-82 g, R-0NO PRINT      ampicillin (OMNIPEN) infusion Infuse 2,000 mg intravenously every 4 hours Compound per protocol., Disp-492 g, R-0NO PRINT           Discharge Medication List as of 3/2/2022  1:09 PM        Discharge Medication List as of 3/2/2022  1:09 PM      CONTINUE these medications which have NOT CHANGED    Details   citalopram (CELEXA) 20 MG tablet Take 20 mg by mouth dailyHistorical Med      OLANZapine (ZYPREXA) 2.5 MG tablet Take 2.5 mg by mouth nightlyHistorical Med      levETIRAcetam (KEPPRA) 500 MG tablet Take 1,500 mg by mouth 2 times dailyHistorical Med      potassium chloride (KLOR-CON M) 20 MEQ extended release tablet Take 20 mEq by mouth 2 times dailyHistorical Med      acetaminophen (TYLENOL) 325 MG tablet Take 650 mg by mouth every 4 hours as needed for PainHistorical Med      mirtazapine (REMERON) 7.5 MG tablet Take 7.5 mg by mouth nightlyHistorical Med      Cholecalciferol (VITAMIN D3) 5000 units TABS Take by mouth dailyHistorical Med      lacosamide (VIMPAT) 200 MG tablet Take 200 mg by mouth 2 times daily. Historical Med      calcium carbonate (TUMS) 500 MG chewable tablet Take 1 tablet by mouth 2 times daily Historical Med      methadone (DOLOPHINE) 5 MG tablet Take 5 mg by mouth 2 times daily. Historical Med      atorvastatin (LIPITOR) 10 MG tablet Take 10 mg by mouth nightly Historical Med           Discharge Medication List as of 3/2/2022  1:09 PM      STOP taking these medications       DULoxetine (CYMBALTA) 30 MG extended release capsule Comments:   Reason for Stopping:         loratadine (CLARITIN) 10 MG tablet Comments:   Reason for Stopping:         melatonin 3 MG TABS tablet Comments:   Reason for Stopping:         ACIDOPHILUS LACTOBACILLUS PO Comments:   Reason for Stopping:         traZODone (DESYREL) 50 MG tablet Comments:   Reason for Stopping:               Discharge ROS:  A complete review of systems was asked and negative except for feels fine. Discharge Exam:    BP (!) 108/59   Pulse 71   Temp 98.2 °F (36.8 °C) (Temporal)   Resp 20   Ht 5' 11\" (1.803 m)   Wt 153 lb 10.6 oz (69.7 kg)   SpO2 94%   BMI 21.43 kg/m²     General appearance: No apparent distress, appears stated age and cooperative. HEENT: Pupils equal, round, and reactive to light. Conjunctivae/corneas clear. Neck: Supple, with full range of motion. No jugular venous distention. Trachea midline. Respiratory:  Normal respiratory effort.  Clear to auscultation, bilaterally without Rales/Wheezes/Rhonchi. Cardiovascular: Regular rate and rhythm 2-3/6 murmur, no  rubs or gallops. Abdomen: Soft, non-tender, non-distended with normal bowel sounds. Musculoskeletal: No clubbing, cyanosis or edema bilaterally. Full range of motion without deformity. Skin: Skin color, texture, turgor normal.  No rashes or lesions. Neurologic:  Neurovascularly intact without any focal sensory/motor deficits. Cranial nerves: II-XII intact, grossly non-focal.  Psychiatric: Alert and oriented, thought content appropriate, normal insight  Capillary Refill: Brisk,3 seconds, normal   Peripheral Pulses: +2 palpable, equal bilaterally     Labs: For convenience and continuity at follow-up the following most recent labs are provided:    Lab Results   Component Value Date    WBC 6.3 03/02/2022    HGB 12.6 03/02/2022    HCT 36.1 03/02/2022    MCV 96.8 03/02/2022     03/02/2022     03/02/2022    K 3.4 03/02/2022    K 4.7 02/22/2022     03/02/2022    CO2 29 03/02/2022    BUN 17 03/02/2022    CREATININE 0.6 03/02/2022    CALCIUM 9.1 03/02/2022    ALKPHOS 66 03/02/2022    ALT 31 03/02/2022    AST 26 03/02/2022    BILITOT 0.3 03/02/2022    LABALBU 3.5 03/02/2022     Lab Results   Component Value Date    INR 1.23 (H) 03/02/2022    INR 1.15 (H) 03/01/2022    INR 1.23 (H) 02/28/2022       Radiology:  No results found. The patient was seen and examined on day of discharge and this discharge summary is in conjunction with any daily progress note from day of discharge. Time Spent on discharge is 33 minutes  in the examination, evaluation, counseling and review of medications and discharge plan. Note that greater  than 30 minutes was spent in preparing discharge papers, discussing discharge with patient, medication review, etc.       Signed:    Micah Mena MD   4/7/2022      Thank you Alessandro Timmons MD for the opportunity to be involved in this patient's care.  If you have any questions or concerns please feel free to contact me at [phone].

## 2022-05-03 ENCOUNTER — TELEMEDICINE (OUTPATIENT)
Dept: INFECTIOUS DISEASES | Age: 62
End: 2022-05-03
Payer: COMMERCIAL

## 2022-05-03 DIAGNOSIS — R47.01 APHASIA: ICD-10-CM

## 2022-05-03 DIAGNOSIS — R91.8 LUNG NODULES: ICD-10-CM

## 2022-05-03 DIAGNOSIS — B95.2 ENTEROCOCCAL BACTEREMIA: Primary | ICD-10-CM

## 2022-05-03 DIAGNOSIS — I38 ENDOCARDITIS, VALVE: ICD-10-CM

## 2022-05-03 DIAGNOSIS — G40.011 PARTIAL IDIOPATHIC EPILEPSY WITH SEIZURES OF LOCALIZED ONSET, INTRACTABLE, WITH STATUS EPILEPTICUS (HCC): ICD-10-CM

## 2022-05-03 DIAGNOSIS — R78.81 ENTEROCOCCAL BACTEREMIA: Primary | ICD-10-CM

## 2022-05-03 DIAGNOSIS — G40.219 PARTIAL SYMPTOMATIC EPILEPSY WITH COMPLEX PARTIAL SEIZURES, INTRACTABLE, WITHOUT STATUS EPILEPTICUS (HCC): ICD-10-CM

## 2022-05-03 DIAGNOSIS — I10 ESSENTIAL HYPERTENSION: ICD-10-CM

## 2022-05-03 DIAGNOSIS — Z86.73 HISTORY OF STROKE: ICD-10-CM

## 2022-05-03 DIAGNOSIS — Z79.2 RECEIVING INTRAVENOUS ANTIBIOTIC TREATMENT AS OUTPATIENT: ICD-10-CM

## 2022-05-03 PROCEDURE — 3046F HEMOGLOBIN A1C LEVEL >9.0%: CPT | Performed by: INTERNAL MEDICINE

## 2022-05-03 PROCEDURE — 3017F COLORECTAL CA SCREEN DOC REV: CPT | Performed by: INTERNAL MEDICINE

## 2022-05-03 PROCEDURE — 99214 OFFICE O/P EST MOD 30 MIN: CPT | Performed by: INTERNAL MEDICINE

## 2022-05-03 PROCEDURE — 2022F DILAT RTA XM EVC RTNOPTHY: CPT | Performed by: INTERNAL MEDICINE

## 2022-05-03 PROCEDURE — G8427 DOCREV CUR MEDS BY ELIG CLIN: HCPCS | Performed by: INTERNAL MEDICINE

## 2022-05-03 ASSESSMENT — ENCOUNTER SYMPTOMS
SORE THROAT: 0
TROUBLE SWALLOWING: 0
DIARRHEA: 0
EYE DISCHARGE: 0
SHORTNESS OF BREATH: 0
WHEEZING: 0
BACK PAIN: 0
COUGH: 0
EYE REDNESS: 0
NAUSEA: 0
CONSTIPATION: 0
ABDOMINAL PAIN: 0
RHINORRHEA: 0

## 2022-05-03 NOTE — PROGRESS NOTES
Infectious Diseases Oupatient Follow-up Note            Reason for Visit:              Follow-up for Enterococcus faecalis bacteremia  Primary Care Physician:  Edmar Cruz MD  History Obtained From:   Patient , Medical Records     CHIEF COMPLAINT:    Chief Complaint   Patient presents with    Follow-up      bacteremia       INTERVAL HISTORY: Ryan Olivas is a 64 y.o. pleasant male patient, who had a virtual visit with me today for follow-up. History was obtained from chart review and the patient. The patient had a virtual visit with me today for above mentioned complaints. The patient was hospitalized in March 2022 for severe sepsis and was found to have Enterococcus faecalis bacteremia. He has history of streptococcal bacteremia with mitral aortic valve endocarditis in 2017. Enterococcus faecalis was ampicillin and vancomycin susceptible. Due to high clinical suspicion for endocarditis, he was placed on IV ampicillin and IV ceftriaxone with plans to continue both antibiotics until April 11, 2022    The patient had a follow-up video visit with me today      Past Medical History:   Past medical and surgical history was reviewed by me during this visit in detail.     Past Medical History:   Diagnosis Date    Acute intracerebral hemorrhage ([address] 02/03/2017    Anxiety     Back pain     Cerebral artery occlusion with cerebral infarction (HCC)     Chronic back pain greater than 3 months duration 02/03/2017    Diabetes mellitus ([address]     Dysphasia 02/03/2017    Dysphonia 02/03/2017    Endocarditis and heart valve disorders in diseases classified elsewhere 02/03/2017    Hydrocephalus ([address] 02/03/2017    Hypertension     IVH (intraventricular hemorrhage) ([address] 02/03/2017    NSTEMI (non-ST elevated myocardial infarction) ([address] 02/03/2017    Seizures ([address]        Past Surgical History:    Past Surgical History:   Procedure Laterality Date    BRAIN ANEURYSM SURGERY Left 02/03/2017    COLONOSCOPY N/A 8/14/2019    COLONOSCOPY WITH MAC ANESTHESIA performed by Wojciech Cage [facility] at  [facility] Left 02/03/2017    LEG SURGERY      UPPER GASTROINTESTINAL ENDOSCOPY N/A 8/14/2019    EGD BIOPSY performed by [facility] Cage MD at [facility] ENDOSCOPY       Current Medications: All medications were reviewed by me during this visit  Current Outpatient Medications   Medication Sig Dispense Refill    DULoxetine (CYMBALTA) 30 MG extended release capsule Take 60 mg by mouth daily      loratadine (CLARITIN) 10 MG tablet Take 10 mg by mouth daily      MELATONIN PO Take 6 mg by mouth nightly      traZODone (DESYREL) 50 MG tablet Take 50 mg by mouth nightly      lisinopril (PRINIVIL;ZESTRIL) 2.5 MG tablet Take 1 tablet by mouth daily 30 tablet 3    torsemide (DEMADEX) 10 MG tablet Take 1 tablet by mouth daily 30 tablet 3    levETIRAcetam (KEPPRA) 500 MG tablet Take 1,500 mg by mouth 2 times daily      potassium chloride (KLOR-CON M) 20 MEQ extended release tablet Take 20 mEq by mouth 2 times daily      acetaminophen (TYLENOL) 325 MG tablet Take 650 mg by mouth every 4 hours as needed for Pain      mirtazapine (REMERON) 7.5 MG tablet Take 7.5 mg by mouth nightly      Cholecalciferol (VITAMIN D3) 5000 units TABS Take by mouth daily      lacosamide (VIMPAT) 200 MG tablet Take 200 mg by mouth 2 times daily.  calcium carbonate (TUMS) 500 MG chewable tablet Take 1 tablet by mouth 2 times daily       methadone (DOLOPHINE) 5 MG tablet Take 5 mg by mouth daily.  atorvastatin (LIPITOR) 10 MG tablet Take 10 mg by mouth nightly        No current facility-administered medications for this visit. Family history: All family history was reviewed by me today    No family history on file. No family history of immunocompromising or autoimmune conditions.  No h/o TB in in family or contacts    REVIEW OF SYSTEMS:      Review of Systems   Constitutional: Negative for chills, diaphoresis and fever.   HENT: Negative for ear discharge, ear pain, rhinorrhea, sore throat and trouble swallowing. Eyes: Negative for discharge and redness. Respiratory: Negative for cough, shortness of breath and wheezing. Cardiovascular: Negative for chest pain and leg swelling. Gastrointestinal: Negative for abdominal pain, constipation, diarrhea and nausea. Endocrine: Negative for polyuria. Genitourinary: Negative for dysuria, flank pain, frequency, hematuria and urgency. Musculoskeletal: Negative for back pain and myalgias. Skin: Negative for rash. Neurological: Negative for dizziness, seizures and headaches. Hematological: Does not bruise/bleed easily. Psychiatric/Behavioral: Negative for hallucinations and suicidal ideas. All other systems reviewed and are negative. PHYSICAL EXAM:      There were no vitals filed for this visit. Physical Exam  Constitutional:       General: He is not in acute distress. Appearance: Normal appearance. He is not ill-appearing or toxic-appearing. Comments: The patient was seen today via virtual video visit   HENT:      Head: Normocephalic. Right Ear: External ear normal.      Left Ear: External ear normal.      Nose: Nose normal.      Mouth/Throat:      Pharynx: No oropharyngeal exudate. Eyes:      General: No scleral icterus. Right eye: No discharge. Left eye: No discharge. Extraocular Movements: Extraocular movements intact. Pulmonary:      Effort: Pulmonary effort is normal.   Musculoskeletal:         General: No swelling. Normal range of motion. Cervical back: Normal range of motion. Skin:     Coloration: Skin is not jaundiced. Findings: No bruising or erythema. Neurological:      Mental Status: He is alert and oriented to person, place, and time. Mental status is at baseline.       Gait: Gait normal.   Psychiatric:         Mood and Affect: Mood normal.         Behavior: Behavior normal. Thought Content: Thought content normal.         Judgment: Judgment normal.              DATA:  All available lab data wasreviewed by me during this visit    CBC:  Lab Results   Component Value Date    WBC 6.3 03/02/2022    HGB 12.6 (L) 03/02/2022    HCT 36.1 (L) 03/02/2022    MCV 96.8 03/02/2022     03/02/2022    LYMPHOPCT 16.4 03/02/2022    RBC 3.73 (L) 03/02/2022    MCH 33.7 03/02/2022    MCHC 34.8 03/02/2022    RDW 13.1 03/02/2022       Last BMP:  Lab Results   Component Value Date     03/02/2022    K 3.4 03/02/2022    K 4.7 02/22/2022     03/02/2022    CO2 29 03/02/2022    BUN 17 03/02/2022    CREATININE 0.6 03/02/2022    GLUCOSE 111 03/02/2022    CALCIUM 9.1 03/02/2022        Hepatic Function Panel:  Lab Results   Component Value Date    ALKPHOS 66 03/02/2022    ALT 31 03/02/2022    AST 26 03/02/2022    PROT 6.1 03/02/2022    PROT 7.1 12/13/2011    BILITOT 0.3 03/02/2022    LABALBU 3.5 03/02/2022       Last CK:  Lab Results   Component Value Date    CKTOTAL 73 02/22/2022       Last ESR:  Lab Results   Component Value Date    SEDRATE 30 (H) 02/28/2022       Last CRP:  Lab Results   Component Value Date    CRP 12.1 (H) 02/28/2022         Imaging: All pertinent images and reports for the current visit were reviewed by me during this visit. I reviewed the chest x-ray/CT scan/MRI images and independently interpreted the findings and results today. Outside records:    Labs, Microbiology,Radiology and pertinent results from Care everywhere, if available, were reviewed as a part of the consultation.     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) M18.1, E11.65    Protein calorie malnutrition ([address] E46    Seizure ([address] R56.9    Partial symptomatic epilepsy with complex partial seizures, intractable, without status epilepticus ([address] G40.219    Abnormal CT of the head R93.0    Breakthrough seizure ([address] G40.919    History of stroke Z86.73    Aphasia R47.01    NATALIA (acute kidney injury) ([address] N17.9    Lactic acidosis E87.2    Unresponsive R41.89    Partial idiopathic epilepsy with seizures of localized onset, intractable, with status epilepticus ([address] G40.011    Acute respiratory failure with hypoxia ([facility]) J96.01    Sepsis ([facility] A41.9    [place] coma scale total score 3-8 ([facility]) R40.2430    Lung nodules R91.8    Essential hypertension I10    Enterococcal bacteremia R78.81, B95.2    Receiving intravenous antibiotic treatment as outpatient Z79.2    Complex care coordination Z71.89    Congestive heart failure ([address] I50.9       Microbiology:       All available micro data was reviewed by me during this visit    · Blood culture (/2) - collectedon 2/22/2022 and 2/25/22 : Enterococcus faecalis    Resulting Agency [address] [facility]          Susceptibility      Enterococcus  faecalis (2)    Antibiotic Interpretation Microscan  Method Status    ampicillin Sensitive <=2 mcg/mL BACTERIAL SUSCEPTIBILITY PANEL BY ABIEL     vancomycin Sensitive 1 mcg/mL BACTERIAL SUSCEPTIBILITY PANEL BY ABIEL                    Allergies and immunizations:       Known drug Allergies: All allergies data was reviewed by me during this visit  Seasonal    Immunizations: All immunizations were reviewed byme in detail. Immunization History   Administered Date(s) Administered    COVID-19, Pfizer Purple top, DILUTE for use, 12+ yrs, 30mcg/0.3mL dose 12/21/2020, 01/11/2021, 10/21/2021       SocialHistory:  All social and epidemiologic history was reviewed and updated be me in detail today.     Social History     Socioeconomic History    Marital status:      Spouse name: Not on file    Number of children: Not on file    Years of education: Not on file    Highest education level: Not on file   Occupational History    Not on file   Tobacco Use    Smoking status: Current Some Day Smoker     Packs/day: 0.50     Types: Cigarettes    Smokeless tobacco: Never Used    Tobacco comment: 6 per WEEK   Vaping Use    Vaping Use: Never used    Passive vaping exposure: Yes   Substance and Sexual Activity    Alcohol use: Yes     Alcohol/week: 1.0 standard drink     Types: 1 Cans of beer per week     Comment: occ    Drug use: Not Currently     Types: Marijuana Elfida Carcamo)     Comment: not at this time    Sexual activity: Not on file   Other Topics Concern    Not on file   Social History Narrative    Not on file     Social Determinants of Health     Financial Resource Strain:     Difficulty of Paying Living Expenses: Not on file   Food Insecurity:     Worried About Running Out of Food in the Last Year: Not on file    Joie of Food in the Last Year: Not on file   Transportation Needs:     Lack of Transportation (Medical): Not on file    Lack of Transportation (Non-Medical):  Not on file   Physical Activity:     Days of Exercise per Week: Not on file    Minutes of Exercise per Session: Not on file   Stress:     Feeling of Stress : Not on file   Social Connections:     Frequency of Communication with Friends and Family: Not on file    Frequency of Social Gatherings with Friends and Family: Not on file    Attends Congregational Services: Not on file    Active Member of Clubs or Organizations: Not on file    Attends Club or Organization Meetings: Not on file    Marital Status: Not on file   Intimate Partner Violence:     Fear of Current or Ex-Partner: Not on file    Emotionally Abused: Not on file    Physically Abused: Not on file    Sexually Abused: Not on file   Housing Stability:     Unable to Pay for Housing in the Last Year: Not on file  Number of Places Lived in the Last Year: Not on file    Unstable Housing in the Last Year: Not on file       COVID VACCINATION AND LAB RESULT RECORDS:     Internal Administration   First Dose COVID-19, Pfizer Purple top, DILUTE for use, 12+ yrs, 30mcg/0.3mL dose  12/21/2020   Second Dose COVID-19, Pfizer Purple top, DILUTE for use, 12+ yrs, 30mcg/0.3mL dose   01/11/2021       Last COVID Lab SARS-CoV-2, PCR (no units)   Date Value   07/15/2020 Not Detected     SARS-CoV-2, NAAT (no units)   Date Value   02/25/2022 Not Detected            IMPRESSION:    The patient is a 64 y.o. old male who  has a past medical history of Acute intracerebral hemorrhage ([address] (02/03/2017), Anxiety, Back pain, Cerebral artery occlusion with cerebral infarction ([address] Chronic back pain greater than 3 months duration (02/03/2017), Diabetes mellitus ([address] Dysphasia (02/03/2017), Dysphonia (02/03/2017), Endocarditis and heart valve disorders in diseases classified elsewhere (02/03/2017), Hydrocephalus ([address] (02/03/2017), Hypertension, IVH (intraventricular hemorrhage) ([address] (02/03/2017), NSTEMI (non-ST elevated myocardial infarction) ([address] (02/03/2017), and Seizures ([address] was seen today for following problems:    1. Enterococcal bacteremia    2. Receiving intravenous antibiotic treatment as outpatient    3. Essential hypertension    4. Lung nodules    5. Partial idiopathic epilepsy with seizures of localized onset, intractable, with status epilepticus ([address]    6. History of stroke    7. Aphasia    8. Partial symptomatic epilepsy with complex partial seizures, intractable, without status epilepticus ([address]    9. DM (diabetes mellitus), type 2, uncontrolled with complications ([address]    10. Endocarditis, sumit and aortic valves        Discussion:      I have been following patient with weekly labs. Last set of labs available to me is from 4/15/2022. White cell count was 4800 with hemoglobin of 14.4 and platelet count of 546,433.   Serum creatinine was 0.8. Liver functions were okay with AST of 16 and ALT of 17. Patient's IV antibiotics were stopped on April 11. His last 2D echo on 2/25/2022 had shown severe mitral and aortic valve regurgitation and possible perforation of aortic valve. The entire video encounter was conducted with the patient with the help of his RN and assistant Isr[facility] at [facility]. RECOMMENDATIONS:      Diagnostic Workup:    · No further labs needed today  · Continue to follow fever curve  at home      Antimicrobials:    · Given patient's severe valve damage and enterococcal bacteremia couple of months ago and prior history of streptococcal endocarditis, I have ordered oral amoxicillin 500 mg every 12 hour for the patient with 3 months for secondary antibiotic prophylaxis  · Discussed the importance of antibiotic compliance with the patient  · I have given verbal orders for above antibiotic to patient's RN during the video encounter  · I have also ordered a 2D echo and I have strongly recommended the patient to follow-up with his cardiologist  · The patient tells me that he does not want anything to be done with the heart valves and understands the severity of his cardiac condition. The nurses tell me that he is leaning toward DNR and comfort care options  · If the patient decides to go for comfort care options only and want to stop his antibiotics as well, we will honor his wishes  · I had a detailed discussion about above with the patient and his nurse at bedside  · Discussed with patient the strategies to stay safe from [facility] exposures including safe social distancing, frequent and proper hand hygiene when outside and using 3 layered mouth and nose coverings/facemasks when outside their home.       Patient education and counseling:    · The patient was educated in detail about the side-effects of various antibiotics and things to watch for like new rashes, lip swelling, severereaction, worsening diarrhea, break through fever etc.  · Patient was instructed to stop antibiotics and call our office if these problems were to occur, or go to nearest ER ifexperiencing severe symptoms. · Discussed patient's condition and what to expect. All of the patient's questions were addressed in a satisfactory manner and patient verbalizedunderstanding all instructions. Follow-up:    · Follow-up with me in ID clinic or through video visit in 3 months    Nura Sterling is a 64 y.o. male being evaluated by a Virtual Visit encounter to address concerns as mentioned above. A caregiver was present when appropriate. Due to this being a TeleHealth encounter (During BXPSS-84 public health emergency), evaluation of the following organ systems was limited: Vitals/Constitutional/EENT/Resp/CV/GI//MS/Neuro/Skin/Heme-Lymph-Imm. Pursuant to the emergency declaration under the [address] [place] [address] and the [place] [employer] Act, this Virtual Visit was conducted with patient's (and/or legal guardian's) consent, to reduce the patient's risk of exposure to COVID-19 and provide necessary medical care. The patient (and/or legal guardian) has also been advised to contact this office for worsening conditions or problems, and seek emergency medical treatment and/or call 911 if deemed necessary. Services were provided through an audio and/or video synchronous discussion virtually to substitute for in-person clinic visit. Patient and provider were located at their individual homes. --Van Yo MD on 5/3/2022 at 11:10 AM    An electronic signature was used to authenticate this note. TIME SPENT TODAY:    - Spent over 32 minutes on visit (including interval history, physical exam, review of data including labs,cultures, imaging, development and implementation of treatment plan and coordination of care).    - Over 50% of time spent with pt counseling andeducation. Please note that this chart was generated using Dragon dictation software. Although every effort was made to ensure the accuracy of this automated transcription, some errors in transcription may have occurred inadvertently. If you may need any clarification, please do not hesitate to contact me through EPIC or at the phone number provided below with my electronic signature. Any pictures or media included in this note were obtained after taking informed verbal consent from the patient and with their approval to include those in the patient's medical record. Thankyou for involving me inthe care of your patient. If you have any additional questions, please do not hesitate to contact me.       Kristi Monique MD, MPH, [address] [place] [address]  5/3/2022, 11:10 AM  [facility] Infectious Disease   [address] [place] [address], [address] [place] [address]  Office: [phone]  Fax: [phone]  Clinic days:  Tuesday & Thursday

## 2022-05-04 ENCOUNTER — TELEPHONE (OUTPATIENT)
Dept: INFECTIOUS DISEASES | Age: 62
End: 2022-05-04

## 2022-05-04 NOTE — TELEPHONE ENCOUNTER
Spoke with RN director Jens Trevino at [facility], states patient does not wish to follow up with cardiology but does agree to continue po antibiotics and follow up in three months.      Thank you

## 2022-05-20 ENCOUNTER — APPOINTMENT (OUTPATIENT)
Dept: GENERAL RADIOLOGY | Age: 62
End: 2022-05-20
Payer: COMMERCIAL

## 2022-05-20 ENCOUNTER — HOSPITAL ENCOUNTER (EMERGENCY)
Age: 62
Discharge: HOME | End: 2022-05-21
Attending: EMERGENCY MEDICINE
Payer: COMMERCIAL

## 2022-05-20 ENCOUNTER — APPOINTMENT (OUTPATIENT)
Dept: CT IMAGING | Age: 62
End: 2022-05-20
Payer: COMMERCIAL

## 2022-05-20 DIAGNOSIS — R53.1 GENERALIZED WEAKNESS: Primary | ICD-10-CM

## 2022-05-20 LAB
A/G RATIO: 1.6 (ref 1.1–2.2)
ALBUMIN SERPL-MCNC: 4 G/DL (ref 3.4–5)
ALP BLD-CCNC: 58 U/L (ref 40–129)
ALT SERPL-CCNC: 17 U/L (ref 10–40)
ANION GAP SERPL CALCULATED.3IONS-SCNC: 8 MMOL/L (ref 3–16)
APTT: 40.1 SEC (ref 26.2–38.6)
AST SERPL-CCNC: 20 U/L (ref 15–37)
BASOPHILS ABSOLUTE: 0 K/UL (ref 0–0.2)
BASOPHILS RELATIVE PERCENT: 0.5 %
BILIRUB SERPL-MCNC: 0.3 MG/DL (ref 0–1)
BUN BLDV-MCNC: 22 MG/DL (ref 7–20)
CALCIUM SERPL-MCNC: 9.5 MG/DL (ref 8.3–10.6)
CHLORIDE BLD-SCNC: 99 MMOL/L (ref 99–110)
CO2: 27 MMOL/L (ref 21–32)
CREAT SERPL-MCNC: 0.8 MG/DL (ref 0.8–1.3)
EOSINOPHILS ABSOLUTE: 0.2 K/UL (ref 0–0.6)
EOSINOPHILS RELATIVE PERCENT: 2.9 %
GFR AFRICAN AMERICAN: >60
GFR NON-AFRICAN AMERICAN: >60
GLUCOSE BLD-MCNC: 108 MG/DL (ref 70–99)
HCT VFR BLD CALC: 39.9 % (ref 40.5–52.5)
HEMOGLOBIN: 13.9 G/DL (ref 13.5–17.5)
INR BLD: 1.03 (ref 0.88–1.12)
LYMPHOCYTES ABSOLUTE: 1.5 K/UL (ref 1–5.1)
LYMPHOCYTES RELATIVE PERCENT: 27 %
MCH RBC QN AUTO: 33.8 PG (ref 26–34)
MCHC RBC AUTO-ENTMCNC: 34.9 G/DL (ref 31–36)
MCV RBC AUTO: 97 FL (ref 80–100)
MONOCYTES ABSOLUTE: 0.5 K/UL (ref 0–1.3)
MONOCYTES RELATIVE PERCENT: 8.3 %
NEUTROPHILS ABSOLUTE: 3.3 K/UL (ref 1.7–7.7)
NEUTROPHILS RELATIVE PERCENT: 61.3 %
PDW BLD-RTO: 12.9 % (ref 12.4–15.4)
PLATELET # BLD: 144 K/UL (ref 135–450)
PMV BLD AUTO: 8.1 FL (ref 5–10.5)
POTASSIUM REFLEX MAGNESIUM: 4.6 MMOL/L (ref 3.5–5.1)
PRO-BNP: 130 PG/ML (ref 0–124)
PROTHROMBIN TIME: 11.7 SEC (ref 9.9–12.7)
RBC # BLD: 4.12 M/UL (ref 4.2–5.9)
SODIUM BLD-SCNC: 134 MMOL/L (ref 136–145)
TOTAL PROTEIN: 6.5 G/DL (ref 6.4–8.2)
TROPONIN: <0.01 NG/ML
WBC # BLD: 5.4 K/UL (ref 4–11)

## 2022-05-20 PROCEDURE — 85730 THROMBOPLASTIN TIME PARTIAL: CPT

## 2022-05-20 PROCEDURE — 83880 ASSAY OF NATRIURETIC PEPTIDE: CPT

## 2022-05-20 PROCEDURE — 70450 CT HEAD/BRAIN W/O DYE: CPT

## 2022-05-20 PROCEDURE — 70498 CT ANGIOGRAPHY NECK: CPT

## 2022-05-20 PROCEDURE — 36415 COLL VENOUS BLD VENIPUNCTURE: CPT

## 2022-05-20 PROCEDURE — 87040 BLOOD CULTURE FOR BACTERIA: CPT

## 2022-05-20 PROCEDURE — 80053 COMPREHEN METABOLIC PANEL: CPT

## 2022-05-20 PROCEDURE — 85610 PROTHROMBIN TIME: CPT

## 2022-05-20 PROCEDURE — 96374 THER/PROPH/DIAG INJ IV PUSH: CPT

## 2022-05-20 PROCEDURE — 84484 ASSAY OF TROPONIN QUANT: CPT

## 2022-05-20 PROCEDURE — 96375 TX/PRO/DX INJ NEW DRUG ADDON: CPT

## 2022-05-20 PROCEDURE — 6360000004 HC RX CONTRAST MEDICATION: Performed by: EMERGENCY MEDICINE

## 2022-05-20 PROCEDURE — 93005 ELECTROCARDIOGRAM TRACING: CPT | Performed by: PHYSICIAN ASSISTANT

## 2022-05-20 PROCEDURE — 99285 EMERGENCY DEPT VISIT HI MDM: CPT

## 2022-05-20 PROCEDURE — 71045 X-RAY EXAM CHEST 1 VIEW: CPT

## 2022-05-20 PROCEDURE — 85025 COMPLETE CBC W/AUTO DIFF WBC: CPT

## 2022-05-20 RX ADMIN — IOPAMIDOL 75 ML: 755 INJECTION, SOLUTION INTRAVENOUS at 22:49

## 2022-05-20 ASSESSMENT — ENCOUNTER SYMPTOMS
CHEST TIGHTNESS: 0
COLOR CHANGE: 0
COUGH: 0
PHOTOPHOBIA: 0
CONSTIPATION: 0
ABDOMINAL PAIN: 0
VOMITING: 0
RESPIRATORY NEGATIVE: 1
DIARRHEA: 0
BACK PAIN: 0
SHORTNESS OF BREATH: 0
NAUSEA: 0

## 2022-05-21 ENCOUNTER — APPOINTMENT (OUTPATIENT)
Dept: GENERAL RADIOLOGY | Age: 62
End: 2022-05-21
Payer: COMMERCIAL

## 2022-05-21 VITALS
SYSTOLIC BLOOD PRESSURE: 107 MMHG | DIASTOLIC BLOOD PRESSURE: 59 MMHG | TEMPERATURE: 98.1 F | HEART RATE: 79 BPM | OXYGEN SATURATION: 95 % | HEIGHT: 72 IN | WEIGHT: 180 LBS | BODY MASS INDEX: 24.38 KG/M2 | RESPIRATION RATE: 17 BRPM

## 2022-05-21 LAB
BACTERIA: ABNORMAL /HPF
BILIRUBIN URINE: NEGATIVE
BLOOD, URINE: ABNORMAL
CLARITY: CLEAR
COLOR: YELLOW
EKG ATRIAL RATE: 79 BPM
EKG DIAGNOSIS: NORMAL
EKG P AXIS: 3 DEGREES
EKG P-R INTERVAL: 176 MS
EKG Q-T INTERVAL: 358 MS
EKG QRS DURATION: 108 MS
EKG QTC CALCULATION (BAZETT): 410 MS
EKG R AXIS: -42 DEGREES
EKG T AXIS: 8 DEGREES
EKG VENTRICULAR RATE: 79 BPM
EPITHELIAL CELLS, UA: 0 /HPF (ref 0–5)
GLUCOSE URINE: NEGATIVE MG/DL
HYALINE CASTS: 0 /LPF (ref 0–8)
KETONES, URINE: NEGATIVE MG/DL
LEUKOCYTE ESTERASE, URINE: NEGATIVE
MICROSCOPIC EXAMINATION: YES
NITRITE, URINE: NEGATIVE
PH UA: 5.5 (ref 5–8)
PROTEIN UA: NEGATIVE MG/DL
RBC UA: 8 /HPF (ref 0–4)
SPECIFIC GRAVITY UA: 1.02 (ref 1–1.03)
URINE REFLEX TO CULTURE: ABNORMAL
URINE TYPE: ABNORMAL
UROBILINOGEN, URINE: 0.2 E.U./DL
WBC UA: 0 /HPF (ref 0–5)

## 2022-05-21 PROCEDURE — 93010 ELECTROCARDIOGRAM REPORT: CPT | Performed by: INTERNAL MEDICINE

## 2022-05-21 PROCEDURE — 96375 TX/PRO/DX INJ NEW DRUG ADDON: CPT

## 2022-05-21 PROCEDURE — 81001 URINALYSIS AUTO W/SCOPE: CPT

## 2022-05-21 PROCEDURE — 96374 THER/PROPH/DIAG INJ IV PUSH: CPT

## 2022-05-21 PROCEDURE — 6360000002 HC RX W HCPCS: Performed by: EMERGENCY MEDICINE

## 2022-05-21 RX ORDER — FENTANYL CITRATE 50 UG/ML
50 INJECTION, SOLUTION INTRAMUSCULAR; INTRAVENOUS ONCE
Status: COMPLETED | OUTPATIENT
Start: 2022-05-21 | End: 2022-05-21

## 2022-05-21 RX ORDER — HYDROCODONE BITARTRATE AND ACETAMINOPHEN 5; 325 MG/1; MG/1
1 TABLET ORAL ONCE
Status: DISCONTINUED | OUTPATIENT
Start: 2022-05-21 | End: 2022-05-21 | Stop reason: HOSPADM

## 2022-05-21 RX ORDER — ONDANSETRON 2 MG/ML
4 INJECTION INTRAMUSCULAR; INTRAVENOUS EVERY 6 HOURS PRN
Status: DISCONTINUED | OUTPATIENT
Start: 2022-05-21 | End: 2022-05-21 | Stop reason: HOSPADM

## 2022-05-21 RX ORDER — OXYCODONE HYDROCHLORIDE AND ACETAMINOPHEN 5; 325 MG/1; MG/1
1 TABLET ORAL ONCE
Status: DISCONTINUED | OUTPATIENT
Start: 2022-05-21 | End: 2022-05-21 | Stop reason: HOSPADM

## 2022-05-21 RX ADMIN — ONDANSETRON 4 MG: 2 INJECTION INTRAMUSCULAR; INTRAVENOUS at 00:49

## 2022-05-21 RX ADMIN — FENTANYL CITRATE 50 MCG: 50 INJECTION, SOLUTION INTRAMUSCULAR; INTRAVENOUS at 00:49

## 2022-05-21 ASSESSMENT — PAIN SCALES - GENERAL: PAINLEVEL_OUTOF10: 8

## 2022-05-21 NOTE — ED PROVIDER NOTES
[facility]        Pt Name: Pauline Kwon  MRN: [id number]  [place] 1960  Date of evaluation: 5/20/2022  Provider: Claudette Sidle, PA  PCP: Teodoro Osorio MD  Note Started: 11:32 PM EDT        I have seen and evaluated this patient with my supervising physician Laureano Bob MD.    [address] [place] [address]       Chief Complaint   Patient presents with    Fatigue     Patient in VIA FP form [facility] at the [facility] for \"unsteady gait\". Patient states he does feel weak and complains of lower back pain however this is nothing new. Patient alert to self and situation. HISTORY OF PRESENT ILLNESS   (Location, Timing/Onset, Context/Setting, Quality, Duration, Modifying Factors, Severity, Associated Signs and Symptoms)  Note limiting factors. Chief Complaint: Fatigue     Pauline Kwon is a 64 y.o. male with past medical history of previous intracranial hemorrhage, CVA, chronic back pain, diabetes, endocarditis, hypertension and seizures who presents to the ED with complaint of fatigue. [facility] for unsteady gait and concern for stroke. Unclear how long patient has had the gait problem. Patient relatively poor historian at this time. There is been no documented falls. He states he has pain to his lower back which he states is chronic. He denies any headache, visual changes, speech disturbances, numbness/tingling or lightheadedness/dizziness. Denies chest pain, shortness of breath, abdominal pain, nausea/vomiting, urinary symptoms or changes in bowel movement. Denies fever or chills. Denies any rashes or lesions    Nursing Notes were all reviewed and agreed with or any disagreements were addressed in the HPI. REVIEW OF SYSTEMS    (2-9 systems for level 4, 10 or more for level 5)     Review of Systems   Constitutional: Positive for activity change and fatigue.  Negative for appetite change, chills, diaphoresis and fever. Eyes: Negative for photophobia and visual disturbance. Respiratory: Negative. Negative for cough, chest tightness and shortness of breath. Cardiovascular: Negative. Negative for chest pain, palpitations and leg swelling. Gastrointestinal: Negative for abdominal pain, constipation, diarrhea, nausea and vomiting. Genitourinary: Negative for decreased urine volume, difficulty urinating, dysuria, flank pain, frequency, hematuria and urgency. Musculoskeletal: Negative for arthralgias, back pain, myalgias, neck pain and neck stiffness. Skin: Negative for color change, pallor, rash and wound. Neurological: Positive for weakness. Negative for dizziness, tremors, seizures, syncope, facial asymmetry, speech difficulty, light-headedness, numbness and headaches. Positives and Pertinent negatives as per HPI. Except as noted above in the ROS, all other systems were reviewed and negative.        PAST MEDICAL HISTORY     Past Medical History:   Diagnosis Date    Acute intracerebral hemorrhage ([address] 02/03/2017    Anxiety     Back pain     Cerebral artery occlusion with cerebral infarction (HCC)     Chronic back pain greater than 3 months duration 02/03/2017    Diabetes mellitus ([address]     Dysphasia 02/03/2017    Dysphonia 02/03/2017    Endocarditis and heart valve disorders in diseases classified elsewhere 02/03/2017    Hydrocephalus ([address] 02/03/2017    Hypertension     IVH (intraventricular hemorrhage) ([address] 02/03/2017    NSTEMI (non-ST elevated myocardial infarction) ([address] 02/03/2017    Seizures ([address]          SURGICAL HISTORY     Past Surgical History:   Procedure Laterality Date    BRAIN ANEURYSM SURGERY Left 02/03/2017    COLONOSCOPY N/A 8/14/2019    COLONOSCOPY WITH MAC ANESTHESIA performed by Ely Paige MD at [address] [place] [address] Left 02/03/2017    LEG SURGERY      UPPER GASTROINTESTINAL ENDOSCOPY N/A 8/14/2019    EGD BIOPSY performed by Robby Roy MD at [facility]       Previous Medications    ACETAMINOPHEN (TYLENOL) 325 MG TABLET    Take 650 mg by mouth every 4 hours as needed for Pain    ATORVASTATIN (LIPITOR) 10 MG TABLET    Take 10 mg by mouth nightly     CALCIUM CARBONATE (TUMS) 500 MG CHEWABLE TABLET    Take 1 tablet by mouth 2 times daily     CHOLECALCIFEROL (VITAMIN D3) 5000 UNITS TABS    Take by mouth daily    DULOXETINE (CYMBALTA) 30 MG EXTENDED RELEASE CAPSULE    Take 60 mg by mouth daily    LACOSAMIDE (VIMPAT) 200 MG TABLET    Take 200 mg by mouth 2 times daily. LEVETIRACETAM (KEPPRA) 500 MG TABLET    Take 1,500 mg by mouth 2 times daily    LISINOPRIL (PRINIVIL;ZESTRIL) 2.5 MG TABLET    Take 1 tablet by mouth daily    LORATADINE (CLARITIN) 10 MG TABLET    Take 10 mg by mouth daily    MELATONIN PO    Take 6 mg by mouth nightly    METHADONE (DOLOPHINE) 5 MG TABLET    Take 5 mg by mouth daily. MIRTAZAPINE (REMERON) 7.5 MG TABLET    Take 7.5 mg by mouth nightly    POTASSIUM CHLORIDE (KLOR-CON M) 20 MEQ EXTENDED RELEASE TABLET    Take 20 mEq by mouth 2 times daily    TORSEMIDE (DEMADEX) 10 MG TABLET    Take 1 tablet by mouth daily    TRAZODONE (DESYREL) 50 MG TABLET    Take 50 mg by mouth nightly         ALLERGIES     Seasonal    FAMILYHISTORY     History reviewed. No pertinent family history. SOCIAL HISTORY       Social History     Tobacco Use    Smoking status: Current Some Day Smoker     Packs/day: 0.50     Types: Cigarettes    Smokeless tobacco: Never Used    Tobacco comment: 6 per WEEK   Vaping Use    Vaping Use: Never used    Passive vaping exposure: Yes   Substance Use Topics    Alcohol use:  Yes     Alcohol/week: 1.0 standard drink     Types: 1 Cans of beer per week     Comment: occ    Drug use: Not Currently     Types: Marijuana Elfrieda Miguel)     Comment: not at this time       SCREENINGS    [place] Coma Scale  Eye Opening: Spontaneous  Best Verbal Response: Oriented  Best Motor Response: Obeys commands  Marcelo Coma Scale Score: 15        PHYSICAL EXAM    (up to 7 for level 4, 8 or more for level 5)     ED Triage Vitals [05/20/22 2120]   BP Temp Temp Source Pulse Resp SpO2 Height Weight   122/65 98.1 °F (36.7 °C) Oral 95 20 94 % 6' (1.829 m) 180 lb (81.6 kg)       Physical Exam  Constitutional:       General: He is not in acute distress. Appearance: Normal appearance. He is well-developed. He is not ill-appearing, toxic-appearing or diaphoretic. HENT:      Head: Normocephalic and atraumatic. Right Ear: External ear normal.      Left Ear: External ear normal.   Eyes:      General:         Right eye: No discharge. Left eye: No discharge. Extraocular Movements: Extraocular movements intact. Conjunctiva/sclera: Conjunctivae normal.      Pupils: Pupils are equal, round, and reactive to light. Cardiovascular:      Rate and Rhythm: Normal rate and regular rhythm. Pulses: Normal pulses. Heart sounds: Normal heart sounds. No murmur heard. No friction rub. No gallop. Comments: 2+ radial pulses bilaterally. No pedal edema. No calf tenderness. No JVD  Pulmonary:      Effort: Pulmonary effort is normal. No respiratory distress. Breath sounds: Normal breath sounds. No stridor. No wheezing, rhonchi or rales. Chest:      Chest wall: No tenderness. Abdominal:      General: Abdomen is flat. Bowel sounds are normal. There is no distension. Palpations: Abdomen is soft. There is no mass. Tenderness: There is no abdominal tenderness. There is no right CVA tenderness, left CVA tenderness, guarding or rebound. Hernia: No hernia is present. Musculoskeletal:         General: Normal range of motion. Cervical back: Normal range of motion and neck supple. No rigidity or tenderness. Lymphadenopathy:      Cervical: No cervical adenopathy. Skin:     General: Skin is warm and dry. Coloration: Skin is not pale.       Findings: No erythema. Neurological:      General: No focal deficit present. Mental Status: He is alert. GCS: GCS eye subscore is 4. GCS verbal subscore is 5. GCS motor subscore is 6. Cranial Nerves: Cranial nerves are intact. No cranial nerve deficit, dysarthria or facial asymmetry. Sensory: Sensation is intact. No sensory deficit. Motor: Motor function is intact. No weakness. Coordination: Coordination is intact. Gait: Gait is intact. Gait normal.      Comments: Alert to person only at this time. His GCS 15. Cranial nerves II through XII intact. Speech is clear. No facial droop. EOMs intact. PERRLA. No nystagmus. Negative test of skew. Sensation intact light touch in the upper and lower extremities throughout. There is full range of motion and strength to the upper extremities and lower extremities throughout. He is able to ambulate without assistance. There is no ataxia. Psychiatric:         Behavior: Behavior normal.         DIAGNOSTIC RESULTS   LABS:    Labs Reviewed   CBC WITH AUTO DIFFERENTIAL - Abnormal; Notable for the following components:       Result Value    RBC 4.12 (*)     Hematocrit 39.9 (*)     All other components within normal limits   COMPREHENSIVE METABOLIC PANEL W/ REFLEX TO MG FOR LOW K - Abnormal; Notable for the following components:    Sodium 134 (*)     Glucose 108 (*)     BUN 22 (*)     All other components within normal limits   BRAIN NATRIURETIC PEPTIDE - Abnormal; Notable for the following components:    Pro- (*)     All other components within normal limits   APTT - Abnormal; Notable for the following components:    aPTT 40.1 (*)     All other components within normal limits   CULTURE, BLOOD 1   CULTURE, BLOOD 2   TROPONIN   PROTIME-INR   URINALYSIS WITH REFLEX TO CULTURE   LACTATE, SEPSIS   LACTATE, SEPSIS       When ordered only abnormal lab results are displayed.  All other labs were within normal range or not returned as of this dictation. EKG: When ordered, EKG's are interpreted by the Emergency Department Physician in the absence of a cardiologist.  Please see their note for interpretation of EKG. RADIOLOGY:   Non-plain film images such as CT, Ultrasound and MRI are read by the radiologist. Plain radiographic images are visualized and preliminarily interpreted by the ED Provider with the below findings:        Interpretation per the Radiologist below, if available at the time of this note:    CT HEAD WO CONTRAST   Final Result   No acute intracranial abnormality. Stable area of postoperative encephalomalacia in the posterior left parietal   lobe. XR CHEST PORTABLE   Final Result   No acute abnormality. CTA HEAD NECK W CONTRAST    (Results Pending)     CT HEAD WO CONTRAST    Result Date: 5/20/2022  EXAMINATION: CT OF THE HEAD WITHOUT CONTRAST  5/20/2022 9:44 pm TECHNIQUE: CT of the head was performed without the administration of intravenous contrast. Automated exposure control, iterative reconstruction, and/or weight based adjustment of the mA/kV was utilized to reduce the radiation dose to as low as reasonably achievable. COMPARISON: 02/22/2022 HISTORY: ORDERING SYSTEM PROVIDED HISTORY: weakness TECHNOLOGIST PROVIDED HISTORY: Has a \"code stroke\" or \"stroke alert\" been called? ->No Reason for exam:->weakness Decision Support Exception - unselect if not a suspected or confirmed emergency medical condition->Emergency Medical Condition (MA) Reason for Exam: Fatigue (Patient in VIA FP form [facility] at the [facility] for \"unsteady gait\". Patient states he does feel weak and complains of lower back pain however this is nothing new. Patient alert to self and situation. ) FINDINGS: BRAIN/VENTRICLES: Stable encephalomalacia in the posterior left parietal lobe adjacent to a craniotomy defect. There is enlargement of the adjacent occipital horn of the left lateral ventricle.   There is a small area of right frontal lobe encephalomalacia, unchanged. There is no evidence of acute hemorrhage, mass or extra-axial fluid collection. ORBITS: The visualized portion of the orbits demonstrate no acute abnormality. SINUSES: The visualized paranasal sinuses and mastoid air cells demonstrate no acute abnormality. SOFT TISSUES/SKULL:  Remote left posterior parietal craniotomy defect. No acute abnormality of the visualized skull or soft tissues. No acute intracranial abnormality. Stable area of postoperative encephalomalacia in the posterior left parietal lobe. XR CHEST PORTABLE    Result Date: 5/20/2022  EXAMINATION: ONE XRAY VIEW OF THE CHEST 5/20/2022 9:42 pm COMPARISON: 02/22/2022 HISTORY: Unsteady gait, weakness. FINDINGS: Cardiomediastinal silhouette and pulmonary vasculature are normal. No consolidation, pleural effusion, or pneumothorax. No acute abnormality. PROCEDURES   Unless otherwise noted below, none     Procedures    CRITICAL CARE TIME       CONSULTS:  None      EMERGENCY DEPARTMENT COURSE and DIFFERENTIAL DIAGNOSIS/MDM:   Vitals:    Vitals:    05/20/22 2120   BP: 122/65   Pulse: 95   Resp: 20   Temp: 98.1 °F (36.7 °C)   TempSrc: Oral   SpO2: 94%   Weight: 180 lb (81.6 kg)   Height: 6' (1.829 m)       Patient was given the following medications:  Medications   iopamidol (ISOVUE-370) 76 % injection 75 mL (75 mLs IntraVENous Given 5/20/22 2249)         Is this patient to be included in the SEP-1 Core Measure due to severe sepsis or septic shock? No   Exclusion criteria - the patient is NOT to be included for SEP-1 Core Measure due to: Infection is not suspected    Patient is a 55-year-old male who presents to the ED with complaint of weakness and fatigue. He has history of previous intracranial hemorrhage. He has relatively reassuring neurologic examination. He is only alert to person which unsure if this is his baseline or not. There is no focal deficit noted on his neurologic examination.   He has full range of motion strength to the upper and lower extremities throughout. He is able to ambulate without difficulty. Do not believe stroke alert initiated at this time. CBC showed normal white count, hemoglobin and platelets. CMP relatively unremarkable. Troponin was normal.  . INR was normal.  PTT 40.1. CT of the head obtained and shows stable area of posterior left parietal postoperative encephalomalacia. CTA of the head and neck is currently pending at this time. Chest x-ray unremarkable. EKG inter by attending. Troponin was normal.  . Coags obtained. CBC showed normal white count, hemoglobin and platelets. CMP relatively unremarkable. Patient pending completion of work-up at end of shift. Will sign out to attending provider. Please see attending provider note for further disposition and treatment of patient. FINAL IMPRESSION    No diagnosis found. DISPOSITION/PLAN   DISPOSITION        PATIENT REFERRED TO:  No follow-up provider specified.     DISCHARGE MEDICATIONS:  New Prescriptions    No medications on file       DISCONTINUED MEDICATIONS:  Discontinued Medications    No medications on file              (Please note that portions of this note were completed with a voice recognition program.  Efforts were made to edit the dictations but occasionally words are mis-transcribed.)    ARIC Poole (electronically signed)          ARIC Phelan  05/20/22 [id number]

## 2022-05-21 NOTE — ED NOTES
Per MAURO RN she stated she would call report to the [facility] prior to her leaving at [address] [place] [address] Wili Mathews RN  05/21/22 1012

## 2022-05-22 NOTE — ED PROVIDER NOTES
In addition to the advanced practice provider, I personally saw Vicky Sarmiento and performed a substantive portion of the visit including all aspects of the medical decision making. Briefly, this is a 64 y.o. male here for to the ER for evaluation of acute on chronic generalized weakness, he is currently at his neurologic baseline, he has a prior history of seizures mycotic aneurysm, afebrile, no new dysmetria, at his neurologic baseline acute on chronic back pain. No active chest pain pressure acute shortness of breath. No seizure. EKG  EKG was reviewed by emergency department physician in the absence of a cardiologist    Narrow complex sinus rhythm, rate  normal axis, normal NH and QRS intervals, normal Qtc, no ST elevations or depressions, normal t-wave morphology, impression NSR, no STEMI      Screenings   Marcelo Coma Scale  Eye Opening: Spontaneous  Best Verbal Response: Oriented  Best Motor Response: Obeys commands  Marcelo Coma Scale Score: 15        MDM  Patient has no evidence of acute seizure no intracranial hemorrhage no thrombosis, he is currently at his neurologic baseline, cognition improved is possible he may have had a outpatient seizure, no electrolyte abnormalities no clinical signs of stroke, he stable for outpatient management. Patient Referrals:  No follow-up provider specified. Discharge Medications:  Discharge Medication List as of 5/21/2022  1:44 AM          FINAL IMPRESSION  1. Generalized weakness        Blood pressure (!) 107/59, pulse 79, temperature 98.1 °F (36.7 °C), temperature source Oral, resp. rate 17, height 6' (1.829 m), weight 180 lb (81.6 kg), SpO2 95 %.      For further details of Tonia Escalante emergency department encounter, please see documentation by advanced practice provider,     Casie Vaughan MD (electronically signed)  Attending Emergency Physician     Melissa Salas MD  44/49/05 [id number]

## 2022-05-25 LAB
BLOOD CULTURE, ROUTINE: NORMAL
CULTURE, BLOOD 2: NORMAL

## 2022-06-13 ENCOUNTER — OFFICE VISIT (OUTPATIENT)
Dept: CARDIOLOGY CLINIC | Age: 62
End: 2022-06-13
Payer: COMMERCIAL

## 2022-06-13 VITALS
BODY MASS INDEX: 24.65 KG/M2 | HEART RATE: 99 BPM | WEIGHT: 182 LBS | OXYGEN SATURATION: 96 % | DIASTOLIC BLOOD PRESSURE: 74 MMHG | SYSTOLIC BLOOD PRESSURE: 122 MMHG | HEIGHT: 72 IN

## 2022-06-13 DIAGNOSIS — I38 ENDOCARDITIS, VALVE: ICD-10-CM

## 2022-06-13 DIAGNOSIS — I34.0 NONRHEUMATIC MITRAL VALVE REGURGITATION: ICD-10-CM

## 2022-06-13 DIAGNOSIS — I50.9 CONGESTIVE HEART FAILURE, UNSPECIFIED HF CHRONICITY, UNSPECIFIED HEART FAILURE TYPE (HCC): Primary | ICD-10-CM

## 2022-06-13 DIAGNOSIS — I10 ESSENTIAL HYPERTENSION: ICD-10-CM

## 2022-06-13 PROCEDURE — 4004F PT TOBACCO SCREEN RCVD TLK: CPT | Performed by: INTERNAL MEDICINE

## 2022-06-13 PROCEDURE — G8420 CALC BMI NORM PARAMETERS: HCPCS | Performed by: INTERNAL MEDICINE

## 2022-06-13 PROCEDURE — G8427 DOCREV CUR MEDS BY ELIG CLIN: HCPCS | Performed by: INTERNAL MEDICINE

## 2022-06-13 PROCEDURE — 99213 OFFICE O/P EST LOW 20 MIN: CPT | Performed by: INTERNAL MEDICINE

## 2022-06-13 PROCEDURE — 3017F COLORECTAL CA SCREEN DOC REV: CPT | Performed by: INTERNAL MEDICINE

## 2022-06-13 PROCEDURE — 2022F DILAT RTA XM EVC RTNOPTHY: CPT | Performed by: INTERNAL MEDICINE

## 2022-06-13 PROCEDURE — 3046F HEMOGLOBIN A1C LEVEL >9.0%: CPT | Performed by: INTERNAL MEDICINE

## 2022-06-13 RX ORDER — CETIRIZINE HYDROCHLORIDE 10 MG/1
10 TABLET ORAL DAILY
COMMUNITY

## 2022-06-13 RX ORDER — AMOXICILLIN 500 MG/1
500 CAPSULE ORAL 2 TIMES DAILY
COMMUNITY

## 2022-06-13 NOTE — PROGRESS NOTES
[address]  6/13/22  Referring: was seen in hospital    REASON FOR CONSULT/CHIEF COMPLAINT/HPI     Reason for visit/ Chief complaint   Hospital Inpatient Follow Up    HPI Yobany Lockhart is a 64 y.o. 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 No witnessed seizure, loaded with Keppra in ER. It is unknown how long he was down. Upon arrival to ER, he was intubated for GCS 3. Transesophageal echocardiogram on 2/25 found severe MR, papillary chord rupture, severe aortic regurgitation, and severe LV dysfunction. He finished his antibiotics for his enterococcus infection. Today he feels much better. He is here with a  from his group home who isn't a caretaker. We have no notes from his group home about what meds he is actually taking. I discussed the possibility of MV surgery with him again and he says \"I don't want to be cut all the way open. \"  I mentioned to him the possibility of a minimally invasive repair, and he interested in considering that. Today, he reports he has been having good and bad days. He denies having chest pain or palpitations. He went to see Dr. Farzana Mason and decided he does not want to proceed with surgery.      Patient is adherent with medications and is tolerating them well without side effects     HISTORY/ALLERGIES/ROS     MedHx:  has a past medical history of Acute intracerebral hemorrhage ([address] Anxiety, Back pain, Cerebral artery occlusion with cerebral infarction [facility]), Chronic back pain greater than 3 months duration, Diabetes mellitus ([address] Dysphasia, Dysphonia, Endocarditis and heart valve disorders in diseases classified elsewhere, Hydrocephalus ([address] Hypertension, IVH (intraventricular hemorrhage) ([facility] NSTEMI (non-ST elevated myocardial infarction) ([facility] and Seizures ([facility] SurgHx:  has a past surgical history that includes Leg Surgery; craniotomy (Left, 02/03/2017); Brain aneurysm surgery (Left, 02/03/2017); Upper gastrointestinal endoscopy (N/A, 8/14/2019); and Colonoscopy (N/A, 8/14/2019). SocHx:  reports that he has been smoking cigarettes. He has been smoking about 0.50 packs per day. He has never used smokeless tobacco. He reports current alcohol use of about 1.0 standard drink of alcohol per week. He reports previous drug use. Drug: Marijuana Christiano Perez). Allergies: Seasonal     MEDICATIONS      Prior to Admission medications    Medication Sig Start Date End Date Taking?  Authorizing Provider   amoxicillin (AMOXIL) 500 mg capsule Take 500 mg by mouth 2 times daily   Yes Historical Provider, MD   LACTOBACILLUS PO Take 1 mg by mouth Bid   Yes Historical Provider, MD   cetirizine (ZYRTEC ALLERGY) 10 MG tablet Take 10 mg by mouth daily   Yes Historical Provider, MD CHINCHILLA LIDOCAINE PATCH EX Apply topically   Yes Historical Provider, MD   DULoxetine (CYMBALTA) 30 MG extended release capsule Take 60 mg by mouth daily   Yes Historical Provider, MD   MELATONIN PO Take 6 mg by mouth nightly   Yes Historical Provider, MD   traZODone (DESYREL) 50 MG tablet Take 50 mg by mouth nightly   Yes Historical Provider, MD   lisinopril (PRINIVIL;ZESTRIL) 2.5 MG tablet Take 1 tablet by mouth daily 3/2/22  Yes Nick Cuevas MD   torsemide [facility]) 10 MG tablet Take 1 tablet by mouth daily 3/2/22  Yes Nick Cuevas MD   levETIRAcetam (KEPPRA) 500 MG tablet Take 1,500 mg by mouth 2 times daily   Yes Historical Provider, MD   potassium chloride (KLOR-CON M) 20 MEQ extended release tablet Take 20 mEq by mouth 2 times daily   Yes Historical Provider, MD   acetaminophen (TYLENOL) 325 MG tablet Take 650 mg by mouth every 4 hours as needed for Pain   Yes Historical Provider, MD   mirtazapine (REMERON) 7.5 MG tablet Take 7.5 mg by mouth nightly   Yes Historical Provider, MD   Cholecalciferol (VITAMIN D3) 5000 units TABS Take by mouth daily   Yes Historical Provider, MD   lacosamide (VIMPAT) 200 MG tablet Take 200 mg by mouth 2 times daily. Yes Historical Provider, MD   calcium carbonate (TUMS) 500 MG chewable tablet Take 1 tablet by mouth 2 times daily    Yes Historical Provider, MD   methadone (DOLOPHINE) 5 MG tablet Take 5 mg by mouth daily. Yes Historical Provider, MD   atorvastatin (LIPITOR) 10 MG tablet Take 10 mg by mouth nightly  2/3/17  Yes Historical Provider, MD       PHYSICAL EXAM        Vitals:    06/13/22 1328   BP: 122/74   Pulse: 99   SpO2: 96%    Weight: 182 lb (82.6 kg)     Gen Alert, cooperative, no distress Heart  Regular rate and rhythm, loud HSM murmur   Head Normocephalic, atraumatic, no abnormalities Abd  Soft, NT, +BS, no mass, no OM   Eyes PERRLA, conj/corn clear Ext  Ext nl, AT, no C/C, no edema   Nose Nares normal, no drain age, Non-tender Pulse 2+ and symmetric   Throat Lips, mucosa, tongue normal Skin Color/text/turg nl, no rash/lesions   Neck S/S, TM, NT, no bruit Psych Nl mood and affect   Lung CTA-B, unlabored, no DTP     Ch wall NT, no deform       LABS and Imaging     Relevant and available CV data reviewed    EKG personally interpreted: 2/24/2022 SR HR 75 Normal sinus rhythm,Left axis deviationVoltage criteria for left ventricular hypertrophy, Intra-ventricular conduction delay, Cannot rule out Septal infarct , age undeterminedInferior infarct    Pro BNP 2/28/2022 - 435    Troponin 2/25/2022 < 0.01    Echo 2/25/2022   -Left ventricular cavity size is severely dilated with normal left   ventricular wall thickness.   -Overall left ventricular systolic function appears low normal. Ejection   fraction is visually estimated to be 55%. -Grade I diastolic dysfunction with normal LV filling pressures.    -Mitral valve leaflets appear mild-moderately thickened.   -Severe mitral regurgitation directed eccentrically posterior with systolic   flow reversal in pulmonary veins.   -Moderate aortic regurgitation.   -Trivial tricuspid regurgitation with a PASP of 25 mmHg.   -Trivial pulmonic regurgitation. Stress: none    Cath: none    Holter:none      High Risk  High Complexity/Medical Decision Making    Outside/Care everywhere records Reviewed  Labs Reviewed  Prior Imaging, ekg, echo reviewed when available  Medications reviewed  Old Notes reviewed  ASSESSMENT AND PLAN     1. 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 He knows he is likely to die sooner from heart failure without surgery, and is ok with that. Will medically manage the best we can. He appears euvolemic today. Continue current meds. 2. Sepsis   Enterococcus faecalis bacteremia. History of streptococcal bacteremia and mitral and aortic valve endocarditis in 2017  Bacteremia without vegetations 2022  S/p antibiotic course  On suppressive amoxicillin    3. Severe MR, papillary chord rupture, severe aortic regurgitation, and severe LV dysfunction  CVTS was consulted for surgical evaluation for mitral valve replacement/repair and aortic vavle replacement  He has no interest in [facility] or full sternotomy heart surgery. He is interested in minimally invasive surgery   Plan: patient has seen surgeon and opted against it. 4.HTN  BP Today 110/66  Lisinopril  Plan: Continue per PCP    5.DM Type 2  Plan: Continue per PCP    Patient counseled on lifestyle modification, diet, and exercise. Follow Up: Return in about 6 months (around 12/13/2022).      Sheppard Paget, MD  Cardiologist [address], Shankar Choudhary am scribing for and in the presence of Sheppard Paget, MD.

## 2023-02-01 ENCOUNTER — APPOINTMENT (OUTPATIENT)
Dept: GENERAL RADIOLOGY | Age: 63
DRG: 208 | End: 2023-02-01
Payer: COMMERCIAL

## 2023-02-01 ENCOUNTER — APPOINTMENT (OUTPATIENT)
Dept: CT IMAGING | Age: 63
DRG: 208 | End: 2023-02-01
Payer: COMMERCIAL

## 2023-02-01 ENCOUNTER — HOSPITAL ENCOUNTER (INPATIENT)
Age: 63
LOS: 2 days | Discharge: NURSING, REHAB OR LONG-TERM CARE | DRG: 208 | End: 2023-02-03
Attending: STUDENT IN AN ORGANIZED HEALTH CARE EDUCATION/TRAINING PROGRAM | Admitting: INTERNAL MEDICINE
Payer: COMMERCIAL

## 2023-02-01 DIAGNOSIS — T42.4X5A: ICD-10-CM

## 2023-02-01 DIAGNOSIS — G40.909 SEIZURE DISORDER (HCC): ICD-10-CM

## 2023-02-01 DIAGNOSIS — R41.82 ALTERED MENTAL STATUS, UNSPECIFIED ALTERED MENTAL STATUS TYPE: Primary | ICD-10-CM

## 2023-02-01 PROBLEM — E46 PROTEIN CALORIE MALNUTRITION (HCC): Chronic | Status: ACTIVE | Noted: 2017-02-04

## 2023-02-01 PROBLEM — R56.9 SEIZURE (HCC): Chronic | Status: ACTIVE | Noted: 2020-07-15

## 2023-02-01 PROBLEM — I50.9 CONGESTIVE HEART FAILURE (HCC): Chronic | Status: ACTIVE | Noted: 2022-03-11

## 2023-02-01 LAB
A/G RATIO: 1.4 (ref 1.1–2.2)
ALBUMIN SERPL-MCNC: 4 G/DL (ref 3.4–5)
ALP BLD-CCNC: 50 U/L (ref 40–129)
ALT SERPL-CCNC: 12 U/L (ref 10–40)
AMMONIA: 81 UMOL/L (ref 16–60)
AMPHETAMINE SCREEN, URINE: NORMAL
ANION GAP SERPL CALCULATED.3IONS-SCNC: 22 MMOL/L (ref 3–16)
AST SERPL-CCNC: 19 U/L (ref 15–37)
BACTERIA: NORMAL /HPF
BARBITURATE SCREEN URINE: NORMAL
BASE EXCESS VENOUS: -7.8 MMOL/L (ref -3–3)
BASOPHILS ABSOLUTE: 0 K/UL (ref 0–0.2)
BASOPHILS RELATIVE PERCENT: 0.6 %
BENZODIAZEPINE SCREEN, URINE: NORMAL
BILIRUB SERPL-MCNC: 0.4 MG/DL (ref 0–1)
BILIRUBIN URINE: NEGATIVE
BLOOD, URINE: ABNORMAL
BUN BLDV-MCNC: 20 MG/DL (ref 7–20)
CALCIUM SERPL-MCNC: 9.3 MG/DL (ref 8.3–10.6)
CANNABINOID SCREEN URINE: NORMAL
CARBOXYHEMOGLOBIN: 6.2 % (ref 0–1.5)
CHLORIDE BLD-SCNC: 100 MMOL/L (ref 99–110)
CLARITY: CLEAR
CO2: 17 MMOL/L (ref 21–32)
COCAINE METABOLITE SCREEN URINE: NORMAL
COLOR: YELLOW
CREAT SERPL-MCNC: 1.2 MG/DL (ref 0.8–1.3)
EOSINOPHILS ABSOLUTE: 0 K/UL (ref 0–0.6)
EOSINOPHILS RELATIVE PERCENT: 0.7 %
EPITHELIAL CELLS, UA: 0 /HPF (ref 0–5)
ETHANOL: NORMAL MG/DL (ref 0–0.08)
FENTANYL SCREEN, URINE: NORMAL
GFR SERPL CREATININE-BSD FRML MDRD: >60 ML/MIN/{1.73_M2}
GLUCOSE BLD-MCNC: 197 MG/DL (ref 70–99)
GLUCOSE URINE: NEGATIVE MG/DL
HCO3 VENOUS: 17.4 MMOL/L (ref 23–29)
HCT VFR BLD CALC: 40 % (ref 40.5–52.5)
HEMOGLOBIN: 13.1 G/DL (ref 13.5–17.5)
HYALINE CASTS: 0 /LPF (ref 0–8)
KEPPRA DOSE AMT: NORMAL
KEPPRA: 31.6 UG/ML (ref 6–46)
KETONES, URINE: NEGATIVE MG/DL
LACTIC ACID, SEPSIS: 11.6 MMOL/L (ref 0.4–1.9)
LACTIC ACID, SEPSIS: 2.9 MMOL/L (ref 0.4–1.9)
LACTIC ACID: 2.8 MMOL/L (ref 0.4–2)
LEUKOCYTE ESTERASE, URINE: NEGATIVE
LYMPHOCYTES ABSOLUTE: 0.8 K/UL (ref 1–5.1)
LYMPHOCYTES RELATIVE PERCENT: 16.8 %
Lab: NORMAL
MAGNESIUM: 2.3 MG/DL (ref 1.8–2.4)
MCH RBC QN AUTO: 33.5 PG (ref 26–34)
MCHC RBC AUTO-ENTMCNC: 32.6 G/DL (ref 31–36)
MCV RBC AUTO: 102.6 FL (ref 80–100)
METHADONE SCREEN, URINE: NORMAL
METHEMOGLOBIN VENOUS: 0.4 %
MICROSCOPIC EXAMINATION: YES
MONOCYTES ABSOLUTE: 0.1 K/UL (ref 0–1.3)
MONOCYTES RELATIVE PERCENT: 2.6 %
NEUTROPHILS ABSOLUTE: 3.7 K/UL (ref 1.7–7.7)
NEUTROPHILS RELATIVE PERCENT: 79.3 %
NITRITE, URINE: NEGATIVE
O2 CONTENT, VEN: 18 VOL %
O2 SAT, VEN: 100 %
O2 THERAPY: ABNORMAL
OPIATE SCREEN URINE: NORMAL
OXYCODONE URINE: NORMAL
PCO2, VEN: 33.8 MMHG (ref 40–50)
PDW BLD-RTO: 13.2 % (ref 12.4–15.4)
PH UA: 6
PH UA: 6 (ref 5–8)
PH VENOUS: 7.32 (ref 7.35–7.45)
PHENCYCLIDINE SCREEN URINE: NORMAL
PLATELET # BLD: 133 K/UL (ref 135–450)
PMV BLD AUTO: 9.2 FL (ref 5–10.5)
PO2, VEN: 182 MMHG (ref 25–40)
POTASSIUM REFLEX MAGNESIUM: 3.7 MMOL/L (ref 3.5–5.1)
PROCALCITONIN: 0.05 NG/ML (ref 0–0.15)
PROTEIN UA: NEGATIVE MG/DL
RAPID INFLUENZA  B AGN: NEGATIVE
RAPID INFLUENZA A AGN: NEGATIVE
RBC # BLD: 3.9 M/UL (ref 4.2–5.9)
RBC UA: 3 /HPF (ref 0–4)
SARS-COV-2, NAAT: NOT DETECTED
SODIUM BLD-SCNC: 139 MMOL/L (ref 136–145)
SPECIFIC GRAVITY UA: 1.01 (ref 1–1.03)
TCO2 CALC VENOUS: 41 MMOL/L
TOTAL CK: 342 U/L (ref 39–308)
TOTAL PROTEIN: 6.8 G/DL (ref 6.4–8.2)
TROPONIN: <0.01 NG/ML
URINE REFLEX TO CULTURE: ABNORMAL
URINE TYPE: ABNORMAL
UROBILINOGEN, URINE: 0.2 E.U./DL
WBC # BLD: 4.7 K/UL (ref 4–11)
WBC UA: 1 /HPF (ref 0–5)

## 2023-02-01 PROCEDURE — 87635 SARS-COV-2 COVID-19 AMP PRB: CPT

## 2023-02-01 PROCEDURE — 5A1935Z RESPIRATORY VENTILATION, LESS THAN 24 CONSECUTIVE HOURS: ICD-10-PCS | Performed by: STUDENT IN AN ORGANIZED HEALTH CARE EDUCATION/TRAINING PROGRAM

## 2023-02-01 PROCEDURE — 99285 EMERGENCY DEPT VISIT HI MDM: CPT

## 2023-02-01 PROCEDURE — 84484 ASSAY OF TROPONIN QUANT: CPT

## 2023-02-01 PROCEDURE — 85025 COMPLETE CBC W/AUTO DIFF WBC: CPT

## 2023-02-01 PROCEDURE — 0BH17EZ INSERTION OF ENDOTRACHEAL AIRWAY INTO TRACHEA, VIA NATURAL OR ARTIFICIAL OPENING: ICD-10-PCS | Performed by: STUDENT IN AN ORGANIZED HEALTH CARE EDUCATION/TRAINING PROGRAM

## 2023-02-01 PROCEDURE — 87804 INFLUENZA ASSAY W/OPTIC: CPT

## 2023-02-01 PROCEDURE — 70450 CT HEAD/BRAIN W/O DYE: CPT

## 2023-02-01 PROCEDURE — 87040 BLOOD CULTURE FOR BACTERIA: CPT

## 2023-02-01 PROCEDURE — 94002 VENT MGMT INPAT INIT DAY: CPT

## 2023-02-01 PROCEDURE — 2500000003 HC RX 250 WO HCPCS: Performed by: INTERNAL MEDICINE

## 2023-02-01 PROCEDURE — 82550 ASSAY OF CK (CPK): CPT

## 2023-02-01 PROCEDURE — 82140 ASSAY OF AMMONIA: CPT

## 2023-02-01 PROCEDURE — 82077 ASSAY SPEC XCP UR&BREATH IA: CPT

## 2023-02-01 PROCEDURE — 6360000002 HC RX W HCPCS: Performed by: INTERNAL MEDICINE

## 2023-02-01 PROCEDURE — 80053 COMPREHEN METABOLIC PANEL: CPT

## 2023-02-01 PROCEDURE — 96374 THER/PROPH/DIAG INJ IV PUSH: CPT

## 2023-02-01 PROCEDURE — 2580000003 HC RX 258: Performed by: PHYSICIAN ASSISTANT

## 2023-02-01 PROCEDURE — 2000000000 HC ICU R&B

## 2023-02-01 PROCEDURE — 36415 COLL VENOUS BLD VENIPUNCTURE: CPT

## 2023-02-01 PROCEDURE — 81001 URINALYSIS AUTO W/SCOPE: CPT

## 2023-02-01 PROCEDURE — 84145 PROCALCITONIN (PCT): CPT

## 2023-02-01 PROCEDURE — 83735 ASSAY OF MAGNESIUM: CPT

## 2023-02-01 PROCEDURE — 2500000003 HC RX 250 WO HCPCS

## 2023-02-01 PROCEDURE — 82803 BLOOD GASES ANY COMBINATION: CPT

## 2023-02-01 PROCEDURE — 83605 ASSAY OF LACTIC ACID: CPT

## 2023-02-01 PROCEDURE — 71045 X-RAY EXAM CHEST 1 VIEW: CPT

## 2023-02-01 PROCEDURE — 93005 ELECTROCARDIOGRAM TRACING: CPT | Performed by: PHYSICIAN ASSISTANT

## 2023-02-01 PROCEDURE — 80177 DRUG SCRN QUAN LEVETIRACETAM: CPT

## 2023-02-01 PROCEDURE — 2580000003 HC RX 258: Performed by: STUDENT IN AN ORGANIZED HEALTH CARE EDUCATION/TRAINING PROGRAM

## 2023-02-01 PROCEDURE — 6360000002 HC RX W HCPCS

## 2023-02-01 PROCEDURE — 6360000002 HC RX W HCPCS: Performed by: STUDENT IN AN ORGANIZED HEALTH CARE EDUCATION/TRAINING PROGRAM

## 2023-02-01 PROCEDURE — 80307 DRUG TEST PRSMV CHEM ANLYZR: CPT

## 2023-02-01 PROCEDURE — 2580000003 HC RX 258: Performed by: INTERNAL MEDICINE

## 2023-02-01 RX ORDER — LORAZEPAM 2 MG/ML
2 INJECTION INTRAMUSCULAR DAILY PRN
COMMUNITY

## 2023-02-01 RX ORDER — 0.9 % SODIUM CHLORIDE 0.9 %
1000 INTRAVENOUS SOLUTION INTRAVENOUS ONCE
Status: DISCONTINUED | OUTPATIENT
Start: 2023-02-01 | End: 2023-02-01

## 2023-02-01 RX ORDER — ONDANSETRON 2 MG/ML
4 INJECTION INTRAMUSCULAR; INTRAVENOUS EVERY 6 HOURS PRN
Status: DISCONTINUED | OUTPATIENT
Start: 2023-02-01 | End: 2023-02-03 | Stop reason: HOSPADM

## 2023-02-01 RX ORDER — IBUPROFEN 800 MG/1
800 TABLET ORAL EVERY 6 HOURS PRN
Status: ON HOLD | COMMUNITY
End: 2023-02-03 | Stop reason: HOSPADM

## 2023-02-01 RX ORDER — CETIRIZINE HYDROCHLORIDE 10 MG/1
10 TABLET ORAL DAILY
Status: DISCONTINUED | OUTPATIENT
Start: 2023-02-02 | End: 2023-02-03 | Stop reason: HOSPADM

## 2023-02-01 RX ORDER — FENTANYL CITRATE 50 UG/ML
75 INJECTION, SOLUTION INTRAMUSCULAR; INTRAVENOUS ONCE
Status: COMPLETED | OUTPATIENT
Start: 2023-02-01 | End: 2023-02-01

## 2023-02-01 RX ORDER — ONDANSETRON 4 MG/1
4 TABLET, ORALLY DISINTEGRATING ORAL EVERY 8 HOURS PRN
Status: DISCONTINUED | OUTPATIENT
Start: 2023-02-01 | End: 2023-02-03 | Stop reason: HOSPADM

## 2023-02-01 RX ORDER — PROPOFOL 10 MG/ML
5-50 INJECTION, EMULSION INTRAVENOUS CONTINUOUS
Status: DISCONTINUED | OUTPATIENT
Start: 2023-02-01 | End: 2023-02-02

## 2023-02-01 RX ORDER — SODIUM CHLORIDE 0.9 % (FLUSH) 0.9 %
5-40 SYRINGE (ML) INJECTION PRN
Status: DISCONTINUED | OUTPATIENT
Start: 2023-02-01 | End: 2023-02-03 | Stop reason: HOSPADM

## 2023-02-01 RX ORDER — ACETAMINOPHEN 325 MG/1
650 TABLET ORAL EVERY 6 HOURS PRN
Status: DISCONTINUED | OUTPATIENT
Start: 2023-02-01 | End: 2023-02-03 | Stop reason: HOSPADM

## 2023-02-01 RX ORDER — LACOSAMIDE 100 MG/1
200 TABLET ORAL 2 TIMES DAILY
Status: DISCONTINUED | OUTPATIENT
Start: 2023-02-01 | End: 2023-02-02 | Stop reason: ALTCHOICE

## 2023-02-01 RX ORDER — 0.9 % SODIUM CHLORIDE 0.9 %
1000 INTRAVENOUS SOLUTION INTRAVENOUS ONCE
Status: COMPLETED | OUTPATIENT
Start: 2023-02-01 | End: 2023-02-01

## 2023-02-01 RX ORDER — FENTANYL CITRATE 50 UG/ML
75 INJECTION, SOLUTION INTRAMUSCULAR; INTRAVENOUS ONCE
Status: DISCONTINUED | OUTPATIENT
Start: 2023-02-01 | End: 2023-02-01

## 2023-02-01 RX ORDER — ATORVASTATIN CALCIUM 10 MG/1
10 TABLET, FILM COATED ORAL NIGHTLY
Status: DISCONTINUED | OUTPATIENT
Start: 2023-02-01 | End: 2023-02-03 | Stop reason: HOSPADM

## 2023-02-01 RX ORDER — MIDAZOLAM HYDROCHLORIDE 5 MG/ML
2 INJECTION INTRAMUSCULAR; INTRAVENOUS ONCE
Status: DISCONTINUED | OUTPATIENT
Start: 2023-02-01 | End: 2023-02-01

## 2023-02-01 RX ORDER — DULOXETIN HYDROCHLORIDE 30 MG/1
30 CAPSULE, DELAYED RELEASE ORAL DAILY
Status: DISCONTINUED | OUTPATIENT
Start: 2023-02-02 | End: 2023-02-03 | Stop reason: HOSPADM

## 2023-02-01 RX ORDER — MIDAZOLAM HYDROCHLORIDE 2 MG/2ML
1 INJECTION, SOLUTION INTRAMUSCULAR; INTRAVENOUS EVERY 30 MIN PRN
Status: DISCONTINUED | OUTPATIENT
Start: 2023-02-01 | End: 2023-02-01 | Stop reason: SDUPTHER

## 2023-02-01 RX ORDER — ROCURONIUM BROMIDE 10 MG/ML
0.1 INJECTION, SOLUTION INTRAVENOUS ONCE
Status: DISCONTINUED | OUTPATIENT
Start: 2023-02-01 | End: 2023-02-01

## 2023-02-01 RX ORDER — SODIUM CHLORIDE 9 MG/ML
INJECTION, SOLUTION INTRAVENOUS PRN
Status: DISCONTINUED | OUTPATIENT
Start: 2023-02-01 | End: 2023-02-03 | Stop reason: HOSPADM

## 2023-02-01 RX ORDER — TRAZODONE HYDROCHLORIDE 50 MG/1
50 TABLET ORAL NIGHTLY
Status: DISCONTINUED | OUTPATIENT
Start: 2023-02-02 | End: 2023-02-03 | Stop reason: HOSPADM

## 2023-02-01 RX ORDER — ACETAMINOPHEN 650 MG/1
650 SUPPOSITORY RECTAL EVERY 6 HOURS PRN
Status: DISCONTINUED | OUTPATIENT
Start: 2023-02-01 | End: 2023-02-03 | Stop reason: HOSPADM

## 2023-02-01 RX ORDER — SODIUM CHLORIDE, SODIUM LACTATE, POTASSIUM CHLORIDE, CALCIUM CHLORIDE 600; 310; 30; 20 MG/100ML; MG/100ML; MG/100ML; MG/100ML
INJECTION, SOLUTION INTRAVENOUS CONTINUOUS
Status: DISCONTINUED | OUTPATIENT
Start: 2023-02-01 | End: 2023-02-03

## 2023-02-01 RX ORDER — MIDAZOLAM HYDROCHLORIDE 2 MG/2ML
1 INJECTION, SOLUTION INTRAMUSCULAR; INTRAVENOUS EVERY 30 MIN PRN
Status: DISCONTINUED | OUTPATIENT
Start: 2023-02-01 | End: 2023-02-01 | Stop reason: CLARIF

## 2023-02-01 RX ORDER — MIRTAZAPINE 15 MG/1
7.5 TABLET, FILM COATED ORAL NIGHTLY
Status: DISCONTINUED | OUTPATIENT
Start: 2023-02-02 | End: 2023-02-03 | Stop reason: HOSPADM

## 2023-02-01 RX ORDER — LISINOPRIL 5 MG/1
2.5 TABLET ORAL DAILY
Status: DISCONTINUED | OUTPATIENT
Start: 2023-02-02 | End: 2023-02-03 | Stop reason: HOSPADM

## 2023-02-01 RX ORDER — ENOXAPARIN SODIUM 100 MG/ML
40 INJECTION SUBCUTANEOUS DAILY
Status: DISCONTINUED | OUTPATIENT
Start: 2023-02-02 | End: 2023-02-03 | Stop reason: HOSPADM

## 2023-02-01 RX ORDER — ROCURONIUM BROMIDE 10 MG/ML
INJECTION, SOLUTION INTRAVENOUS
Status: COMPLETED
Start: 2023-02-01 | End: 2023-02-01

## 2023-02-01 RX ORDER — NOREPINEPHRINE BIT/0.9 % NACL 16MG/250ML
1-100 INFUSION BOTTLE (ML) INTRAVENOUS CONTINUOUS
Status: DISCONTINUED | OUTPATIENT
Start: 2023-02-01 | End: 2023-02-03

## 2023-02-01 RX ORDER — MIDAZOLAM HYDROCHLORIDE 5 MG/ML
INJECTION INTRAMUSCULAR; INTRAVENOUS
Status: COMPLETED
Start: 2023-02-01 | End: 2023-02-01

## 2023-02-01 RX ORDER — LEVETIRACETAM 10 MG/ML
1000 INJECTION INTRAVASCULAR EVERY 12 HOURS
Status: DISCONTINUED | OUTPATIENT
Start: 2023-02-01 | End: 2023-02-02

## 2023-02-01 RX ORDER — FLUTICASONE PROPIONATE 50 MCG
2 SPRAY, SUSPENSION (ML) NASAL DAILY
COMMUNITY
Start: 2023-01-10

## 2023-02-01 RX ORDER — TORSEMIDE 20 MG/1
10 TABLET ORAL DAILY
Status: DISCONTINUED | OUTPATIENT
Start: 2023-02-02 | End: 2023-02-03 | Stop reason: HOSPADM

## 2023-02-01 RX ORDER — SODIUM CHLORIDE 0.9 % (FLUSH) 0.9 %
5-40 SYRINGE (ML) INJECTION EVERY 12 HOURS SCHEDULED
Status: DISCONTINUED | OUTPATIENT
Start: 2023-02-01 | End: 2023-02-03 | Stop reason: HOSPADM

## 2023-02-01 RX ORDER — POLYETHYLENE GLYCOL 3350 17 G/17G
17 POWDER, FOR SOLUTION ORAL DAILY PRN
Status: DISCONTINUED | OUTPATIENT
Start: 2023-02-01 | End: 2023-02-03 | Stop reason: HOSPADM

## 2023-02-01 RX ADMIN — LEVETIRACETAM 1000 MG: 10 INJECTION INTRAVENOUS at 23:09

## 2023-02-01 RX ADMIN — SODIUM CHLORIDE, PRESERVATIVE FREE 10 ML: 5 INJECTION INTRAVENOUS at 22:12

## 2023-02-01 RX ADMIN — FENTANYL CITRATE 75 MCG: 50 INJECTION, SOLUTION INTRAMUSCULAR; INTRAVENOUS at 19:17

## 2023-02-01 RX ADMIN — MIDAZOLAM: 5 INJECTION INTRAMUSCULAR; INTRAVENOUS at 17:19

## 2023-02-01 RX ADMIN — MIDAZOLAM HYDROCHLORIDE 2.5 MG: 5 INJECTION, SOLUTION INTRAMUSCULAR; INTRAVENOUS at 16:48

## 2023-02-01 RX ADMIN — SODIUM CHLORIDE 1000 ML: 9 INJECTION, SOLUTION INTRAVENOUS at 16:54

## 2023-02-01 RX ADMIN — SODIUM CHLORIDE, POTASSIUM CHLORIDE, SODIUM LACTATE AND CALCIUM CHLORIDE: 600; 310; 30; 20 INJECTION, SOLUTION INTRAVENOUS at 21:30

## 2023-02-01 RX ADMIN — Medication 2 MCG/MIN: at 22:08

## 2023-02-01 RX ADMIN — ROCURONIUM BROMIDE 100 MG: 10 INJECTION INTRAVENOUS at 16:49

## 2023-02-01 RX ADMIN — PROPOFOL 20 MCG/KG/MIN: 10 INJECTION, EMULSION INTRAVENOUS at 22:48

## 2023-02-01 RX ADMIN — SODIUM CHLORIDE 1000 ML: 9 INJECTION, SOLUTION INTRAVENOUS at 17:23

## 2023-02-01 ASSESSMENT — PULMONARY FUNCTION TESTS
PIF_VALUE: 20
PIF_VALUE: 21

## 2023-02-01 NOTE — ED NOTES
Pt moved to bed 2 and respiratory called to intubate at this time.       Jeniffer Ellswroth RN  02/01/23 1211

## 2023-02-01 NOTE — ED NOTES
Pt unresponsive at this time, per EMS patient was not a witnessed seizure and had no seizure activity on way to to hospital however they gave 10 mg versed in route.       [address] Tavia Mauricio RN  02/01/23 3469

## 2023-02-01 NOTE — ED NOTES
Dr. Paris Oliveros to bedside, patient nonresponsive to straight cath at this time.       Jeniffer Monroe RN  02/01/23 6818

## 2023-02-01 NOTE — ED PROVIDER NOTES
In addition to the advanced practice provider, I personally saw Israel Coe and performed a substantive portion of the visit including all aspects of the medical decision making. Medical Decision Making  Pt arrives altered nursing facility. Patient was found down, uncertain last known well. Staff suspected a seizure and EMS was called. There was no witnessed seizure activity. EMS reports giving 10 mg of Versed in route. patient has history of seizure on Keppra and Vimpat per medication list.  He is altered, for eyes no history on arrival.    Patient diaphoretic, pale appearing. GCS of 4  Labored breaths. Hypoxic on room air. Tachycardic regular rhythm. No spontaneous movement or speech. EKG  The Ekg interpreted by me in the absence of a cardiologist shows. Sinus tachycardia, ventricular rate 107. Axis left. LVH changes noted. No specific ST or T wave abnormality. PVCs noted. No significant change from prior 5-. SEP-1  Is this patient to be included in the SEP-1 Core Measure due to severe sepsis or septic shock? No   Exclusion criteria - the patient is NOT to be included for SEP-1 Core Measure due to: Infection is not suspected    Screenings     Hershey Coma Scale  Eye Opening: None  Best Verbal Response: None  Best Motor Response: None  Marcelo Coma Scale Score: 3        XR CHEST PORTABLE   Final Result   Endotracheal tube appears properly placed above the bella. No acute cardiopulmonary findings         CT Head W/O Contrast   Final Result   No acute intracranial abnormality.               Labs Reviewed   CBC WITH AUTO DIFFERENTIAL - Abnormal; Notable for the following components:       Result Value    RBC 3.90 (*)     Hemoglobin 13.1 (*)     Hematocrit 40.0 (*)     .6 (*)     Platelets 612 (*)     Lymphocytes Absolute 0.8 (*)     All other components within normal limits   COMPREHENSIVE METABOLIC PANEL W/ REFLEX TO MG FOR LOW K - Abnormal; Notable for the following components:    CO2 17 (*)     Anion Gap 22 (*)     Glucose 197 (*)     All other components within normal limits   URINALYSIS WITH REFLEX TO CULTURE - Abnormal; Notable for the following components:    Blood, Urine TRACE (*)     All other components within normal limits   LACTATE, SEPSIS - Abnormal; Notable for the following components:    Lactic Acid, Sepsis 11.6 (*)     All other components within normal limits    Narrative:     CALL  [facility] tel. [phone],  Chemistry results called to and read back by JOSH Oconnor, 02/01/2023  17:15, by Leatha Gomez   LACTATE, SEPSIS - Abnormal; Notable for the following components:    Lactic Acid, Sepsis 2.9 (*)     All other components within normal limits   BLOOD GAS, VENOUS - Abnormal; Notable for the following components:    pH, Vineet 7.319 (*)     pCO2, Vineet 33.8 (*)     pO2, Vineet 182.0 (*)     HCO3, Venous 17.4 (*)     Base Excess, Vineet -7.8 (*)     Carboxyhemoglobin 6.2 (*)     All other components within normal limits   AMMONIA - Abnormal; Notable for the following components:    Ammonia 81 (*)     All other components within normal limits   COVID-19, RAPID   RAPID INFLUENZA A/B ANTIGENS   LEVETIRACETAM LEVEL   MAGNESIUM   TROPONIN   ETHANOL   URINE DRUG SCREEN   PROCALCITONIN   MICROSCOPIC URINALYSIS   CK     Medications   midazolam (VERSED) 100 mg in sodium chloride 0.9 % 100 mL IVPB ( IntraVENous Rate/Dose Change 2/1/23 1941)   0.9 % sodium chloride bolus (0 mLs IntraVENous Stopped 2/1/23 1827)   midazolam (VERSED) 5 MG/ML injection (2.5 mg  Given 2/1/23 1648)   rocuronium (ZEMURON) 100 MG/10ML injection (100 mg  Given 2/1/23 1649)   midazolam (VERSED) 100 mg in sodium chloride 0.9 % 100 mL IVPB ( IntraVENous Stopped 2/1/23 1922)   0.9 % sodium chloride IV bolus 1,000 mL (0 mLs IntraVENous Stopped 2/1/23 1827)   fentaNYL (SUBLIMAZE) injection 75 mcg (75 mcg IntraVENous Given 2/1/23 1917)     Patient is 77-year-old male presenting for altered mental status, was apparently found down at nursing home today and given 10 mg of Versed by EMS though there was no witnessed seizure activity after he was found. Patient arrives with low GCS, not protecting airway. No witnessed seizure activity in the emergency room. Decision was made to intubate for inability to protect airway/low GCS, please see below intubation note. Patient was hypotensive, tachycardic and hypoxic prior to intubation but his vitals did improve after intubation. Chest x-ray is not showing any signs of aspiration pneumonia. Blood pressures have been stable on Versed drip after intubation and 1 L IV fluids. Lactate was elevated at greater than 11 however did improve significantly down to 3 after just 1 L normal saline so I suspect this is in the setting of seizure and not true infection. Will admit for airway monitoring and controlled extubation in ICU when benzodiazepine therapy has worn off. I do not suspect status epilepticus today, more suspicion for benzodiazepine toxicity. Head CT today not showing any bleed. The total Critical Care time is 42  minutes which excludes separately billable procedures. PROCEDURE:  ENDOTRACHEAL INTUBATION  The benefits, risks, and alternatives were not discussed with the patient due to emergent status. Yu Keepers was pre-oxygenated as best as possible with bag valve mask ventilation. . Suction was ready. The patient was sedated, then paralyzed; please see the chart for the medications and dosages administered. The vocal cords were visualized, and the endotracheal tube was inserted without complication. Tracheal position was confirmed using auscultation, capnometry, tube condensation, and chest x-ray. The tube was appropriately secured. Sedation was provided for endotracheal tube and ventilatory comfort. Patient Referrals:  No follow-up provider specified.     Discharge Medications:  New Prescriptions    No medications on file       FINAL IMPRESSION  1. Altered mental status, unspecified altered mental status type    2. Benzodiazepine-based tranquilizers causing adverse effect, initial encounter    3. Seizure disorder (HCC)        Blood pressure (!) 114/57, pulse (!) 102, temperature 98.8 °F (37.1 °C), temperature source Axillary, resp. rate 22, SpO2 97 %.      For further details of Aura Pacheco emergency department encounter, please see documentation by advanced practice provider      Jake Cobb MD  02/01/23 2001

## 2023-02-01 NOTE — ED NOTES
Call received from [facility] at [facility]; reports pt was actively seizing upon entering room from [phone]. With EMS arrival, squad administered IM medication, seizing stopped for approx 30 seconds & then second seizure began, while leaving facility. Updated on Ailyn on [address].        Himanshu Rushing RN  02/01/23 3803

## 2023-02-01 NOTE — ED NOTES
Pt arrived to bed two & this nurse resumed care. Reported from JOSH Rod: [facility] EMS was called to [facility] rt pt found down/potential unwitnessed seizure; pt was post ectical en route & given 10mg of Versed iv. Pt unresponsive to verbal or painful stimulus upon arrival to ED; moved to bed two for intubation. At [id number], given 100mcg of push dose epi, 1648 given 2.5 versed & 1649 given 100mg of Marco Antonio. Intubated by Dr. Apurva Calvin with 7.5 & 25 at the lip with positive color change & bilat breath sounds.           Rosalie Lu RN  02/01/23 5097

## 2023-02-01 NOTE — ED PROVIDER NOTES
[facility]        Pt Name: Jefry Tatum  MRN: [id number]  [place] 1960  Date of evaluation: 2/1/2023  Provider: Sachin Rivera PA-C  PCP: Juarez Cuevas MD  Note Started: 4:32 PM EST 2/1/23       I have seen and evaluated this patient with my supervising physician Luan Richardson MD.    I personally saw the patient and independently provided 33 minutes of non-concurrent critical care time out of the total critical care time provided. This excludes time spent doing separately billable procedures. This includes time at the bedside, data interpretation, medication management, obtaining critical history from collateral sources if the patient is unable to provide it directly, and physician consultation. Specifics of interventions taken and potentially life-threatening diagnostic considerations are listed above in the medical decision making. CHIEF COMPLAINT       Chief Complaint   Patient presents with    Seizures     Pt here by EMS for seizures, currently lives at [facility]. ECF staff told EMS they are unsure how long he seized but were concerned when he did not become alert as quickly as he normally does following seizure activity. He does take Keppra and Vimpat per his med list.       HISTORY OF PRESENT ILLNESS: 1 or more Elements     History From: EMS  Limitations to history : Altered Mental Status    Jefry Tatum is a 58 y.o. male who presents to the emergency department after a possible seizure. He was found altered and unresponsive and they became concerned when he did not respond as quickly as he normally did. Sallyanne Chain He is on Vimpat and Keppra per paperwork from [facility]. EMS gave 10 mg of Versed. Patient presents here tachycardic, hypotensive and hypoxic. Was placed on nasal cannula oxygen. Unable to get any history from the patient. He is not responding to verbal stimuli or sternal rub.     Nursing Notes were all reviewed and agreed with or any disagreements were addressed in the HPI. REVIEW OF SYSTEMS :      Review of Systems    Positives and Pertinent negatives as per HPI. SURGICAL HISTORY     Past Surgical History:   Procedure Laterality Date    BRAIN ANEURYSM SURGERY Left 02/03/2017    COLONOSCOPY N/A 8/14/2019    COLONOSCOPY WITH MAC ANESTHESIA performed by Uma Lugo MD at [address] [facility] Left 02/03/2017    LEG SURGERY      UPPER GASTROINTESTINAL ENDOSCOPY N/A 8/14/2019    EGD BIOPSY performed by Uma Lugo MD at [address] [place] [address]       Current Discharge Medication List        CONTINUE these medications which have NOT CHANGED    Details   ibuprofen (ADVIL;MOTRIN) 800 MG tablet Take 800 mg by mouth every 6 hours as needed for Pain      LORazepam (ATIVAN) 2 MG/ML injection Inject 2 mg into the muscle daily as needed.       fluticasone (FLONASE) 50 MCG/ACT nasal spray 2 sprays daily      amoxicillin (AMOXIL) 500 mg capsule Take 500 mg by mouth 2 times daily      LACTOBACILLUS PO Take 1 mg by mouth Bid      cetirizine (ZYRTEC) 10 MG tablet Take 10 mg by mouth daily      HM LIDOCAINE PATCH EX Apply topically      DULoxetine (CYMBALTA) 30 MG extended release capsule Take 30 mg by mouth daily      MELATONIN PO Take 9 mg by mouth nightly      traZODone (DESYREL) 50 MG tablet Take 25 mg by mouth nightly      lisinopril (PRINIVIL;ZESTRIL) 2.5 MG tablet Take 1 tablet by mouth daily  Qty: 30 tablet, Refills: 3      torsemide (DEMADEX) 10 MG tablet Take 1 tablet by mouth daily  Qty: 30 tablet, Refills: 3      levETIRAcetam (KEPPRA) 500 MG tablet Take 1,500 mg by mouth 2 times daily      potassium chloride (KLOR-CON M) 20 MEQ extended release tablet Take 20 mEq by mouth 2 times daily      acetaminophen (TYLENOL) 325 MG tablet Take 650 mg by mouth every 4 hours as needed for Pain      mirtazapine (REMERON) 7.5 MG tablet Take 7.5 mg by mouth nightly      Cholecalciferol (VITAMIN D3) 5000 units TABS Take by mouth daily lacosamide (VIMPAT) 200 MG tablet Take 200 mg by mouth 2 times daily. calcium carbonate (TUMS) 500 MG chewable tablet Take 1 tablet by mouth 2 times daily       methadone (DOLOPHINE) 5 MG tablet Take 5 mg by mouth daily. atorvastatin (LIPITOR) 10 MG tablet Take 10 mg by mouth nightly              ALLERGIES     Seasonal    FAMILYHISTORY     History reviewed. No pertinent family history. SOCIAL HISTORY       Social History     Tobacco Use    Smoking status: Some Days     Packs/day: 0.50     Types: Cigarettes    Smokeless tobacco: Never    Tobacco comments:     6 per WEEK   Vaping Use    Vaping Use: Never used    Passive vaping exposure: Yes   Substance Use Topics    Alcohol use: Yes     Alcohol/week: 1.0 standard drink     Types: 1 Cans of beer per week     Comment: occ    Drug use: Not Currently     Types: Marijuana Jenet Levon)     Comment: not at this time       SCREENINGS        Marcelo Coma Scale  Eye Opening: To speech  Best Verbal Response: None  Best Motor Response: Obeys commands  Marcelo Coma Scale Score: 10                CIWA Assessment  BP: (!) 105/54  Heart Rate: 64           PHYSICAL EXAM  1 or more Elements     ED Triage Vitals [02/01/23 1617]   BP Temp Temp Source Heart Rate Resp SpO2 Height Weight   (!) 90/58 98.8 °F (37.1 °C) Axillary (!) 122 30 (!) 87 % -- --       Physical Exam  Vitals and nursing note reviewed. Constitutional:       Appearance: He is well-developed. He is ill-appearing and diaphoretic. HENT:      Head: Atraumatic. Nose: Nose normal.      Mouth/Throat:      Mouth: Mucous membranes are dry. Eyes:      General:         Right eye: No discharge. Left eye: No discharge. Pupils: Pupils are equal, round, and reactive to light. Cardiovascular:      Rate and Rhythm: Regular rhythm. Tachycardia present. Heart sounds: No murmur heard. No friction rub. No gallop. Pulmonary:      Effort: Pulmonary effort is normal. No respiratory distress. Breath sounds: No stridor. No wheezing, rhonchi or rales. Abdominal:      General: Bowel sounds are normal. There is no distension. Palpations: Abdomen is soft. There is no mass. Tenderness: There is no abdominal tenderness. There is no guarding or rebound. Hernia: No hernia is present. Musculoskeletal:         General: No deformity. Cervical back: Normal range of motion. Skin:     General: Skin is warm. Findings: No erythema or rash. Neurological:      Mental Status: He is alert. He is disoriented.            DIAGNOSTIC RESULTS   LABS:    Labs Reviewed   CBC WITH AUTO DIFFERENTIAL - Abnormal; Notable for the following components:       Result Value    RBC 3.90 (*)     Hemoglobin 13.1 (*)     Hematocrit 40.0 (*)     .6 (*)     Platelets 268 (*)     Lymphocytes Absolute 0.8 (*)     All other components within normal limits   COMPREHENSIVE METABOLIC PANEL W/ REFLEX TO MG FOR LOW K - Abnormal; Notable for the following components:    CO2 17 (*)     Anion Gap 22 (*)     Glucose 197 (*)     All other components within normal limits   URINALYSIS WITH REFLEX TO CULTURE - Abnormal; Notable for the following components:    Blood, Urine TRACE (*)     All other components within normal limits   LACTATE, SEPSIS - Abnormal; Notable for the following components:    Lactic Acid, Sepsis 11.6 (*)     All other components within normal limits    Narrative:     CALL  [facility] tel. [phone],  Chemistry results called to and read back by JOSH Velasquez, 02/01/2023  17:15, by Zi Delaney   LACTATE, SEPSIS - Abnormal; Notable for the following components:    Lactic Acid, Sepsis 2.9 (*)     All other components within normal limits   BLOOD GAS, VENOUS - Abnormal; Notable for the following components:    pH, Vineet 7.319 (*)     pCO2, Vineet 33.8 (*)     pO2, Vineet 182.0 (*)     HCO3, Venous 17.4 (*)     Base Excess, Vineet -7.8 (*)     Carboxyhemoglobin 6.2 (*)     All other components within normal limits AMMONIA - Abnormal; Notable for the following components:    Ammonia 81 (*)     All other components within normal limits   CK - Abnormal; Notable for the following components: Total  (*)     All other components within normal limits   CBC WITH AUTO DIFFERENTIAL - Abnormal; Notable for the following components:    RBC 3.50 (*)     Hemoglobin 11.9 (*)     Hematocrit 34.6 (*)     Platelets 246 (*)     Neutrophils Absolute 9.4 (*)     All other components within normal limits   BASIC METABOLIC PANEL - Abnormal; Notable for the following components:    Glucose 118 (*)     Creatinine 0.7 (*)     All other components within normal limits   PHOSPHORUS - Abnormal; Notable for the following components:    Phosphorus 2.4 (*)     All other components within normal limits   LACTIC ACID - Abnormal; Notable for the following components:    Lactic Acid 2.8 (*)     All other components within normal limits   CK - Abnormal; Notable for the following components: Total  (*)     All other components within normal limits   HEPATIC FUNCTION PANEL - Abnormal; Notable for the following components: Total Protein 5.5 (*)     Alkaline Phosphatase 39 (*)     All other components within normal limits   BLOOD GAS, ARTERIAL - Abnormal; Notable for the following components:    pH, Arterial 7.504 (*)     pCO2, Arterial 32.4 (*)     pO2, Arterial 117.0 (*)     Hemoglobin, Art, Extended 11.8 (*)     All other components within normal limits    Narrative:     0513   COVID-19, RAPID   RAPID INFLUENZA A/B ANTIGENS   CULTURE, BLOOD 1   CULTURE, BLOOD 2   LEVETIRACETAM LEVEL   MAGNESIUM   TROPONIN   ETHANOL   URINE DRUG SCREEN   PROCALCITONIN   MICROSCOPIC URINALYSIS   LEVETIRACETAM LEVEL   MAGNESIUM   LACTIC ACID   PROCALCITONIN       When ordered only abnormal lab results are displayed. All other labs were within normal range or not returned as of this dictation. EKG:  When ordered, EKG's are interpreted by the Emergency Department Physician in the absence of a cardiologist.  Please see their note for interpretation of EKG. RADIOLOGY:   Non-plain film images such as CT, Ultrasound and MRI are read by the radiologist. Plain radiographic images are visualized and preliminarily interpreted by the ED Provider with the below findings:        Interpretation per the Radiologist below, if available at the time of this note:    XR CHEST PORTABLE   Final Result   Endotracheal tube appears properly placed above the bella. No acute cardiopulmonary findings         CT Head W/O Contrast   Final Result   No acute intracranial abnormality. No results found. No results found. PROCEDURES   Unless otherwise noted below, none     Procedures    CRITICAL CARE TIME (.cctime)       PAST MEDICAL HISTORY      has a past medical history of Acute intracerebral hemorrhage ([facility] [address] (02/03/2017), Anxiety, Back pain, Cerebral artery occlusion with cerebral infarction [facility]), Chronic back pain greater than 3 months duration (02/03/2017), Diabetes mellitus ([address] Dysphasia (02/03/2017), Dysphonia (02/03/2017), Endocarditis and heart valve disorders in diseases classified elsewhere (02/03/2017), Hydrocephalus ([address] (02/03/2017), Hypertension, IVH (intraventricular hemorrhage) ([address] (02/03/2017), NSTEMI (non-ST elevated myocardial infarction) ([facility] [address] (02/03/2017), and Seizures ([facility] [address]      EMERGENCY DEPARTMENT COURSE and DIFFERENTIAL DIAGNOSIS/MDM:   Vitals:    Vitals:    02/02/23 0600 02/02/23 0615 02/02/23 0630 02/02/23 0645   BP: (!) 95/56 (!) 103/55 96/68 (!) 105/54   Pulse: 60 60 62 64   Resp: 18 18 18 24   Temp:       TempSrc:       SpO2: 100% 100% 100% 100%   Weight:       Height:           Patient was given the following medications:  Medications   midazolam (VERSED) 100 mg in sodium chloride 0.9 % 100 mL IVPB ( IntraVENous Rate/Dose Change 2/2/23 0603)   sodium chloride flush 0.9 % injection 5-40 mL (10 mLs IntraVENous Given 2/1/23 0098) sodium chloride flush 0.9 % injection 5-40 mL (has no administration in time range)   0.9 % sodium chloride infusion (has no administration in time range)   enoxaparin (LOVENOX) injection 40 mg (has no administration in time range)   ondansetron (ZOFRAN-ODT) disintegrating tablet 4 mg (has no administration in time range)     Or   ondansetron (ZOFRAN) injection 4 mg (has no administration in time range)   polyethylene glycol (GLYCOLAX) packet 17 g (has no administration in time range)   acetaminophen (TYLENOL) tablet 650 mg (has no administration in time range)     Or   acetaminophen (TYLENOL) suppository 650 mg (has no administration in time range)   propofol injection (20 mcg/kg/min × 76.2 kg IntraVENous Rate/Dose Change 2/2/23 0731)   lactated ringers IV soln infusion ( IntraVENous Rate/Dose Verify 2/2/23 0434)   norepinephrine (LEVOPHED) 16 mg in sodium chloride 0.9 % 250 mL infusion (0 mcg/min IntraVENous Stopped 2/2/23 0731)   fentaNYL 10 mcg/mL infusion (has no administration in time range)   levETIRAcetam (KEPPRA) 1000 mg/100 mL IVPB (0 mg IntraVENous Stopped 2/1/23 2325)   DULoxetine (CYMBALTA) extended release capsule 30 mg (has no administration in time range)   lacosamide (VIMPAT) tablet 200 mg (200 mg Oral Not Given 2/1/23 2252)   traZODone (DESYREL) tablet 50 mg (has no administration in time range)   mirtazapine (REMERON) tablet 7.5 mg (has no administration in time range)   atorvastatin (LIPITOR) tablet 10 mg (10 mg Oral Not Given 2/1/23 2322)   lisinopril (PRINIVIL;ZESTRIL) tablet 2.5 mg (has no administration in time range)   torsemide (DEMADEX) tablet 10 mg (has no administration in time range)   cetirizine (ZYRTEC) tablet 10 mg (has no administration in time range)   0.9 % sodium chloride bolus (0 mLs IntraVENous Stopped 2/1/23 1827)   midazolam (VERSED) 5 MG/ML injection (2.5 mg  Given 2/1/23 1648)   rocuronium (ZEMURON) 100 MG/10ML injection (100 mg  Given 2/1/23 6093)   midazolam (VERSED) 100 mg in sodium chloride 0.9 % 100 mL IVPB ( IntraVENous Stopped 2/1/23 1922)   0.9 % sodium chloride IV bolus 1,000 mL (0 mLs IntraVENous Stopped 2/1/23 1827)   fentaNYL (SUBLIMAZE) injection 75 mcg (75 mcg IntraVENous Given 2/1/23 1917)             Is this patient to be included in the SEP-1 Core Measure due to severe sepsis or septic shock? No   Exclusion criteria - the patient is NOT to be included for SEP-1 Core Measure due to: Infection is not suspected    Chronic Conditions affecting care:    has a past medical history of Acute intracerebral hemorrhage ([facility] [address] (02/03/2017), Anxiety, Back pain, Cerebral artery occlusion with cerebral infarction [facility]), Chronic back pain greater than 3 months duration (02/03/2017), Diabetes mellitus ([facility] [address] Dysphasia (02/03/2017), Dysphonia (02/03/2017), Endocarditis and heart valve disorders in diseases classified elsewhere (02/03/2017), Hydrocephalus ([facility] [address] (02/03/2017), Hypertension, IVH (intraventricular hemorrhage) ([facility] [address] (02/03/2017), NSTEMI (non-ST elevated myocardial infarction) ([facility] [address] (02/03/2017), and Seizures ([facility] [address] CONSULTS: (Who and What was discussed)  IP CONSULT TO CRITICAL CARE  IP CONSULT TO NEUROLOGY      Social Determinants : Patient lacks transportation    Records Reviewed (Source):     CC/HPI Summary, DDx, ED Course, and Reassessment: Patient presented after presumed seizure. He was found altered and not becoming responsive as quickly as he normally does after his seizures. He was given 10 mg of Versed still by squad and presented here unresponsive. Patient is tachypneic with labored breathing with a GCS of 4. Nonresponsive to even sternal rub or painful stimuli. There is no obvious sign of trauma. Immediately patient was moved to trauma room and attending Dr. Roya Costa intubated the patient. Patient does have initial lactic acid over 11. Has no leukocytosis.   Low suspicion for sepsis or infectious etiology and have high suspicion for probable seizure that was unwitnessed. CT imaging of the head and chest x-ray and some laboratory testing still pending at the end of my shift. Patient is now stable intubated. Ammonia is only minimally elevated. Remainder of work-up at this time is unremarkable. Please see attending note for final disposition and any change in care. Disposition Considerations (tests considered but not done, Admit vs D/C, Shared Decision Making, Pt Expectation of Test or Tx.):        I am the Primary Clinician of Record. FINAL IMPRESSION      1. Altered mental status, unspecified altered mental status type    2. Benzodiazepine-based tranquilizers causing adverse effect, initial encounter    3. Seizure disorder [facility])          DISPOSITION/PLAN     DISPOSITION Admitted 02/01/2023 07:18:16 PM      PATIENT REFERRED TO:  No follow-up provider specified.     DISCHARGE MEDICATIONS:  Current Discharge Medication List          DISCONTINUED MEDICATIONS:  Current Discharge Medication List                 (Please note that portions of this note were completed with a voice recognition program.  Efforts were made to edit the dictations but occasionally words are mis-transcribed.)    Rosa Gonzalez PA-C (electronically signed)       Rosa Gonzalez PA-C  02/02/23 7524

## 2023-02-02 PROBLEM — R41.82 ALTERED MENTAL STATUS: Status: ACTIVE | Noted: 2023-02-02

## 2023-02-02 PROBLEM — G40.909 SEIZURE DISORDER (HCC): Status: ACTIVE | Noted: 2022-01-13

## 2023-02-02 LAB
ALBUMIN SERPL-MCNC: 3.4 G/DL (ref 3.4–5)
ALP BLD-CCNC: 39 U/L (ref 40–129)
ALT SERPL-CCNC: 11 U/L (ref 10–40)
ANION GAP SERPL CALCULATED.3IONS-SCNC: 7 MMOL/L (ref 3–16)
AST SERPL-CCNC: 29 U/L (ref 15–37)
BASE EXCESS ARTERIAL: 2.7 MMOL/L (ref -3–3)
BASOPHILS ABSOLUTE: 0 K/UL (ref 0–0.2)
BASOPHILS RELATIVE PERCENT: 0.1 %
BILIRUB SERPL-MCNC: 0.8 MG/DL (ref 0–1)
BILIRUBIN DIRECT: <0.2 MG/DL (ref 0–0.3)
BILIRUBIN, INDIRECT: ABNORMAL MG/DL (ref 0–1)
BUN BLDV-MCNC: 17 MG/DL (ref 7–20)
CALCIUM SERPL-MCNC: 8.6 MG/DL (ref 8.3–10.6)
CARBOXYHEMOGLOBIN ARTERIAL: 1.4 % (ref 0–1.5)
CHLORIDE BLD-SCNC: 109 MMOL/L (ref 99–110)
CO2: 22 MMOL/L (ref 21–32)
CREAT SERPL-MCNC: 0.7 MG/DL (ref 0.8–1.3)
EKG ATRIAL RATE: 107 BPM
EKG DIAGNOSIS: NORMAL
EKG P AXIS: 59 DEGREES
EKG P-R INTERVAL: 194 MS
EKG Q-T INTERVAL: 352 MS
EKG QRS DURATION: 118 MS
EKG QTC CALCULATION (BAZETT): 469 MS
EKG R AXIS: -41 DEGREES
EKG T AXIS: 50 DEGREES
EKG VENTRICULAR RATE: 107 BPM
EOSINOPHILS ABSOLUTE: 0 K/UL (ref 0–0.6)
EOSINOPHILS RELATIVE PERCENT: 0 %
GFR SERPL CREATININE-BSD FRML MDRD: >60 ML/MIN/{1.73_M2}
GLUCOSE BLD-MCNC: 118 MG/DL (ref 70–99)
HCO3 ARTERIAL: 25.5 MMOL/L (ref 21–29)
HCT VFR BLD CALC: 34.6 % (ref 40.5–52.5)
HEMOGLOBIN, ART, EXTENDED: 11.8 G/DL (ref 13.5–17.5)
HEMOGLOBIN: 11.9 G/DL (ref 13.5–17.5)
KEPPRA DOSE AMT: NORMAL
KEPPRA: 19.9 UG/ML (ref 6–46)
LACTIC ACID: 1.1 MMOL/L (ref 0.4–2)
LYMPHOCYTES ABSOLUTE: 1 K/UL (ref 1–5.1)
LYMPHOCYTES RELATIVE PERCENT: 8.8 %
MAGNESIUM: 1.9 MG/DL (ref 1.8–2.4)
MCH RBC QN AUTO: 33.9 PG (ref 26–34)
MCHC RBC AUTO-ENTMCNC: 34.3 G/DL (ref 31–36)
MCV RBC AUTO: 98.8 FL (ref 80–100)
METHEMOGLOBIN ARTERIAL: 0.1 %
MONOCYTES ABSOLUTE: 0.6 K/UL (ref 0–1.3)
MONOCYTES RELATIVE PERCENT: 5.4 %
NEUTROPHILS ABSOLUTE: 9.4 K/UL (ref 1.7–7.7)
NEUTROPHILS RELATIVE PERCENT: 85.7 %
O2 SAT, ARTERIAL: 99.9 %
O2 THERAPY: ABNORMAL
PCO2 ARTERIAL: 32.4 MMHG (ref 35–45)
PDW BLD-RTO: 12.6 % (ref 12.4–15.4)
PH ARTERIAL: 7.5 (ref 7.35–7.45)
PHOSPHORUS: 2.4 MG/DL (ref 2.5–4.9)
PLATELET # BLD: 122 K/UL (ref 135–450)
PMV BLD AUTO: 9.3 FL (ref 5–10.5)
PO2 ARTERIAL: 117 MMHG (ref 75–108)
POTASSIUM SERPL-SCNC: 3.6 MMOL/L (ref 3.5–5.1)
PROCALCITONIN: 0.15 NG/ML (ref 0–0.15)
RBC # BLD: 3.5 M/UL (ref 4.2–5.9)
SODIUM BLD-SCNC: 138 MMOL/L (ref 136–145)
TCO2 ARTERIAL: 59.3 MMOL/L
TOTAL CK: 756 U/L (ref 39–308)
TOTAL PROTEIN: 5.5 G/DL (ref 6.4–8.2)
WBC # BLD: 10.9 K/UL (ref 4–11)

## 2023-02-02 PROCEDURE — 2000000000 HC ICU R&B

## 2023-02-02 PROCEDURE — C9254 INJECTION, LACOSAMIDE: HCPCS | Performed by: STUDENT IN AN ORGANIZED HEALTH CARE EDUCATION/TRAINING PROGRAM

## 2023-02-02 PROCEDURE — 6370000000 HC RX 637 (ALT 250 FOR IP): Performed by: INTERNAL MEDICINE

## 2023-02-02 PROCEDURE — 99253 IP/OBS CNSLTJ NEW/EST LOW 45: CPT | Performed by: NEUROMUSCULOSKELETAL MEDICINE & OMM

## 2023-02-02 PROCEDURE — 92610 EVALUATE SWALLOWING FUNCTION: CPT

## 2023-02-02 PROCEDURE — 99291 CRITICAL CARE FIRST HOUR: CPT | Performed by: INTERNAL MEDICINE

## 2023-02-02 PROCEDURE — 80076 HEPATIC FUNCTION PANEL: CPT

## 2023-02-02 PROCEDURE — 2580000003 HC RX 258: Performed by: STUDENT IN AN ORGANIZED HEALTH CARE EDUCATION/TRAINING PROGRAM

## 2023-02-02 PROCEDURE — 82803 BLOOD GASES ANY COMBINATION: CPT

## 2023-02-02 PROCEDURE — 36415 COLL VENOUS BLD VENIPUNCTURE: CPT

## 2023-02-02 PROCEDURE — 2500000003 HC RX 250 WO HCPCS: Performed by: INTERNAL MEDICINE

## 2023-02-02 PROCEDURE — 6370000000 HC RX 637 (ALT 250 FOR IP): Performed by: NEUROMUSCULOSKELETAL MEDICINE & OMM

## 2023-02-02 PROCEDURE — 84100 ASSAY OF PHOSPHORUS: CPT

## 2023-02-02 PROCEDURE — 93010 ELECTROCARDIOGRAM REPORT: CPT | Performed by: INTERNAL MEDICINE

## 2023-02-02 PROCEDURE — 94003 VENT MGMT INPAT SUBQ DAY: CPT

## 2023-02-02 PROCEDURE — 84145 PROCALCITONIN (PCT): CPT

## 2023-02-02 PROCEDURE — 2580000003 HC RX 258: Performed by: INTERNAL MEDICINE

## 2023-02-02 PROCEDURE — 95819 EEG AWAKE AND ASLEEP: CPT

## 2023-02-02 PROCEDURE — 80048 BASIC METABOLIC PNL TOTAL CA: CPT

## 2023-02-02 PROCEDURE — 82550 ASSAY OF CK (CPK): CPT

## 2023-02-02 PROCEDURE — 6360000002 HC RX W HCPCS: Performed by: STUDENT IN AN ORGANIZED HEALTH CARE EDUCATION/TRAINING PROGRAM

## 2023-02-02 PROCEDURE — 6360000002 HC RX W HCPCS: Performed by: INTERNAL MEDICINE

## 2023-02-02 PROCEDURE — 99221 1ST HOSP IP/OBS SF/LOW 40: CPT | Performed by: NEUROMUSCULOSKELETAL MEDICINE & OMM

## 2023-02-02 PROCEDURE — 83605 ASSAY OF LACTIC ACID: CPT

## 2023-02-02 PROCEDURE — 83735 ASSAY OF MAGNESIUM: CPT

## 2023-02-02 PROCEDURE — 85025 COMPLETE CBC W/AUTO DIFF WBC: CPT

## 2023-02-02 PROCEDURE — 92526 ORAL FUNCTION THERAPY: CPT

## 2023-02-02 RX ORDER — HALOPERIDOL 5 MG/ML
5 INJECTION INTRAMUSCULAR EVERY 4 HOURS PRN
Status: DISCONTINUED | OUTPATIENT
Start: 2023-02-02 | End: 2023-02-03 | Stop reason: HOSPADM

## 2023-02-02 RX ORDER — OXCARBAZEPINE 300 MG/1
300 TABLET, FILM COATED ORAL 2 TIMES DAILY
Status: DISCONTINUED | OUTPATIENT
Start: 2023-02-02 | End: 2023-02-03 | Stop reason: HOSPADM

## 2023-02-02 RX ORDER — PANTOPRAZOLE SODIUM 40 MG/10ML
40 INJECTION, POWDER, LYOPHILIZED, FOR SOLUTION INTRAVENOUS DAILY
Status: DISCONTINUED | OUTPATIENT
Start: 2023-02-02 | End: 2023-02-03 | Stop reason: HOSPADM

## 2023-02-02 RX ORDER — ASPIRIN 81 MG/1
81 TABLET ORAL DAILY
Status: DISCONTINUED | OUTPATIENT
Start: 2023-02-02 | End: 2023-02-03 | Stop reason: HOSPADM

## 2023-02-02 RX ADMIN — SODIUM CHLORIDE 200 MG: 9 INJECTION, SOLUTION INTRAVENOUS at 21:31

## 2023-02-02 RX ADMIN — ATORVASTATIN CALCIUM 10 MG: 10 TABLET, FILM COATED ORAL at 20:25

## 2023-02-02 RX ADMIN — Medication 5 MCG/MIN: at 22:10

## 2023-02-02 RX ADMIN — ENOXAPARIN SODIUM 40 MG: 100 INJECTION SUBCUTANEOUS at 09:26

## 2023-02-02 RX ADMIN — LEVETIRACETAM 1500 MG: 100 INJECTION, SOLUTION, CONCENTRATE INTRAVENOUS at 10:56

## 2023-02-02 RX ADMIN — MIRTAZAPINE 7.5 MG: 15 TABLET, FILM COATED ORAL at 20:25

## 2023-02-02 RX ADMIN — SODIUM CHLORIDE, POTASSIUM CHLORIDE, SODIUM LACTATE AND CALCIUM CHLORIDE: 600; 310; 30; 20 INJECTION, SOLUTION INTRAVENOUS at 09:31

## 2023-02-02 RX ADMIN — TRAZODONE HYDROCHLORIDE 50 MG: 50 TABLET ORAL at 20:25

## 2023-02-02 RX ADMIN — PROPOFOL 25 MCG/KG/MIN: 10 INJECTION, EMULSION INTRAVENOUS at 04:33

## 2023-02-02 RX ADMIN — SODIUM CHLORIDE 200 MG: 9 INJECTION, SOLUTION INTRAVENOUS at 10:04

## 2023-02-02 RX ADMIN — SODIUM CHLORIDE, PRESERVATIVE FREE 10 ML: 5 INJECTION INTRAVENOUS at 20:26

## 2023-02-02 RX ADMIN — OXCARBAZEPINE 300 MG: 300 TABLET, FILM COATED ORAL at 20:25

## 2023-02-02 RX ADMIN — LEVETIRACETAM 1500 MG: 100 INJECTION, SOLUTION, CONCENTRATE INTRAVENOUS at 23:02

## 2023-02-02 ASSESSMENT — PAIN SCALES - GENERAL
PAINLEVEL_OUTOF10: 0
PAINLEVEL_OUTOF10: 0
PAINLEVEL_OUTOF10: 5

## 2023-02-02 ASSESSMENT — PULMONARY FUNCTION TESTS
PIF_VALUE: 17
PIF_VALUE: 16
PIF_VALUE: 17

## 2023-02-02 ASSESSMENT — PAIN DESCRIPTION - LOCATION: LOCATION: BACK

## 2023-02-02 NOTE — CARE COORDINATION
Discharge Planning Assessment    RN discharge planner spoke to Donell Hughes in admissions at [facility]. Pt status: [x]  LTC []  SNF []  AL  []  IL     Bed Status: Hold    Pre-cert required: No    Level of function/Support:  requires assistance    Additional Information:  DNR-CCA papers received and on chart, Dr. Wai Eduardo notified to update chart. Pt sister Jeni Navarro is actively involved and would like to receive updates.       Transportation at the time of discharge:  Medical    Electronically signed by Dena Ngo RN on 2/2/2023 at 8:25 AM

## 2023-02-02 NOTE — PROGRESS NOTES
Facility/Department: [facility] ICU  Speech Language Pathology-Initial Assessment  DYSPHAGIA BEDSIDE SWALLOW EVALUATION     Patient: Nereida Theodore   : 1960   MRN: [id number]      Evaluation Date: 2023   Admitting Diagnosis: Seizure disorder ([facility] [address] [G40.909]  Acute respiratory failure with hypoxia ([facility] [address] [J96.01]  Benzodiazepine-based tranquilizers causing adverse effect, initial encounter [T42.4X5A]  Altered mental status, unspecified altered mental status type [R41.82]  Pain: Denies                                                       H&P: \"Dakota Rees is 58 y.o. male with history of history of seizure disorder, drug abuse, craniotomy status post cerebral aneurysm rupture. 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 He underwent craniotomy and aneurysm clipping at the time. 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 Per prior notes he is aphasic at baseline. He now presents to the ER from a nursing facility where he lives after he was found down today. He arrived tachycardic, hypotensive, tachypneic and hypoxic. There was no witnessed seizure but he appeared postictal.   EMS gave him 10 mg of Versed in route to the hospital though he was not actively seizing so he arrived very somnolent with GCS of 4 not protecting his airway. When he arrived to the ED, he was immediately intubated for airway protection. Lactate was 11 on arrival suggesting possible recent seizure like activity. He was started on versed drip and admitted to the ICU\"    Imaging:  Chest X-ray:   Impression   Endotracheal tube appears properly placed above the bella. No acute cardiopulmonary findings     Head CT:   Impression   No acute intracranial abnormality. Prior Modified Barium Swallow Study: 2019   \"Pt in upright, seated position for MBS.  Presented with pudding thick x1, nectar thick x2 by cup, thin barium x3 by straw, and barium coated solid x1. Patient presents with functional oropharyngeal phases of swallow. There is a mild oral delay characterized by increased AP transit time/mild oral holding, but functional bolus containment with no spillage into the pharynx. Swallow initiation is delayed with initiation occurring at the level of the valleculae for all consistencies. Laryngeal elevation, anterior hyoid excursion, epiglottic inversion, and pharyngeal peristalsis were WFL. There was no penetration or aspiration with any consistency. Cervical esophageal clearance was complete. Barriers to nutrition: PO acceptance, low appetite\"    History/Prior Level of Function:   Living Status: ECF   Prior Dysphagia History: Pt with prior hx of oropharyngeal dysphagia, documented aphasia, apraxia and cognitive deficits. Hx of CVA, intracerebral hemorrhage (2017) and craniotomy noted in chart review. Pt recently evaluated by us 2/2022 with recommendations for Regular texture diet with thin liquids upon d/c. Per hard chart, pt on a Regular texture diet with thin liquids at baseline. Per chart review, pt with Modified Barium Swallow completed 4/2019 with recommendations for Dysphagia III Soft and Bite-Sized with thin liquids (see report above). Reason for referral: SLP evaluation orders received due to recent intubation  (2/1/2023-2/2/2023)    Dysphagia Impressions/Diagnosis: Oropharyngeal Dysphagia   Pt was positioned upright in bed on RA. He was oriented to person and location only. Not oriented to temporal concepts. Able to follow simple commands and was grossly able to respond appropriately to yes/no questions. Oral mechanism exam: decreased labial ROM, adequate lingual ROM/coordination, missing some dentition. Pt with hoarse vocal quality and strong cough. Various consistency trials were provided to assess swallow function. Pt was able to self feed however demonstrated reduced coordination.  Pt demonstrated prolonged mastication with delayed oral clearing of hard solids. Pt was able to consistently initiate pharyngeal swallow however, swallow onset appeared minimally delayed with suspected decreased laryngeal elevation. Throughout assessment, intermittent throat clearing was noted post swallows this could be indicative of laryngeal penetration/aspiration, pharyngeal stasis or due to recent intubation. Vocal quality remained clear and O2 saturations remained stable. Pt demonstrates aspiration risk due to recent intubation, prior hx of dysphagia and cognitive state. Recommend advance to Dysphagia III Soft and Bite-Sized with thin liquids with close monitoring of respiratory status with intake. Recommended Diet and Intervention 2/2/2023:  Diet Solids Recommendation:  Dysphagia III Soft and bite sized  Liquid Consistency Recommendation: Thin liquids  Recommended form of Meds: Meds whole with water or Meds in puree         Compensatory Swallowing Strategies:  Upright as possible with all PO intake , Small bites/sips , Eat/feed slowly, Remain upright 30-45 min     SHORT TERM DYSPHAGIA GOALS/PLAN OF CARE: Speech therapy for dysphagia tx 3-5 times per week during acute care stay.     Pt will functionally tolerate recommended diet with no overt clinical s/s of aspiration   Pt will demonstrate understanding of aspiration risk and precautions via education/demonstration with occasional prompting  Pt will advance to least restrictive diet as indicated     Dysphagia Therapeutic Intervention:  Diet Tolerance Monitoring , Patient/Family Education , Therapeutic Trials with SLP     Discharge Recommendations: Discharge recommendations to be determined pending ongoing follow-up during acute care stay    Patient Positioning: Upright in bed     Current Diet Level (prior to evaluation): NPO        Respiratory Status:   [x]Room Air   []O2 via nasal cannula   []Other:    Dentition:  []Adequate  []Dentures   [x]Missing Some Teeth  []Edentulous  []Other:    Baseline Vocal Quality:  []Normal  []Dysphonic   []Aphonic   [x]Hoarse  []Wet  []Weak  []Other:    Volitional Cough:  Elicited: Strong     Volitional Swallow:   []Absent   []Delayed     []Adequate     [x]Required use of drink     Oral Mechanism Exam:  [x]WFL []Mild   [] Moderate  []Severe  []To be assessed  Impaired:   []Left side      []Right side    []Labial ROM/Coordination    []Labial Symmetry   []Lingual ROM/Coordination   []Lingual Symmetry  []Gag  []Other:     Oral Phase: []WFL [x]Mild   [] Moderate  []Severe  []To be assessed   [x]Impaired/Prolonged Mastication:   []Oral Holding:   []Spillage Left:   []Spillage Right:  []Pocketing Left:   []Pocketing Right:   []Decreased Anterior to Posterior Transit:   []Suspected Premature Bolus Loss:   []Lingual/Palatal Residue:   []Other:     Pharyngeal Phase: []WFL [x]Mild   [] Moderate  []Severe  []To be assessed   []Delayed Swallow:   [x]Suspected Pharyngeal Pooling:   [x]Decreased Laryngeal Elevation:   []Absent Swallow:  []Wet Vocal Quality:   []Throat Clearing-Immediate:   []Throat Clearing-Delayed:   []Cough-Immediate:   []Cough-Delayed:  []Change in Vital Signs:  [x]Suspected Delayed Pharyngeal Clearing:  []Other:     Eating Assistance:  []Independent  []Setup or clean-up assistance   [] Supervision or touching assistance   [] Partial or moderate assistance   [] Substantial or maximal assistance  [] Dependent     EDUCATION:   Provided education regarding role of SLP, results of assessment, recommendations and general speech pathology plan of care. [] Pt verbalized understanding and agreement   [] Pt requires ongoing learning   [x] No evidence of comprehension     If patient discharges prior to next visit, this note will serve as discharge.      Treatment time:  Timed Code Treatment Minutes: 0 minutes   Total Treatment time: 25 minutes     Electronically signed by:  Mira Lucas MA [address] [place] [address]  Speech Language Pathologist

## 2023-02-02 NOTE — PROGRESS NOTES
Bed alarm activated and patient found getting up. Pulled out lopez and being verbally and physically aggressive toward staff. MD aware with restraints and PRN med orders placed. Urology consulted for penile bleeding with large clots. Sister, POA, updated. States that the patient is supposed to be a complete DNR, not CCA, and will be in touch with the nursing coordinator at his facility for proper documentation.

## 2023-02-02 NOTE — PLAN OF CARE
Problem: Discharge Planning  Goal: Discharge to home or other facility with appropriate resources  Outcome: Progressing     Problem: Pain  Goal: Verbalizes/displays adequate comfort level or baseline comfort level  Outcome: Progressing     Problem: Safety - Medical Restraint  Goal: Remains free of injury from restraints (Restraint for Interference with Medical Device)  Description: INTERVENTIONS:  1. Determine that other, less restrictive measures have been tried or would not be effective before applying the restraint  2. Evaluate the patient's condition at the time of restraint application  3. Inform patient/family regarding the reason for restraint  4.  Q2H: Monitor safety, psychosocial status, comfort, nutrition and hydration  Outcome: Progressing  Flowsheets (Taken 2/1/2023 2213)  Remains free of injury from restraints (restraint for interference with medical device): Every 2 hours: Monitor safety, psychosocial status, comfort, nutrition and hydration     Problem: Neurosensory - Adult  Goal: Achieves stable or improved neurological status  Outcome: Progressing  Goal: Absence of seizures  Outcome: Progressing  Goal: Remains free of injury related to seizures activity  Outcome: Progressing

## 2023-02-02 NOTE — ED NOTES
Patient sister Steve Rivera [phone] wishes to be updated with patient updates, she would like call when patient is in inpatient room and settled.       Ashlie Serna RN  02/01/23 2002

## 2023-02-02 NOTE — FLOWSHEET NOTE
Arrived from ED, sedated with Versed on mechanical ventilation, eyes open, follows commands, assisted self to reposition in bed. Monitor NSR with occasional bigeminal PVC. BP stable, bladder temp WNL. SPO2 WNL. Positioned for comfort, plan of care explained.

## 2023-02-02 NOTE — PLAN OF CARE
Problem: Discharge Planning  Goal: Discharge to home or other facility with appropriate resources  2/2/2023 0419 by David   Outcome: Progressing  Flowsheets (Taken 2/2/2023 0000)  Discharge to home or other facility with appropriate resources:   Identify barriers to discharge with patient and caregiver   Arrange for needed discharge resources and transportation as appropriate   Identify discharge learning needs (meds, wound care, etc)   Arrange for interpreters to assist at discharge as needed   Refer to discharge planning if patient needs post-hospital services based on physician order or complex needs related to functional status, cognitive ability or social support system  2/1/2023 2229 by Deloris Michelle RN  Outcome: Progressing     Problem: Pain  Goal: Verbalizes/displays adequate comfort level or baseline comfort level  2/2/2023 0419 by David   Outcome: Progressing  Flowsheets (Taken 2/2/2023 0400)  Verbalizes/displays adequate comfort level or baseline comfort level:   Encourage patient to monitor pain and request assistance   Administer analgesics based on type and severity of pain and evaluate response   Assess pain using appropriate pain scale   Implement non-pharmacological measures as appropriate and evaluate response   Consider cultural and social influences on pain and pain management   Notify Licensed Independent Practitioner if interventions unsuccessful or patient reports new pain  2/1/2023 2229 by Deloris Michelle RN  Outcome: Progressing     Problem: Safety - Medical Restraint  Goal: Remains free of injury from restraints (Restraint for Interference with Medical Device)  Description: INTERVENTIONS:  1. Determine that other, less restrictive measures have been tried or would not be effective before applying the restraint  2. Evaluate the patient's condition at the time of restraint application  3. Inform patient/family regarding the reason for restraint  4.  Q2H: Monitor safety, psychosocial status, comfort, nutrition and hydration  2/2/2023 0419 by Vern Daily  Outcome: Progressing  Flowsheets  Taken 2/2/2023 0400  Remains free of injury from restraints (restraint for interference with medical device):   Determine that other, less restrictive measures have been tried or would not be effective before applying the restraint   Evaluate the patient's condition at the time of restraint application   Inform patient/family regarding the reason for restraint   Every 2 hours: Monitor safety, psychosocial status, comfort, nutrition and hydration  Taken 2/2/2023 0200  Remains free of injury from restraints (restraint for interference with medical device):   Determine that other, less restrictive measures have been tried or would not be effective before applying the restraint   Evaluate the patient's condition at the time of restraint application   Inform patient/family regarding the reason for restraint   Every 2 hours: Monitor safety, psychosocial status, comfort, nutrition and hydration  2/1/2023 2229 by Barbra Bhagat RN  Outcome: Progressing  Flowsheets (Taken 2/1/2023 2213)  Remains free of injury from restraints (restraint for interference with medical device): Every 2 hours: Monitor safety, psychosocial status, comfort, nutrition and hydration     Problem: Neurosensory - Adult  Goal: Achieves stable or improved neurological status  2/2/2023 0419 by Vern Daily  Outcome: Progressing  Flowsheets (Taken 2/2/2023 0000)  Achieves stable or improved neurological status:   Assess for and report changes in neurological status   Initiate measures to prevent increased intracranial pressure   Maintain blood pressure and fluid volume within ordered parameters to optimize cerebral perfusion and minimize risk of hemorrhage   Monitor temperature, glucose, and sodium.  Initiate appropriate interventions as ordered  2/1/2023 2229 by Barbra Bhagat RN  Outcome: Progressing  Goal: Absence of seizures  2/2/2023 0419 by Theodora Buck  Outcome: Progressing  Flowsheets (Taken 2/2/2023 0000)  Absence of seizures:   Monitor for seizure activity. If seizure occurs, document type and location of movements and any associated apnea   If seizure occurs, turn head to side and suction secretions as needed   Administer anticonvulsants as ordered   Support airway/breathing, administer oxygen as needed   Diagnostic studies as ordered  2/1/2023 2229 by Oscar Huffman RN  Outcome: Progressing  Goal: Remains free of injury related to seizures activity  2/2/2023 0419 by Theodora Buck  Outcome: Progressing  Flowsheets (Taken 2/2/2023 0000)  Remains free of injury related to seizure activity:   Maintain airway, patient safety  and administer oxygen as ordered   Monitor patient for seizure activity, document and report duration and description of seizure to Licensed Independent Practitioner   If seizure occurs, turn patient to side and suction secretions as needed   Reorient patient post seizure   Seizure pads on all 4 side rails   Instruct patient/family to notify RN of any seizure activity   Instruct patient/family to call for assistance with activity based on assessment  2/1/2023 2229 by Oscar Huffman RN  Outcome: Progressing     Problem: Skin/Tissue Integrity  Goal: Absence of new skin breakdown  Description: 1. Monitor for areas of redness and/or skin breakdown  2. Assess vascular access sites hourly  3. Every 4-6 hours minimum:  Change oxygen saturation probe site  4. Every 4-6 hours:  If on nasal continuous positive airway pressure, respiratory therapy assess nares and determine need for appliance change or resting period.   Outcome: Progressing     Problem: Safety - Adult  Goal: Free from fall injury  Outcome: Progressing

## 2023-02-02 NOTE — H&P
Hospital Medicine History & Physical      PCP: Sherren Combs, MD    Date of Admission: 2/1/2023    Date of Service: Pt seen/examined on 02/01/23 and Admitted to Inpatient with expected LOS greater than two midnights due to medical therapy. Chief Complaint:  Altered mental status, seizure       History Of Present Illness:      Moni Farr is 58 y.o. male with history of history of seizure disorder, drug abuse, craniotomy status post cerebral aneurysm rupture. 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 He underwent craniotomy and aneurysm clipping at the time. 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 Per prior notes he is aphasic at baseline. He now presents to the ER from a nursing facility where he lives after he was found down today. He arrived tachycardic, hypotensive, tachypneic and hypoxic. There was no witnessed seizure but he appeared postictal.   EMS gave him 10 mg of Versed in route to the hospital though he was not actively seizing so he arrived very somnolent with GCS of 4 not protecting his airway. When he arrived to the ED, he was immediately intubated for airway protection. Lactate was 11 on arrival suggesting possible recent seizure like activity.    He was started on versed drip and admitted to the ICU      Past Medical History:          Diagnosis Date    Acute intracerebral hemorrhage ([address] 02/03/2017    Anxiety     Back pain     Cerebral artery occlusion with cerebral infarction ([address]     Chronic back pain greater than 3 months duration 02/03/2017    Diabetes mellitus ([address]     Dysphasia 02/03/2017    Dysphonia 02/03/2017    Endocarditis and heart valve disorders in diseases classified elsewhere 02/03/2017    Hydrocephalus ([address] 02/03/2017    Hypertension     IVH (intraventricular hemorrhage) ([address] 02/03/2017    NSTEMI (non-ST elevated myocardial infarction) [facility] 02/03/2017    Seizures ([facility]        Past Surgical History:          Procedure Laterality Date    BRAIN ANEURYSM SURGERY Left 02/03/2017    COLONOSCOPY N/A 8/14/2019    COLONOSCOPY WITH MAC ANESTHESIA performed by Stef Mejia MD at [facility] Left 02/03/2017    LEG SURGERY      UPPER GASTROINTESTINAL ENDOSCOPY N/A 8/14/2019    EGD BIOPSY performed by Stef Mejia MD at [place] [address]       Medications Prior to Admission:      Prior to Admission medications    Medication Sig Start Date End Date Taking?  Authorizing Provider   amoxicillin (AMOXIL) 500 mg capsule Take 500 mg by mouth 2 times daily    Historical Provider, MD   LACTOBACILLUS PO Take 1 mg by mouth Bid    Historical Provider, MD   cetirizine (ZYRTEC ALLERGY) 10 MG tablet Take 10 mg by mouth daily    Historical Provider, MD CHINCHILLA LIDOCAINE PATCH EX Apply topically    Historical Provider, MD   DULoxetine (CYMBALTA) 30 MG extended release capsule Take 60 mg by mouth daily    Historical Provider, MD   MELATONIN PO Take 6 mg by mouth nightly    Historical Provider, MD   traZODone (DESYREL) 50 MG tablet Take 50 mg by mouth nightly    Historical Provider, MD   lisinopril (PRINIVIL;ZESTRIL) 2.5 MG tablet Take 1 tablet by mouth daily 3/2/22   Brent Lewis MD   torsemide [facility]) 10 MG tablet Take 1 tablet by mouth daily 3/2/22   Brent Lewis MD   levETIRAcetam (KEPPRA) 500 MG tablet Take 1,500 mg by mouth 2 times daily    Historical Provider, MD   potassium chloride (KLOR-CON M) 20 MEQ extended release tablet Take 20 mEq by mouth 2 times daily    Historical Provider, MD   acetaminophen (TYLENOL) 325 MG tablet Take 650 mg by mouth every 4 hours as needed for Pain    Historical Provider, MD   mirtazapine (REMERON) 7.5 MG tablet Take 7.5 mg by mouth nightly    Historical Provider, MD   Cholecalciferol (VITAMIN D3) 5000 units TABS Take by mouth daily    Historical Provider, MD   lacosamide (VIMPAT) 200 MG tablet Take 200 mg by mouth 2 times daily. Historical Provider, MD   calcium carbonate (TUMS) 500 MG chewable tablet Take 1 tablet by mouth 2 times daily     Historical Provider, MD   methadone (DOLOPHINE) 5 MG tablet Take 5 mg by mouth daily. Historical Provider, MD   atorvastatin (LIPITOR) 10 MG tablet Take 10 mg by mouth nightly  2/3/17   Historical Provider, MD       Allergies:  Seasonal    Social History:      The patient currently lives at home     TOBACCO:   reports that he has been smoking cigarettes. He has been smoking an average of .5 packs per day. He has never used smokeless tobacco.  ETOH:   reports current alcohol use of about 1.0 standard drink per week. E-cigarette/Vaping       Questions Responses    E-cigarette/Vaping Use Never User    Start Date     Passive Exposure Yes    Quit Date     Counseling Given     Comments               Family History:      Reviewed and negative in regards to presenting illness/complaint. REVIEW OF SYSTEMS COMPLETED:   Pertinent positives as noted in the HPI. All other systems reviewed and negative. PHYSICAL EXAM PERFORMED:    BP (!) 82/42   Pulse 81   Temp 98.8 °F (37.1 °C) (Bladder)   Resp 18   Ht 6' (1.829 m)   Wt 167 lb 15.9 oz (76.2 kg)   SpO2 95%   BMI 22.78 kg/m²     General appearance:  No apparent distress, appears stated age and cooperative. HEENT:  Normal cephalic, atraumatic without obvious deformity. Pupils equal, round, and reactive to light. Extra ocular muscles intact. Conjunctivae/corneas clear. Neck: Supple, with full range of motion. No jugular venous distention. Trachea midline. Respiratory:  Normal respiratory effort. Clear to auscultation, bilaterally without Rales/Wheezes/Rhonchi. Cardiovascular:  Regular rate and rhythm with normal S1/S2 without murmurs, rubs or gallops. Abdomen: Soft, non-tender, non-distended with normal bowel sounds. Musculoskeletal:  No clubbing, cyanosis or edema bilaterally.   Full range of motion without deformity. Skin: Skin color, texture, turgor normal.  No rashes or lesions. Neurologic:  Neurovascularly intact without any focal sensory/motor deficits. Cranial nerves: II-XII intact, grossly non-focal.  Psychiatric:  Alert and oriented, thought content appropriate, normal insight  Capillary Refill: Brisk,3 seconds, normal  Peripheral Pulses: +2 palpable, equal bilaterally       Labs:     Recent Labs     02/01/23  1645   WBC 4.7   HGB 13.1*   HCT 40.0*   *     Recent Labs     02/01/23  1645      K 3.7      CO2 17*   BUN 20   CREATININE 1.2   CALCIUM 9.3     Recent Labs     02/01/23  1645   AST 19   ALT 12   BILITOT 0.4   ALKPHOS 50     No results for input(s): INR in the last 72 hours. Recent Labs     02/01/23  1645 02/01/23  2131   CKTOTAL  --  342*   TROPONINI <0.01  --        Urinalysis:      Lab Results   Component Value Date/Time    NITRU Negative 02/01/2023 04:45 PM    WBCUA 1 02/01/2023 04:45 PM    BACTERIA None Seen 02/01/2023 04:45 PM    RBCUA 3 02/01/2023 04:45 PM    BLOODU TRACE 02/01/2023 04:45 PM    SPECGRAV 1.010 02/01/2023 04:45 PM    GLUCOSEU Negative 02/01/2023 04:45 PM       Radiology:     CXR: I have reviewed the CXR with the following interpretation:   EKG:  I have reviewed the EKG with the following interpretation:     XR CHEST PORTABLE   Final Result   Endotracheal tube appears properly placed above the bella. No acute cardiopulmonary findings         CT Head W/O Contrast   Final Result   No acute intracranial abnormality.              Consults:    IP CONSULT TO CRITICAL CARE  IP CONSULT TO NEUROLOGY    ASSESSMENT:    Acute respiratory failure with hypoxia  Intubated for airway protection  Altered mental status  Patient unresponsive likely due to high dose of versed (10 mg) given by EMS  Partial seizure disorder  History of acute intracerebral hemorrhage   He required craniotomy for cerebral aneurysm rupture in 2017  Aphasic at baseline  Diabetes mellitus type 2  Severe mitral regurgitation. ANA MARIA on February 2022 shows  LV is severely dilated with low-normal systolic function. Severe MR, severe AR. Mechanism of MR appears to be primarily ruptured chorda of anterior mitral leaflet. Possible perforation of aortic valve. Poor central coaptation of aortic valve. Essential hypertension  Protein calorie malnutrition, moderate    PLAN:    Admit to the ICU  Continue IV Versed infusion or propofol for sedation through the night  Continue IV fentanyl infusion for analgesia as ordered  IV lactated Ringer infusion at 75 mL/h to maintain adequate urine output  He has already received 2 L normal saline bolus in the ED  I have ordered Levophed to maintain MAP greater than 65  Consult pulmonology for vent management neurology for seizure disorder  For now IV Keppra is also ordered. Check Keppra level. Continue PO Keppra after he is extubated  Continue his usual oral meds once extubated  Given history of bacteremia check blood culture      DVT Prophylaxis: Lovenox  Diet: Diet NPO Exceptions are: Sips of Water with Meds  Code Status: Full Code    PT/OT Eval Status: PT/OT consult is not ordered    Dispo -admit as inpatient       Damaris Moscoso MD    Thank you Loni Self MD for the opportunity to be involved in this patient's care. If you have any questions or concerns please feel free to contact me at [phone].

## 2023-02-02 NOTE — CONSULTS
Neurology Consultation    Pt Name: Karla Sanchez  MRN: [id number]  [place] 1960  Date of evaluation: 2/2/2023  Provider: Vickie John  Requesting Provider: Colletta Galla  Reason: Seizure         C/C: found seizing at NH       HPI:   58 yea old male , a nursing home resident with known PMH of  \"Refractory Epilepsy\" who was found   actively seizing upon entering room from 1506-8580 then became unresponsive by according to [facility] staff yesterday and was brought in to ED by ambulance and a 10- mg of Versed was given. Patient arrived diaphoretic, pale appearing. GCS of 4  Labored breaths. Hypoxic on room air. Tachycardic regular rhythm. No spontaneous movement or speech. Patient was intubated due to decreased BP, unresponsiveness. Head CT today not showing any bleed. He has a known history of seizures secondary to left sided craniotomy 2017 and aneurysmal bleed on Keppra 1500 mg bd and Vimpat 200 mg bd. He was also on Remeron, Trazodone, Duloxetine and Methadone     There has been  no seizure activity since admission  Per prior notes he is aphasic at baseline. Past Medical History:       history of history of seizure disorder, drug abuse, craniotomy status post cerebral aneurysm rupture. 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 He underwent craniotomy and aneurysm clipping at the time. 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    Past Medical History             Diagnosis Date    Acute intracerebral hemorrhage ([address] 02/03/2017    Anxiety      Back pain      Cerebral artery occlusion with cerebral infarction (HCC)      Chronic back pain greater than 3 months duration 02/03/2017    Diabetes mellitus ([address]      Dysphasia 02/03/2017    Dysphonia 02/03/2017    Endocarditis and heart valve disorders in diseases classified elsewhere 02/03/2017    Hydrocephalus ([address] 02/03/2017    Hypertension      IVH (intraventricular hemorrhage) [address] 02/03/2017    NSTEMI (non-ST elevated myocardial infarction) ([facility] 02/03/2017    Seizures ([facility]              Past Surgical History:       Past Surgical History             Procedure Laterality Date    BRAIN ANEURYSM SURGERY Left 02/03/2017    COLONOSCOPY N/A 8/14/2019     COLONOSCOPY WITH MAC ANESTHESIA performed by Richard Chaudhari MD at [address] [facility] [address] Left 02/03/2017    LEG SURGERY        UPPER GASTROINTESTINAL ENDOSCOPY N/A 8/14/2019     EGD BIOPSY performed by Richard Chaudhari MD at [facility] ENDOSCOPY            Medications Prior to Admission:      Home Medications           Prior to Admission medications    Medication Sig Start Date End Date Taking?  Authorizing Provider   amoxicillin (AMOXIL) 500 mg capsule Take 500 mg by mouth 2 times daily       Historical Provider, MD   LACTOBACILLUS PO Take 1 mg by mouth Bid       Historical Provider, MD   cetirizine (ZYRTEC ALLERGY) 10 MG tablet Take 10 mg by mouth daily       Historical Provider, MD CHINCHILLA LIDOCAINE PATCH EX Apply topically       Historical Provider, MD   DULoxetine (CYMBALTA) 30 MG extended release capsule Take 60 mg by mouth daily       Historical Provider, MD   MELATONIN PO Take 6 mg by mouth nightly       Historical Provider, MD   traZODone (DESYREL) 50 MG tablet Take 50 mg by mouth nightly       Historical Provider, MD   lisinopril (PRINIVIL;ZESTRIL) 2.5 MG tablet Take 1 tablet by mouth daily 3/2/22     Kiki Mills MD   torsemide [facility]) 10 MG tablet Take 1 tablet by mouth daily 3/2/22     Kiki Mills MD   levETIRAcetam (KEPPRA) 500 MG tablet Take 1,500 mg by mouth 2 times daily       Historical Provider, MD   potassium chloride (KLOR-CON M) 20 MEQ extended release tablet Take 20 mEq by mouth 2 times daily       Historical Provider, MD   acetaminophen (TYLENOL) 325 MG tablet Take 650 mg by mouth every 4 hours as needed for Pain       Historical Provider, MD mirtazapine (REMERON) 7.5 MG tablet Take 7.5 mg by mouth nightly       Historical Provider, MD   Cholecalciferol (VITAMIN D3) 5000 units TABS Take by mouth daily       Historical Provider, MD   lacosamide (VIMPAT) 200 MG tablet Take 200 mg by mouth 2 times daily. Historical Provider, MD   calcium carbonate (TUMS) 500 MG chewable tablet Take 1 tablet by mouth 2 times daily        Historical Provider, MD   methadone (DOLOPHINE) 5 MG tablet Take 5 mg by mouth daily. Historical Provider, MD   atorvastatin (LIPITOR) 10 MG tablet Take 10 mg by mouth nightly  2/3/17     Historical Provider, MD           Allergies      CNS Imaging:    Head CT scan, CTA of head and Neck and C spine CT Scan  on admission: were all  WNL. No acute intracranial abnormality. A prior brain MRI scan one year ago:     MRI OF THE BRAIN WITHOUT CONTRAST  2/26/2022 11:30 am       TECHNIQUE:   Multiplanar multisequence MRI of the brain was performed without the   administration of intravenous contrast.       COMPARISON:   01/15/2022, 02/22/2022. HISTORY:   ORDERING SYSTEM PROVIDED HISTORY: stroke   TECHNOLOGIST PROVIDED HISTORY:   Reason for exam:->stroke   Reason for Exam: stroke like symptoms       FINDINGS:   INTRACRANIAL STRUCTURES/VENTRICLES: No areas of restricted diffusion to   suggest an acute infarct. The cerebral and cerebellar parenchyma demonstrate   volume loss. Chronic infarcts are noted in the right frontal lobe, left   posterior cerebral hemisphere, and bilateral cerebellar hemispheres. No   abnormal extra-axial fluid collections. The ventricles are enlarged,   asymmetric on the left, likely related to involutional change. There are several scattered areas of increased T2 signal noted   supratentorially, compatible with mild chronic microvascular white matter   ischemic disease. Normal major intracranial flow voids are noted.        There are several areas of blooming artifact in the cerebellar hemispheres   and posterior cerebral hemispheres, likely related to sequela of remote   hemorrhage, unchanged. ORBITS: The visualized portion of the orbits demonstrate no acute abnormality. SINUSES: There is scattered trace paranasal sinus disease. The mastoid air   cells are clear. BONES/SOFT TISSUES: The bone marrow signal intensity and craniocervical   junction are unremarkable. Pituitary gland is normal in appearance. There is sequela of a posterior left craniotomy. There is a sebaceous cyst   along the right scalp towards the vertex that was calcified on the previous   exam.           Impression   1. No evidence of acute infarct. 2. Left-sided craniotomy with underlying encephalomalacia in the posterior   left cerebral hemisphere, unchanged. 3. Cerebral parenchymal volume loss with chronic microvascular white matter   ischemic disease. Chronic infarcts are noted in the right frontal lobe and   cerebellar hemispheres. Labs:          Recent Labs     02/01/23  1645   WBC 4.7   HGB 13.1*   HCT 40.0*   *          Recent Labs     02/01/23  1645      K 3.7      CO2 17*   BUN 20   CREATININE 1.2   CALCIUM 9.3          Recent Labs     02/01/23  1645   AST 19   ALT 12   BILITOT 0.4     Examination:    Patient  on mechanical ventilation, eyes open, follows simple commands, appears comfortable, no jerking or tremor on involuntary movements seen. Supple neck, no meningism  Pupils 3 mm symmetrical and reactive  No apparent facial asymmetry.  Moving both side to command  Left Babinski  Soft abdomen, + BS  Reguarr s1 s2 sounds  Good air exchange bilaterally  , no edema      A+P:    Break thru seizure    Partial symptomatic epilepsy with complex partial seizures, intractable, without status epilepticus    Continue Levetiracetam at 1500 mg  bd (May switch to oral once extubated) and Lacosamide 200 mg bd    Add oxcarbazepine which has better coverage for partial onset seizures; Immediate release: Initial: 300 mg bd doses orally or via NG tube    Monitor for rash, if develops stop  oxcarbazepine immediately    Benzodiazepine for break thru seizures  EEG today      Lisa Rojas MD  Neurology  [phone]     Thank you for the consultation

## 2023-02-02 NOTE — ACP (ADVANCE CARE PLANNING)
Advance Care Planning   Healthcare Decision Maker:    Primary Decision Maker: Karthik Bobo - Brother/Sister - [phone]    Secondary Decision Maker: Mary Jane Doyle - Other - [phone]    Click here to complete Healthcare Decision Makers including selection of the Healthcare Decision Maker Relationship (ie \"Primary\"). Today we documented Decision Maker(s) consistent with ACP documents on file. Pt's ACP received 8/21/19 is one file containing DPOA, Living Will, and HCPOA.

## 2023-02-02 NOTE — ED NOTES
Spoke with [facility] Fermin Born per sister Regis Moreno request. Patient is [id number] but paperwork did not arrive with patient. Requested that paperwork be faxed over but SNF unsure if they'd be able to send it over [facility].       Sally Oates RN  02/01/23 2008

## 2023-02-02 NOTE — PROGRESS NOTES
Hospitalist Progress Note    Name:  Aide Mejia    /Age/Sex: 1960  (58 y.o. 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 He underwent craniotomy and aneurysm clipping at the time. 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 Per prior notes he is aphasic at baseline. Found down at nursing home. Unwitnessed seizure. Given 10mg versed en route to hospital. Intubated for concern for airway compromise. Lactate 11.0 on admission. Started on versed drip. Subjective: Today is:  Hospital Day: 2. Patient seen and examined in ICU-3905/3905-01. Awake on ventilator today. Aphasic at baseline, but able to follow minimal commands. No sign of distress or pain.       Medications:  Reviewed    Infusion Medications    IVPB builder Stopped (23)    sodium chloride      propofol Stopped (23)    lactated ringers IV soln 75 mL/hr at 23 0434    norepinephrine Stopped (23 07)    fentaNYL       Scheduled Medications    levETIRAcetam  1,500 mg IntraVENous Q12H    lacosamide (VIMPAT) IVPB  200 mg IntraVENous BID    sodium chloride flush  5-40 mL IntraVENous 2 times per day    enoxaparin  40 mg SubCUTAneous Daily    DULoxetine  30 mg Oral Daily    traZODone  50 mg Oral Nightly    mirtazapine  7.5 mg Oral Nightly    atorvastatin  10 mg Oral Nightly    lisinopril  2.5 mg Oral Daily    torsemide  10 mg Oral Daily    cetirizine  10 mg Oral Daily     PRN Meds: sodium chloride flush, sodium chloride, ondansetron **OR** ondansetron, polyethylene glycol, acetaminophen **OR** acetaminophen      Intake/Output Summary (Last 24 hours) at 2023 0904  Last data filed at 2023 0434  Gross per 24 hour   Intake 791.43 ml   Output 1025 ml   Net -233.57 ml       Physical Exam Performed:    BP (!) 105/54   Pulse 64   Temp 99.2 °F (37.3 °C) (Bladder)   Resp 24   Ht 6' (1.829 m)   Wt 168 lb 6.9 oz (76.4 kg)   SpO2 100%   BMI 22.84 kg/m²     General appearance: No apparent distress, appears stated age and cooperative. Mildly sedated, but follows commands. HEENT: Pupils equal, round, and reactive to light. Conjunctivae/corneas clear. Intubated. Neck: Supple, with full range of motion. No jugular venous distention. Trachea midline. Respiratory:  Normal respiratory effort. Mild crackles noted bilaterally. Cardiovascular: Regular rate and rhythm with normal S1/S2 without murmurs, rubs or gallops. No peripheral edema. Abdomen: Soft, non-tender, non-distended with normal bowel sounds. : No CVA tenderness. Musculoskeletal: No clubbing or cyanosis. Full range of motion without deformity. Skin: Skin color, texture, turgor normal.  No rashes or lesions. Neurologic:  Neurovascularly intact without any focal sensory/motor deficits. Cranial nerves: II-XII intact, grossly non-focal.  Psychiatric: Alert and oriented, thought content appropriate, normal insight  Capillary Refill: Brisk, 3 seconds, normal   Peripheral Pulses: +2 palpable, equal bilaterally       Labs:   Recent Labs     02/01/23 1645 02/02/23 0407   WBC 4.7 10.9   HGB 13.1* 11.9*   HCT 40.0* 34.6*   * 122*     Recent Labs     02/01/23 1645 02/02/23 0407    138   K 3.7 3.6    109   CO2 17* 22   BUN 20 17   CREATININE 1.2 0.7*   CALCIUM 9.3 8.6   PHOS  --  2.4*     Recent Labs     02/01/23 1645 02/02/23 0407   AST 19 29   ALT 12 11   BILIDIR  --  <0.2   BILITOT 0.4 0.8   ALKPHOS 50 39*     No results for input(s): INR in the last 72 hours.   Recent Labs     02/01/23 1645 02/01/23  2131 02/02/23 0407   CKTOTAL  --  342* 756*   TROPONINI <0.01  --   --        Urinalysis:      Lab Results   Component Value Date/Time    NITRU Negative 02/01/2023 04:45 PM    WBCUA 1 02/01/2023 04:45 PM    BACTERIA None Seen 02/01/2023 04:45 PM    RBCUA 3 02/01/2023 04:45 PM    BLOODU TRACE 02/01/2023 04:45 PM    SPECGRAV 1.010 02/01/2023 04:45 PM    GLUCOSEU Negative 02/01/2023 04:45 PM       Radiology:  XR CHEST PORTABLE   Final Result   Endotracheal tube appears properly placed above the bella. No acute cardiopulmonary findings         CT Head W/O Contrast   Final Result   No acute intracranial abnormality.                  Assessment/Plan:    Active Hospital Problems    Diagnosis     Congestive heart failure ([address] [I50.9]     Complex care coordination [Z71.89]     Hypertension associated with diabetes ([address] [E11.59, I15.2]     Acute respiratory failure with hypoxia ([address] [J96.01]     History of stroke [Z86.73]     Partial symptomatic epilepsy with complex partial seizures, intractable, without status epilepticus ([address] [G40.219]     Seizure ([address] [R56.9]     Protein calorie malnutrition ([address] [E46]     Type 2 diabetes mellitus ([address] [E11.9]     Endocarditis, sumit and aortic valves [I38]     Acute intracerebral hemorrhage ([address] [I61.9]     Psychosis ([address] [place] [address] Day: 2    This is a 58 y.o. male who presented to [facility] on 2/1/2023 and is being treated for:    Seizure  -CT head nonacute  -Unsure of cause of seizure at this time  -s/p left parieto-occipital mycotic aneurysm rupture and subsequent craniotomy in 2017  -Continue home Keppra and Vimpat; may need additional AED  -Keppra level ordered  -Blood cultures ordered  -Hold off on antibiotics for now as patient does not seem to have infection; no leukocytosis or fever  -Off sedation  -Daily labs; replace electrolytes as needed  -Neurology consulted; appreciate recommendations  -Palliative care consulted  -SLP evaluation after extubation    Acute respiratory failure  -Likely 2/2 seizure vs versed sedation  -Intubated; plan for extubation later today pending SBT  -Monitor O2 saturation after extubation  -Keep O2 sat above 90%    HFpEF  -Echo 2/24/2022 shows LVEF 55% with G1 DD; severe MR; severe AR  -Continue home torsemide  -I/Os    Hx of CVA  -Continue ASA and statin    Hypertension  -Continue home antihypertensives    Type 2 diabetes  -Not on antihyperglycemics  -Monitor BG  -We will add SSI if patient becomes hyperglycemic      DVT ppx: Lovenox  GI ppx: PPI  Diet: Diet NPO Exceptions are: Sips of Water with Meds  Code Status: DNR-CCA    Disposition:  Intubated, hopeful for extubation today. Seizure disorder - restarting home meds. Will likely go back to facility in next 72 hours. PT/OT Eval Status: ordered    Patient has a high probability of imminent or life-threatening deterioration requiring close monitoring, and highly complex decision-making and/or interventions of high intensity to assess, manipulate, and support his critical organ systems to prevent decline. I personally spent 33 minutes in providing critical care. Total critical care time includes caring for direct patient contact, management of life support systems, review of data including imaging and labs, discussions with other team members and physicians, but excludes procedures. Mai Martines, DO  2/2/2023  9:04 AM      Please note that some part of this chart was generated using Dragon dictation software. Although every effort was made to ensure the accuracy of this automated transcription, some errors in transcription may have occurred inadvertently. If you may need any clarification, please do not hesitate to contact me through [facility].

## 2023-02-02 NOTE — ED NOTES
Patient:   ELEUTERIO SCANLON            MRN: [facility]-[id number]            FIN: [id number]              Age:   3 years     Sex:  MALE     :  16   Associated Diagnoses:   None   Author:   WILMA ROSS     History of Present Illness   NOTES Eleuterio is a previously healhty 3y male admitted with complicated pneumonia.  Per mom, patient was well until about 1 week ago when he began to have mild cough and runny nose.   A few days after URI symptoms began, patient started to have fever.  Mom was giving tylenol and patient seemed to improve.  Went to [place] with dad.  Mom stopped giving tylenol and then fevers returned.  Cough began to increase.  patient complained of upper abd  pain.  Did not want to eat, but was tolerating liquids.  Decreased energy.  Mom took to OSH ED.  Found to be tachypneic with increased WOB.  Placed on HFNC.  Given duoneb and then CTX.  CXR showed complete consolidation of left lung.  Right lung clear.  WBC 6.8.  CRP 29.1.  PCT 23.61.  patient transferred to [facility] for further care.  Initially on HFNC, but subsequently required intubation.  Chest tube placed at bedside by surgery team.  Pleural  fluid with GPC.  OSH bCx also with GPC.  BPs have been stable.   Has significant thrombocytopenia.  Patient's sister with recent URI symptoms and started on an abx.  Family unsure of which abx, but state that sister is well at this time.  Eleuterio has not had rash.  No V/D.  Not complaining of HA.  No lethargy.  No easy bruising.  No easy bleeding.  No weight loss.      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  Does go to .    FHx: no bleeding disorders, no immune dysfunction      Update : continued febrile, current vent settnigs rate 30, FiO2 40%, Overnight pt noted to have no neuro response to stimulation, so  Report called to [facility] ICU RN      Jamaal Chin RN  02/01/23 2029 vecuronium drip decreased to 1.0mcg/kg/hr.  Pt with persistent fevers overnight.  Pt received tylenol, but did not defervesce.  Switched to IV tylenol this morning.  Pt also noted to have low UOP and increased hand, foot, and periorbital edema on exam.  Received 2 doses albumin in the past 24 hours, one dose  of lasix.      Update 9/22: last temp elevation 9/21 at 10 pm to 38, overnight pressors epi and norepi but then weaned off this morning, vent at rate 30 and FiO2 40%, art lines placed, minimal chest tube output  Update 9/23: last fever 2 am today, 38.4, changes vent overnight now rate 25. FiO2 40%, off pressors, plan for VATS tomorrow  Update 9/24: febrile, vent rate 18, FiO2 40%, plan for VATS today  Update 9/25: patient is POD#1 status post VATS, continues to remain intubated on the vent. He was febrile to 39.2 this morning and blood cultures were collected. Vent settings notable for FiO2 at 41%, PEEP of 10, RR:24, with an End-tidal between 40-50.  Update 9/26: POD#2 s/p VATS. febrile to 39.3 this AM and blood cultures collected. Leakage around chest tube site last night, surgery readjusted tube. CXR this AM c/f possible PTX (reviewed by me- TATUM). Vent settings still R 18, FiO2 40%, , Peep 10. Et CO2 in 50's.  Update 9/27: POD#3 s/p VATS. Febrile overnight, Tmax 38.8. BCx obtained. Decreasing vent settings R 14  PEEP 6 FiO2 35%. Chest tube with 64mL out, same as day prior. CXR continues to show L lateral lung luceny, concerning for necorsis. Plan for PICC today.  Update 9/28:  Last fever 9/27 AM.  PEEP at 5.  Getting blood transfusion this morning.  Chest tube with 30mL out.  Update 9/29:  Tmax 39.9.  Bps stable.  Weaning on vent.  Rate down to 10.  Chest tube with 22mL out.  Now to waterseal.  Update 9/30:  Chest tube to waterseal, drained 2mL/24 hours.  New labs: WBC stable at 15.8. CRP down to 9.9.  Last fever 9/29 @ 1230 = 39.9.  Overnight has been slightly hypothermic to 35.8.  Still requiring  mechanical ventilation, however ventilator settings very mild, extubation trial today    Update 10/1:  Patient with significantly improving respiratory status from complicated LLL/LML pneumonia s/p VATS procedure.  Extubated and chest tube removed yesterday, as well as central line and kilgore.  CXR stable this am fter chest tube removal.  Has remained afebrile overnight and has tolerated wean in resp support to 10L HFNC 25% FiO2.  New labs:  WBC uptrending to 20.6 (from 15.8 yesterday), platelets continuing to climb to 704.  No new inflammatory markers.  9/28 Resp Cx:  Rare Pseudomonas, pending    Update 10/2:  Patient afebrile but still very agitated 2/2 sedation wean.  He tolerated wean from HFNC to room air last night!  Labs today: WBC up to 21.5, platelets up to 746    Update 10/3:  At 3am this morning, patient required escalation of respiratory support from RA to 15L HFNC, 100% FiO2.  Per PICU team, an air bubble was noticed under patient's chest tube dressing.  CXR and CT chest were done which showed possible worsening of left sided necrosis and enlargement of the existing pneumothorax.  Surgery was contacted, who changed the dressing and recommended aggressive pulmonary toilet in addition to replacement of the Chest  Tube in the OR today.  Eleuterio was placed on HFNC but never had any change in vitals signs or breathing status.  AM Labs:  WBC stable at 21.3, platelets down trending to 600s, CRP down from 9.9 to 6.5  Addendum: Eleuterio was taken for placement of new CT by IR. By report, serosanguinous fluid draining in CT. Post-op returned to PICU.    Update 10/4:  Patient s/p Chest tube revision in OR by IR yesteday 10/3.  Pleural fluid culture taken and culture is pending.  Chest tube drained 90mL since re-insertion yesteday.  Overnight patient requiring more suctioning but tolerated wean in respiratory support to 2L NC.  WBC 12 today  CXR this am with some improvement, though with significant residual  disease.    Update 10/5:  Today appears more comfortable, not agitated. No new fevers. CT output 86cc. CXR stable today.    Update 10/6:  Just had NG placed. On RA, alert. CXR this am reviewed and compared with yesterday's- essentially no change, still with significant left opacification. Minimal CT output.     Update 10/7:  Afebrile w/o acute events overnight.  Patient w/ largely unchanged CXR and PE today.  Only 50mL fluid out of chest tube w/in last 24 hours.  Remains stable on room air with somewhat less agitation today.    Update 10/8:  Eleuterio is much more playful and energetic today compared to previous days!  Patient afebrile overnight and stable on RA.  Chest tube output 40mL in last 24 hours, remains to suction.  New labs show stable WBC at 15 and uptrending platelets to 756.  CXR yesterday afternoon shows LL consolidation, possibly c/w pneumonitis, and persistent Right pulm vascular congestion.    Update 10/9:  CXR uncahnged, still with notable LL opacification.  No fevers overnight, chest tube draining well, put out 85mL in last 24 hours.  Patient still stable on room air and more active.  No new labs.  Afebrile for several days.    Update 10/10:  Chest tube output decreased today, only drained 20mL over the last 24 hours.  Continues to be stable on RA and afebrile.  PO slowly improving.  No new labs or imaging.    Update 10/11:  Afebrile, remains stable on RA.  Chest tube output still much decreased, did not drain any fluid over the last 24 hours.  New labs:  WBC stable at 13.6, CRP down to 1.1.    Update 10/12:  No fevers.  Chest tube with 60mL out.  Stable on room air.  Had irregular HR overnight.  Resolved without intervention.  CXR showed picc position to be correct.    Update 10/13:  No fevers.  Room air.  Chest tube remains in place, but surgery planning to remove later today most likely.   Weaning methodone.  mom reports that patient did not sleep well last night, but is sleeping comfortably this  morning.    Update 10/14:  Chest tube removed by surgery yesterday 10/13, patient has been tolerating RA well since then and remained afebrile.   CXR this morning: slighlty more air on left pneumothorax, L lung changes c/w necrotizing PNA.    Update 10/15:  Pneumothorax still enlarging today on CXR compared to yesterday's imaging.  Hospitalist team is discussing need for intervention with [facility] Surgery team.   Otherwise, patient has been afebrile, on RA, in no acute distress, but still taking PO much less than baseline.  No new labs today.    Update 10/16:  remained afebrile, on Room air.  Patient had video swallow study yesterday and was cleared to take nectar-thick liquids by mouth.  No new labs. No chest x-ray today; per parents, likely Friday.    Update 10/17:  Eleuterio remains on room air, ambulating without assistance, today per mother coughing more but in no distress. No fevers. Minimal PO. Plan for CXR tomorrow.    Update 10/18:  Eleuterio becomes more active everyday, has remained afebrile on RA for quite some time.  Diet advancing nicely.  No new labs.  CXR today shows persistent pneumothorax.  Update 10/19:  AF, pt removed NG 10/18 pm, not replaced, taking po, no new problems  Update 10/20:  AF, no respiratory symtpoms, RA, taking thickened feeds, no problem with po amoxicillin    Update 10/22:    Patient has been stable on RA and continues to be afebrile. Po amox transiitoned to ampicillin due to being NPO for plan for OR today for thoracostomy/possible VATS due persistent hydropneumothorax on left.   Update 10/23:  POD 1 mini thoracotomy with removal of necrotic lung, evacuation of pleural space, chest tube placement.  Pt on RA post op, chest tube drained approx 100 cc, hgb 6 and receiving PRBCs.       Review of Systems   Constitutional:  No fever.    Eye:  Negative.    Ear/Nose/Mouth/Throat:  Negative.    Cardiovascular:  Negative.    Respiratory:  Negative except as documented in history of present illness,  PTX, No shortness of breath.   Gastrointestinal:  Negative.    Genitourinary:  Negative.    Musculoskeletal:  Negative.    Integumentary:  Negative.    Hematology/Lymphatics:  Negative.    Neurologic:  Negative.    Endocrine:  Negative.    Allergy/Immunologic:  Negative.    All other systems All other systems are negative.     Physical Examination   VS/Measurements       Vitals between:   22-OCT-2019 22:41:49   TO   23-OCT-2019 22:41:49                   LAST RESULT MINIMUM MAXIMUM  Temperature 37.1 36.5 37.1  Heart Rate 132 117 140  Respiratory Rate 26 24 28  NISBP           114 100 120  NIDBP           69 65 103  NIMBP           79 72 109  SpO2                    100 97 100    General:  No acute distress.    Eye:  Normal conjunctiva.    HENT:  Normocephalic, Oral mucosa is moist.    Neck:  Supple, Non-tender, No lymphadenopathy.    Respiratory:  Decreased breath sounds in left base; somewhat diminished in left mid-upper lung fields. No focality. No wheezes or crackles..   Cardiovascular:  Normal rate, Regular rhythm, Normal peripheral perfusion.   Gastrointestinal:  Soft, Non-tender, Non-distended.    Lymphatics:  No lymphadenopathy neck, axilla, groin.    Musculoskeletal:  No tenderness, No swelling, No deformity, PICCsite RUE no erythema or tenderness.   Integumentary:  Warm, No rash.    Neurologic:  Alert, No focal deficits, awake appropriately with exam.      Review / Management   Laboratory results:       Labs between:  22-OCT-2019 22:41 to 23-OCT-2019 22:41    CBC:                 WBC  HgB  Hct  Plt  MCV  RDW   23-OCT-2019 10.5  (!) 6.3  (L) 18.9  302  (H) 93.6  (H) 16.0     DIFF:                 Seg  Neutroph//ABS  Lymph//ABS  Mono//ABS  EOS/ABS  23-OCT-2019 NOT APPLICABLE  63 // 6.5 20 // (L) 2.1  15 // (H) 1.5  2 // 0.2                 ,   Other ID Labs  RPP: positive for Parainfluenza and Rhinovirus/Enterovirus.   MRSA PCR: Negative    10/22 IgG 1130 IgA 115 IgM 75    ABX:  CTX 9/19- 9/23  Vancomycin  9/19-9/23  Clindamycin 9/20- 9/23  Ampicillin 9/23-10/19  amoxicillin 10/19-10/22  ampciillin 10/22-10/23  amoxicillin 10/23-    Cultures:  OSH BCx S. Pneumoniae  penicillin sensitive  Pleural fluid 9/19: S.pneumoniae penicillin sensitive  9/25 Resp Cx.  No growth final  9/28 Resp Cx:  Rare Pseudomonas, achromobacter    Blood cultures 9/20, 9/21, 9/22, 9/24, 9/25, 9/26 No growth  Blood culture 9/27 NG x 5 days, final  Urine culture 9/25 NG final       ,      .    Radiology results     Result Type: XR CHEST PORTABLE 1V  Result Date: October 12, 2019 02:07 CDT  Result Status: Auth (Verified)  Result Title: XR CHEST PORTABLE 1V  Performed By: EDUARDO BALDERAS on October 12, 2019 02:07 CDT  Verified By: ANDREW DURAN on October 12, 2019 02:46 CDT  Encounter info: [id number], [facility], Inpatient, 09/19/19 -     * Final Report *    Reason For Exam  arrhythmia/ PICC placement    RADRPT  PROCEDURE INFORMATION:  Exam: XR Chest, 1 View  Exam date and time: 10/12/2019 1:42 AM  Clinical history: 3 years old, male; Device placement; Picc; Additional info:  Arrhythmia/ picc placement    TECHNIQUE:  Imaging protocol: XR of the chest. Pediatric exam.  Views: 1 view. 1 image(s) and 1 work sheet available for review at the time of  this interpretation/dictation.    COMPARISON:  CR XR CHEST 1V 10/10/2019 7:28 AM    FINDINGS:  Tubes, catheters and devices: Right upper extremity PICC is again seen crossing  midline with tip likely located at the left brachiocephalic vein. This is  similar to comparison. Redemonstrated is a left chest pigtail catheter, similar  in course and position. Enteric tube extends below the diaphragm with tip  projecting over the gastric body.  Lungs: Degree of left lung opacification appears similar. No significant change  of right perihilar opacities.  Pleural space: A moderate amount of left pleural fluid appears similar.  Redemonstrated is lucency along the left mediastinal border, which may reflect   anteromedial pleural air or aerated lung. This is not significantly changed.  Heart/Mediastinum: Size and shape of cardiothymic silhouette are similar to  comparison. The left aspect of the cardiomediastinal silhouette is mostly  obscured.  Bones/joints: No significant change.    IMPRESSION:  1. Position of right upper extremity PICC is similar crossing midline with tip  projecting over the expected location of the left brachiocephalic vein.  2. Degree of left lung opacification and residual left pleural fluid appear  similar to comparison. Lucency along the left mediastinal border may reflect  anteromedial pleural gas or aerated lung and is not significantly changed.  3. No significant change of right perihilar opacities.        Reason For Exam  Pneumothorax    RADRPT  HISTORY: Pneumothorax    COMPARISON: 10/15/2019    Supine frontal portable chest shows normal size heart, loculated mid and lateral left pneumothorax with pleural thickening or fluid appears about the same as previously.  Partially collapsed left lung is densely opacified as previously.  Right lung grossly clear.  Enteric catheter enters the stomach.  Right PICC line crosses the midline with tip at the downstream left brachiocephalic vein.    IMPRESSION: No acute change.    Signature Line  -----  F I N A L  -----    Transcribed By: ELISEO   10/18/19 9:26 am    Dictated By:            EDUARDO SINCLAIR MD    Electronically Reviewed and Approved By:           EDUARDO SINCLAIR MD  10/18/19 9:28 am                  Result Type: XR CHEST 2V  Result Date: October 21, 2019 11:34 CDT  Result Status: Auth (Verified)  Result Title: XR CHEST 2V  Performed By: SILKE MENDEZ on October 21, 2019 12:19 CDT  Verified By: SILKE MENDEZ on October 21, 2019 12:25 CDT  Encounter info: [id number], [facility], Inpatient, 09/19/19 -     * Final Report *    Reason For Exam  Dyspnea    RADRPT  EXAMINATION: XR CHEST 2V    EXAM DATE: 10/21/2019    CLINICAL HISTORY: Male,  3 years old. PNEUMONIA; Dyspnea    COMPARISON:  10/18/2019    TECHNIQUE: Frontal and lateral views of the chest were obtained.    FINDINGS:    Right arm PICC tip near the confluence of the brachiocephalic veins.    The cardiothymic silhouette is unchanged.    Left hydropneumothorax is similar in size to prior studies, with an air-fluid level on this upright study.  There is persistent patchy consolidation of the left lung with volume loss, also similar to the prior study.  The right lung remains clear.    The visualized bones and upper abdomen are unchanged in appearance.    IMPRESSION: Partially collapsed and opacified left lung with left hydropneumothorax, grossly similar in appearance to the prior study accounting for differences in patient positioning.    Signature Line  -----  F I N A L  -----    Transcribed By: ELISEO   10/21/19 12:19 pm    Dictated By:            SILKE MENDEZ md    Electronically Reviewed and Approved By:           SILKE MENDEZ md  10/21/19 12:25 pm  ,    ,   Result title:  XR CHEST 1V  Result status:  Final  Verified by:  RAFAELA MOROCHO on 10/23/2019 7:14  FINDINGS:Left-sided chest tube and right upper extremity PICC are unchanged.There is an overall similar appearance of the lungs, with continued small left pneumothorax.  Persistent left hemithoracic opacification with scattered lucencies remain.  The right lung is otherwise clear.  The heart is obscured  IMPRESSION:1.  Stable support lines and tubes.2.  Persistent small left pneumothorax.3.  Overall similar appearance of the lungs, with continued left hemithoracic opacification with scattered lucencies that may represent aerated lung and/or lung necrosis.     Lines and Tubes:    LINES  Central Catheter Nontunneled Single Lumen PICC [facility] [id number] 11- Right, Basilic   Charted: 10/23/19 08:00  Inserted: 09/27/19   Days Since Insertion: 26 days  Indication of Use: Fluid/Blood Products, Limited Peripheral Access    DRAINS  AND TUBES  Chest Tubes Left Mid Axillary   Charted: 10/23/19 08:00  Inserted: 10/22/19   Days Since Insertion: 1 days   .      Impression and Plan   Dx and Plan:  Diagnosis       Eleuterio is a 3 year old previously healthy male with complicated pneumococcal pneumonia with effusion, empyema requiring VATS procedure, bacteremia, and respiratory failure requiring mechanical ventilation.  Now s/p extubation with L sided opacification and pneumothorax demonstrated on serial CXRS, no change, underwent mini thoracotomy 10/22 and placement of chest tube      Recommendations:    1.   Resume amoxicillin 30 mg/kg/dose q 8---   3.  Continue to follow clinical response  3.  Immunoglobulins normal, f/u pending immune studies:CH50, pneumococcal Abs, HIB abs    Lynda Mckeon MD  Pediatric Infectious Disease Attending  Page via [employer]       .     .

## 2023-02-02 NOTE — CONSULTS
PULMONARY AND CRITICAL CARE MEDICINE CONSULT NOTE      Name: Israel Coe  Sex: male  : 1960  MRN: [id number]  Admission Date: 2023  Admission Diagnosis: Seizure disorder ([facility] [G40.909]  Acute respiratory failure with hypoxia ([facility] [J96.01]  Benzodiazepine-based tranquilizers causing adverse effect, initial encounter [T42.4X5A]  Altered mental status, unspecified altered mental status type [R41.82]  Date of ICU admission: 2023    HPI: Patient is a 58 y.o. male with past medical history significant for seizure disorder, history of ruptured aneurysm status postcraniotomy with residual aphasia, severe MR, severe AR, history of endocarditis in 2017 who lives in a nursing home and was brought in when he was noted to have seizures in the nursing home followed by unresponsiveness. Patient was given 10 mg of Versed in route. Upon arrival in the ER, patient had to be intubated for airway protection for poor GCS with hypoxia. CT head did not show any evidence of CVA or bleed. Lab work showed elevated lactic acid of 2.8 which is normalized to 1.1 along with elevated CK of 342. Patient is currently sedated with low-dose propofol at 50 mcg/kg/min and is able to follow commands. 14 point ROS could not be obtained due to patient factors.      Past Medical History:   Diagnosis Date    Acute intracerebral hemorrhage ([facility] 2017    Anxiety     Back pain     Cerebral artery occlusion with cerebral infarction (HCC)     Chronic back pain greater than 3 months duration 2017    Diabetes mellitus ([facility]     Dysphasia 2017    Dysphonia 2017    Endocarditis and heart valve disorders in diseases classified elsewhere 2017    Hydrocephalus ([facility] 2017    Hypertension     IVH (intraventricular hemorrhage) ([facility] 2017    NSTEMI (non-ST elevated myocardial infarction) ([facility] 2017    Seizures ([facility]      Past Surgical History:   Procedure Laterality Date    BRAIN ANEURYSM SURGERY Left 02/03/2017    COLONOSCOPY N/A 8/14/2019    COLONOSCOPY WITH MAC ANESTHESIA performed by Dandre Cali MD at [address] Left 02/03/2017    LEG SURGERY      UPPER GASTROINTESTINAL ENDOSCOPY N/A 8/14/2019    EGD BIOPSY performed by Dandre Cali MD at [address] History     Socioeconomic History    Marital status:      Spouse name: Not on file    Number of children: Not on file    Years of education: Not on file    Highest education level: Not on file   Occupational History    Not on file   Tobacco Use    Smoking status: Some Days     Packs/day: 0.50     Types: Cigarettes    Smokeless tobacco: Never    Tobacco comments:     6 per WEEK   Vaping Use    Vaping Use: Never used    Passive vaping exposure: Yes   Substance and Sexual Activity    Alcohol use: Yes     Alcohol/week: 1.0 standard drink     Types: 1 Cans of beer per week     Comment: occ    Drug use: Not Currently     Types: Marijuana Lázaro Calamity)     Comment: not at this time    Sexual activity: Not on file   Other Topics Concern    Not on file   Social History Narrative    Not on file     Social Determinants of Health     Financial Resource Strain: Not on file   Food Insecurity: Not on file   Transportation Needs: Not on file   Physical Activity: Not on file   Stress: Not on file   Social Connections: Not on file   Intimate Partner Violence: Not on file   Housing Stability: Not on file     History reviewed. No pertinent family history.   Allergies   Allergen Reactions    Seasonal        MEDICATIONS:     Scheduled Meds:     levETIRAcetam  1,500 mg IntraVENous Q12H    lacosamide (VIMPAT) IVPB  200 mg IntraVENous BID    pantoprazole  40 mg IntraVENous Daily    OXcarbazepine  300 mg Oral BID    aspirin  81 mg Oral Daily    sodium chloride flush  5-40 mL IntraVENous 2 times per day    enoxaparin  40 mg SubCUTAneous Daily    DULoxetine  30 mg Oral Daily    traZODone  50 mg Oral Nightly    mirtazapine  7.5 mg Oral Nightly atorvastatin  10 mg Oral Nightly    lisinopril  2.5 mg Oral Daily    torsemide  10 mg Oral Daily    cetirizine  10 mg Oral Daily     Current Infusions:    sodium chloride      lactated ringers IV soln 75 mL/hr at 02/02/23 0931    norepinephrine Stopped (02/02/23 0731)       PRN meds:  sodium chloride flush, sodium chloride, ondansetron **OR** ondansetron, polyethylene glycol, acetaminophen **OR** acetaminophen    PHYSICAL EXAM:    BP (!) 107/52   Pulse 91   Temp 100.2 °F (37.9 °C) (Core)   Resp 20   Ht 6' (1.829 m)   Wt 168 lb 6.9 oz (76.4 kg)   SpO2 92%   BMI 22.84 kg/m²  I/O last 3 completed shifts: In: 791.4 [I.V.:667.4; IV Piggyback:124]  Out: 1025 [Urine:1025] No intake/output data recorded. Intake/Output Summary (Last 24 hours) at 2/2/2023 1308  Last data filed at 2/2/2023 0434  Gross per 24 hour   Intake 791.43 ml   Output 1025 ml   Net -233.57 ml         CONSTITUTIONAL: He is a 58y.o.-year-old who appears his stated age. He is  in no acute distress. CARDIOVASCULAR: S1 S2 RRR. Without murmer  RESPIRATORY & CHEST: Lungs are clear to auscultation and percussion. No wheezing, no crackles. Good air movement  GASTROINTESTINAL & ABDOMEN: Soft, nontender, positive bowel sounds in all quadrants, non-distended, without hepatosplenomegaly. GENITOURINARY: Deferred. MUSCULOSKELETAL: No tenderness to palpation of the axial skeleton. There is no clubbing. No cyanosis. No edema of the lower extremities. SKIN OF BODY: No rash or jaundice. PSYCHIATRIC EVALUATION: Could not be assessed. HEMATOLOGIC/LYMPHATIC/ IMMUNOLOGIC: No palpable lymphadenopathy. NEUROLOGIC: Sedated but wakes up to follow commands. Groslly non-focal.     LABS:    Recent Labs     02/01/23  1645 02/02/23  0407   WBC 4.7 10.9   RBC 3.90* 3.50*   HGB 13.1* 11.9*   HCT 40.0* 34.6*   MCH 33.5 33.9   MCHC 32.6 34.3   RDW 13.2 12.6   * 122*   MPV 9.2 9.3   NEUTOPHILPCT 79.3 85.7   MONOPCT 2.6 5.4   BASOPCT 0.6 0.1     Recent Labs 02/01/23  1645 02/02/23  0407    138   K 3.7 3.6    109   ANIONGAP 22* 7   CO2 17* 22   BUN 20 17   CREATININE 1.2 0.7*   CALCIUM 9.3 8.6   PROT 6.8 5.5*   BILITOT 0.4 0.8   ALKPHOS 50 39*   ALT 12 11   AST 19 29   GLUCOSE 197* 118*     Recent Labs     02/02/23  0513   PHART 7.504*   WRX0EPX 32.4*   PO2ART 117.0*   MGW0WJW 25.5   Y8CNDBPA 99.9   BEART 2.7       Recent Labs     02/01/23  1645 02/01/23  2131 02/02/23  0407   CKTOTAL  --  342* 756*   TROPONINI <0.01  --   --        IMAGING:  I reviewed the chest x-ray and my interpretation is as follows. No infiltrates noted. IMPRESSION:  Acute hypoxic respiratory failure  Seizure disorder  History of craniotomy  Severe MR  Severe AR  History of endocarditis    PLAN:  Patient presented with breakthrough seizure. He has history of seizure after his craniotomy and has been on Keppra 1500 twice daily and Vimpat 200 mg twice daily. No further episodes of seizures noted in the hospital.  Evaluate by neurology and has been initiated on oxcarbazepine 300 mg twice daily. No acute abnormality noted on CT head. EEG pending. Acute hypoxic respiratory failure requiring mechanical ventilation. I switched him to pressure support ventilation which she is tolerating well. Plan will be to extubate as tolerated. Speech evaluation postextubation. Adequate heart rate and blood pressure. Patient has severe MR with preserved EF. He has history of endocarditis. On 10 mg of Demadex, home dose. No evidence of infection. Protonix for stress ulcer prophylaxis. Lovenox for DVT prophylaxis. Critical Care Time: 39 Minutes  Total critical care time caring for this patient with life threatening illness, including direct patient contact, management of life support systems, review of data including imaging and labs, discussions with other team members and physicians excluding procedures.       Electronically signed by Kika Balderas MD on 2/2/23 at 1:08 PM EST This note was completed using dragon medical speech recognition software. Grammatical errors, random word insertions, pronoun errors and incomplete sentences are occasional consequences of this technology due to software limitations. If there are questions or concerns about the content of this note of information contained within the body of this dictation, they should be addressed with the provider for clarification.

## 2023-02-02 NOTE — PLAN OF CARE
Problem: Safety - Medical Restraint  Goal: Remains free of injury from restraints (Restraint for Interference with Medical Device)  Description: INTERVENTIONS:  1. Determine that other, less restrictive measures have been tried or would not be effective before applying the restraint  2. Evaluate the patient's condition at the time of restraint application  3. Inform patient/family regarding the reason for restraint  4. Q2H: Monitor safety, psychosocial status, comfort, nutrition and hydration  2/2/2023 1204 by Cecile Mead RN  Outcome: Completed  Extubated and restraints removed. Problem: Safety - Adult  Goal: Free from fall injury  2/2/2023 1204 by Cecile Mead RN  Outcome: Progressing  2/2/2023 0419 by Jet Nichole  Outcome: Progressing     Problem: ABCDS Injury Assessment  Goal: Absence of physical injury  Outcome: Progressing  Patient educated on the importance of calling out before exiting the bed and always waiting for assistance. Bed in lowest position with wheels locked. Alarm activated. 3/4 side rails up. Non-skid socks on. Call light within reach. Hourly checks. All requested needs provided. Problem: Skin/Tissue Integrity  Goal: Absence of new skin breakdown  Description: 1. Monitor for areas of redness and/or skin breakdown  2. Assess vascular access sites hourly  3. Every 4-6 hours minimum:  Change oxygen saturation probe site  4. Every 4-6 hours:  If on nasal continuous positive airway pressure, respiratory therapy assess nares and determine need for appliance change or resting period. 2/2/2023 1204 by Cecile Mead RN  Outcome: Progressing   Checked for moisture and repositioned with pillow support q 2 hrs for comfort and the prevention of skin break down or pressure ulcer formation.         Problem: Pain  Goal: Verbalizes/displays adequate comfort level or baseline comfort level  2/2/2023 1204 by Cecile Mead RN  Outcome: Progressing  Denies pain at this time.        Problem: Neurosensory - Adult  Goal: Achieves stable or improved neurological status  2/2/2023 1204 by Liliane Lazo RN  Outcome: Progressing  Off sedation and alert. Disoriented.         Problem: Neurosensory - Adult  Goal: Absence of seizures  2/2/2023 1204 by Liliane Lazo RN  Outcome: Progressing     Problem: Neurosensory - Adult  Goal: Remains free of injury related to seizures activity  2/2/2023 1204 by Liliane Lazo RN  Outcome: Progressing     Problem: Chronic Conditions and Co-morbidities  Goal: Patient's chronic conditions and co-morbidity symptoms are monitored and maintained or improved  Outcome: Progressing  Flowsheets (Taken 2/2/2023 0800)  Care Plan - Patient's Chronic Conditions and Co-Morbidity Symptoms are Monitored and Maintained or Improved:   Monitor and assess patient's chronic conditions and comorbid symptoms for stability, deterioration, or improvement   Collaborate with multidisciplinary team to address chronic and comorbid conditions and prevent exacerbation or deterioration   Update acute care plan with appropriate goals if chronic or comorbid symptoms are exacerbated and prevent overall improvement and discharge     Problem: Discharge Planning  Goal: Discharge to home or other facility with appropriate resources  2/2/2023 1204 by Liliane Lazo RN  Outcome: Progressing  Flowsheets (Taken 2/2/2023 0800)  Discharge to home or other facility with appropriate resources:   Identify barriers to discharge with patient and caregiver   Arrange for needed discharge resources and transportation as appropriate   Identify discharge learning needs (meds, wound care, etc)  2/2/2023 0419 by Reina Tenorio  Outcome: Progressing  Flowsheets (Taken 2/2/2023 0000)  Discharge to home or other facility with appropriate resources:   Identify barriers to discharge with patient and caregiver   Arrange for needed discharge resources and transportation as appropriate   Identify discharge learning needs (meds, wound care, etc)   Arrange for interpreters to assist at discharge as needed   Refer to discharge planning if patient needs post-hospital services based on physician order or complex needs related to functional status, cognitive ability or social support system  2/1/2023 2229 by Edwin Kate RN  Outcome: Progressing 199.9

## 2023-02-03 VITALS
RESPIRATION RATE: 21 BRPM | OXYGEN SATURATION: 94 % | WEIGHT: 154.1 LBS | DIASTOLIC BLOOD PRESSURE: 36 MMHG | HEIGHT: 72 IN | HEART RATE: 90 BPM | SYSTOLIC BLOOD PRESSURE: 152 MMHG | BODY MASS INDEX: 20.87 KG/M2 | TEMPERATURE: 99 F

## 2023-02-03 PROBLEM — R41.82 ALTERED MENTAL STATUS: Status: RESOLVED | Noted: 2023-02-02 | Resolved: 2023-02-03

## 2023-02-03 PROBLEM — I61.9 ACUTE INTRACEREBRAL HEMORRHAGE (HCC): Status: RESOLVED | Noted: 2017-01-25 | Resolved: 2023-02-03

## 2023-02-03 PROBLEM — I38 ENDOCARDITIS, VALVE: Status: RESOLVED | Noted: 2017-01-28 | Resolved: 2023-02-03

## 2023-02-03 LAB
ALBUMIN SERPL-MCNC: 3.5 G/DL (ref 3.4–5)
ANION GAP SERPL CALCULATED.3IONS-SCNC: 9 MMOL/L (ref 3–16)
BASOPHILS ABSOLUTE: 0 K/UL (ref 0–0.2)
BASOPHILS RELATIVE PERCENT: 0.3 %
BUN BLDV-MCNC: 10 MG/DL (ref 7–20)
CALCIUM SERPL-MCNC: 8.9 MG/DL (ref 8.3–10.6)
CHLORIDE BLD-SCNC: 109 MMOL/L (ref 99–110)
CO2: 23 MMOL/L (ref 21–32)
CREAT SERPL-MCNC: 0.6 MG/DL (ref 0.8–1.3)
EOSINOPHILS ABSOLUTE: 0 K/UL (ref 0–0.6)
EOSINOPHILS RELATIVE PERCENT: 0.3 %
GFR SERPL CREATININE-BSD FRML MDRD: >60 ML/MIN/{1.73_M2}
GLUCOSE BLD-MCNC: 82 MG/DL (ref 70–99)
HCT VFR BLD CALC: 37.7 % (ref 40.5–52.5)
HEMOGLOBIN: 12.6 G/DL (ref 13.5–17.5)
LYMPHOCYTES ABSOLUTE: 1.2 K/UL (ref 1–5.1)
LYMPHOCYTES RELATIVE PERCENT: 20.6 %
MAGNESIUM: 2.1 MG/DL (ref 1.8–2.4)
MCH RBC QN AUTO: 33.6 PG (ref 26–34)
MCHC RBC AUTO-ENTMCNC: 33.5 G/DL (ref 31–36)
MCV RBC AUTO: 100.1 FL (ref 80–100)
MONOCYTES ABSOLUTE: 0.4 K/UL (ref 0–1.3)
MONOCYTES RELATIVE PERCENT: 5.9 %
NEUTROPHILS ABSOLUTE: 4.4 K/UL (ref 1.7–7.7)
NEUTROPHILS RELATIVE PERCENT: 72.9 %
PDW BLD-RTO: 13 % (ref 12.4–15.4)
PHOSPHORUS: 2.5 MG/DL (ref 2.5–4.9)
PLATELET # BLD: 101 K/UL (ref 135–450)
PMV BLD AUTO: 9.8 FL (ref 5–10.5)
POTASSIUM SERPL-SCNC: 3.7 MMOL/L (ref 3.5–5.1)
RBC # BLD: 3.76 M/UL (ref 4.2–5.9)
SODIUM BLD-SCNC: 141 MMOL/L (ref 136–145)
WBC # BLD: 6 K/UL (ref 4–11)

## 2023-02-03 PROCEDURE — 36415 COLL VENOUS BLD VENIPUNCTURE: CPT

## 2023-02-03 PROCEDURE — 92526 ORAL FUNCTION THERAPY: CPT

## 2023-02-03 PROCEDURE — 6370000000 HC RX 637 (ALT 250 FOR IP): Performed by: INTERNAL MEDICINE

## 2023-02-03 PROCEDURE — 83735 ASSAY OF MAGNESIUM: CPT

## 2023-02-03 PROCEDURE — 99233 SBSQ HOSP IP/OBS HIGH 50: CPT | Performed by: INTERNAL MEDICINE

## 2023-02-03 PROCEDURE — 85025 COMPLETE CBC W/AUTO DIFF WBC: CPT

## 2023-02-03 PROCEDURE — 6370000000 HC RX 637 (ALT 250 FOR IP): Performed by: STUDENT IN AN ORGANIZED HEALTH CARE EDUCATION/TRAINING PROGRAM

## 2023-02-03 PROCEDURE — 6360000002 HC RX W HCPCS: Performed by: INTERNAL MEDICINE

## 2023-02-03 PROCEDURE — 6370000000 HC RX 637 (ALT 250 FOR IP): Performed by: NEUROMUSCULOSKELETAL MEDICINE & OMM

## 2023-02-03 PROCEDURE — 80069 RENAL FUNCTION PANEL: CPT

## 2023-02-03 PROCEDURE — 51798 US URINE CAPACITY MEASURE: CPT

## 2023-02-03 PROCEDURE — 95819 EEG AWAKE AND ASLEEP: CPT | Performed by: NEUROMUSCULOSKELETAL MEDICINE & OMM

## 2023-02-03 RX ORDER — OXCARBAZEPINE 300 MG/1
300 TABLET, FILM COATED ORAL 2 TIMES DAILY
Qty: 60 TABLET | Refills: 3 | Status: SHIPPED | OUTPATIENT
Start: 2023-02-03

## 2023-02-03 RX ORDER — LACOSAMIDE 100 MG/1
200 TABLET ORAL 2 TIMES DAILY
Status: DISCONTINUED | OUTPATIENT
Start: 2023-02-03 | End: 2023-02-03 | Stop reason: HOSPADM

## 2023-02-03 RX ORDER — LEVETIRACETAM 500 MG/1
1500 TABLET ORAL 2 TIMES DAILY
Status: DISCONTINUED | OUTPATIENT
Start: 2023-02-03 | End: 2023-02-03 | Stop reason: HOSPADM

## 2023-02-03 RX ADMIN — ASPIRIN 81 MG: 81 TABLET, COATED ORAL at 09:20

## 2023-02-03 RX ADMIN — DULOXETINE HYDROCHLORIDE 30 MG: 30 CAPSULE, DELAYED RELEASE ORAL at 09:20

## 2023-02-03 RX ADMIN — ENOXAPARIN SODIUM 40 MG: 100 INJECTION SUBCUTANEOUS at 09:21

## 2023-02-03 RX ADMIN — OXCARBAZEPINE 300 MG: 300 TABLET, FILM COATED ORAL at 09:20

## 2023-02-03 RX ADMIN — TORSEMIDE 10 MG: 20 TABLET ORAL at 09:20

## 2023-02-03 RX ADMIN — LEVETIRACETAM 1500 MG: 500 TABLET, FILM COATED ORAL at 09:21

## 2023-02-03 RX ADMIN — LISINOPRIL 2.5 MG: 5 TABLET ORAL at 09:20

## 2023-02-03 RX ADMIN — CETIRIZINE HYDROCHLORIDE 10 MG: 10 TABLET, FILM COATED ORAL at 09:20

## 2023-02-03 RX ADMIN — LACOSAMIDE 200 MG: 100 TABLET, FILM COATED ORAL at 09:21

## 2023-02-03 ASSESSMENT — PAIN DESCRIPTION - PAIN TYPE: TYPE: CHRONIC PAIN

## 2023-02-03 ASSESSMENT — PAIN SCALES - GENERAL: PAINLEVEL_OUTOF10: 8

## 2023-02-03 ASSESSMENT — PAIN DESCRIPTION - LOCATION: LOCATION: BACK

## 2023-02-03 NOTE — DISCHARGE SUMMARY
Hospital Medicine Discharge Summary    Name:  Sandra Teresa  Gender: male  : 1960  58 y.o. MRN: [id number]    PCP: Chon Orellana MD     Date of Admission:  2023  4:12 PM  Discharge Date: 2/3/2023    Admitting Physician: Juancho Marquez MD  Discharge Physician: Russ Eubanks DO    Communication to PCP  -Seizure  -Added trileptal to his AED regimen      Discharge Diagnoses: Active Hospital Problems    Diagnosis     Congestive heart failure ([address] [I50.9]     Complex care coordination [Z71.89]     Hypertension associated with diabetes ([address] [E11.59, I15.2]     Seizure disorder ([address] [G40.909]     History of stroke [Z86.73]     Partial symptomatic epilepsy with complex partial seizures, intractable, without status epilepticus ([address] [G40.219]     Seizure ([address] [R56.9]     Protein calorie malnutrition ([address] [E46]     Type 2 diabetes mellitus ([address] [E11.9]     Psychosis ([address] [F29]        The patient was seen and examined on day of discharge and this discharge summary is in conjunction with any daily progress note from day of discharge. Hospital Course:  Sandra Teresa is a 58y.o. year old male who presented to [facility] on 2023  4:12 PM.      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 He underwent craniotomy and aneurysm clipping at the time. 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 Per prior notes he is aphasic at baseline. Found down at nursing home. Unwitnessed seizure. Given 10mg versed en route to hospital. Intubated for concern for airway compromise. Lactate 11.0 on admission. Started on versed drip. Extubated on . Weaned off of all drips very quickly and patient improved.  On , patient did pull out his lopez with the balloon inflated, but he was seen by urology who did not feel the patient needed any intervention and patient was urinating well.    On the last day of hospital stay, denied acute complaints. Understood he was going to be discharged with an additional seizure medication. Eating and drinking well. The patient expressed appropriate understanding of and agreement with the discharge recommendations, medications, and plan. Physical Exam Performed:     BP (!) 147/48   Pulse 86   Temp 99 °F (37.2 °C) (Temporal)   Resp 18   Ht 6' (1.829 m)   Wt 154 lb 1.6 oz (69.9 kg)   SpO2 94%   BMI 20.90 kg/m²       General appearance:  No apparent distress, appears stated age and cooperative. HEENT:  Normal cephalic, atraumatic without obvious deformity. Pupils equal, round, and reactive to light. Extra ocular muscles intact. Conjunctivae/corneas clear. Neck: Supple, with full range of motion. No jugular venous distention. Trachea midline. Respiratory:  Normal respiratory effort. Clear to auscultation, bilaterally without Rales/Wheezes/Rhonchi. Cardiovascular:  Regular rate and rhythm with normal S1/S2 without murmurs, rubs or gallops. No periphearal edema. Abdomen: Soft, non-tender, non-distended with normal bowel sounds. Musculoskeletal:  No clubbing or cyanosis. Full range of motion without deformity. Skin: Skin color, texture, turgor normal.  No rashes or lesions. Neurologic:  Neurovascularly intact without any focal sensory/motor deficits. Cranial nerves: II-XII intact, grossly non-focal.  Psychiatric:  Alert and oriented, thought content appropriate at times, but not at others. Capillary Refill: Brisk, 3 seconds, normal   Peripheral Pulses: +2 palpable, equal bilaterally       Labs:  For convenience and continuity at follow-up the following most recent labs are provided:      CBC:    Lab Results   Component Value Date/Time    WBC 6.0 02/03/2023 04:18 AM    HGB 12.6 02/03/2023 04:18 AM    HCT 37.7 02/03/2023 04:18 AM     02/03/2023 04:18 AM       Renal:    Lab Results   Component Value Date/Time     02/03/2023 04:18 AM    K 3.7 02/03/2023 04:18 AM    K 3.7 02/01/2023 04:45 PM     02/03/2023 04:18 AM    CO2 23 02/03/2023 04:18 AM    BUN 10 02/03/2023 04:18 AM    CREATININE 0.6 02/03/2023 04:18 AM    CALCIUM 8.9 02/03/2023 04:18 AM    PHOS 2.5 02/03/2023 04:18 AM         Significant Diagnostic Studies    Radiology:   XR CHEST PORTABLE   Final Result   Endotracheal tube appears properly placed above the bella. No acute cardiopulmonary findings         CT Head W/O Contrast   Final Result   No acute intracranial abnormality. Consults:     IP CONSULT TO CRITICAL CARE  IP CONSULT TO NEUROLOGY  IP CONSULT TO PALLIATIVE CARE  IP CONSULT TO UROLOGY    F/U APPTS:  No follow-up provider specified. Diet:  regular diet     Activity:  activity as tolerated    Disposition:  Discharged to SNF    Rehab: Patient returned to prior level of function, rehabilitation not indicated at this time    Condition at Discharge: Stable    Code Status:  DNR-CCA     Discharge Medications:     Current Discharge Medication List             Details   OXcarbazepine (TRILEPTAL) 300 MG tablet Take 1 tablet by mouth 2 times daily  Qty: 60 tablet, Refills: 3                Details   LORazepam (ATIVAN) 2 MG/ML injection Inject 2 mg into the muscle daily as needed.       fluticasone (FLONASE) 50 MCG/ACT nasal spray 2 sprays daily      LACTOBACILLUS PO Take 1 mg by mouth Bid      cetirizine (ZYRTEC) 10 MG tablet Take 10 mg by mouth daily      HM LIDOCAINE PATCH EX Apply topically      DULoxetine (CYMBALTA) 30 MG extended release capsule Take 30 mg by mouth daily      MELATONIN PO Take 9 mg by mouth nightly      traZODone (DESYREL) 50 MG tablet Take 25 mg by mouth nightly      lisinopril (PRINIVIL;ZESTRIL) 2.5 MG tablet Take 1 tablet by mouth daily  Qty: 30 tablet, Refills: 3      torsemide (DEMADEX) 10 MG tablet Take 1 tablet by mouth daily  Qty: 30 tablet, Refills: 3      levETIRAcetam (KEPPRA) 500 MG tablet Take 1,500 mg by mouth 2 times daily      potassium chloride (KLOR-CON M) 20 MEQ extended release tablet Take 20 mEq by mouth 2 times daily      acetaminophen (TYLENOL) 325 MG tablet Take 650 mg by mouth every 4 hours as needed for Pain      mirtazapine (REMERON) 7.5 MG tablet Take 7.5 mg by mouth nightly      Cholecalciferol (VITAMIN D3) 5000 units TABS Take by mouth daily      lacosamide (VIMPAT) 200 MG tablet Take 200 mg by mouth 2 times daily. calcium carbonate (TUMS) 500 MG chewable tablet Take 1 tablet by mouth 2 times daily       methadone (DOLOPHINE) 5 MG tablet Take 5 mg by mouth daily. atorvastatin (LIPITOR) 10 MG tablet Take 10 mg by mouth nightly              Total time spent on discharge was 34 minutes in the examination, evaluation, counseling and review of medications and discharge plan.       Signed:    Daniela Medina DO   2/3/2023  11:43 AM

## 2023-02-03 NOTE — PROGRESS NOTES
Physical Therapy/Occupational Therapy  Edna Mealing  Hold PT/OT evaluations at this time. Code violet called last evening, pt aggressive and biting off restraint. Will hold this morning and follow up.    Thanks, Davin Erickson, [address] [employer] [facility], [address] [place] [address]

## 2023-02-03 NOTE — PLAN OF CARE
Problem: Discharge Planning  Goal: Discharge to home or other facility with appropriate resources  Outcome: Progressing  Flowsheets (Taken 2/2/2023 2000)  Discharge to home or other facility with appropriate resources:   Identify barriers to discharge with patient and caregiver   Arrange for needed discharge resources and transportation as appropriate   Identify discharge learning needs (meds, wound care, etc)   Arrange for interpreters to assist at discharge as needed   Refer to discharge planning if patient needs post-hospital services based on physician order or complex needs related to functional status, cognitive ability or social support system     Problem: Pain  Goal: Verbalizes/displays adequate comfort level or baseline comfort level  Outcome: Progressing  Flowsheets (Taken 2/2/2023 2046)  Verbalizes/displays adequate comfort level or baseline comfort level:   Encourage patient to monitor pain and request assistance   Assess pain using appropriate pain scale   Administer analgesics based on type and severity of pain and evaluate response   Implement non-pharmacological measures as appropriate and evaluate response   Consider cultural and social influences on pain and pain management   Notify Licensed Independent Practitioner if interventions unsuccessful or patient reports new pain     Problem: Neurosensory - Adult  Goal: Achieves stable or improved neurological status  Outcome: Progressing  Flowsheets (Taken 2/2/2023 2000)  Achieves stable or improved neurological status:   Assess for and report changes in neurological status   Initiate measures to prevent increased intracranial pressure   Maintain blood pressure and fluid volume within ordered parameters to optimize cerebral perfusion and minimize risk of hemorrhage   Monitor temperature, glucose, and sodium.  Initiate appropriate interventions as ordered  Goal: Absence of seizures  Outcome: Progressing  Flowsheets (Taken 2/2/2023 2000)  Absence of seizures:   Monitor for seizure activity. If seizure occurs, document type and location of movements and any associated apnea   If seizure occurs, turn head to side and suction secretions as needed   Administer anticonvulsants as ordered   Support airway/breathing, administer oxygen as needed   Diagnostic studies as ordered  Goal: Remains free of injury related to seizures activity  Outcome: Progressing  Flowsheets (Taken 2/2/2023 2000)  Remains free of injury related to seizure activity:   Maintain airway, patient safety  and administer oxygen as ordered   Monitor patient for seizure activity, document and report duration and description of seizure to Licensed Independent Practitioner   If seizure occurs, turn patient to side and suction secretions as needed   Reorient patient post seizure   Seizure pads on all 4 side rails   Instruct patient/family to notify RN of any seizure activity   Instruct patient/family to call for assistance with activity based on assessment     Problem: Skin/Tissue Integrity  Goal: Absence of new skin breakdown  Description: 1. Monitor for areas of redness and/or skin breakdown  2. Assess vascular access sites hourly  3. Every 4-6 hours minimum:  Change oxygen saturation probe site  4. Every 4-6 hours:  If on nasal continuous positive airway pressure, respiratory therapy assess nares and determine need for appliance change or resting period.   Outcome: Progressing     Problem: Safety - Adult  Goal: Free from fall injury  Outcome: Progressing  Flowsheets (Taken 2/3/2023 0250)  Free From Fall Injury:   Instruct family/caregiver on patient safety   Based on caregiver fall risk screen, instruct family/caregiver to ask for assistance with transferring infant if caregiver noted to have fall risk factors     Problem: Chronic Conditions and Co-morbidities  Goal: Patient's chronic conditions and co-morbidity symptoms are monitored and maintained or improved  Outcome: Progressing  Flowsheets (Taken 2/2/2023 2000)  Care Plan - Patient's Chronic Conditions and Co-Morbidity Symptoms are Monitored and Maintained or Improved:   Monitor and assess patient's chronic conditions and comorbid symptoms for stability, deterioration, or improvement   Collaborate with multidisciplinary team to address chronic and comorbid conditions and prevent exacerbation or deterioration   Update acute care plan with appropriate goals if chronic or comorbid symptoms are exacerbated and prevent overall improvement and discharge     Problem: ABCDS Injury Assessment  Goal: Absence of physical injury  Outcome: Progressing  Flowsheets (Taken 2/3/2023 0250)  Absence of Physical Injury: Implement safety measures based on patient assessment     Problem: Safety - Medical Restraint  Goal: Remains free of injury from restraints (Restraint for Interference with Medical Device)  Description: INTERVENTIONS:  1. Determine that other, less restrictive measures have been tried or would not be effective before applying the restraint  2. Evaluate the patient's condition at the time of restraint application  3. Inform patient/family regarding the reason for restraint  4.  Q2H: Monitor safety, psychosocial status, comfort, nutrition and hydration  Outcome: Progressing  Flowsheets  Taken 2/3/2023 0200  Remains free of injury from restraints (restraint for interference with medical device):   Determine that other, less restrictive measures have been tried or would not be effective before applying the restraint   Evaluate the patient's condition at the time of restraint application   Inform patient/family regarding the reason for restraint   Every 2 hours: Monitor safety, psychosocial status, comfort, nutrition and hydration  Taken 2/2/2023 2000  Remains free of injury from restraints (restraint for interference with medical device):   Determine that other, less restrictive measures have been tried or would not be effective before applying the restraint Evaluate the patient's condition at the time of restraint application   Inform patient/family regarding the reason for restraint   Every 2 hours: Monitor safety, psychosocial status, comfort, nutrition and hydration

## 2023-02-03 NOTE — PROGRESS NOTES
Attempted to give report to [facility]. The first and second time they didn't answer, then left me on hold. EDINSON was sent with patient, if they have questions they have the number to unit.

## 2023-02-03 NOTE — DISCHARGE INSTR - COC
Continuity of Care Form    Patient Name: Bill Verdin   :  1960  MRN:  [id number]    Admit date:  2023  Discharge date:  23      Code Status Order: DNR-CCA   Advance Directives:     Admitting Physician:  Jennifer Benoit MD  PCP: Raissa Stokes MD    Discharging Nurse: Charline Muse [place] Unit/Room#: DIU-0098/6936-32  Discharging Unit Phone Number: [phone]    Emergency Contact:   Extended Emergency Contact Information  Primary Emergency Contact: [facility] [facility] Phone: [phone]  Mobile Phone: [phone]  Relation: Brother/Sister  Secondary Emergency Contact: [address] [place] [address] Phone: [phone]  Relation: Other    Past Surgical History:  Past Surgical History:   Procedure Laterality Date    BRAIN ANEURYSM SURGERY Left 2017    COLONOSCOPY N/A 2019    COLONOSCOPY WITH MAC ANESTHESIA performed by Ken Landrum MD at [address] [place] [address] Left 2017    LEG SURGERY      UPPER GASTROINTESTINAL ENDOSCOPY N/A 2019    EGD BIOPSY performed by Ken Landrum MD at [facility] ENDOSCOPY       Immunization History:   Immunization History   Administered Date(s) Administered    COVID-19, PFIZER PURPLE top, DILUTE for use, (age 15 y+), 30mcg/0.3mL 2020, 2021, 10/21/2021       Active Problems:  Patient Active Problem List   Diagnosis Code    Psychosis ([address] F29    Acute intracerebral hemorrhage ([address] I61.9    Dysphagia R13.10    Dysphonia R49.0    Endocarditis, sumit and aortic valves I38    Endocarditis I38    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    Bacterial infection due to Streptococcus mutans A49.1    Type 2 diabetes mellitus ([address] E11.9    Protein calorie malnutrition ([address] E46    Seizure ([address] R56.9    Partial symptomatic epilepsy with complex partial seizures, intractable, without status epilepticus ([address] G40.219    Abnormal CT of the head R93.0    Seizure disorder ([address] G40.909    History of stroke Z86.73    Aphasia R47.01    NATALIA (acute kidney injury) ([facility] N17.9    Lactic acidosis E87.20    Unresponsive R41.89    Partial idiopathic epilepsy with seizures of localized onset, intractable, with status epilepticus ([facility] G40.011    Acute respiratory failure with hypoxia (HCC) J96.01    Sepsis ([facility] A41.9    Denver coma scale total score 3-8 ([facility] R40.2430    Lung nodules R91.8    Hypertension associated with diabetes ([facility] E11.59, I15.2    Enterococcal bacteremia R78.81, B95.2    Receiving intravenous antibiotic treatment as outpatient Z79.2    Complex care coordination Z71.89    Congestive heart failure ([facility] I50.9    Nonrheumatic mitral valve regurgitation I34.0    Altered mental status R41.82       Isolation/Infection:   Isolation            No Isolation          Patient Infection Status       Infection Onset Added Last Indicated Last Indicated By Review Planned Expiration Resolved Resolved By    None active    Resolved    COVID-19 (Rule Out) 02/01/23 02/01/23 02/01/23 COVID-19, Rapid (Ordered)   02/01/23 Rule-Out Test Resulted    COVID-19 (Rule Out) 01/03/21 01/03/21 01/03/21 COVID-19 (Ordered)   01/03/21 Rule-Out Test Resulted    COVID-19 (Rule Out) 07/15/20 07/15/20 07/15/20 COVID-19 (Ordered)   07/16/20 Rule-Out Test Resulted            Nurse Assessment:  Last Vital Signs: BP (!) 147/48   Pulse 86   Temp 99 °F (37.2 °C) (Temporal)   Resp 18   Ht 6' (1.829 m)   Wt 154 lb 1.6 oz (69.9 kg)   SpO2 94%   BMI 20.90 kg/m²     Last documented pain score (0-10 scale): Pain Level: 8  Last Weight:   Wt Readings from Last 1 Encounters:   02/03/23 154 lb 1.6 oz (69.9 kg)     Mental Status:  alert, able to concentrate and follow conversation, and confused at times, impulsive    IV Access:  - None    Nursing Mobility/ADLs:  Walking   Independent  Transfer  Independent  Bathing  Assisted  Dressing  Assisted  [address]  Med Delivery   whole    Wound Care Documentation and Therapy:  Incision 02/03/17 Head Left;Posterior (Active)   Number of days: 2190        Elimination:  Continence: Bowel: Yes  Bladder: Yes  Urinary Catheter: None   Colostomy/Ileostomy/Ileal Conduit: No       Date of Last BM: 02/03/23      Intake/Output Summary (Last 24 hours) at 2/3/2023 1136  Last data filed at 2/3/2023 0600  Gross per 24 hour   Intake 1654.11 ml   Output 1525 ml   Net 129.11 ml     I/O last 3 completed shifts: In: 2445.5 [I.V.:1982.2; IV Piggyback:463.4]  Out: 2450 [Urine:2450]    Safety Concerns:     History of Falls (last 30 days) and At Risk for Falls    Impairments/Disabilities:      None    Nutrition Therapy:  Current Nutrition Therapy:   - Oral Diet:  General    Routes of Feeding: Oral  Liquids: Thin Liquids  Daily Fluid Restriction: no  Last Modified Barium Swallow with Video (Video Swallowing Test): not done    Treatments at the Time of Hospital Discharge:   Respiratory Treatments: none  Oxygen Therapy:  is not on home oxygen therapy.   Ventilator:    - No ventilator support    Rehab Therapies: none  Weight Bearing Status/Restrictions: No weight bearing restrictions  Other Medical Equipment (for information only, NOT a DME order):  none  Other Treatments: none    Patient's personal belongings (please select all that are sent with patient):  Clothes, shoes    RN SIGNATURE:  Electronically signed by Antonio Krause RN on 2/3/23 at 2:16 PM EST    CASE MANAGEMENT/SOCIAL WORK SECTION    Inpatient Status Date: 02/01/2023    Readmission Risk Assessment Score: 15  Readmission Risk              Risk of Unplanned Readmission:  15           Discharging to Facility/ [facility] OCTAVIO Navarro at [facility], [address] [place] [address]  Phone: [phone]  Fax: [phone]    / signature: Electronically signed by Meaghan Noriega RN on 2/3/23 at 11:36 AM EST    PHYSICIAN SECTION    Prognosis: Good    Condition at Discharge: Stable    Rehab Potential (if transferring to Rehab): Fair    Recommended Labs or Other Treatments After Discharge: n/a    Physician Certification: I certify the above information and transfer of Moni Farr  is necessary for the continuing treatment of the diagnosis listed and that he requires Jaswinder Brant for greater 30 days.      Update Admission H&P: No change in H&P    PHYSICIAN SIGNATURE:  Electronically signed by Regis Mahmood DO on 2/3/23 at 11:43 AM EST

## 2023-02-03 NOTE — CONSULTS
Urology Consult Note  [facility]     Patient: Ruperto Fisher MRN: [id number]  Room/Bed: [id number]/7216-38   YOB: 1960  Age/Sex: 58 y.o.male  Admission Date: 2/1/2023     Date of Service:  2/3/2023    Consulting Provider: SENAIT Bustillos - CONOR  Admitting/Requesting Physician: Josue Montenegro MD  Primary Care Physician: Ziggy Mckenzie MD    Reason for Consult: Urinary Retention, Gross Hematuria, Traumatic Lopez Removal    ASSESSMENT/PLAN     59 yo male with hx of seizure disorder, drug abuse, craniotomy status post cerebral aneurysm rupture, admitted with seizure. He was intubated on arrival and a lopez was placed. Urology has been consulted because he has traumatically removed his lopez catheter and now having gross hematuria. Reports voiding at baseline. Hgb stable. Recommendations:  Obtain a PVR. IF <350cc's he can be discharged home. Would hold any anticoagulation he is on for 48hours. Can discharge if the above goals met and when medically cleared. All patient questions were answered. He understands the plan as listed above. HISTORY     Chief Complaint:   Chief Complaint   Patient presents with    Seizures     Pt here by EMS for seizures, currently lives at [facility]. [facility] staff told EMS they are unsure how long he seized but were concerned when he did not become alert as quickly as he normally does following seizure activity. He does take Keppra and Vimpat per his med list.       History of Present Illness: Ruperto Fisher is a 58 y.o. male with gross hematuria 2/2 to traumatic removal of lopez. Onset of symptoms was days ago with improving course since that time. Symptoms are aggravated by Pulling stent. Symptoms improved with holding AX. Associated symptoms include gross hematuria. Patient also reports voiding at baseline. He has tried the following treatments: keppra for seizures.      Past Medical History:  He has a past medical history of Acute intracerebral hemorrhage ([facility] (02/03/2017), Anxiety, Back pain, Cerebral artery occlusion with cerebral infarction [facility]), Chronic back pain greater than 3 months duration (02/03/2017), Diabetes mellitus ([facility] Dysphasia (02/03/2017), Dysphonia (02/03/2017), Endocarditis and heart valve disorders in diseases classified elsewhere (02/03/2017), Hydrocephalus ([facility] (02/03/2017), Hypertension, IVH (intraventricular hemorrhage) ([facility] (02/03/2017), NSTEMI (non-ST elevated myocardial infarction) ([facility] (02/03/2017), and Seizures ([facility] Hospital Problem List:  Principal Problem:    Seizure [facility])  Active Problems:    Altered mental status    Psychosis ([facility]    Acute intracerebral hemorrhage (HCC)    Endocarditis, sumit and aortic valves    Type 2 diabetes mellitus (HCC)    Protein calorie malnutrition (HCC)    Partial symptomatic epilepsy with complex partial seizures, intractable, without status epilepticus ([facility]    Seizure disorder ([facility]    History of stroke    Acute respiratory failure with hypoxia ([facility]    Hypertension associated with diabetes ([facility]    Complex care coordination    Congestive heart failure ([facility]  Resolved Problems:    * No resolved hospital problems. *      Past Surgical History:  He has a past surgical history that includes Leg Surgery; craniotomy (Left, 02/03/2017); Brain aneurysm surgery (Left, 02/03/2017); Upper gastrointestinal endoscopy (N/A, 8/14/2019); and Colonoscopy (N/A, 8/14/2019). Social History:  He reports that he has been smoking cigarettes. He has been smoking an average of .5 packs per day. He has never used smokeless tobacco. He reports current alcohol use of about 1.0 standard drink per week. He reports that he does not currently use drugs after having used the following drugs: Marijuana [employer]). Family History:  family history is not on file. Allergies:   Allergies   Allergen Reactions    Seasonal        Medications:  Scheduled Meds:   lacosamide  200 mg Oral BID    levETIRAcetam  1,500 mg Oral BID    pantoprazole  40 mg IntraVENous Daily    OXcarbazepine  300 mg Oral BID    aspirin  81 mg Oral Daily    sodium chloride flush  5-40 mL IntraVENous 2 times per day    enoxaparin  40 mg SubCUTAneous Daily    DULoxetine  30 mg Oral Daily    traZODone  50 mg Oral Nightly    mirtazapine  7.5 mg Oral Nightly    atorvastatin  10 mg Oral Nightly    lisinopril  2.5 mg Oral Daily    torsemide  10 mg Oral Daily    cetirizine  10 mg Oral Daily     Continuous Infusions:   sodium chloride       PRN Meds:haloperidol lactate, sodium chloride flush, sodium chloride, ondansetron **OR** ondansetron, polyethylene glycol, acetaminophen **OR** acetaminophen    Review of Systems:  Pertinent positives/negatives reviewed in HPI. All other systems reviewed and negative, unless noted below. Constitutional: Negative  Genitourinary: see HPI  HEENT: Negative   Cardiovascular: Negative   Respiratory: Negative   Gastrointestinal: Negative   Musculoskeletal: Negative   Neurological: Negative   Psychiatric: Negative   Integumentary: Negative     PHYSICAL EXAM     Vitals:    02/03/23 0800   BP: (!) 147/48   Pulse: 86   Resp: 18   Temp: 99 °F (37.2 °C)   SpO2: 94%     CONSTITUTIONAL: The patient is well nourished/developed, with no distress noted. NEUROLOGICAL/PSYCHIATRIC: Oriented to place and time, normal affected noted. NECK: The neck is symmetrical and supple, with no masses noted. CARDIOVASCULAR: Regular rate and rhythm, no evidence of swelling noted. RESPIRATORY: Normal respiratory effort with no wheezing noted. ABDOMEN: Abdomen soft, non-tender, non-distended. No enlarged liver or spleen. No hernias noted. Stool occult blood not indicated. SKIN: Skin appears normal.  LYMPHATICS: No adenopathy noted. CVA: No CVA tenderness bilaterally. GENITOURINARY: Urine in external collection container- red. The penis is without rash or lesions and meatus with expected size and location.  The scrotum appears normal. Bilateral testicles appears to be of normal size and location. No masses or tenderness noted of testicles or epididymis.       Ins/Outs:    Intake/Output Summary (Last 24 hours) at 2/3/2023 1021  Last data filed at 2/3/2023 0600  Gross per 24 hour   Intake 1654.11 ml   Output 1525 ml   Net 129.11 ml       LABS     CBC   Lab Results   Component Value Date/Time    WBC 6.0 02/03/2023 04:18 AM    RBC 3.76 02/03/2023 04:18 AM    HGB 12.6 02/03/2023 04:18 AM    HCT 37.7 02/03/2023 04:18 AM    .1 02/03/2023 04:18 AM    MCH 33.6 02/03/2023 04:18 AM    MCHC 33.5 02/03/2023 04:18 AM    RDW 13.0 02/03/2023 04:18 AM     02/03/2023 04:18 AM    MPV 9.8 02/03/2023 04:18 AM     BMP   Lab Results   Component Value Date/Time     02/03/2023 04:18 AM    K 3.7 02/03/2023 04:18 AM    K 3.7 02/01/2023 04:45 PM     02/03/2023 04:18 AM    CO2 23 02/03/2023 04:18 AM    BUN 10 02/03/2023 04:18 AM    CREATININE 0.6 02/03/2023 04:18 AM    GLUCOSE 82 02/03/2023 04:18 AM    CALCIUM 8.9 02/03/2023 04:18 AM     Urinalysis:   Lab Results   Component Value Date/Time    COLORU Yellow 02/01/2023 04:45 PM    GLUCOSEU Negative 02/01/2023 04:45 PM    BLOODU TRACE 02/01/2023 04:45 PM    NITRU Negative 02/01/2023 04:45 PM    LEUKOCYTESUR Negative 02/01/2023 04:45 PM     Urine culture: No results for input(s): LABURIN in the last 72 hours.  PSA: No results found for: PSA      IMAGING     CT Head W/O Contrast    Result Date: 2/1/2023  EXAMINATION: CT OF THE HEAD WITHOUT CONTRAST  2/1/2023 4:59 pm TECHNIQUE: CT of the head was performed without the administration of intravenous contrast. Automated exposure control, iterative reconstruction, and/or weight based adjustment of the mA/kV was utilized to reduce the radiation dose to as low as reasonably achievable. COMPARISON: 05/20/2022 HISTORY: ORDERING SYSTEM PROVIDED HISTORY: Altered mental status TECHNOLOGIST PROVIDED HISTORY: Reason for exam:->Altered mental status Has a \"code stroke\" or  \"stroke alert\" been called? ->No Decision Support Exception - unselect if not a suspected or confirmed emergency medical condition->Emergency Medical Condition ([place]) Reason for Exam: Altered mental status FINDINGS: BRAIN/VENTRICLES: There is no acute intracranial hemorrhage, mass effect or midline shift. No abnormal extra-axial fluid collection. The gray-white differentiation is maintained without evidence of an acute infarct. There is no evidence of hydrocephalus. Similar chronic changes. ORBITS: The visualized portion of the orbits demonstrate no acute abnormality. SINUSES: The visualized paranasal sinuses and mastoid air cells demonstrate no acute abnormality. SOFT TISSUES/SKULL:  No acute abnormality of the visualized skull or soft tissues. No acute intracranial abnormality. XR CHEST PORTABLE    Result Date: 2/1/2023  EXAMINATION: ONE XRAY VIEW OF THE CHEST 2/1/2023 5:15 pm COMPARISON: None. HISTORY: ORDERING SYSTEM PROVIDED HISTORY: s/p intubation TECHNOLOGIST PROVIDED HISTORY: Reason for exam:->s/p intubation Reason for Exam: s/p intubation FINDINGS: There is an endotracheal tube with the tip 4.4 cm above the bella. No pneumothorax or pleural effusion. No acute airspace infiltrate. Normal cardiomediastinal silhouette     Endotracheal tube appears properly placed above the bella.  No acute cardiopulmonary findings            Electronically signed by: SENAIT Bear CNP  2/3/2023   [facility]  Office Contact: [phone]

## 2023-02-03 NOTE — PROGRESS NOTES
Patient found biting off restraint and freeing one of his hands. Became very aggressive, especially to a male staff, stating that the patient was going to have his family come after the male staff. Patient tried getting out bed, kicking legs, and being verbally aggressive to all staff. Elzbieta Piedra called. Restraints applied and security + other assistance to room. IV fluids disconnected at this time d/t trying to rip them out.

## 2023-02-03 NOTE — PROGRESS NOTES
Facility/Department: [facility] ICU  Speech Language Pathology   Dysphagia Treatment Note    Patient: Nancy Lopez   : 1960   MRN: [id number]      Evaluation Date: 2/3/2023      Admitting Dx: Seizure disorder ([facility] [G40.909]  Acute respiratory failure with hypoxia ([facility] [facility] [J96.01]  Benzodiazepine-based tranquilizers causing adverse effect, initial encounter [T42.4X5A]  Altered mental status, unspecified altered mental status type [R41.82]  Treatment Diagnosis: Oropharyngeal Dysphagia   Pain: Reported pain, 7/10                                             Diet and Treatment Recommendations 2/3/2023:  Diet Solids Recommendation:  Easy to chew  Liquid Consistency Recommendation: Thin liquids  Recommended form of Meds: Meds whole with water        Compensatory strategies:   Upright as possible with all PO intake , Small bites/sips , Eat/feed slowly, Remain upright 30-45 min     Assessment of Texture Tolerance:  Diet level prior to treatment: Dysphagia III Soft and bite sized , Thin liquids   Tolerance of Current Diet Level:RN reported pt appears to be tolerating current diet level     Impressions: Pt was positioned Upright in bed , awake and alert with bilateral restraints in place. Currently on room air. Trials of thin liquids, soft solids, and regular solids  were provided to assess swallow function. Oral phase characterized by prolonged mastication secondary to reduced dentition, delayed AP transit and suspected premature bolus loss to pharynx. Pt overall tolerating of thin liquids with no overt clinical s/s of aspiration. Pt able to achieve adequate oral clearance of regular solids given extended time. Overall, pt continues to benefit from softer selections due to reduced dentition and remains at  increased risk for aspiration due to cognitive state  and prior hx of dysphagia . Based on today's assessment recommend Easy to chew  with Thin liquids , Meds whole with water.      Dysphagia Goals:   Pt will functionally tolerate recommended diet with no overt clinical s/s of aspiration (Ongoing 02/03/23)  Pt will demonstrate understanding of aspiration risk and precautions via education/demonstration with occasional prompting (Ongoing 02/03/23)  Pt will advance to least restrictive diet as indicated (Ongoing 02/03/23)    Plan:   1-2 times to ensure diet tolerance. Patient/Family Education:  Provided education regarding role of SLP, recommendations and general speech pathology plan of care. [x] Pt verbalized understanding and agreement   [x] Pt requires ongoing learning   [] No evidence of comprehension     Discharge Recommendations:    Do not anticipate need for further speech/dysphagia therapy upon discharge from hospital     Treatment time:  Timed Code Treatment Minutes: 0 min  Total Treatment time: 11 min    If patient discharges prior to next session this note will serve as a discharge summary.        Signature:   Stephen Blair M.A., [address]  Speech-Language Pathologist

## 2023-02-03 NOTE — PROCEDURES
EEG Report  Pt Name: Rosalina Giraldo  MRN: [id number]  [place] 1960  Date of EE2023  Provider: Stacy Ng  Reason: Seizure    Clinical History:history of seizures secondary to left sided craniotomy 2017 and aneurysmal bleed on Keppra 1500 mg bd and Vimpat 200 mg bd. Aggressive behavior. Procedure:   Utilizing the international 10:20 lead placement system an 18 Lead digital EEG recording done in ICU stetting. Hyperventilation was not performed. Photic stimulation at gradually increasing frequencies was utilized  Study was down during awake, drowsy and light sleep states  Description:  Excessive muscle movement and ICU electric artifact contaminated most of the study. There is slight asymmetry and mild breach rhythm seen on the left side. No focal slowing or epileptiform discharges were seen  No evidence of electrical status . Impression:    Normal EEG. No epileptiform discharges seen.       Stacy Ng MD  Neurology  [phone]

## 2023-02-03 NOTE — CARE COORDINATION
Discharge note:      CM/SW has been notified of discharge. Patient noted to have the following needs at discharge. CM/SW has coordinated the following services:  Return to LTC      Discharge Destination:     [facility], [address]  Phone: [phone]  Fax: [phone]    Transportation: [address] [facility]  ([phone])  Discharge Time:   AVS faxed and agency notified: yes  The following prescriptions sent with patient:none  Nurse to call report to facility  Family updated  - LVM for sister Buddy Brochure:  [facility] admission has been notified of DC time      All CM/SW needs met, will sign off.

## 2023-02-03 NOTE — PROGRESS NOTES
Patient called out stating he needed to urinate, bedding found to be saturated in urine, during bedding change patient captured IV tubing in his mouth and ripped out IV access, Pt. Will not sit still enough to get a new PIV. Secure message sent to night hospitalist. Received orders for PO meds instead.

## 2023-02-03 NOTE — PROGRESS NOTES
PULMONARY AND CRITICAL CARE MEDICINE PROGRESS NOTE    Subjective: No acute events overnight. Patient has done well postextubation. EEG did not show any evidence of seizures. REVIEW OF SYSTEMS:   Constitutional symptoms: The patient denies fever, fatigue, night sweats, weight loss or weight gain. HEENT: No vision changes. No tinnitus, Denies sinus pain. No hoarseness, or dysphagia. Neck: Patient denies swelling in the neck. Cardiovascular: Denies chest pain, palpitation, syncope. Respiratory: Denies shortness of breath or cough. Gastrointestinal: Denies nausea, abdominal pain or change in bowel function. Genitourinary: Denies obstructive symptoms. No history of incontinence. Skin: No rashes or itching. Muskuloskeletal: Denies weakness or bone pain. Neurological: No headaches or seizures. Psychiatric: Denies mood swings or depression. MEDICATIONS:     Scheduled Meds:   lacosamide  200 mg Oral BID    levETIRAcetam  1,500 mg Oral BID    pantoprazole  40 mg IntraVENous Daily    OXcarbazepine  300 mg Oral BID    aspirin  81 mg Oral Daily    sodium chloride flush  5-40 mL IntraVENous 2 times per day    enoxaparin  40 mg SubCUTAneous Daily    DULoxetine  30 mg Oral Daily    traZODone  50 mg Oral Nightly    mirtazapine  7.5 mg Oral Nightly    atorvastatin  10 mg Oral Nightly    lisinopril  2.5 mg Oral Daily    torsemide  10 mg Oral Daily    cetirizine  10 mg Oral Daily       Current Infusions:    sodium chloride         PRN meds:  haloperidol lactate, sodium chloride flush, sodium chloride, ondansetron **OR** ondansetron, polyethylene glycol, acetaminophen **OR** acetaminophen    PHYSICAL EXAM:  BP (!) 147/48   Pulse 86   Temp 99 °F (37.2 °C) (Temporal)   Resp 18   Ht 6' (1.829 m)   Wt 154 lb 1.6 oz (69.9 kg)   SpO2 94%   BMI 20.90 kg/m²  I/O last 3 completed shifts: In: 2445.5 [I.V.:1982.2; IV Piggyback:463.4]  Out: 2450 [Urine:2450] No intake/output data recorded.     Intake/Output Summary (Last 24 hours) at 2/3/2023 1054  Last data filed at 2/3/2023 0600  Gross per 24 hour   Intake 1654.11 ml   Output 1525 ml   Net 129.11 ml       CONSTITUTIONAL: He is a 58y.o.-year-old who appears his stated age. He is in no acute distress. CARDIOVASCULAR: S1 S2 RRR. Without murmer  RESPIRATORY & CHEST: Lungs are clear to auscultation and percussion. No wheezing, no crackles. Good air movement  GASTROINTESTINAL & ABDOMEN: Soft, nontender, positive bowel sounds in all quadrants, non-distended, without hepatosplenomegaly. GENITOURINARY: Deferred. MUSCULOSKELETAL: No tenderness to palpation of the axial skeleton. There is no clubbing. No cyanosis. No edema of the lower extremities. SKIN OF BODY: No rash or jaundice. PSYCHIATRIC EVALUATION: Normal affect. Patient answers questions appropriately. HEMATOLOGIC/LYMPHATIC/ IMMUNOLOGIC: No palpable lymphadenopathy. NEUROLOGIC: Awake and answers questions. Groslly non-focal. Motor strength is 5+/5 in all muscle groups. The patient has a normal sensorium globally. LABS:    Recent Labs     02/01/23 1645 02/02/23 0407 02/03/23 0418   WBC 4.7 10.9 6.0   RBC 3.90* 3.50* 3.76*   HGB 13.1* 11.9* 12.6*   HCT 40.0* 34.6* 37.7*   MCH 33.5 33.9 33.6   MCHC 32.6 34.3 33.5   RDW 13.2 12.6 13.0   * 122* 101*   MPV 9.2 9.3 9.8   NEUTOPHILPCT 79.3 85.7 72.9   MONOPCT 2.6 5.4 5.9   BASOPCT 0.6 0.1 0.3     Recent Labs     02/01/23 1645 02/02/23 0407 02/03/23 0418    138 141   K 3.7 3.6 3.7    109 109   ANIONGAP 22* 7 9   CO2 17* 22 23   BUN 20 17 10   CREATININE 1.2 0.7* 0.6*   CALCIUM 9.3 8.6 8.9   PROT 6.8 5.5*  --    BILITOT 0.4 0.8  --    ALKPHOS 50 39*  --    ALT 12 11  --    AST 19 29  --    GLUCOSE 197* 118* 82     Recent Labs     02/02/23  0513   PHART 7.504*   OJC6WVC 32.4*   PO2ART 117.0*   LOS3POD 25.5   C6LVQHTP 99.9   BEART 2.7          IMAGING:  I reviewed the chest x-ray and my interpretation is as follows.   No infiltrates noted.     IMPRESSION:  Acute hypoxic respiratory failure  Seizure disorder  History of craniotomy  Severe MR  Severe AR  History of endocarditis     PLAN:  Patient presented with breakthrough seizure. He has history of seizure after his craniotomy and has been on Keppra 1500 twice daily and Vimpat 200 mg twice daily. No further episodes of seizures noted in the hospital.  Evaluate by neurology and has been initiated on oxcarbazepine 300 mg twice daily. No acute abnormality noted on CT head. EEG did not show any evidence of seizures. Acute hypoxic respiratory failure requiring mechanical ventilation. Extubated yesterday and tolerated well. Now on nasal cannula. Diet per speech    Patient has severe MR with preserved EF. He has history of endocarditis. On 10 mg of Demadex, home dose. No evidence of infection. Patient can be discharged from critical care perspective. Critical care will sign off. Slade Clark MD  Pulmonary Critical Care and Sleep Medicine  2/3/2023, 10:54 AM    This note was completed using dragon medical speech recognition software. Grammatical errors, random word insertions, pronoun errors and incomplete sentences are occasional consequences of this technology due to software limitations. If there are questions or concerns about the content of this note of information contained within the body of this dictation they should be addressed with the provider for clarification.

## 2023-02-03 NOTE — PROGRESS NOTES
Bladder scab completed, 274 ml of urine noted as retention. Patient has external catheter in place, with about 1000ml in container, also his bedpad was wet and was changed.

## 2023-02-05 LAB
BLOOD CULTURE, ROUTINE: NORMAL
CULTURE, BLOOD 2: NORMAL

## 2023-03-07 ENCOUNTER — HOSPITAL ENCOUNTER (EMERGENCY)
Age: 63
Discharge: HOME | End: 2023-03-07
Attending: EMERGENCY MEDICINE
Payer: COMMERCIAL

## 2023-03-07 ENCOUNTER — APPOINTMENT (OUTPATIENT)
Dept: GENERAL RADIOLOGY | Age: 63
End: 2023-03-07
Payer: COMMERCIAL

## 2023-03-07 VITALS
RESPIRATION RATE: 19 BRPM | DIASTOLIC BLOOD PRESSURE: 41 MMHG | WEIGHT: 155 LBS | SYSTOLIC BLOOD PRESSURE: 122 MMHG | HEART RATE: 95 BPM | HEIGHT: 72 IN | TEMPERATURE: 97 F | BODY MASS INDEX: 20.99 KG/M2 | OXYGEN SATURATION: 99 %

## 2023-03-07 DIAGNOSIS — I49.3 PVC'S (PREMATURE VENTRICULAR CONTRACTIONS): Primary | ICD-10-CM

## 2023-03-07 LAB
A/G RATIO: 1.8 (ref 1.1–2.2)
ALBUMIN SERPL-MCNC: 4.4 G/DL (ref 3.4–5)
ALP BLD-CCNC: 50 U/L (ref 40–129)
ALT SERPL-CCNC: 14 U/L (ref 10–40)
ANION GAP SERPL CALCULATED.3IONS-SCNC: 7 MMOL/L (ref 3–16)
AST SERPL-CCNC: 18 U/L (ref 15–37)
BASOPHILS ABSOLUTE: 0 K/UL (ref 0–0.2)
BASOPHILS RELATIVE PERCENT: 0.7 %
BILIRUB SERPL-MCNC: 0.4 MG/DL (ref 0–1)
BUN BLDV-MCNC: 14 MG/DL (ref 7–20)
CALCIUM SERPL-MCNC: 9.8 MG/DL (ref 8.3–10.6)
CHLORIDE BLD-SCNC: 98 MMOL/L (ref 99–110)
CO2: 27 MMOL/L (ref 21–32)
CREAT SERPL-MCNC: 1 MG/DL (ref 0.8–1.3)
EOSINOPHILS ABSOLUTE: 0.1 K/UL (ref 0–0.6)
EOSINOPHILS RELATIVE PERCENT: 1.6 %
GFR SERPL CREATININE-BSD FRML MDRD: >60 ML/MIN/{1.73_M2}
GLUCOSE BLD-MCNC: 98 MG/DL (ref 70–99)
HCT VFR BLD CALC: 38.9 % (ref 40.5–52.5)
HEMOGLOBIN: 13.7 G/DL (ref 13.5–17.5)
LYMPHOCYTES ABSOLUTE: 1.4 K/UL (ref 1–5.1)
LYMPHOCYTES RELATIVE PERCENT: 31.8 %
MCH RBC QN AUTO: 35.1 PG (ref 26–34)
MCHC RBC AUTO-ENTMCNC: 35.3 G/DL (ref 31–36)
MCV RBC AUTO: 99.3 FL (ref 80–100)
MONOCYTES ABSOLUTE: 0.4 K/UL (ref 0–1.3)
MONOCYTES RELATIVE PERCENT: 9.2 %
NEUTROPHILS ABSOLUTE: 2.5 K/UL (ref 1.7–7.7)
NEUTROPHILS RELATIVE PERCENT: 56.7 %
PDW BLD-RTO: 13.7 % (ref 12.4–15.4)
PLATELET # BLD: 129 K/UL (ref 135–450)
PMV BLD AUTO: 9.3 FL (ref 5–10.5)
POTASSIUM REFLEX MAGNESIUM: 4.8 MMOL/L (ref 3.5–5.1)
RBC # BLD: 3.92 M/UL (ref 4.2–5.9)
SODIUM BLD-SCNC: 132 MMOL/L (ref 136–145)
TOTAL PROTEIN: 6.8 G/DL (ref 6.4–8.2)
TROPONIN: <0.01 NG/ML
WBC # BLD: 4.4 K/UL (ref 4–11)

## 2023-03-07 PROCEDURE — 99285 EMERGENCY DEPT VISIT HI MDM: CPT

## 2023-03-07 PROCEDURE — 85025 COMPLETE CBC W/AUTO DIFF WBC: CPT

## 2023-03-07 PROCEDURE — 80053 COMPREHEN METABOLIC PANEL: CPT

## 2023-03-07 PROCEDURE — 93005 ELECTROCARDIOGRAM TRACING: CPT | Performed by: EMERGENCY MEDICINE

## 2023-03-07 PROCEDURE — 84484 ASSAY OF TROPONIN QUANT: CPT

## 2023-03-07 PROCEDURE — 71045 X-RAY EXAM CHEST 1 VIEW: CPT

## 2023-03-07 ASSESSMENT — ENCOUNTER SYMPTOMS
SHORTNESS OF BREATH: 0
NAUSEA: 0
CHEST TIGHTNESS: 0
VOMITING: 0
DIARRHEA: 0
ABDOMINAL PAIN: 0

## 2023-03-07 ASSESSMENT — PAIN SCALES - GENERAL: PAINLEVEL_OUTOF10: 0

## 2023-03-07 ASSESSMENT — PAIN - FUNCTIONAL ASSESSMENT: PAIN_FUNCTIONAL_ASSESSMENT: 0-10

## 2023-03-07 NOTE — ED PROVIDER NOTES
[address] [facility]        Pt Name: Kathryn Carter  MRN: [id number]  [place] 1960  Date of evaluation: 3/7/2023  Provider: SENAIT Thompson - CNP  PCP: Rylan Coy MD  Note Started: 6:32 PM EST 3/7/23       I have seen and evaluated this patient with my supervising physician Curtiss Soulier, MD.      [address] [facility]       Chief Complaint   Patient presents with    Other     Pt brought in per [facility] from [facility], NP concerned that pt is in Afib, pt AAO x 3, denies complaint. HISTORY OF PRESENT ILLNESS: 1 or more Elements     History from : Patient    Limitations to history : None    Kathryn Carter is a 58 y.o. male who presents to the ER with complaint of irregular heart rhythm, patient was sent out per nursing home for nursing concern of irregular heart rhythm. Patient denies any complaints. Denies any headache, fever, lightheadedness, dizziness, visual disturbances. No chest pain or pressure. No neck or back pain. No shortness of breath, cough, or congestion. No abdominal pain, nausea, vomiting, diarrhea, constipation, or dysuria. No rash. Nursing Notes were all reviewed and agreed with or any disagreements were addressed in the HPI. REVIEW OF SYSTEMS :      Review of Systems   Constitutional:  Negative for activity change, chills and fever. Respiratory:  Negative for chest tightness and shortness of breath. Cardiovascular:  Negative for chest pain. Gastrointestinal:  Negative for abdominal pain, diarrhea, nausea and vomiting. Genitourinary:  Negative for dysuria. All other systems reviewed and are negative. Positives and Pertinent negatives as per HPI.      SURGICAL HISTORY     Past Surgical History:   Procedure Laterality Date    BRAIN ANEURYSM SURGERY Left 02/03/2017    COLONOSCOPY N/A 8/14/2019    COLONOSCOPY WITH MAC ANESTHESIA performed by Isak Yousif MD at [facility] ENDOSCOPY    CRANIOTOMY Left 02/03/2017    LEG SURGERY      UPPER GASTROINTESTINAL ENDOSCOPY N/A 8/14/2019    EGD BIOPSY performed by Yasmin Vang MD at [facility]       Discharge Medication List as of 3/7/2023  8:08 PM        CONTINUE these medications which have NOT CHANGED    Details   LACTOBACILLUS PO Take 1 mg by mouth BidHistorical Med      cetirizine (ZYRTEC) 10 MG tablet Take 10 mg by mouth dailyHistorical Med      HM LIDOCAINE PATCH EX Apply topicallyHistorical Med      DULoxetine (CYMBALTA) 30 MG extended release capsule Take 30 mg by mouth dailyHistorical Med      MELATONIN PO Take 9 mg by mouth nightlyHistorical Med      traZODone (DESYREL) 50 MG tablet Take 25 mg by mouth nightlyHistorical Med      lisinopril (PRINIVIL;ZESTRIL) 2.5 MG tablet Take 1 tablet by mouth daily, Disp-30 tablet, R-3Print      torsemide (DEMADEX) 10 MG tablet Take 1 tablet by mouth daily, Disp-30 tablet, R-3Print      levETIRAcetam (KEPPRA) 500 MG tablet Take 1,500 mg by mouth 2 times dailyHistorical Med      potassium chloride (KLOR-CON M) 20 MEQ extended release tablet Take 20 mEq by mouth 2 times dailyHistorical Med      acetaminophen (TYLENOL) 325 MG tablet Take 650 mg by mouth every 4 hours as needed for PainHistorical Med      mirtazapine (REMERON) 7.5 MG tablet Take 7.5 mg by mouth nightlyHistorical Med      Cholecalciferol (VITAMIN D3) 5000 units TABS Take by mouth dailyHistorical Med      lacosamide (VIMPAT) 200 MG tablet Take 200 mg by mouth 2 times daily. Historical Med      calcium carbonate (TUMS) 500 MG chewable tablet Take 1 tablet by mouth 2 times daily Historical Med      methadone (DOLOPHINE) 5 MG tablet Take 5 mg by mouth daily.  Historical Med      atorvastatin (LIPITOR) 10 MG tablet Take 10 mg by mouth nightly Historical Med      OXcarbazepine (TRILEPTAL) 300 MG tablet Take 1 tablet by mouth 2 times daily, Disp-60 tablet, R-3Normal      LORazepam (ATIVAN) 2 MG/ML injection Inject 2 mg into the muscle daily as needed. Historical Med      fluticasone (FLONASE) 50 MCG/ACT nasal spray 2 sprays dailyHistorical Med             ALLERGIES     Seasonal    FAMILYHISTORY     History reviewed. No pertinent family history. SOCIAL HISTORY       Social History     Tobacco Use    Smoking status: Some Days     Packs/day: 0.50     Types: Cigarettes    Smokeless tobacco: Never    Tobacco comments:     6 per WEEK   Vaping Use    Vaping Use: Never used    Passive vaping exposure: Yes   Substance Use Topics    Alcohol use: Yes     Alcohol/week: 1.0 standard drink     Types: 1 Cans of beer per week     Comment: occ    Drug use: Not Currently     Types: Marijuana Jean Carlos Semen)     Comment: not at this time       SCREENINGS        Marcelo Coma Scale  Eye Opening: Spontaneous  Best Verbal Response: Oriented  Best Motor Response: Obeys commands  Marcelo Coma Scale Score: 15                CIWA Assessment  BP: (!) 122/41  Heart Rate: 95           PHYSICAL EXAM  1 or more Elements     ED Triage Vitals   BP Temp Temp Source Heart Rate Resp SpO2 Height Weight   03/07/23 1759 03/07/23 1759 03/07/23 1759 03/07/23 1759 03/07/23 1759 03/07/23 1759 03/07/23 1756 03/07/23 1756   (!) 122/41 97 °F (36.1 °C) Oral 95 19 99 % 6' (1.829 m) 155 lb (70.3 kg)       Physical Exam  Vitals and nursing note reviewed. Constitutional:       Appearance: He is well-developed. He is not diaphoretic. HENT:      Head: Normocephalic and atraumatic. Right Ear: External ear normal.      Left Ear: External ear normal.   Eyes:      General:         Right eye: No discharge. Left eye: No discharge. Neck:      Vascular: No JVD. Cardiovascular:      Rate and Rhythm: Normal rate. Pulses: Normal pulses. Heart sounds: Murmur heard. Comments: Frequent PVC's  Pulmonary:      Effort: Pulmonary effort is normal. No respiratory distress. Breath sounds: Normal breath sounds. Abdominal:      Palpations: Abdomen is soft. Musculoskeletal:         General: Normal range of motion. Skin:     General: Skin is warm and dry. Coloration: Skin is not pale. Neurological:      Mental Status: He is alert. Psychiatric:         Behavior: Behavior normal.           DIAGNOSTIC RESULTS   LABS:    Labs Reviewed   CBC WITH AUTO DIFFERENTIAL - Abnormal; Notable for the following components:       Result Value    RBC 3.92 (*)     Hematocrit 38.9 (*)     MCH 35.1 (*)     Platelets 006 (*)     All other components within normal limits   COMPREHENSIVE METABOLIC PANEL W/ REFLEX TO MG FOR LOW K - Abnormal; Notable for the following components:    Sodium 132 (*)     Chloride 98 (*)     All other components within normal limits   TROPONIN       When ordered only abnormal lab results are displayed. All other labs were within normal range or not returned as of this dictation. EKG: When ordered, EKG's are interpreted by the Emergency Department Physician in the absence of a cardiologist.  Please see their note for interpretation of EKG. RADIOLOGY:   Non-plain film images such as CT, Ultrasound and MRI are read by the radiologist. Plain radiographic images are visualized and preliminarily interpreted by the ED Provider with the below findings:        Interpretation per the Radiologist below, if available at the time of this note:    XR CHEST PORTABLE   Final Result   No acute process. No results found. No results found.     PROCEDURES   Unless otherwise noted below, none     Procedures    CRITICAL CARE TIME (.cctime)   none    PAST MEDICAL HISTORY      has a past medical history of Acute intracerebral hemorrhage ([address] (02/03/2017), Anxiety, Back pain, Cerebral artery occlusion with cerebral infarction [facility]), Chronic back pain greater than 3 months duration (02/03/2017), Diabetes mellitus ([address] Dysphasia (02/03/2017), Dysphonia (02/03/2017), Endocarditis and heart valve disorders in diseases classified elsewhere (02/03/2017), Hydrocephalus ([facility] (02/03/2017), Hypertension, IVH (intraventricular hemorrhage) ([facility] [facility] (02/03/2017), NSTEMI (non-ST elevated myocardial infarction) ([facility] [facility] (02/03/2017), and Seizures ([facility] [address] Chronic Conditions affecting Care:  has a past medical history of Acute intracerebral hemorrhage ([facility] [address] (02/03/2017), Anxiety, Back pain, Cerebral artery occlusion with cerebral infarction [facility]), Chronic back pain greater than 3 months duration (02/03/2017), Diabetes mellitus ([facility] [address] Dysphasia (02/03/2017), Dysphonia (02/03/2017), Endocarditis and heart valve disorders in diseases classified elsewhere (02/03/2017), Hydrocephalus ([facility] [address] (02/03/2017), Hypertension, IVH (intraventricular hemorrhage) ([facility] [address] (02/03/2017), [facility]STEM[facility] (non[facility] (02/03/2017), and Seizures ([facility] EMERGENCY DEPARTMENT COURSE and DIFFERENTIAL DIAGNOSIS/MDM:   Vitals:    Vitals:    03/07/23 1756 03/07/23 1759   BP:  (!) 122/41   Pulse:  95   Resp:  19   Temp:  97 °F (36.1 °C)   TempSrc:  Oral   SpO2:  99%   Weight: 155 lb (70.3 kg)    Height: 6' (1.829 m)        Patient was given the following medications:  Medications - No data to display          Is this patient to be included in the SEP-1 Core Measure due to severe sepsis or septic shock? No   Exclusion criteria - the patient is NOT to be included for SEP-1 Core Measure due to: Infection is not suspected    CONSULTS: (Who and What was discussed)  None  Discussion with Other Profesionals : None    Social Determinants : None    Records Reviewed : Outpatient Notes previous ER visit and outpatient notes from cardiology    CC/HPI Summary, DDx, ED Course, and Reassessment:     Briefly, this is a 58year old male who presents to the ER with complaint of irregular heart rhythm, patient was sent out per nursing home for nursing concern of irregular heart rhythm. Patient denies any complaints. Cbc is unremarkable, CMP is unremarkable. Troponin is negative.     Ekg reviewed by attending physician. XR CHEST PORTABLE (Final result)  Result time 03/07/23 18:46:17  Final result by Ruth Rowell MD (03/07/23 18:46:17)                Impression:    No acute process. The patient is not in atrial fibrillation. He is in a sinus rhythm with frequent PVCs, and does have history of the same. States that he feels fine. He would like to be discharged back to the nursing facility, I do believe this is reasonable as he was sent over for reason that was not found. All needs were met, no further needs. Disposition Considerations (include 1 Tests not done, Shared Decision Making, Pt Expectation of Test or Tx.): shared decision making used throughout    I did consider dimer    Appropriate for outpatient management follow up      I am the Primary Clinician of Record.     FINAL IMPRESSION      1. PVC's (premature ventricular contractions)          DISPOSITION/PLAN     DISPOSITION Decision To Discharge 03/07/2023 07:16:20 PM      PATIENT REFERRED TO:  Danielito Ochoa MD  [address]  [phone]    Call in 2 days      DISCHARGE MEDICATIONS:  Discharge Medication List as of 3/7/2023  8:08 PM          DISCONTINUED MEDICATIONS:  Discharge Medication List as of 3/7/2023  8:08 PM                 (Please note that portions of this note were completed with a voice recognition program.  Efforts were made to edit the dictations but occasionally words are mis-transcribed.)    SENAIT Welch CNP (electronically signed)           SENAIT Welch CNP  03/08/23 0011

## 2023-03-08 LAB
EKG ATRIAL RATE: 92 BPM
EKG DIAGNOSIS: NORMAL
EKG P AXIS: 61 DEGREES
EKG P-R INTERVAL: 198 MS
EKG Q-T INTERVAL: 354 MS
EKG QRS DURATION: 118 MS
EKG QTC CALCULATION (BAZETT): 437 MS
EKG R AXIS: -42 DEGREES
EKG T AXIS: 71 DEGREES
EKG VENTRICULAR RATE: 92 BPM

## 2023-03-08 PROCEDURE — 93010 ELECTROCARDIOGRAM REPORT: CPT | Performed by: INTERNAL MEDICINE

## 2023-03-08 NOTE — DISCHARGE INSTRUCTIONS
See your doctor in the next several days for repeat EKG. Return for any other problems or worsening of symptoms.

## 2023-03-08 NOTE — ED PROVIDER NOTES
This patient was seen by the Mid-Level Provider. I have seen and examined the patient, agree with the workup, evaluation, management and diagnosis. Care plan has been discussed. My assessment reveals a 75-year-old male who presents with possible A-fib. This is a 75-year-old male with a history of a CVA who currently resides in extended care facility. The patient denies any complaints but apparently this morning he was getting physical in the provider thought that he may be in atrial fibrillation. Again he denies any complaints. Radiology results:    XR CHEST PORTABLE   Final Result   No acute process. LABS:    Labs Reviewed   CBC WITH AUTO DIFFERENTIAL - Abnormal; Notable for the following components:       Result Value    RBC 3.92 (*)     Hematocrit 38.9 (*)     MCH 35.1 (*)     Platelets 681 (*)     All other components within normal limits   COMPREHENSIVE METABOLIC PANEL W/ REFLEX TO MG FOR LOW K - Abnormal; Notable for the following components:    Sodium 132 (*)     Chloride 98 (*)     All other components within normal limits   TROPONIN           EKG:    Sinus rhythm with a left axis deviation at a rate of 92 beats a minute with frequent PVCs. However, when I went to the patient's room he was not having PVCs. Exam:    Well-nourished male in no acute distress. Heart was regular regular rate rhythm with no murmurs rubs gallops. He had a grade 1 systolic ejection murmur. Lungs were clear to auscultation bilaterally. Medical decision makin-year-old male presents with an irregular heartbeat. The patient's work-up appears to be consistent with some PVCs. He appeared to be in a bigeminy when I saw the patient but is in a normal sinus rhythm at this point. I see no evidence of any acute pathology.   Laboratory results, chest x-ray and troponin all negative and normal.  The patient was discharged back to his primary care physician and his facility for further care with instructions to return if worse. FINAL IMPRESSION:    1.  PVC's (premature ventricular contractions)           Gertrude Steve MD  03/07/23 1920

## 2023-11-01 ENCOUNTER — APPOINTMENT (OUTPATIENT)
Dept: GENERAL RADIOLOGY | Age: 63
End: 2023-11-01
Payer: COMMERCIAL

## 2023-11-01 ENCOUNTER — HOSPITAL ENCOUNTER (INPATIENT)
Age: 63
LOS: 2 days | Discharge: OTHER INSTITUTION | End: 2023-11-03
Attending: EMERGENCY MEDICINE | Admitting: INTERNAL MEDICINE
Payer: COMMERCIAL

## 2023-11-01 DIAGNOSIS — R56.9 SEIZURE (HCC): Primary | ICD-10-CM

## 2023-11-01 DIAGNOSIS — A41.9 SEPTICEMIA (HCC): ICD-10-CM

## 2023-11-01 LAB
ALBUMIN SERPL-MCNC: 3.7 G/DL (ref 3.4–5)
ALBUMIN/GLOB SERPL: 1.5 {RATIO} (ref 1.1–2.2)
ALP SERPL-CCNC: 54 U/L (ref 40–129)
ALT SERPL-CCNC: 12 U/L (ref 10–40)
ANION GAP SERPL CALCULATED.3IONS-SCNC: 20 MMOL/L (ref 3–16)
AST SERPL-CCNC: 27 U/L (ref 15–37)
BACTERIA URNS QL MICRO: NORMAL /HPF
BASE EXCESS BLDV CALC-SCNC: -3.4 MMOL/L (ref -3–3)
BASOPHILS # BLD: 0 K/UL (ref 0–0.2)
BASOPHILS NFR BLD: 0.2 %
BILIRUB SERPL-MCNC: 0.5 MG/DL (ref 0–1)
BILIRUB UR QL STRIP.AUTO: NEGATIVE
BUN SERPL-MCNC: 9 MG/DL (ref 7–20)
CALCIUM SERPL-MCNC: 9 MG/DL (ref 8.3–10.6)
CHLORIDE SERPL-SCNC: 92 MMOL/L (ref 99–110)
CLARITY UR: CLEAR
CO2 BLDV-SCNC: 51 MMOL/L
CO2 SERPL-SCNC: 14 MMOL/L (ref 21–32)
COHGB MFR BLDV: 5.2 % (ref 0–1.5)
COLOR UR: YELLOW
CREAT SERPL-MCNC: 1.1 MG/DL (ref 0.8–1.3)
DEPRECATED RDW RBC AUTO: 12.3 % (ref 12.4–15.4)
EOSINOPHIL # BLD: 0 K/UL (ref 0–0.6)
EOSINOPHIL NFR BLD: 0.4 %
EPI CELLS #/AREA URNS AUTO: 0 /HPF (ref 0–5)
GFR SERPLBLD CREATININE-BSD FMLA CKD-EPI: >60 ML/MIN/{1.73_M2}
GLUCOSE SERPL-MCNC: 187 MG/DL (ref 70–99)
GLUCOSE UR STRIP.AUTO-MCNC: NEGATIVE MG/DL
HCO3 BLDV-SCNC: 21.6 MMOL/L (ref 23–29)
HCT VFR BLD AUTO: 38.7 % (ref 40.5–52.5)
HGB BLD-MCNC: 12.9 G/DL (ref 13.5–17.5)
HGB UR QL STRIP.AUTO: ABNORMAL
HYALINE CASTS #/AREA URNS AUTO: 1 /LPF (ref 0–8)
KETONES UR STRIP.AUTO-MCNC: NEGATIVE MG/DL
LACTATE BLDV-SCNC: 1.2 MMOL/L (ref 0.4–1.9)
LACTATE BLDV-SCNC: 9.3 MMOL/L (ref 0.4–2)
LEUKOCYTE ESTERASE UR QL STRIP.AUTO: NEGATIVE
LYMPHOCYTES # BLD: 0.6 K/UL (ref 1–5.1)
LYMPHOCYTES NFR BLD: 10 %
MCH RBC QN AUTO: 34.1 PG (ref 26–34)
MCHC RBC AUTO-ENTMCNC: 33.4 G/DL (ref 31–36)
MCV RBC AUTO: 102 FL (ref 80–100)
METHGB MFR BLDV: 0.3 %
MONOCYTES # BLD: 0.3 K/UL (ref 0–1.3)
MONOCYTES NFR BLD: 4.6 %
NEUTROPHILS # BLD: 4.7 K/UL (ref 1.7–7.7)
NEUTROPHILS NFR BLD: 84.8 %
NITRITE UR QL STRIP.AUTO: NEGATIVE
O2 CT VFR BLDV CALC: 17 VOL %
O2 THERAPY: ABNORMAL
PCO2 BLDV: 38.3 MMHG (ref 40–50)
PH BLDV: 7.36 [PH] (ref 7.35–7.45)
PH UR STRIP.AUTO: 7 [PH] (ref 5–8)
PLATELET # BLD AUTO: 154 K/UL (ref 135–450)
PMV BLD AUTO: 9.1 FL (ref 5–10.5)
PO2 BLDV: 128 MMHG (ref 25–40)
POTASSIUM SERPL-SCNC: 4.2 MMOL/L (ref 3.5–5.1)
PROT SERPL-MCNC: 6.1 G/DL (ref 6.4–8.2)
PROT UR STRIP.AUTO-MCNC: ABNORMAL MG/DL
RBC # BLD AUTO: 3.8 M/UL (ref 4.2–5.9)
RBC CLUMPS #/AREA URNS AUTO: 4 /HPF (ref 0–4)
SAO2 % BLDV: 99 %
SODIUM SERPL-SCNC: 126 MMOL/L (ref 136–145)
SP GR UR STRIP.AUTO: <=1.005 (ref 1–1.03)
UA COMPLETE W REFLEX CULTURE PNL UR: ABNORMAL
UA DIPSTICK W REFLEX MICRO PNL UR: YES
URN SPEC COLLECT METH UR: ABNORMAL
UROBILINOGEN UR STRIP-ACNC: 0.2 E.U./DL
WBC # BLD AUTO: 5.6 K/UL (ref 4–11)
WBC #/AREA URNS AUTO: 1 /HPF (ref 0–5)

## 2023-11-01 PROCEDURE — 81001 URINALYSIS AUTO W/SCOPE: CPT

## 2023-11-01 PROCEDURE — 85025 COMPLETE CBC W/AUTO DIFF WBC: CPT

## 2023-11-01 PROCEDURE — 71045 X-RAY EXAM CHEST 1 VIEW: CPT

## 2023-11-01 PROCEDURE — 6360000002 HC RX W HCPCS: Performed by: EMERGENCY MEDICINE

## 2023-11-01 PROCEDURE — 6370000000 HC RX 637 (ALT 250 FOR IP): Performed by: INTERNAL MEDICINE

## 2023-11-01 PROCEDURE — 96374 THER/PROPH/DIAG INJ IV PUSH: CPT

## 2023-11-01 PROCEDURE — 80053 COMPREHEN METABOLIC PANEL: CPT

## 2023-11-01 PROCEDURE — 93005 ELECTROCARDIOGRAM TRACING: CPT | Performed by: EMERGENCY MEDICINE

## 2023-11-01 PROCEDURE — 83605 ASSAY OF LACTIC ACID: CPT

## 2023-11-01 PROCEDURE — 99285 EMERGENCY DEPT VISIT HI MDM: CPT

## 2023-11-01 PROCEDURE — 2580000003 HC RX 258: Performed by: EMERGENCY MEDICINE

## 2023-11-01 PROCEDURE — 2060000000 HC ICU INTERMEDIATE R&B

## 2023-11-01 PROCEDURE — 96375 TX/PRO/DX INJ NEW DRUG ADDON: CPT

## 2023-11-01 PROCEDURE — 36415 COLL VENOUS BLD VENIPUNCTURE: CPT

## 2023-11-01 PROCEDURE — 82803 BLOOD GASES ANY COMBINATION: CPT

## 2023-11-01 PROCEDURE — 2580000003 HC RX 258: Performed by: INTERNAL MEDICINE

## 2023-11-01 RX ORDER — SODIUM CHLORIDE 9 MG/ML
INJECTION, SOLUTION INTRAVENOUS PRN
Status: DISCONTINUED | OUTPATIENT
Start: 2023-11-01 | End: 2023-11-03 | Stop reason: HOSPADM

## 2023-11-01 RX ORDER — ACETAMINOPHEN 650 MG/1
650 SUPPOSITORY RECTAL EVERY 6 HOURS PRN
Status: DISCONTINUED | OUTPATIENT
Start: 2023-11-01 | End: 2023-11-03 | Stop reason: HOSPADM

## 2023-11-01 RX ORDER — POTASSIUM CHLORIDE 20 MEQ/1
40 TABLET, EXTENDED RELEASE ORAL PRN
Status: DISCONTINUED | OUTPATIENT
Start: 2023-11-01 | End: 2023-11-03 | Stop reason: HOSPADM

## 2023-11-01 RX ORDER — SODIUM CHLORIDE 0.9 % (FLUSH) 0.9 %
5-40 SYRINGE (ML) INJECTION EVERY 12 HOURS SCHEDULED
Status: DISCONTINUED | OUTPATIENT
Start: 2023-11-01 | End: 2023-11-03 | Stop reason: HOSPADM

## 2023-11-01 RX ORDER — ONDANSETRON 2 MG/ML
4 INJECTION INTRAMUSCULAR; INTRAVENOUS EVERY 6 HOURS PRN
Status: DISCONTINUED | OUTPATIENT
Start: 2023-11-01 | End: 2023-11-03 | Stop reason: HOSPADM

## 2023-11-01 RX ORDER — 0.9 % SODIUM CHLORIDE 0.9 %
1000 INTRAVENOUS SOLUTION INTRAVENOUS ONCE
Status: COMPLETED | OUTPATIENT
Start: 2023-11-01 | End: 2023-11-01

## 2023-11-01 RX ORDER — ATORVASTATIN CALCIUM 10 MG/1
10 TABLET, FILM COATED ORAL NIGHTLY
Status: DISCONTINUED | OUTPATIENT
Start: 2023-11-01 | End: 2023-11-03 | Stop reason: HOSPADM

## 2023-11-01 RX ORDER — SODIUM CHLORIDE 0.9 % (FLUSH) 0.9 %
5-40 SYRINGE (ML) INJECTION PRN
Status: DISCONTINUED | OUTPATIENT
Start: 2023-11-01 | End: 2023-11-03 | Stop reason: HOSPADM

## 2023-11-01 RX ORDER — MAGNESIUM SULFATE IN WATER 40 MG/ML
2000 INJECTION, SOLUTION INTRAVENOUS PRN
Status: DISCONTINUED | OUTPATIENT
Start: 2023-11-01 | End: 2023-11-03 | Stop reason: HOSPADM

## 2023-11-01 RX ORDER — ACETAMINOPHEN 325 MG/1
650 TABLET ORAL EVERY 6 HOURS PRN
Status: DISCONTINUED | OUTPATIENT
Start: 2023-11-01 | End: 2023-11-03 | Stop reason: HOSPADM

## 2023-11-01 RX ORDER — ONDANSETRON 4 MG/1
4 TABLET, ORALLY DISINTEGRATING ORAL EVERY 8 HOURS PRN
Status: DISCONTINUED | OUTPATIENT
Start: 2023-11-01 | End: 2023-11-03 | Stop reason: HOSPADM

## 2023-11-01 RX ORDER — ENOXAPARIN SODIUM 100 MG/ML
40 INJECTION SUBCUTANEOUS DAILY
Status: DISCONTINUED | OUTPATIENT
Start: 2023-11-02 | End: 2023-11-03 | Stop reason: HOSPADM

## 2023-11-01 RX ORDER — VANCOMYCIN HYDROCHLORIDE 1 G/200ML
1000 INJECTION, SOLUTION INTRAVENOUS ONCE
Status: DISCONTINUED | OUTPATIENT
Start: 2023-11-01 | End: 2023-11-01 | Stop reason: DRUGHIGH

## 2023-11-01 RX ORDER — SODIUM CHLORIDE, SODIUM LACTATE, POTASSIUM CHLORIDE, CALCIUM CHLORIDE 600; 310; 30; 20 MG/100ML; MG/100ML; MG/100ML; MG/100ML
INJECTION, SOLUTION INTRAVENOUS CONTINUOUS
Status: DISCONTINUED | OUTPATIENT
Start: 2023-11-01 | End: 2023-11-02

## 2023-11-01 RX ORDER — POTASSIUM CHLORIDE 7.45 MG/ML
10 INJECTION INTRAVENOUS PRN
Status: DISCONTINUED | OUTPATIENT
Start: 2023-11-01 | End: 2023-11-03 | Stop reason: HOSPADM

## 2023-11-01 RX ORDER — LACOSAMIDE 100 MG/1
200 TABLET ORAL 2 TIMES DAILY
Status: DISCONTINUED | OUTPATIENT
Start: 2023-11-01 | End: 2023-11-03 | Stop reason: HOSPADM

## 2023-11-01 RX ORDER — TRAZODONE HYDROCHLORIDE 50 MG/1
50 TABLET ORAL NIGHTLY
Status: DISCONTINUED | OUTPATIENT
Start: 2023-11-01 | End: 2023-11-03 | Stop reason: HOSPADM

## 2023-11-01 RX ORDER — LISINOPRIL 5 MG/1
2.5 TABLET ORAL DAILY
Status: DISCONTINUED | OUTPATIENT
Start: 2023-11-02 | End: 2023-11-03 | Stop reason: HOSPADM

## 2023-11-01 RX ORDER — IBUPROFEN 800 MG/1
800 TABLET ORAL EVERY 8 HOURS PRN
Status: ON HOLD | COMMUNITY
End: 2023-11-03 | Stop reason: HOSPADM

## 2023-11-01 RX ORDER — POLYETHYLENE GLYCOL 3350 17 G/17G
17 POWDER, FOR SOLUTION ORAL DAILY PRN
Status: DISCONTINUED | OUTPATIENT
Start: 2023-11-01 | End: 2023-11-03 | Stop reason: HOSPADM

## 2023-11-01 RX ORDER — LEVETIRACETAM 500 MG/1
1500 TABLET ORAL 2 TIMES DAILY
Status: DISCONTINUED | OUTPATIENT
Start: 2023-11-01 | End: 2023-11-03 | Stop reason: HOSPADM

## 2023-11-01 RX ADMIN — LEVETIRACETAM 1500 MG: 500 TABLET, FILM COATED ORAL at 23:21

## 2023-11-01 RX ADMIN — SODIUM CHLORIDE 1000 ML: 9 INJECTION, SOLUTION INTRAVENOUS at 19:12

## 2023-11-01 RX ADMIN — VANCOMYCIN HYDROCHLORIDE 1500 MG: 1.5 INJECTION, POWDER, LYOPHILIZED, FOR SOLUTION INTRAVENOUS at 20:34

## 2023-11-01 RX ADMIN — SODIUM CHLORIDE 1000 ML: 9 INJECTION, SOLUTION INTRAVENOUS at 18:20

## 2023-11-01 RX ADMIN — LACOSAMIDE 200 MG: 100 TABLET, FILM COATED ORAL at 23:21

## 2023-11-01 RX ADMIN — ATORVASTATIN CALCIUM 10 MG: 10 TABLET, FILM COATED ORAL at 23:21

## 2023-11-01 RX ADMIN — TRAZODONE HYDROCHLORIDE 50 MG: 50 TABLET ORAL at 23:21

## 2023-11-01 RX ADMIN — CEFEPIME 1000 MG: 1 INJECTION, POWDER, FOR SOLUTION INTRAMUSCULAR; INTRAVENOUS at 20:21

## 2023-11-01 RX ADMIN — SODIUM CHLORIDE, POTASSIUM CHLORIDE, SODIUM LACTATE AND CALCIUM CHLORIDE: 600; 310; 30; 20 INJECTION, SOLUTION INTRAVENOUS at 23:23

## 2023-11-01 ASSESSMENT — PAIN SCALES - GENERAL: PAINLEVEL_OUTOF10: 8

## 2023-11-01 ASSESSMENT — PAIN DESCRIPTION - LOCATION: LOCATION: BACK

## 2023-11-01 ASSESSMENT — PAIN DESCRIPTION - ORIENTATION: ORIENTATION: LEFT;RIGHT

## 2023-11-01 NOTE — ED NOTES
Unable to assess screening questions d/t to pt's current mental status.       Burgess Sunday RN  11/01/23 0112

## 2023-11-02 ENCOUNTER — APPOINTMENT (OUTPATIENT)
Dept: MRI IMAGING | Age: 63
End: 2023-11-02
Payer: COMMERCIAL

## 2023-11-02 LAB
ANION GAP SERPL CALCULATED.3IONS-SCNC: 11 MMOL/L (ref 3–16)
BUN SERPL-MCNC: 6 MG/DL (ref 7–20)
CALCIUM SERPL-MCNC: 9 MG/DL (ref 8.3–10.6)
CHLORIDE SERPL-SCNC: 100 MMOL/L (ref 99–110)
CO2 SERPL-SCNC: 23 MMOL/L (ref 21–32)
CREAT SERPL-MCNC: 0.7 MG/DL (ref 0.8–1.3)
EKG ATRIAL RATE: 100 BPM
EKG DIAGNOSIS: NORMAL
EKG P AXIS: 59 DEGREES
EKG P-R INTERVAL: 204 MS
EKG Q-T INTERVAL: 290 MS
EKG QRS DURATION: 120 MS
EKG QTC CALCULATION (BAZETT): 374 MS
EKG R AXIS: -23 DEGREES
EKG T AXIS: 20 DEGREES
EKG VENTRICULAR RATE: 100 BPM
FOLATE SERPL-MCNC: 4.51 NG/ML (ref 4.78–24.2)
GFR SERPLBLD CREATININE-BSD FMLA CKD-EPI: >60 ML/MIN/{1.73_M2}
GLUCOSE SERPL-MCNC: 96 MG/DL (ref 70–99)
LACTATE BLDV-SCNC: 0.9 MMOL/L (ref 0.4–2)
LACTATE BLDV-SCNC: 1.5 MMOL/L (ref 0.4–1.9)
LEVETIRACETAM SERPL-MCNC: 19.2 UG/ML (ref 6–46)
MEDICATION DOSE-MCNC: NORMAL
OSMOLALITY UR: 215 MOSM/KG (ref 390–1070)
POTASSIUM SERPL-SCNC: 4 MMOL/L (ref 3.5–5.1)
SODIUM SERPL-SCNC: 134 MMOL/L (ref 136–145)
SODIUM UR-SCNC: 62 MMOL/L
VIT B12 SERPL-MCNC: 637 PG/ML (ref 211–911)

## 2023-11-02 PROCEDURE — 2580000003 HC RX 258: Performed by: STUDENT IN AN ORGANIZED HEALTH CARE EDUCATION/TRAINING PROGRAM

## 2023-11-02 PROCEDURE — 82746 ASSAY OF FOLIC ACID SERUM: CPT

## 2023-11-02 PROCEDURE — 6360000002 HC RX W HCPCS: Performed by: INTERNAL MEDICINE

## 2023-11-02 PROCEDURE — 80177 DRUG SCRN QUAN LEVETIRACETAM: CPT

## 2023-11-02 PROCEDURE — 95816 EEG AWAKE AND DROWSY: CPT

## 2023-11-02 PROCEDURE — 2060000000 HC ICU INTERMEDIATE R&B

## 2023-11-02 PROCEDURE — APPNB30 APP NON BILLABLE TIME 0-30 MINS

## 2023-11-02 PROCEDURE — 83605 ASSAY OF LACTIC ACID: CPT

## 2023-11-02 PROCEDURE — 80048 BASIC METABOLIC PNL TOTAL CA: CPT

## 2023-11-02 PROCEDURE — 80183 DRUG SCRN QUANT OXCARBAZEPIN: CPT

## 2023-11-02 PROCEDURE — 36415 COLL VENOUS BLD VENIPUNCTURE: CPT

## 2023-11-02 PROCEDURE — 2500000003 HC RX 250 WO HCPCS: Performed by: STUDENT IN AN ORGANIZED HEALTH CARE EDUCATION/TRAINING PROGRAM

## 2023-11-02 PROCEDURE — 2580000003 HC RX 258: Performed by: INTERNAL MEDICINE

## 2023-11-02 PROCEDURE — 93010 ELECTROCARDIOGRAM REPORT: CPT | Performed by: INTERNAL MEDICINE

## 2023-11-02 PROCEDURE — APPSS60 APP SPLIT SHARED TIME 46-60 MINUTES

## 2023-11-02 PROCEDURE — 83935 ASSAY OF URINE OSMOLALITY: CPT

## 2023-11-02 PROCEDURE — 6370000000 HC RX 637 (ALT 250 FOR IP): Performed by: STUDENT IN AN ORGANIZED HEALTH CARE EDUCATION/TRAINING PROGRAM

## 2023-11-02 PROCEDURE — 70551 MRI BRAIN STEM W/O DYE: CPT

## 2023-11-02 PROCEDURE — 84300 ASSAY OF URINE SODIUM: CPT

## 2023-11-02 PROCEDURE — 82607 VITAMIN B-12: CPT

## 2023-11-02 PROCEDURE — 6370000000 HC RX 637 (ALT 250 FOR IP): Performed by: INTERNAL MEDICINE

## 2023-11-02 PROCEDURE — 95816 EEG AWAKE AND DROWSY: CPT | Performed by: PSYCHIATRY & NEUROLOGY

## 2023-11-02 RX ORDER — LORAZEPAM 2 MG/ML
4 INJECTION INTRAMUSCULAR EVERY 5 MIN PRN
Status: DISCONTINUED | OUTPATIENT
Start: 2023-11-02 | End: 2023-11-03 | Stop reason: HOSPADM

## 2023-11-02 RX ORDER — OXCARBAZEPINE 300 MG/1
300 TABLET, FILM COATED ORAL 2 TIMES DAILY
Status: DISCONTINUED | OUTPATIENT
Start: 2023-11-02 | End: 2023-11-03 | Stop reason: HOSPADM

## 2023-11-02 RX ORDER — FOLIC ACID 1 MG/1
1 TABLET ORAL DAILY
Status: DISCONTINUED | OUTPATIENT
Start: 2023-11-02 | End: 2023-11-03 | Stop reason: HOSPADM

## 2023-11-02 RX ORDER — DEXTROSE MONOHYDRATE 100 MG/ML
INJECTION, SOLUTION INTRAVENOUS CONTINUOUS PRN
Status: DISCONTINUED | OUTPATIENT
Start: 2023-11-02 | End: 2023-11-03 | Stop reason: HOSPADM

## 2023-11-02 RX ORDER — GLUCAGON 1 MG/ML
1 KIT INJECTION PRN
Status: DISCONTINUED | OUTPATIENT
Start: 2023-11-02 | End: 2023-11-03 | Stop reason: HOSPADM

## 2023-11-02 RX ADMIN — FOLIC ACID 1 MG: 1 TABLET ORAL at 22:29

## 2023-11-02 RX ADMIN — OXCARBAZEPINE 300 MG: 300 TABLET, FILM COATED ORAL at 22:21

## 2023-11-02 RX ADMIN — ACETAMINOPHEN 650 MG: 325 TABLET ORAL at 09:09

## 2023-11-02 RX ADMIN — SODIUM CHLORIDE, PRESERVATIVE FREE 10 ML: 5 INJECTION INTRAVENOUS at 09:10

## 2023-11-02 RX ADMIN — TRAZODONE HYDROCHLORIDE 50 MG: 50 TABLET ORAL at 22:21

## 2023-11-02 RX ADMIN — OXCARBAZEPINE 300 MG: 300 TABLET, FILM COATED ORAL at 09:10

## 2023-11-02 RX ADMIN — LEVETIRACETAM 1500 MG: 500 TABLET, FILM COATED ORAL at 22:21

## 2023-11-02 RX ADMIN — ACETAMINOPHEN 650 MG: 325 TABLET ORAL at 22:29

## 2023-11-02 RX ADMIN — LACOSAMIDE 200 MG: 100 TABLET, FILM COATED ORAL at 22:21

## 2023-11-02 RX ADMIN — LEVETIRACETAM 1500 MG: 500 TABLET, FILM COATED ORAL at 09:09

## 2023-11-02 RX ADMIN — SODIUM CHLORIDE, PRESERVATIVE FREE 10 ML: 5 INJECTION INTRAVENOUS at 22:24

## 2023-11-02 RX ADMIN — ATORVASTATIN CALCIUM 10 MG: 10 TABLET, FILM COATED ORAL at 22:21

## 2023-11-02 RX ADMIN — ENOXAPARIN SODIUM 40 MG: 100 INJECTION SUBCUTANEOUS at 09:09

## 2023-11-02 RX ADMIN — LACOSAMIDE 200 MG: 100 TABLET, FILM COATED ORAL at 09:10

## 2023-11-02 RX ADMIN — SODIUM BICARBONATE: 84 INJECTION, SOLUTION INTRAVENOUS at 08:24

## 2023-11-02 ASSESSMENT — PAIN DESCRIPTION - LOCATION: LOCATION: BACK

## 2023-11-02 ASSESSMENT — PAIN SCALES - GENERAL: PAINLEVEL_OUTOF10: 3

## 2023-11-02 NOTE — PROGRESS NOTES
Patient admitted to room *** from ***. Oriented to room, call light, and floor policies. Plan of care reviewed with patient/family. Pt is resting in bed and **** pain at this time; no s/s of distress noted. Assessment completed; VSS. Tele in place reading *** rhythm with a rate of ***. Pt rates a *** fall risk; bed alarm deferred OR bed alarm to be placed on the bed/chair. Safety precautions in place; call light and bedside table within reach. Pt encouraged to call for needs or ambulation. Pt VU. Will continue to monitor.   Glen Bosworth, RN

## 2023-11-02 NOTE — PLAN OF CARE
Problem: Discharge Planning  Goal: Discharge to home or other facility with appropriate resources  Outcome: Progressing     Problem: Safety - Adult  Goal: Free from fall injury  Outcome: Progressing     Problem: Pain  Goal: Verbalizes/displays adequate comfort level or baseline comfort level  Outcome: Progressing     Problem: Skin/Tissue Integrity  Goal: Absence of new skin breakdown  Description: 1. Monitor for areas of redness and/or skin breakdown  2. Assess vascular access sites hourly  3. Every 4-6 hours minimum:  Change oxygen saturation probe site  4. Every 4-6 hours:  If on nasal continuous positive airway pressure, respiratory therapy assess nares and determine need for appliance change or resting period. Outcome: Progressing     Problem: Confusion  Goal: Confusion, delirium, dementia, or psychosis is improved or at baseline  Description: INTERVENTIONS:  1. Assess for possible contributors to thought disturbance, including medications, impaired vision or hearing, underlying metabolic abnormalities, dehydration, psychiatric diagnoses, and notify attending LIP  2. [place] high risk fall precautions, as indicated  3. Provide frequent short contacts to provide reality reorientation, refocusing and direction  4. Decrease environmental stimuli, including noise as appropriate  5. Monitor and intervene to maintain adequate nutrition, hydration, elimination, sleep and activity  6. If unable to ensure safety without constant attention obtain sitter and review sitter guidelines with assigned personnel  7.  Initiate Psychosocial CNS and Spiritual Care consult, as indicated  Outcome: Progressing

## 2023-11-02 NOTE — PROGRESS NOTES
Pharmacy Home Medication Reconciliation Note    A medication reconciliation has been completed for Payal Sharma 1960    Pharmacy: [facility] [address] [facility] [address]    Information provided by: Facility    The patient's home medication list is as follows: No current facility-administered medications on file prior to encounter. Current Outpatient Medications on File Prior to Encounter   Medication Sig Dispense Refill    sertraline (ZOLOFT) 50 MG tablet Take 1.5 tablets by mouth daily      ibuprofen (ADVIL;MOTRIN) 800 MG tablet Take 1 tablet by mouth every 8 hours as needed for Pain      Testosterone Cypionate 200 MG/ML KIT Inject 50 mg into the muscle. (Patient not taking: Reported on 11/1/2023)      OXcarbazepine (TRILEPTAL) 300 MG tablet Take 1 tablet by mouth 2 times daily 60 tablet 3    LORazepam (ATIVAN) 2 MG/ML injection Inject 1 mL into the muscle daily as needed.       fluticasone (FLONASE) 50 MCG/ACT nasal spray 2 sprays daily      LACTOBACILLUS PO Take 1 mg by mouth Bid (Patient not taking: Reported on 11/1/2023)      cetirizine (ZYRTEC) 10 MG tablet Take 1 tablet by mouth daily      HM LIDOCAINE PATCH EX Apply topically (Patient not taking: Reported on 11/1/2023)      DULoxetine (CYMBALTA) 30 MG extended release capsule Take 30 mg by mouth daily (Patient not taking: Reported on 11/1/2023)      MELATONIN PO Take 9 mg by mouth nightly      traZODone (DESYREL) 50 MG tablet Take 1 tablet by mouth nightly      lisinopril (PRINIVIL;ZESTRIL) 2.5 MG tablet Take 1 tablet by mouth daily 30 tablet 3    torsemide (DEMADEX) 10 MG tablet Take 1 tablet by mouth daily 30 tablet 3    levETIRAcetam (KEPPRA) 500 MG tablet Take 3 tablets by mouth 2 times daily      potassium chloride (KLOR-CON M) 20 MEQ extended release tablet Take 1 tablet by mouth 2 times daily      acetaminophen (TYLENOL) 325 MG tablet Take 2 tablets by mouth every 4 hours as needed for Pain      mirtazapine (REMERON)

## 2023-11-02 NOTE — PROGRESS NOTES
suspected AED noncompliance  Per chart review, patient was post-ictal when he was initially seen. Apparently, the patient had 2 seizures witnessed by the EMS staff. 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 Severe MR, severe AR. Code Status: DNR-CCA   FEN: ADULT DIET; Regular; Low Sodium (2 gm)   PPX: Lovenox  DISPO: [facility]    Lani Cornelius MD  11/02/23  7:20 PM    This note was likely completed using voice recognition technology and may contain unintended errors.     Plan:     -MRI pending  -Keppra and Trileptal level pending  -Seizure precautions  -PT/OT

## 2023-11-02 NOTE — H&P
V2.0  History and Physical      Name:  Nikole Cavazos /Age/Sex: 1960  (61 y.o. male)   MRN & CSN:  [id number] & [id number] Encounter Date/Time: 2023 10:12 PM EDT   Location:  ED- PCP: Sheran Severe, MD       Hospital Day: 1    Assessment and Plan:   Nikole Cavazos is a 61 y.o. male with a pmh of seizure disorder, hypertension, hyperlipidemia, depression who presents with Seizure [facility]    Hospital Problems             Last Modified POA    * (Principal) Seizure ([address]) 2023 Yes   Hypertension  Hyperlipidemia  Metabolic acidosis  Lactic acidosis  Hyponatremia    Plan:  Patient was initially postictal but is now alert but I could not get an adequate history from him I am not sure what his baseline is. He kept picking at his hair. He also had a fever when he came in initially and blood pressure has been marginal which improved we will give LR at 75 cc an hour x1 L we will initiate a diet. There is no overt source of infection urinalysis was negative chest x-ray was also negative and patient does not have a leukocytosis so we will just monitor. Patient did receive vancomycin and ceftriaxone in the ED this will not be continued. We will resume home medications of Trileptal Vimpat Zoloft trazodone. Patient has hyponatremia which is likely related to Zoloft. SSRIs can cause an SIADH picture. We will hold this dose and discuss with psychiatry about alternatives. Neurology consult for further optimization of underlying seizure disorder    Disposition:   Current Living situation: That could not be obtained from the history   expected Disposition: home  Estimated D/C: 2 days    Diet ADULT DIET; Regular;  Low Sodium (2 gm)   DVT Prophylaxis [x] Lovenox, []  Heparin, [] SCDs, [] Ambulation,  [] Eliquis, [] Xarelto, [] Coumadin   Code Status Full Code   Surrogate Decision Maker/ POA unknown     Personally reviewed Lab Studies and Imaging       I    History from:     patient, electronic medical lacosamide  200 mg Oral BID    traZODone  50 mg Oral Nightly    atorvastatin  10 mg Oral Nightly    [START ON 11/2/2023] lisinopril  10 mg Oral Daily      Infusions:    sodium chloride       PRN Meds: sodium chloride flush, 5-40 mL, PRN  sodium chloride, , PRN  potassium chloride, 40 mEq, PRN   Or  potassium alternative oral replacement, 40 mEq, PRN   Or  potassium chloride, 10 mEq, PRN  magnesium sulfate, 2,000 mg, PRN  ondansetron, 4 mg, Q8H PRN   Or  ondansetron, 4 mg, Q6H PRN  polyethylene glycol, 17 g, Daily PRN  acetaminophen, 650 mg, Q6H PRN   Or  acetaminophen, 650 mg, Q6H PRN        Labs      CBC:   Recent Labs     11/01/23 1818   WBC 5.6   HGB 12.9*        BMP:    Recent Labs     11/01/23 1818   *   K 4.2   CL 92*   CO2 14*   BUN 9   CREATININE 1.1   GLUCOSE 187*     Hepatic:   Recent Labs     11/01/23 1818   AST 27   ALT 12   BILITOT 0.5   ALKPHOS 54     Lipids: No results found for: \"CHOL\", \"HDL\", \"TRIG\"  Hemoglobin A1C: No results found for: \"LABA1C\"  TSH: No results found for: \"TSH\"  Troponin: No results found for: \"TROPONINT\"  Lactic Acid:   Recent Labs     11/01/23 1818   LACTA 9.3*     BNP: No results for input(s): \"PROBNP\" in the last 72 hours.   UA:  Lab Results   Component Value Date/Time    NITRU Negative 11/01/2023 08:00 PM    COLORU Yellow 11/01/2023 08:00 PM    PHUR 7.0 11/01/2023 08:00 PM    WBCUA 1 11/01/2023 08:00 PM    RBCUA 4 11/01/2023 08:00 PM    MUCUS Rare 02/08/2017 11:08 PM    BACTERIA None Seen 11/01/2023 08:00 PM    CLARITYU Clear 11/01/2023 08:00 PM    SPECGRAV <=1.005 11/01/2023 08:00 PM    LEUKOCYTESUR Negative 11/01/2023 08:00 PM    UROBILINOGEN 0.2 11/01/2023 08:00 PM    BILIRUBINUR Negative 11/01/2023 08:00 PM    BLOODU SMALL 11/01/2023 08:00 PM    GLUCOSEU Negative 11/01/2023 08:00 PM    KETUA Negative 11/01/2023 08:00 PM    AMORPHOUS 2+ 02/08/2017 11:08 PM     Urine Cultures:   Lab Results   Component Value Date/Time    LABURIN >100,000 CFU/ml 01/13/2022

## 2023-11-02 NOTE — CARE COORDINATION
Case Management Assessment  Initial Evaluation    Date/Time of Evaluation: 11/2/2023 1:51 PM  Assessment Completed by: CRISTOPHER Cooper, LSW    If patient is discharged prior to next notation, then this note serves as note for discharge by case management. Patient Name: Chandler Redmond                   YOB: 1960  Diagnosis: Seizure ([address]) [R56.9]  Septicemia [facility]) [A41.9]                   Date / Time: 11/1/2023  5:45 PM    Patient Admission Status: Inpatient   Readmission Risk (Low < 19, Mod (19-27), High > 27): Readmission Risk Score: 14.7    Current PCP: Luis Felipe Aguilar MD  PCP verified by CM? Yes    Chart Reviewed: Yes      History Provided by: Other (see comment) (Facility admissions)  Patient Orientation: Alert and Oriented    Patient Cognition: Alert    Hospitalization in the last 30 days (Readmission):  No    If yes, Readmission Assessment in CM Navigator will be completed. Advance Directives:      Code Status: DNR-CCA   Patient's Primary Decision Maker is:      Primary Decision Maker: Clement Tiwari - Brother/Sister - [phone]    Secondary Decision Maker: Diane Mosqueda - Other - [phone]    Discharge Planning:    Patient expects to discharge to: [address] [place] [address] for transportation at discharge:      Financial    Payor: [facility] / Plan: Diana Moore / Product Type: *No Product type* /         Current Nursing Home Information:  [facility]    [place] [address] [facility] [address] [facility]  Report = [phone]  Fax = [phone]    Call to Cherie Roy, [phone], at admissions who confirmed the patient is: [address] [place] [address], no Precert required for return.       Patient Covid vaccination status:    Internal Administration   First Dose COVID-19, PFIZER PURPLE top, DILUTE for use, (age 15 y+), 30mcg/0.3mL  12/21/2020   Second Dose COVID-19, PFIZER PURPLE top, DILUTE for use, (age 15 y+), 30mcg/0.3mL   01/11/2021       Last COVID Lab

## 2023-11-02 NOTE — CONSULTS
surgery/procedure with associated risk factors:    [x] Prescription drug management  ----------------------------------------------------------------------  [x] High (any 2)  [] Moderate (any 1)    C. Data (any 2 for high and any 1 for moderate)  [x] Discussed management of the case with: Primary, nurse  [] Imaging personally reviewed and interpreted, includes:    [x] Data Review (any 3)  [x] Collateral history obtained from: Nurse, primary  [x] All available Consultant notes from yesterday/today were reviewed  [x] All current labs were reviewed and interpreted for clinical significance   [x] Appropriate follow-up labs were ordered    Data reviewed including CBC, CMP and LFT  Independent historian giving baseline confusion. Data:  LABS:   Lab Results   Component Value Date/Time     11/02/2023 06:56 AM    K 4.0 11/02/2023 06:56 AM    K 4.2 11/01/2023 06:18 PM     11/02/2023 06:56 AM    CO2 23 11/02/2023 06:56 AM    BUN 6 11/02/2023 06:56 AM    CREATININE 0.7 11/02/2023 06:56 AM    GFRAA >60 05/20/2022 09:46 PM    GFRAA >60 12/13/2011 10:08 AM    LABGLOM >60 11/02/2023 06:56 AM    GLUCOSE 96 11/02/2023 06:56 AM    PHOS 2.5 02/03/2023 04:18 AM    MG 2.10 02/03/2023 04:18 AM    CALCIUM 9.0 11/02/2023 06:56 AM     Lab Results   Component Value Date/Time    WBC 5.6 11/01/2023 06:18 PM    RBC 3.80 11/01/2023 06:18 PM    HGB 12.9 11/01/2023 06:18 PM    HCT 38.7 11/01/2023 06:18 PM    .0 11/01/2023 06:18 PM    RDW 12.3 11/01/2023 06:18 PM     11/01/2023 06:18 PM     Lab Results   Component Value Date    INR 1.03 05/20/2022    PROTIME 11.7 05/20/2022       Neuroimaging were independently reviewed by me. Reviewed notes from different physicians including H&P and ED notes. Reviewed lab and blood testing    Impression:     Acute encephalopathy and breakthrough seizure patient patient with known history of medical refractory epilepsy. Patient does have history of noncompliance with AEDs.   Follow-up Keppra, lacosamide and Trileptal level. We will check MRI brain and EEG. Medical refractory epilepsy. Patient currently on Keppra 1500 milligram twice daily, lacosamide 200 mg twice daily, Trileptal 300 mg twice daily  History of poor compliance  Hypertension  Hyperlipidemia  Diabetes    Recommendation:     MRI brain  EEG  Continue same dose Keppra, lacosamide, and Trileptal  Follow-up Keppra and Trileptal level. Seizure precautions  Telemetry  Neurochecks  Monitor control blood pressure. Continue current blood pressure medications  Aspirin  Statin  Glycemic control  Sliding-scale insulin  Patient follows with  epilepsy. It appears he has not seen them in some time. He will need to follow-up for continued epilepsy management. We will follow    Thank you for referring such patient. If you have any questions regarding my consult note, please don't hesitate to call me. SENAIT Scott - CONOR    Attending Supervising Physician's Attestation Statement   I reviewed and agree with the findings and plan as documented in NP consult note   Patient was admitted for possible breakthrough seizure  History of medical refractory epilepsy on different antiepileptic drugs  Check AED level  Seizure precaution  Resume AED for now  We will follow    Electronically signed by Jorge Avila MD on 11/2/23 at 1:00 PM EDT       This dictation was generated by voice recognition computer software.  Although all attempts are made to edit the dictation for accuracy, there may be errors in the  transcription that are not intended

## 2023-11-03 VITALS
TEMPERATURE: 97.8 F | WEIGHT: 164.9 LBS | SYSTOLIC BLOOD PRESSURE: 110 MMHG | OXYGEN SATURATION: 97 % | DIASTOLIC BLOOD PRESSURE: 56 MMHG | BODY MASS INDEX: 22.36 KG/M2 | HEART RATE: 62 BPM | RESPIRATION RATE: 18 BRPM

## 2023-11-03 LAB
ANION GAP SERPL CALCULATED.3IONS-SCNC: 11 MMOL/L (ref 3–16)
BUN SERPL-MCNC: 6 MG/DL (ref 7–20)
CALCIUM SERPL-MCNC: 9.4 MG/DL (ref 8.3–10.6)
CHLORIDE SERPL-SCNC: 106 MMOL/L (ref 99–110)
CO2 SERPL-SCNC: 24 MMOL/L (ref 21–32)
CREAT SERPL-MCNC: 0.7 MG/DL (ref 0.8–1.3)
DEPRECATED RDW RBC AUTO: 12.3 % (ref 12.4–15.4)
EST. AVERAGE GLUCOSE BLD GHB EST-MCNC: 93.9 MG/DL
GFR SERPLBLD CREATININE-BSD FMLA CKD-EPI: >60 ML/MIN/{1.73_M2}
GLUCOSE BLD-MCNC: 110 MG/DL (ref 70–99)
GLUCOSE BLD-MCNC: 125 MG/DL (ref 70–99)
GLUCOSE BLD-MCNC: 97 MG/DL (ref 70–99)
GLUCOSE SERPL-MCNC: 95 MG/DL (ref 70–99)
HBA1C MFR BLD: 4.9 %
HCT VFR BLD AUTO: 37.3 % (ref 40.5–52.5)
HGB BLD-MCNC: 12.7 G/DL (ref 13.5–17.5)
MAGNESIUM SERPL-MCNC: 2.2 MG/DL (ref 1.8–2.4)
MCH RBC QN AUTO: 34 PG (ref 26–34)
MCHC RBC AUTO-ENTMCNC: 33.9 G/DL (ref 31–36)
MCV RBC AUTO: 100.3 FL (ref 80–100)
PERFORMED ON: ABNORMAL
PERFORMED ON: ABNORMAL
PERFORMED ON: NORMAL
PLATELET # BLD AUTO: 129 K/UL (ref 135–450)
PMV BLD AUTO: 9.1 FL (ref 5–10.5)
POTASSIUM SERPL-SCNC: 4 MMOL/L (ref 3.5–5.1)
RBC # BLD AUTO: 3.72 M/UL (ref 4.2–5.9)
SODIUM SERPL-SCNC: 141 MMOL/L (ref 136–145)
WBC # BLD AUTO: 5.1 K/UL (ref 4–11)

## 2023-11-03 PROCEDURE — 6360000002 HC RX W HCPCS: Performed by: STUDENT IN AN ORGANIZED HEALTH CARE EDUCATION/TRAINING PROGRAM

## 2023-11-03 PROCEDURE — 6370000000 HC RX 637 (ALT 250 FOR IP): Performed by: INTERNAL MEDICINE

## 2023-11-03 PROCEDURE — 99223 1ST HOSP IP/OBS HIGH 75: CPT | Performed by: PSYCHIATRY & NEUROLOGY

## 2023-11-03 PROCEDURE — 2580000003 HC RX 258: Performed by: INTERNAL MEDICINE

## 2023-11-03 PROCEDURE — 36415 COLL VENOUS BLD VENIPUNCTURE: CPT

## 2023-11-03 PROCEDURE — 83036 HEMOGLOBIN GLYCOSYLATED A1C: CPT

## 2023-11-03 PROCEDURE — 80048 BASIC METABOLIC PNL TOTAL CA: CPT

## 2023-11-03 PROCEDURE — 85027 COMPLETE CBC AUTOMATED: CPT

## 2023-11-03 PROCEDURE — 6370000000 HC RX 637 (ALT 250 FOR IP): Performed by: STUDENT IN AN ORGANIZED HEALTH CARE EDUCATION/TRAINING PROGRAM

## 2023-11-03 PROCEDURE — 83735 ASSAY OF MAGNESIUM: CPT

## 2023-11-03 PROCEDURE — 6360000002 HC RX W HCPCS: Performed by: INTERNAL MEDICINE

## 2023-11-03 RX ORDER — LEVETIRACETAM 750 MG/1
1500 TABLET ORAL 2 TIMES DAILY
Qty: 60 TABLET | Refills: 3 | Status: SHIPPED | OUTPATIENT
Start: 2023-11-03

## 2023-11-03 RX ORDER — FOLIC ACID 1 MG/1
1 TABLET ORAL DAILY
Qty: 30 TABLET | Refills: 3 | Status: SHIPPED | OUTPATIENT
Start: 2023-11-04

## 2023-11-03 RX ORDER — LORAZEPAM 2 MG/ML
1 INJECTION INTRAMUSCULAR ONCE
Status: COMPLETED | OUTPATIENT
Start: 2023-11-03 | End: 2023-11-03

## 2023-11-03 RX ADMIN — LISINOPRIL 2.5 MG: 5 TABLET ORAL at 11:36

## 2023-11-03 RX ADMIN — LEVETIRACETAM 1500 MG: 500 TABLET, FILM COATED ORAL at 11:36

## 2023-11-03 RX ADMIN — LORAZEPAM 1 MG: 2 INJECTION INTRAMUSCULAR; INTRAVENOUS at 12:28

## 2023-11-03 RX ADMIN — SODIUM CHLORIDE, PRESERVATIVE FREE 10 ML: 5 INJECTION INTRAVENOUS at 12:08

## 2023-11-03 RX ADMIN — OXCARBAZEPINE 300 MG: 300 TABLET, FILM COATED ORAL at 11:36

## 2023-11-03 RX ADMIN — ENOXAPARIN SODIUM 40 MG: 100 INJECTION SUBCUTANEOUS at 11:43

## 2023-11-03 RX ADMIN — LACOSAMIDE 200 MG: 100 TABLET, FILM COATED ORAL at 12:08

## 2023-11-03 RX ADMIN — FOLIC ACID 1 MG: 1 TABLET ORAL at 11:36

## 2023-11-03 ASSESSMENT — PAIN SCALES - GENERAL
PAINLEVEL_OUTOF10: 7
PAINLEVEL_OUTOF10: 0

## 2023-11-03 NOTE — PLAN OF CARE
Problem: Discharge Planning  Goal: Discharge to home or other facility with appropriate resources  Outcome: Progressing     Problem: Safety - Adult  Goal: Free from fall injury  Outcome: Progressing     Problem: Pain  Goal: Verbalizes/displays adequate comfort level or baseline comfort level  Outcome: Progressing     Problem: Skin/Tissue Integrity  Goal: Absence of new skin breakdown  Description: 1. Monitor for areas of redness and/or skin breakdown  2. Assess vascular access sites hourly  3. Every 4-6 hours minimum:  Change oxygen saturation probe site  4. Every 4-6 hours:  If on nasal continuous positive airway pressure, respiratory therapy assess nares and determine need for appliance change or resting period. Outcome: Progressing  Note: Pt shows no signs of further skin breakdown this shift     Problem: Confusion  Goal: Confusion, delirium, dementia, or psychosis is improved or at baseline  Description: INTERVENTIONS:  1. Assess for possible contributors to thought disturbance, including medications, impaired vision or hearing, underlying metabolic abnormalities, dehydration, psychiatric diagnoses, and notify attending LIP  2. [place] high risk fall precautions, as indicated  3. Provide frequent short contacts to provide reality reorientation, refocusing and direction  4. Decrease environmental stimuli, including noise as appropriate  5. Monitor and intervene to maintain adequate nutrition, hydration, elimination, sleep and activity  6. If unable to ensure safety without constant attention obtain sitter and review sitter guidelines with assigned personnel  7.  Initiate Psychosocial CNS and Spiritual Care consult, as indicated  Outcome: Adequate for Discharge     Problem: Chronic Conditions and Co-morbidities  Goal: Patient's chronic conditions and co-morbidity symptoms are monitored and maintained or improved  Outcome: Progressing

## 2023-11-03 NOTE — PLAN OF CARE
Problem: Discharge Planning  Goal: Discharge to home or other facility with appropriate resources  11/3/2023 1323 by Emiliano Clark RN  Outcome: Adequate for Discharge     Problem: Safety - Adult  Goal: Free from fall injury  11/3/2023 1319 by Emiliano Clark RN  Outcome: Completed  Note: Safety precautions in place. Bed locked, alarmed, lowest position. Call light in reach. Camera for safety. No falls or physical injury noted. Problem: Pain  Goal: Verbalizes/displays adequate comfort level or baseline comfort level  11/3/2023 1323 by Emiliano Clark RN  Outcome: Adequate for Discharge     Problem: Skin/Tissue Integrity  Goal: Absence of new skin breakdown  Description: 1. Monitor for areas of redness and/or skin breakdown  2. Assess vascular access sites hourly  3. Every 4-6 hours minimum:  Change oxygen saturation probe site  4. Every 4-6 hours:  If on nasal continuous positive airway pressure, respiratory therapy assess nares and determine need for appliance change or resting period. 11/3/2023 1323 by Emiliano Clark RN  Outcome: Adequate for Discharge     Problem: Confusion  Goal: Confusion, delirium, dementia, or psychosis is improved or at baseline  Description: INTERVENTIONS:  1. Assess for possible contributors to thought disturbance, including medications, impaired vision or hearing, underlying metabolic abnormalities, dehydration, psychiatric diagnoses, and notify attending LIP  2. [place] high risk fall precautions, as indicated  3. Provide frequent short contacts to provide reality reorientation, refocusing and direction  4. Decrease environmental stimuli, including noise as appropriate  5. Monitor and intervene to maintain adequate nutrition, hydration, elimination, sleep and activity  6. If unable to ensure safety without constant attention obtain sitter and review sitter guidelines with assigned personnel  7.  Initiate Psychosocial CNS and Spiritual Care consult,

## 2023-11-03 NOTE — PROCEDURES
Patient: Nahomy Arriola    MR Number: [id number]  YOB: 1960  Date of Visit: 11/3/2023    Clinical History:  The patient is a 61y.o. years old male with history of epilepsy with recent breakthrough seizure      Method: The EEG was performed utilizing the international 10/20 of electrode placements of both referential and bipolar montages. The patient was awake and drowsy through out the recording. Photic stimulation was performed. Findings: The background of the EEG showed diffuse slowing throughout the recording and the mixture of theta and delta which was waxing and waning, not rhythmical and continuous. No spike or sharp waves. .  Left posterior background slowing was seen intermittently throughout the recording which is consistent with patient's history of encephalomalacia and prior EEG report. Impression: This EEG  is abnormal.  The generalized diffuse slowing suggestive of diffuse encephalopathy with evidence of structural dysfunction of the left hemisphere which is consistent with the patient's prior history of encephalomalacia and surgery. No epileptiform discharges today.         Iqra Shankar MD      Board certified in clinical neurophysiology

## 2023-11-03 NOTE — DISCHARGE SUMMARY
V2.0  Discharge Summary    Name:  Atiya Nascimento /Age/Sex: 1960 (89 y.o. male)   Admit Date: 2023  Discharge Date: 11/3/23    MRN & CSN:  [id number] & [id number] Encounter Date and Time 11/3/23 3:59 PM EDT    Attending:  Olinda Conrad MD Discharging Provider: Olinda Conrad MD       Hospital Course:     Brief HPI: Atiya Nascimento is a 61 y.o. 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 MRI was stable. Patient was continued on his home dose of Vimpat and phenytoin. His Keppra was increased to 1500 mg twice daily. Patient had an EEG which did not show any new seizures. On discharge, patient was alert and oriented. Seizure  Likely 2/2 suspected AED noncompliance  Per chart review, patient was post-ictal when he was initially seen. Apparently, the patient had 2 seizures witnessed by the EMS staff. 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 Severe MR, severe AR. The patient expressed appropriate understanding of, and agreement with the discharge recommendations, medications, and plan.      Consults this admission:  IP CONSULT TO NEUROLOGY    Discharge Diagnosis:   Seizure ([address])  HTN  HLD  T2DM  Severe AR and MR    Discharge Instruction:   Primary care physician: Samir Monk MD within 2 weeks  Activity: activity as tolerated  Disposition: Discharged to:   []Home, [][facility], [x]SNF, []Acute Rehab, []Hospice   Condition on discharge: Stable    Discharge Medications:        Medication List        START taking these medications      folic acid 1 MG tablet  Commonly known as: FOLVITE  Take 1 tablet by mouth daily  Start taking on: November 4, 2023            CHANGE how you take these medications      levETIRAcetam 750 MG tablet  Commonly known as: KEPPRA  Take 2 tablets by mouth 2 times daily  What changed: medication strength            CONTINUE taking these medications      acetaminophen 325 MG tablet  Commonly known as: TYLENOL     atorvastatin 10 MG tablet  Commonly known as: LIPITOR     calcium carbonate 500 MG chewable tablet  Commonly known as: TUMS     cetirizine 10 MG tablet  Commonly known as: ZYRTEC     fluticasone 50 MCG/ACT nasal spray  Commonly known as: FLONASE     lacosamide 200 MG tablet  Commonly known as: VIMPAT     LACTOBACILLUS PO     lisinopril 2.5 MG tablet  Commonly known as: PRINIVIL;ZESTRIL  Take 1 tablet by mouth daily     LORazepam 2 MG/ML injection  Commonly known as: ATIVAN     MELATONIN PO     methadone 5 MG tablet  Commonly known as: DOLOPHINE     OXcarbazepine 300 MG tablet  Commonly known as: TRILEPTAL  Take 1 tablet by mouth 2 times daily     potassium chloride 20 MEQ extended release tablet  Commonly known as: KLOR-CON M     sertraline 50 MG tablet  Commonly known as: ZOLOFT     traZODone 50 MG tablet  Commonly known as: DESYREL     Vitamin D3 125 MCG (5000 UT) Tabs            STOP taking these medications      DULoxetine 30 MG extended release capsule  Commonly known as: CYMBALTA     HM LIDOCAINE PATCH EX     ibuprofen 800 MG tablet  Commonly known as: ADVIL;MOTRIN     mirtazapine 7.5 MG tablet  Commonly known as: REMERON     Testosterone Cypionate 200 MG/ML Kit     torsemide 10 MG tablet  Commonly known as: DEMADEX               Where to Get Your Medications        These medications were sent to

## 2023-11-03 NOTE — DISCHARGE SUMMARY
Alert to self. PIV removed, catheter intact, dressing applied. Telemetry monitor given to monitor tech at nurses station. Transport here to  pt to return to [facility]. Discharge paperwork printed and given to transport including his signed DNR-CCA paperwork. Discharged with personal clothing on, room air and no LDA's.

## 2023-11-03 NOTE — PLAN OF CARE
Problem: Discharge Planning  Goal: Discharge to home or other facility with appropriate resources  11/3/2023 1319 by Pauletta Lesches, RN  Outcome: Adequate for Discharge     Problem: Safety - Adult  Goal: Free from fall injury  11/3/2023 1319 by Pauletta Lesches, RN  Outcome: Completed  Note: Safety precautions in place. Bed locked, alarmed, lowest position. Call light in reach. Camera for safety. No falls or physical injury noted. Problem: Pain  Goal: Verbalizes/displays adequate comfort level or baseline comfort level  11/3/2023 1319 by Pauletta Lesches, RN  Outcome: Adequate for Discharge     Problem: Skin/Tissue Integrity  Goal: Absence of new skin breakdown  Description: 1. Monitor for areas of redness and/or skin breakdown  2. Assess vascular access sites hourly  3. Every 4-6 hours minimum:  Change oxygen saturation probe site  4. Every 4-6 hours:  If on nasal continuous positive airway pressure, respiratory therapy assess nares and determine need for appliance change or resting period. 11/3/2023 1319 by Pauletta Lesches, RN  Outcome: Adequate for Discharge     Problem: Confusion  Goal: Confusion, delirium, dementia, or psychosis is improved or at baseline  Description: INTERVENTIONS:  1. Assess for possible contributors to thought disturbance, including medications, impaired vision or hearing, underlying metabolic abnormalities, dehydration, psychiatric diagnoses, and notify attending LIP  2. [place] high risk fall precautions, as indicated  3. Provide frequent short contacts to provide reality reorientation, refocusing and direction  4. Decrease environmental stimuli, including noise as appropriate  5. Monitor and intervene to maintain adequate nutrition, hydration, elimination, sleep and activity  6. If unable to ensure safety without constant attention obtain sitter and review sitter guidelines with assigned personnel  7.  Initiate Psychosocial CNS and Spiritual Care consult,

## 2023-11-03 NOTE — CARE COORDINATION
Case Management -  Discharge Note      Patient Name: Nanette Zavala                   YOB: 1960            Readmission Risk (Low < 19, Mod (19-27), High > 27): Readmission Risk Score: 14.6    Current PCP: Sydnee Quinn MD    (IMM) Important Message from Medicare:    Date: IMM was given to Patient when he came into the hospital on 11/1/2023    PT AM-PAC:   /24  OT AM-PAC:   /24      Patient noted to have a discharge order. Pt has been medically cleared to return to LTC ([address])    Patient discharged to     [facility] at [facility] [address]  [address] [place] [address] Drive  Phone: [phone]  Fax: [phone]    Pre-cert Required/obtained: LTC  Transportation scheduled for 4:45PM  Transportation provided by [employer]  ([phone])    AVS faxed and agency notified: Yes and spoke with Davin Faith in admissions. Family Notified: CM called the Patient's sister- Esther Olea [phone] who had questions about the Patient. Dr. Deangelo Culver called Esther Lefty and spoke with her and she is okay with Patient D/C back to [facility]. Nurse to call report to facility [phone]        Financial    Payor: [facility] / Plan: Raza Mathews / Product Type: *No Product type* /     Pharmacy:  Potential assistance Purchasing Medications:    Meds-to-Beds request: No      [address] Newton Espinoza [address]  Phone: [phone] Fax: [phone]      Notes: Additional Case Management Notes: All case management needs met.          Electronically signed by CRISTOPHER Izquierdo on 11/3/2023 at 3:45 PM

## 2023-11-06 LAB — 10OH-CARBAZEPINE SERPL-MCNC: 7 UG/ML (ref 3–35)

## 2023-11-22 ENCOUNTER — APPOINTMENT (OUTPATIENT)
Dept: CT IMAGING | Age: 63
DRG: 101 | End: 2023-11-22
Payer: COMMERCIAL

## 2023-11-22 ENCOUNTER — HOSPITAL ENCOUNTER (INPATIENT)
Age: 63
LOS: 4 days | Discharge: HOME | DRG: 101 | End: 2023-11-27
Attending: EMERGENCY MEDICINE | Admitting: PEDIATRICS
Payer: COMMERCIAL

## 2023-11-22 DIAGNOSIS — E87.1 HYPONATREMIA: Primary | ICD-10-CM

## 2023-11-22 DIAGNOSIS — R56.9 SEIZURE (HCC): ICD-10-CM

## 2023-11-22 LAB
ALBUMIN SERPL-MCNC: 4.4 G/DL (ref 3.4–5)
ALBUMIN/GLOB SERPL: 1.9 {RATIO} (ref 1.1–2.2)
ALP SERPL-CCNC: 65 U/L (ref 40–129)
ALT SERPL-CCNC: 9 U/L (ref 10–40)
ANION GAP SERPL CALCULATED.3IONS-SCNC: 7 MMOL/L (ref 3–16)
AST SERPL-CCNC: 19 U/L (ref 15–37)
BACTERIA URNS QL MICRO: ABNORMAL /HPF
BASOPHILS # BLD: 0 K/UL (ref 0–0.2)
BASOPHILS NFR BLD: 0.4 %
BILIRUB SERPL-MCNC: 0.7 MG/DL (ref 0–1)
BILIRUB UR QL STRIP.AUTO: NEGATIVE
BUN SERPL-MCNC: 9 MG/DL (ref 7–20)
CALCIUM SERPL-MCNC: 9.5 MG/DL (ref 8.3–10.6)
CHLORIDE SERPL-SCNC: 88 MMOL/L (ref 99–110)
CLARITY UR: CLEAR
CO2 SERPL-SCNC: 25 MMOL/L (ref 21–32)
COLOR UR: YELLOW
CREAT SERPL-MCNC: 0.7 MG/DL (ref 0.8–1.3)
DEPRECATED RDW RBC AUTO: 13 % (ref 12.4–15.4)
EOSINOPHIL # BLD: 0 K/UL (ref 0–0.6)
EOSINOPHIL NFR BLD: 0.4 %
EPI CELLS #/AREA URNS AUTO: 0 /HPF (ref 0–5)
ETHANOLAMINE SERPL-MCNC: NORMAL MG/DL (ref 0–0.08)
FLUAV RNA RESP QL NAA+PROBE: NOT DETECTED
FLUBV RNA RESP QL NAA+PROBE: NOT DETECTED
GFR SERPLBLD CREATININE-BSD FMLA CKD-EPI: >60 ML/MIN/{1.73_M2}
GLUCOSE SERPL-MCNC: 113 MG/DL (ref 70–99)
GLUCOSE UR STRIP.AUTO-MCNC: NEGATIVE MG/DL
HCT VFR BLD AUTO: 36.9 % (ref 40.5–52.5)
HGB BLD-MCNC: 12.9 G/DL (ref 13.5–17.5)
HGB UR QL STRIP.AUTO: ABNORMAL
HYALINE CASTS #/AREA URNS AUTO: 0 /LPF (ref 0–8)
KETONES UR STRIP.AUTO-MCNC: NEGATIVE MG/DL
LEUKOCYTE ESTERASE UR QL STRIP.AUTO: NEGATIVE
LYMPHOCYTES # BLD: 0.8 K/UL (ref 1–5.1)
LYMPHOCYTES NFR BLD: 15.2 %
MAGNESIUM SERPL-MCNC: 1.8 MG/DL (ref 1.8–2.4)
MCH RBC QN AUTO: 34.5 PG (ref 26–34)
MCHC RBC AUTO-ENTMCNC: 34.9 G/DL (ref 31–36)
MCV RBC AUTO: 98.7 FL (ref 80–100)
MONOCYTES # BLD: 0.3 K/UL (ref 0–1.3)
MONOCYTES NFR BLD: 6.7 %
NEUTROPHILS # BLD: 3.9 K/UL (ref 1.7–7.7)
NEUTROPHILS NFR BLD: 77.3 %
NITRITE UR QL STRIP.AUTO: NEGATIVE
PH UR STRIP.AUTO: 8 [PH] (ref 5–8)
PLATELET # BLD AUTO: 155 K/UL (ref 135–450)
PMV BLD AUTO: 8.9 FL (ref 5–10.5)
POTASSIUM SERPL-SCNC: 4.7 MMOL/L (ref 3.5–5.1)
PROT SERPL-MCNC: 6.7 G/DL (ref 6.4–8.2)
PROT UR STRIP.AUTO-MCNC: NEGATIVE MG/DL
RBC # BLD AUTO: 3.73 M/UL (ref 4.2–5.9)
RBC CLUMPS #/AREA URNS AUTO: 11 /HPF (ref 0–4)
SARS-COV-2 RNA RESP QL NAA+PROBE: NOT DETECTED
SODIUM SERPL-SCNC: 120 MMOL/L (ref 136–145)
SP GR UR STRIP.AUTO: <=1.005 (ref 1–1.03)
TROPONIN, HIGH SENSITIVITY: 12 NG/L (ref 0–22)
UA COMPLETE W REFLEX CULTURE PNL UR: ABNORMAL
UA DIPSTICK W REFLEX MICRO PNL UR: YES
URN SPEC COLLECT METH UR: ABNORMAL
UROBILINOGEN UR STRIP-ACNC: 0.2 E.U./DL
WBC # BLD AUTO: 5.1 K/UL (ref 4–11)
WBC #/AREA URNS AUTO: 1 /HPF (ref 0–5)

## 2023-11-22 PROCEDURE — 82077 ASSAY SPEC XCP UR&BREATH IA: CPT

## 2023-11-22 PROCEDURE — 81001 URINALYSIS AUTO W/SCOPE: CPT

## 2023-11-22 PROCEDURE — 84484 ASSAY OF TROPONIN QUANT: CPT

## 2023-11-22 PROCEDURE — 83735 ASSAY OF MAGNESIUM: CPT

## 2023-11-22 PROCEDURE — 85025 COMPLETE CBC W/AUTO DIFF WBC: CPT

## 2023-11-22 PROCEDURE — 87636 SARSCOV2 & INF A&B AMP PRB: CPT

## 2023-11-22 PROCEDURE — 93005 ELECTROCARDIOGRAM TRACING: CPT | Performed by: EMERGENCY MEDICINE

## 2023-11-22 PROCEDURE — 70450 CT HEAD/BRAIN W/O DYE: CPT

## 2023-11-22 PROCEDURE — 80053 COMPREHEN METABOLIC PANEL: CPT

## 2023-11-22 PROCEDURE — 99285 EMERGENCY DEPT VISIT HI MDM: CPT

## 2023-11-22 ASSESSMENT — PAIN SCALES - GENERAL: PAINLEVEL_OUTOF10: 10

## 2023-11-22 ASSESSMENT — PAIN - FUNCTIONAL ASSESSMENT: PAIN_FUNCTIONAL_ASSESSMENT: 0-10

## 2023-11-22 ASSESSMENT — ENCOUNTER SYMPTOMS
NAUSEA: 1
COUGH: 1
RHINORRHEA: 1
VOMITING: 1

## 2023-11-22 ASSESSMENT — PAIN DESCRIPTION - DESCRIPTORS: DESCRIPTORS: ACHING

## 2023-11-22 ASSESSMENT — PAIN DESCRIPTION - LOCATION: LOCATION: BACK

## 2023-11-23 LAB
AMPHETAMINES UR QL SCN>1000 NG/ML: NORMAL
ANION GAP SERPL CALCULATED.3IONS-SCNC: 10 MMOL/L (ref 3–16)
ANION GAP SERPL CALCULATED.3IONS-SCNC: 11 MMOL/L (ref 3–16)
ANION GAP SERPL CALCULATED.3IONS-SCNC: 9 MMOL/L (ref 3–16)
BARBITURATES UR QL SCN>200 NG/ML: NORMAL
BENZODIAZ UR QL SCN>200 NG/ML: NORMAL
BUN SERPL-MCNC: 7 MG/DL (ref 7–20)
BUN SERPL-MCNC: 7 MG/DL (ref 7–20)
BUN SERPL-MCNC: 8 MG/DL (ref 7–20)
CALCIUM SERPL-MCNC: 9.2 MG/DL (ref 8.3–10.6)
CALCIUM SERPL-MCNC: 9.3 MG/DL (ref 8.3–10.6)
CALCIUM SERPL-MCNC: 9.5 MG/DL (ref 8.3–10.6)
CANNABINOIDS UR QL SCN>50 NG/ML: NORMAL
CHLORIDE SERPL-SCNC: 89 MMOL/L (ref 99–110)
CHLORIDE SERPL-SCNC: 93 MMOL/L (ref 99–110)
CHLORIDE SERPL-SCNC: 98 MMOL/L (ref 99–110)
CO2 SERPL-SCNC: 22 MMOL/L (ref 21–32)
CO2 SERPL-SCNC: 23 MMOL/L (ref 21–32)
CO2 SERPL-SCNC: 24 MMOL/L (ref 21–32)
COCAINE UR QL SCN: NORMAL
CREAT SERPL-MCNC: 0.6 MG/DL (ref 0.8–1.3)
CREAT SERPL-MCNC: 0.7 MG/DL (ref 0.8–1.3)
CREAT SERPL-MCNC: 0.7 MG/DL (ref 0.8–1.3)
DRUG SCREEN COMMENT UR-IMP: NORMAL
EKG ATRIAL RATE: 83 BPM
EKG DIAGNOSIS: NORMAL
EKG P AXIS: 60 DEGREES
EKG P-R INTERVAL: 200 MS
EKG Q-T INTERVAL: 390 MS
EKG QRS DURATION: 118 MS
EKG QTC CALCULATION (BAZETT): 458 MS
EKG R AXIS: -37 DEGREES
EKG T AXIS: 37 DEGREES
EKG VENTRICULAR RATE: 83 BPM
FENTANYL SCREEN, URINE: NORMAL
GFR SERPLBLD CREATININE-BSD FMLA CKD-EPI: >60 ML/MIN/{1.73_M2}
GLUCOSE SERPL-MCNC: 102 MG/DL (ref 70–99)
GLUCOSE SERPL-MCNC: 110 MG/DL (ref 70–99)
GLUCOSE SERPL-MCNC: 112 MG/DL (ref 70–99)
LEVETIRACETAM SERPL-MCNC: 50.2 UG/ML (ref 6–46)
MEDICATION DOSE-MCNC: ABNORMAL
METHADONE UR QL SCN>300 NG/ML: NORMAL
OPIATES UR QL SCN>300 NG/ML: NORMAL
OXYCODONE UR QL SCN: NORMAL
PCP UR QL SCN>25 NG/ML: NORMAL
PH UR STRIP: 7 [PH]
POTASSIUM SERPL-SCNC: 4.1 MMOL/L (ref 3.5–5.1)
POTASSIUM SERPL-SCNC: 4.8 MMOL/L (ref 3.5–5.1)
POTASSIUM SERPL-SCNC: 4.8 MMOL/L (ref 3.5–5.1)
SODIUM SERPL-SCNC: 120 MMOL/L (ref 136–145)
SODIUM SERPL-SCNC: 124 MMOL/L (ref 136–145)
SODIUM SERPL-SCNC: 128 MMOL/L (ref 136–145)
SODIUM SERPL-SCNC: 131 MMOL/L (ref 136–145)
SODIUM SERPL-SCNC: 133 MMOL/L (ref 136–145)
TROPONIN, HIGH SENSITIVITY: 13 NG/L (ref 0–22)
URATE SERPL-MCNC: 2.3 MG/DL (ref 3.5–7.2)

## 2023-11-23 PROCEDURE — 6370000000 HC RX 637 (ALT 250 FOR IP): Performed by: PEDIATRICS

## 2023-11-23 PROCEDURE — 84484 ASSAY OF TROPONIN QUANT: CPT

## 2023-11-23 PROCEDURE — 80183 DRUG SCRN QUANT OXCARBAZEPIN: CPT

## 2023-11-23 PROCEDURE — 84550 ASSAY OF BLOOD/URIC ACID: CPT

## 2023-11-23 PROCEDURE — 6360000002 HC RX W HCPCS: Performed by: INTERNAL MEDICINE

## 2023-11-23 PROCEDURE — 36415 COLL VENOUS BLD VENIPUNCTURE: CPT

## 2023-11-23 PROCEDURE — 83935 ASSAY OF URINE OSMOLALITY: CPT

## 2023-11-23 PROCEDURE — 80048 BASIC METABOLIC PNL TOTAL CA: CPT

## 2023-11-23 PROCEDURE — 93010 ELECTROCARDIOGRAM REPORT: CPT | Performed by: INTERNAL MEDICINE

## 2023-11-23 PROCEDURE — 99223 1ST HOSP IP/OBS HIGH 75: CPT | Performed by: PSYCHIATRY & NEUROLOGY

## 2023-11-23 PROCEDURE — 84300 ASSAY OF URINE SODIUM: CPT

## 2023-11-23 PROCEDURE — 2580000003 HC RX 258: Performed by: PEDIATRICS

## 2023-11-23 PROCEDURE — 6360000002 HC RX W HCPCS: Performed by: PEDIATRICS

## 2023-11-23 PROCEDURE — 84443 ASSAY THYROID STIM HORMONE: CPT

## 2023-11-23 PROCEDURE — 2580000003 HC RX 258: Performed by: INTERNAL MEDICINE

## 2023-11-23 PROCEDURE — 80177 DRUG SCRN QUAN LEVETIRACETAM: CPT

## 2023-11-23 PROCEDURE — 2060000000 HC ICU INTERMEDIATE R&B

## 2023-11-23 PROCEDURE — 84295 ASSAY OF SERUM SODIUM: CPT

## 2023-11-23 PROCEDURE — 80307 DRUG TEST PRSMV CHEM ANLYZR: CPT

## 2023-11-23 PROCEDURE — 84133 ASSAY OF URINE POTASSIUM: CPT

## 2023-11-23 PROCEDURE — 2580000003 HC RX 258: Performed by: EMERGENCY MEDICINE

## 2023-11-23 RX ORDER — LORAZEPAM 2 MG/ML
2 INJECTION INTRAMUSCULAR EVERY 6 HOURS PRN
Status: DISCONTINUED | OUTPATIENT
Start: 2023-11-23 | End: 2023-11-27 | Stop reason: HOSPADM

## 2023-11-23 RX ORDER — ACETAMINOPHEN 325 MG/1
650 TABLET ORAL EVERY 4 HOURS PRN
Status: DISCONTINUED | OUTPATIENT
Start: 2023-11-23 | End: 2023-11-23 | Stop reason: ALTCHOICE

## 2023-11-23 RX ORDER — POLYETHYLENE GLYCOL 3350 17 G/17G
17 POWDER, FOR SOLUTION ORAL DAILY PRN
Status: DISCONTINUED | OUTPATIENT
Start: 2023-11-23 | End: 2023-11-27 | Stop reason: HOSPADM

## 2023-11-23 RX ORDER — POTASSIUM CHLORIDE 20 MEQ/1
40 TABLET, EXTENDED RELEASE ORAL PRN
Status: DISCONTINUED | OUTPATIENT
Start: 2023-11-23 | End: 2023-11-27 | Stop reason: HOSPADM

## 2023-11-23 RX ORDER — SODIUM CHLORIDE 9 MG/ML
INJECTION, SOLUTION INTRAVENOUS CONTINUOUS
Status: DISCONTINUED | OUTPATIENT
Start: 2023-11-23 | End: 2023-11-23

## 2023-11-23 RX ORDER — MULTIVITAMIN WITH IRON
1 TABLET ORAL DAILY
Status: DISCONTINUED | OUTPATIENT
Start: 2023-11-23 | End: 2023-11-27 | Stop reason: HOSPADM

## 2023-11-23 RX ORDER — LORAZEPAM 2 MG/ML
4 INJECTION INTRAMUSCULAR
Status: DISCONTINUED | OUTPATIENT
Start: 2023-11-23 | End: 2023-11-27 | Stop reason: HOSPADM

## 2023-11-23 RX ORDER — DESMOPRESSIN ACETATE 4 UG/ML
1 INJECTION, SOLUTION INTRAVENOUS; SUBCUTANEOUS ONCE
Status: COMPLETED | OUTPATIENT
Start: 2023-11-23 | End: 2023-11-23

## 2023-11-23 RX ORDER — LORAZEPAM 2 MG/ML
3 INJECTION INTRAMUSCULAR
Status: DISCONTINUED | OUTPATIENT
Start: 2023-11-23 | End: 2023-11-27 | Stop reason: HOSPADM

## 2023-11-23 RX ORDER — LISINOPRIL 5 MG/1
2.5 TABLET ORAL DAILY
Status: DISCONTINUED | OUTPATIENT
Start: 2023-11-23 | End: 2023-11-27 | Stop reason: HOSPADM

## 2023-11-23 RX ORDER — GAUZE BANDAGE 2" X 2"
100 BANDAGE TOPICAL DAILY
Status: DISCONTINUED | OUTPATIENT
Start: 2023-11-23 | End: 2023-11-27 | Stop reason: HOSPADM

## 2023-11-23 RX ORDER — FOLIC ACID 1 MG/1
1 TABLET ORAL DAILY
Status: DISCONTINUED | OUTPATIENT
Start: 2023-11-23 | End: 2023-11-27 | Stop reason: HOSPADM

## 2023-11-23 RX ORDER — SODIUM CHLORIDE 0.9 % (FLUSH) 0.9 %
5-40 SYRINGE (ML) INJECTION PRN
Status: DISCONTINUED | OUTPATIENT
Start: 2023-11-23 | End: 2023-11-27 | Stop reason: HOSPADM

## 2023-11-23 RX ORDER — LORAZEPAM 2 MG/ML
1 INJECTION INTRAMUSCULAR
Status: DISCONTINUED | OUTPATIENT
Start: 2023-11-23 | End: 2023-11-27 | Stop reason: HOSPADM

## 2023-11-23 RX ORDER — LACOSAMIDE 50 MG/1
200 TABLET ORAL 2 TIMES DAILY
Status: DISCONTINUED | OUTPATIENT
Start: 2023-11-23 | End: 2023-11-27 | Stop reason: HOSPADM

## 2023-11-23 RX ORDER — POTASSIUM CHLORIDE 20 MEQ/1
20 TABLET, EXTENDED RELEASE ORAL 2 TIMES DAILY
Status: DISCONTINUED | OUTPATIENT
Start: 2023-11-23 | End: 2023-11-27 | Stop reason: HOSPADM

## 2023-11-23 RX ORDER — LORAZEPAM 2 MG/ML
2 INJECTION INTRAMUSCULAR
Status: DISCONTINUED | OUTPATIENT
Start: 2023-11-23 | End: 2023-11-27 | Stop reason: HOSPADM

## 2023-11-23 RX ORDER — DESMOPRESSIN ACETATE 4 UG/ML
1 INJECTION, SOLUTION INTRAVENOUS; SUBCUTANEOUS EVERY 8 HOURS
Status: DISPENSED | OUTPATIENT
Start: 2023-11-23 | End: 2023-11-24

## 2023-11-23 RX ORDER — LORAZEPAM 1 MG/1
1 TABLET ORAL
Status: DISCONTINUED | OUTPATIENT
Start: 2023-11-23 | End: 2023-11-27 | Stop reason: HOSPADM

## 2023-11-23 RX ORDER — SODIUM CHLORIDE 0.9 % (FLUSH) 0.9 %
5-40 SYRINGE (ML) INJECTION EVERY 12 HOURS SCHEDULED
Status: DISCONTINUED | OUTPATIENT
Start: 2023-11-23 | End: 2023-11-27 | Stop reason: HOSPADM

## 2023-11-23 RX ORDER — POTASSIUM CHLORIDE 7.45 MG/ML
10 INJECTION INTRAVENOUS PRN
Status: DISCONTINUED | OUTPATIENT
Start: 2023-11-23 | End: 2023-11-27 | Stop reason: HOSPADM

## 2023-11-23 RX ORDER — CALCIUM CARBONATE 500 MG/1
1 TABLET, CHEWABLE ORAL 2 TIMES DAILY
Status: DISCONTINUED | OUTPATIENT
Start: 2023-11-23 | End: 2023-11-27 | Stop reason: HOSPADM

## 2023-11-23 RX ORDER — MAGNESIUM SULFATE IN WATER 40 MG/ML
2000 INJECTION, SOLUTION INTRAVENOUS PRN
Status: DISCONTINUED | OUTPATIENT
Start: 2023-11-23 | End: 2023-11-27 | Stop reason: HOSPADM

## 2023-11-23 RX ORDER — ONDANSETRON 4 MG/1
4 TABLET, ORALLY DISINTEGRATING ORAL EVERY 8 HOURS PRN
Status: DISCONTINUED | OUTPATIENT
Start: 2023-11-23 | End: 2023-11-27 | Stop reason: HOSPADM

## 2023-11-23 RX ORDER — ACETAMINOPHEN 325 MG/1
650 TABLET ORAL EVERY 6 HOURS PRN
Status: DISCONTINUED | OUTPATIENT
Start: 2023-11-23 | End: 2023-11-27 | Stop reason: HOSPADM

## 2023-11-23 RX ORDER — LANOLIN ALCOHOL/MO/W.PET/CERES
9 CREAM (GRAM) TOPICAL NIGHTLY
Status: DISCONTINUED | OUTPATIENT
Start: 2023-11-23 | End: 2023-11-27 | Stop reason: HOSPADM

## 2023-11-23 RX ORDER — TRAZODONE HYDROCHLORIDE 50 MG/1
100 TABLET ORAL NIGHTLY
Status: DISCONTINUED | OUTPATIENT
Start: 2023-11-23 | End: 2023-11-27 | Stop reason: HOSPADM

## 2023-11-23 RX ORDER — ACETAMINOPHEN 650 MG/1
650 SUPPOSITORY RECTAL EVERY 6 HOURS PRN
Status: DISCONTINUED | OUTPATIENT
Start: 2023-11-23 | End: 2023-11-27 | Stop reason: HOSPADM

## 2023-11-23 RX ORDER — ONDANSETRON 2 MG/ML
4 INJECTION INTRAMUSCULAR; INTRAVENOUS EVERY 6 HOURS PRN
Status: DISCONTINUED | OUTPATIENT
Start: 2023-11-23 | End: 2023-11-27 | Stop reason: HOSPADM

## 2023-11-23 RX ORDER — FLUTICASONE PROPIONATE 50 MCG
2 SPRAY, SUSPENSION (ML) NASAL DAILY PRN
Status: DISCONTINUED | OUTPATIENT
Start: 2023-11-23 | End: 2023-11-27 | Stop reason: HOSPADM

## 2023-11-23 RX ORDER — DEXTROSE MONOHYDRATE 50 MG/ML
INJECTION, SOLUTION INTRAVENOUS CONTINUOUS
Status: ACTIVE | OUTPATIENT
Start: 2023-11-23 | End: 2023-11-23

## 2023-11-23 RX ORDER — SODIUM CHLORIDE 9 MG/ML
INJECTION, SOLUTION INTRAVENOUS PRN
Status: DISCONTINUED | OUTPATIENT
Start: 2023-11-23 | End: 2023-11-27 | Stop reason: HOSPADM

## 2023-11-23 RX ORDER — OXCARBAZEPINE 300 MG/1
300 TABLET, FILM COATED ORAL 2 TIMES DAILY
Status: DISCONTINUED | OUTPATIENT
Start: 2023-11-23 | End: 2023-11-27 | Stop reason: HOSPADM

## 2023-11-23 RX ORDER — METHADONE HYDROCHLORIDE 10 MG/1
5 TABLET ORAL DAILY
Status: DISCONTINUED | OUTPATIENT
Start: 2023-11-23 | End: 2023-11-27 | Stop reason: HOSPADM

## 2023-11-23 RX ORDER — CETIRIZINE HYDROCHLORIDE 10 MG/1
10 TABLET ORAL DAILY
Status: DISCONTINUED | OUTPATIENT
Start: 2023-11-23 | End: 2023-11-27 | Stop reason: HOSPADM

## 2023-11-23 RX ORDER — 0.9 % SODIUM CHLORIDE 0.9 %
1000 INTRAVENOUS SOLUTION INTRAVENOUS ONCE
Status: COMPLETED | OUTPATIENT
Start: 2023-11-23 | End: 2023-11-23

## 2023-11-23 RX ORDER — LORAZEPAM 1 MG/1
3 TABLET ORAL
Status: DISCONTINUED | OUTPATIENT
Start: 2023-11-23 | End: 2023-11-27 | Stop reason: HOSPADM

## 2023-11-23 RX ORDER — ENOXAPARIN SODIUM 100 MG/ML
40 INJECTION SUBCUTANEOUS DAILY
Status: DISCONTINUED | OUTPATIENT
Start: 2023-11-23 | End: 2023-11-27 | Stop reason: HOSPADM

## 2023-11-23 RX ORDER — LORAZEPAM 1 MG/1
4 TABLET ORAL
Status: DISCONTINUED | OUTPATIENT
Start: 2023-11-23 | End: 2023-11-27 | Stop reason: HOSPADM

## 2023-11-23 RX ORDER — ATORVASTATIN CALCIUM 10 MG/1
10 TABLET, FILM COATED ORAL NIGHTLY
Status: DISCONTINUED | OUTPATIENT
Start: 2023-11-23 | End: 2023-11-27 | Stop reason: HOSPADM

## 2023-11-23 RX ORDER — LORAZEPAM 1 MG/1
2 TABLET ORAL
Status: DISCONTINUED | OUTPATIENT
Start: 2023-11-23 | End: 2023-11-27 | Stop reason: HOSPADM

## 2023-11-23 RX ADMIN — ACETAMINOPHEN 650 MG: 325 TABLET ORAL at 22:46

## 2023-11-23 RX ADMIN — ANTACID TABLETS 500 MG: 500 TABLET, CHEWABLE ORAL at 09:36

## 2023-11-23 RX ADMIN — Medication 10 ML: at 22:44

## 2023-11-23 RX ADMIN — POTASSIUM CHLORIDE 20 MEQ: 1500 TABLET, EXTENDED RELEASE ORAL at 22:46

## 2023-11-23 RX ADMIN — FOLIC ACID 1 MG: 1 TABLET ORAL at 09:36

## 2023-11-23 RX ADMIN — LEVETIRACETAM 1750 MG: 500 TABLET, FILM COATED ORAL at 09:36

## 2023-11-23 RX ADMIN — OXCARBAZEPINE 300 MG: 300 TABLET, FILM COATED ORAL at 22:43

## 2023-11-23 RX ADMIN — POTASSIUM CHLORIDE 20 MEQ: 1500 TABLET, EXTENDED RELEASE ORAL at 09:43

## 2023-11-23 RX ADMIN — ANTACID TABLETS 500 MG: 500 TABLET, CHEWABLE ORAL at 22:44

## 2023-11-23 RX ADMIN — LACOSAMIDE 200 MG: 50 TABLET, FILM COATED ORAL at 22:43

## 2023-11-23 RX ADMIN — LACOSAMIDE 200 MG: 50 TABLET, FILM COATED ORAL at 09:36

## 2023-11-23 RX ADMIN — LEVETIRACETAM 1750 MG: 500 TABLET, FILM COATED ORAL at 02:04

## 2023-11-23 RX ADMIN — SODIUM CHLORIDE: 9 INJECTION, SOLUTION INTRAVENOUS at 02:16

## 2023-11-23 RX ADMIN — THERA TABS 1 TABLET: TAB at 02:04

## 2023-11-23 RX ADMIN — DESMOPRESSIN ACETATE 1 MCG: 4 SOLUTION INTRAVENOUS at 15:45

## 2023-11-23 RX ADMIN — SODIUM CHLORIDE 1000 ML: 9 INJECTION, SOLUTION INTRAVENOUS at 00:19

## 2023-11-23 RX ADMIN — DEXTROSE MONOHYDRATE: 50 INJECTION, SOLUTION INTRAVENOUS at 13:57

## 2023-11-23 RX ADMIN — MELATONIN TAB 3 MG 9 MG: 3 TAB at 22:43

## 2023-11-23 RX ADMIN — Medication 100 MG: at 02:04

## 2023-11-23 RX ADMIN — SERTRALINE 75 MG: 50 TABLET, FILM COATED ORAL at 09:37

## 2023-11-23 RX ADMIN — METHADONE HYDROCHLORIDE 5 MG: 10 TABLET ORAL at 09:43

## 2023-11-23 RX ADMIN — OXCARBAZEPINE 300 MG: 300 TABLET, FILM COATED ORAL at 09:43

## 2023-11-23 RX ADMIN — ENOXAPARIN SODIUM 40 MG: 100 INJECTION SUBCUTANEOUS at 09:36

## 2023-11-23 RX ADMIN — ATORVASTATIN CALCIUM 10 MG: 10 TABLET, FILM COATED ORAL at 22:44

## 2023-11-23 RX ADMIN — DESMOPRESSIN ACETATE 1 MCG: 4 SOLUTION INTRAVENOUS at 18:15

## 2023-11-23 RX ADMIN — CETIRIZINE HYDROCHLORIDE 10 MG: 10 TABLET, FILM COATED ORAL at 09:38

## 2023-11-23 RX ADMIN — TRAZODONE HYDROCHLORIDE 100 MG: 50 TABLET ORAL at 22:43

## 2023-11-23 RX ADMIN — LISINOPRIL 2.5 MG: 5 TABLET ORAL at 09:37

## 2023-11-23 RX ADMIN — LEVETIRACETAM 1750 MG: 500 TABLET, FILM COATED ORAL at 22:44

## 2023-11-23 ASSESSMENT — PAIN DESCRIPTION - DESCRIPTORS
DESCRIPTORS: ACHING
DESCRIPTORS: ACHING;DISCOMFORT
DESCRIPTORS: ACHING

## 2023-11-23 ASSESSMENT — PAIN DESCRIPTION - LOCATION
LOCATION: BACK;HAND
LOCATION: BACK
LOCATION: BACK

## 2023-11-23 ASSESSMENT — PAIN DESCRIPTION - ORIENTATION
ORIENTATION: LOWER;MID
ORIENTATION: LOWER;MID
ORIENTATION: LOWER

## 2023-11-23 ASSESSMENT — PAIN DESCRIPTION - PAIN TYPE: TYPE: ACUTE PAIN

## 2023-11-23 ASSESSMENT — PAIN SCALES - GENERAL
PAINLEVEL_OUTOF10: 8
PAINLEVEL_OUTOF10: 7
PAINLEVEL_OUTOF10: 3
PAINLEVEL_OUTOF10: 3
PAINLEVEL_OUTOF10: 0

## 2023-11-23 ASSESSMENT — PAIN DESCRIPTION - ONSET: ONSET: ON-GOING

## 2023-11-23 ASSESSMENT — PAIN - FUNCTIONAL ASSESSMENT: PAIN_FUNCTIONAL_ASSESSMENT: ACTIVITIES ARE NOT PREVENTED

## 2023-11-23 ASSESSMENT — PAIN DESCRIPTION - FREQUENCY: FREQUENCY: INTERMITTENT

## 2023-11-23 NOTE — SIGNIFICANT EVENT
In speaking with his bedside RN - she did have another contact number for his caregivers at [facility]. Upon further questioning he reportedly had a PRESUMED unwitnessed seizure on 11/22/23. Upon evaluation by the staff there he appeared to be post-ictal so was sent to the ED. Roommate described him as \"running around like a crazy person. \" (This information passed on from [facility] staff to RN to MD, so unclear how reliable it is.)       Staff at [facility] reported that they were not aware of him missing any of his medications (except for dosing tonight). So methadone absent from his urine remains unexplained. When asked how much the patient drank the reply was that he had maddi water earlier today. When clarified to how much alcohol he consumed - staff at [facility] reported that they were not aware how much alcohol he consumed because they were \"out getting smokes. \"      He usually feeds himself, and can ambulate on his own.      Radha Tatum MD

## 2023-11-23 NOTE — CONSULTS
[facility]  Consultation Note    Reason for Consult: Hyponatremia  Requesting Physician:  Dr. Ayala Ovalle. Eliud Chandler: Seizure    History obtained from records and patient. HISTORY OF PRESENT ILLNESS:                Kathy Becker  is 61 y.o. male with significant past medical history of hyponatremia, intracerebral hemorrhage, CVA with aphasia, diabetes mellitus type 2, endocarditis, hydrocephalus, hypertension, coronary artery disease who presents with seizure. He is recent resident at [facility]. From 11/3/2023, he had an episode of seizure with plans to increase his Keppra dose if another seizure occurs. I am currently asked to see the patient since his serum sodium was 120 on 11/22/23 at 2300, 128 at 520am and now at 131 1100am.  No seizures at this time. His history is not reliable. He denies any active nausea and vomiting. He denies any diarrhea. His outpatient medications include sertraline and oxcarbazepine. Systolic blood pressure ranging in the 110s to 120s range. +270 mL since admission. Na was 141 on 11/3/23 and 126 on 11/1/23. Past Medical History:     has a past medical history of Acute intracerebral hemorrhage ([address]), Anxiety, Back pain, Cerebral artery occlusion with cerebral infarction [facility]), Chronic back pain greater than 3 months duration, Diabetes mellitus ([address]), Dysphasia, Dysphonia, Endocarditis and heart valve disorders in diseases classified elsewhere, Hydrocephalus ([address]), Hypertension, IVH (intraventricular hemorrhage) ([address]), NSTEMI (non-ST elevated myocardial infarction) ([address]), and Seizures ([address]). Past Surgical History:     has a past surgical history that includes Leg Surgery; craniotomy (Left, 02/03/2017); Brain aneurysm surgery (Left, 02/03/2017); Upper gastrointestinal endoscopy (N/A, 8/14/2019); and Colonoscopy (N/A, 8/14/2019).    Current Medications:    Current Facility-Administered Medications: sodium chloride flush 0.9 %
Pupils: equal, round, reactive to light  III,IV,VI: No gaze preference. No nystagmus  V: Facial sensation: Grossly unremarkable. VII: Facial strength and movements: intact and symmetric  XII: Tongue movements : midline  Musculoskeletal:  The patient can move all 4 extremities. No apparent focal weakness. Plantars: Flexor bilaterally  Tone: Normal tone. No rigidity. Reflexes: symmetric 2+ in both arms and legs  Coordination: No tremors  Sensation: No major sensory loss   Gait: Steady    MDM:         A. Problems (any 1)    High:    [x] Acute/Chronic Illness/injury posing threat to life or bodily function:    [] Severe exacerbation of chronic illness: Moderate:    []     1 or more chronic illness with exacerbation, progression or side effect of treatment or  []     2 or more stable chronic illnesses or  []     1 acute illness with systemic symptoms     ---------------------------------------------------------------------  B. Risk of Treatment (any 1)   [x] Drugs/treatments that require intensive monitoring for toxicity include: Antiepileptic medications  [] Change in code status:    [] Decision to escalate care:    [] Major surgery/procedure with associated risk factors:    [x] Prescription drug management  ----------------------------------------------------------------------  [] High (any 2)  [x] Moderate (any 1)    C. Data (any 2 for high and any 1 for moderate)  [] Discussed management of the case with:    [x] Imaging personally reviewed and interpreted, includes:    [x] Data Review (any 3)  [] Collateral history obtained from:    [x] All available Consultant notes from yesterday/today were reviewed  [x] All current labs were reviewed and interpreted for clinical significance   [x] Appropriate follow-up labs were ordered    Data reviewed including CBC, CMP and LFT  Independent historian giving baseline confusion.    Data:  LABS:   Lab Results   Component Value Date/Time     11/23/2023 11:08 AM    K

## 2023-11-23 NOTE — CARE COORDINATION
CM received a call from [facility] and spoke to [facility] [phone], who reports Patient can admit to [facility] today. RN and MD aware. RN reports chest tube out, lopez still in, but pending blood occult stool. MD reports patient note with low Hgb, and Patient will discharge to [facility] tomorrow. Discharge order pending.      Electronically signed by Alberta Miner on 11/23/2023 at 10:09 AM

## 2023-11-23 NOTE — CARE COORDINATION
11/23/23 1028   Readmission Assessment   Number of Days since last admission? 8-30 days   Previous Disposition Home Alone   Who is being Interviewed Patient   What was the patient's/caregiver's perception as to why they think they needed to return back to the hospital? Other (Comment)  (\"Pt. states I had a stroke\". )   Did you visit your Primary Care Physician after you left the hospital, before you returned this time? No   Why weren't you able to visit your PCP? Other (Comment)  (Patient states he does know.)   Did you see a specialist, such as Cardiac, Pulmonary, Orthopedic Physician, etc. after you left the hospital? Other (Comment)  (Patient states he does not know.)   Who advised the patient to return to the hospital? Physician   Does the patient report anything that got in the way of taking their medications? No   In our efforts to provide the best possible care to you and others like you, can you think of anything that we could have done to help you after you left the hospital the first time, so that you might not have needed to return so soon?  Other (Comment)  (Pt is poor historian - unable to follow timeline of questions.)     Electronically signed by Michel Zapata on 11/23/2023 at 10:35 AM

## 2023-11-23 NOTE — CARE COORDINATION
Case Management Assessment  Initial Evaluation    Date/Time of Evaluation: 11/23/2023 10:40 AM  Assessment Completed by: Rayshawn Rubio    If patient is discharged prior to next notation, then this note serves as note for discharge by case management. Patient Name: Viktoriya Skinner                   YOB: 1960  Diagnosis: Hyponatremia [E87.1]  Seizure [facility]) [R56.9]                   Date / Time: 11/22/2023 10:31 PM    Patient Admission Status: Inpatient   Readmission Risk (Low < 19, Mod (19-27), High > 27): Readmission Risk Score: 16.7    Current PCP: Francisco Mcfarland MD  PCP verified by CM? Yes    Chart Reviewed: Yes      History Provided by: Patient  Patient Orientation: Alert and Oriented    Patient Cognition: Alert    Hospitalization in the last 30 days (Readmission):  Yes    If yes, Readmission Assessment in CM Navigator will be completed.     Advance Directives:      Code Status: DNR-CCA   Patient's Primary Decision Maker is: Legal Next of Kin    Primary Decision Maker: Danielle Patel - Brother/Sister - [phone]    Secondary Decision Maker: Lianna Blake - [phone]    Discharge Planning:    Patient lives with: (P) Alone Type of Home: (P) House  Primary Care Giver: Self  Patient Support Systems include: Family Members   Current Financial resources: (P) Medicaid, Medicare  Current community resources: (P) None  Current services prior to admission: (P) None            Current DME:              Type of Home Care services:  (P) None    ADLS  Prior functional level: (P) Assistance with the following:, Bathing, Dressing, Cooking, Housework, Shopping  Current functional level: (P) Assistance with the following:, Cooking, Housework, Feeding    PT AM-PAC:   /24  OT AM-PAC:   /24    Family can provide assistance at DC: (P) Other (comment)  Would you like Case Management to discuss the discharge plan with any other family members/significant others, and if so, who? (P) Yes  Plans to Return

## 2023-11-23 NOTE — ED PROVIDER NOTES
[facility]      Pt Name: Nehal Hammond  MRN: [id number]  [address] [facility] 1960  Date of evaluation: 11/22/2023  Provider: Lucila Fortune MD    CHIEF COMPLAINT       Chief Complaint   Patient presents with    Seizures     C/o witnessed seizure at nursing home. Pt did hit head         HISTORY OF PRESENT ILLNESS   (Location/Symptom, Timing/Onset, Context/Setting, Quality, Duration, Modifying Factors, Severity)  Note limiting factors. Nehal Hammond is a 61 y.o. male who presents to the emergency department after a witnessed seizure at nursing facility. Patient thinks that he is on blood thinners does not know what medication and on chart review does have a history of intracranial hemorrhage. History is reconstructed through nursing notes as patient is a poor historian. He does complain of a headache over the occipital region. He drinks alcohol states that he thinks his last alcohol was about a week ago. He does have a history of alcohol abuse. He reports nausea and vomiting with the last vomiting about 3 days ago but none since he fell. No neck pain. No weakness, numbness, tingling. No chest pain or shortness of breath though he does note that he has had some URI type symptoms this week. He does not know if he is on seizure medications though chart review showed by me shows that the patient is on Keppra. Nursing Notes were reviewed. REVIEW OF SYSTEMS    (2-9 systems for level 4, 10 or more for level 5)     Review of Systems   Constitutional: Negative. HENT:  Positive for rhinorrhea. Respiratory:  Positive for cough. Cardiovascular: Negative. Gastrointestinal:  Positive for nausea and vomiting. Neurological:  Positive for seizures and headaches. Except as noted above the remainder of the review of systems was reviewed and negative.        PAST MEDICAL HISTORY     Past Medical History:   Diagnosis Date    Acute

## 2023-11-23 NOTE — H&P
Encephalomalacia in the left parietal lobe. ORBITS: The visualized portion of the orbits demonstrate no acute abnormality. SINUSES: The visualized paranasal sinuses and mastoid air cells demonstrate no acute abnormality. SOFT TISSUES/SKULL:  No acute abnormality of the visualized skull or soft tissues. No acute intracranial abnormality.          Electronically signed by Priyanka Novoa MD on 11/23/2023 at 12:19 AM

## 2023-11-24 LAB
ANION GAP SERPL CALCULATED.3IONS-SCNC: 10 MMOL/L (ref 3–16)
ANION GAP SERPL CALCULATED.3IONS-SCNC: 8 MMOL/L (ref 3–16)
ANION GAP SERPL CALCULATED.3IONS-SCNC: 8 MMOL/L (ref 3–16)
ANION GAP SERPL CALCULATED.3IONS-SCNC: 9 MMOL/L (ref 3–16)
BASOPHILS # BLD: 0 K/UL (ref 0–0.2)
BASOPHILS NFR BLD: 0.7 %
BUN SERPL-MCNC: 10 MG/DL (ref 7–20)
BUN SERPL-MCNC: 11 MG/DL (ref 7–20)
CALCIUM SERPL-MCNC: 9 MG/DL (ref 8.3–10.6)
CALCIUM SERPL-MCNC: 9.2 MG/DL (ref 8.3–10.6)
CALCIUM SERPL-MCNC: 9.2 MG/DL (ref 8.3–10.6)
CALCIUM SERPL-MCNC: 9.4 MG/DL (ref 8.3–10.6)
CHLORIDE SERPL-SCNC: 95 MMOL/L (ref 99–110)
CHLORIDE SERPL-SCNC: 95 MMOL/L (ref 99–110)
CHLORIDE SERPL-SCNC: 97 MMOL/L (ref 99–110)
CHLORIDE SERPL-SCNC: 97 MMOL/L (ref 99–110)
CO2 SERPL-SCNC: 21 MMOL/L (ref 21–32)
CO2 SERPL-SCNC: 22 MMOL/L (ref 21–32)
CO2 SERPL-SCNC: 23 MMOL/L (ref 21–32)
CO2 SERPL-SCNC: 25 MMOL/L (ref 21–32)
CREAT SERPL-MCNC: 0.6 MG/DL (ref 0.8–1.3)
CREAT SERPL-MCNC: 0.7 MG/DL (ref 0.8–1.3)
CREAT SERPL-MCNC: 0.7 MG/DL (ref 0.8–1.3)
CREAT SERPL-MCNC: 0.8 MG/DL (ref 0.8–1.3)
DEPRECATED RDW RBC AUTO: 13.2 % (ref 12.4–15.4)
EOSINOPHIL # BLD: 0 K/UL (ref 0–0.6)
EOSINOPHIL NFR BLD: 1 %
GFR SERPLBLD CREATININE-BSD FMLA CKD-EPI: >60 ML/MIN/{1.73_M2}
GLUCOSE SERPL-MCNC: 126 MG/DL (ref 70–99)
GLUCOSE SERPL-MCNC: 148 MG/DL (ref 70–99)
GLUCOSE SERPL-MCNC: 84 MG/DL (ref 70–99)
GLUCOSE SERPL-MCNC: 96 MG/DL (ref 70–99)
HCT VFR BLD AUTO: 35.1 % (ref 40.5–52.5)
HGB BLD-MCNC: 12.2 G/DL (ref 13.5–17.5)
LYMPHOCYTES # BLD: 1.5 K/UL (ref 1–5.1)
LYMPHOCYTES NFR BLD: 40.3 %
MCH RBC QN AUTO: 34.6 PG (ref 26–34)
MCHC RBC AUTO-ENTMCNC: 34.7 G/DL (ref 31–36)
MCV RBC AUTO: 99.7 FL (ref 80–100)
MONOCYTES # BLD: 0.4 K/UL (ref 0–1.3)
MONOCYTES NFR BLD: 11.2 %
NEUTROPHILS # BLD: 1.7 K/UL (ref 1.7–7.7)
NEUTROPHILS NFR BLD: 46.8 %
OSMOLALITY UR: 595 MOSM/KG (ref 390–1070)
PLATELET # BLD AUTO: 135 K/UL (ref 135–450)
PMV BLD AUTO: 8.4 FL (ref 5–10.5)
POTASSIUM SERPL-SCNC: 4.3 MMOL/L (ref 3.5–5.1)
POTASSIUM SERPL-SCNC: 4.5 MMOL/L (ref 3.5–5.1)
POTASSIUM SERPL-SCNC: 4.6 MMOL/L (ref 3.5–5.1)
POTASSIUM SERPL-SCNC: 4.7 MMOL/L (ref 3.5–5.1)
POTASSIUM UR-SCNC: 36.3 MMOL/L
RBC # BLD AUTO: 3.52 M/UL (ref 4.2–5.9)
SODIUM SERPL-SCNC: 127 MMOL/L (ref 136–145)
SODIUM SERPL-SCNC: 127 MMOL/L (ref 136–145)
SODIUM SERPL-SCNC: 128 MMOL/L (ref 136–145)
SODIUM SERPL-SCNC: 128 MMOL/L (ref 136–145)
SODIUM UR-SCNC: 45 MMOL/L
TSH SERPL DL<=0.005 MIU/L-ACNC: 1.37 UIU/ML (ref 0.27–4.2)
WBC # BLD AUTO: 3.7 K/UL (ref 4–11)

## 2023-11-24 PROCEDURE — 6360000002 HC RX W HCPCS: Performed by: PEDIATRICS

## 2023-11-24 PROCEDURE — 6370000000 HC RX 637 (ALT 250 FOR IP): Performed by: PEDIATRICS

## 2023-11-24 PROCEDURE — 36415 COLL VENOUS BLD VENIPUNCTURE: CPT

## 2023-11-24 PROCEDURE — 97116 GAIT TRAINING THERAPY: CPT

## 2023-11-24 PROCEDURE — 80048 BASIC METABOLIC PNL TOTAL CA: CPT

## 2023-11-24 PROCEDURE — 97165 OT EVAL LOW COMPLEX 30 MIN: CPT

## 2023-11-24 PROCEDURE — 85025 COMPLETE CBC W/AUTO DIFF WBC: CPT

## 2023-11-24 PROCEDURE — 2580000003 HC RX 258: Performed by: PEDIATRICS

## 2023-11-24 PROCEDURE — 97161 PT EVAL LOW COMPLEX 20 MIN: CPT

## 2023-11-24 PROCEDURE — 97535 SELF CARE MNGMENT TRAINING: CPT

## 2023-11-24 PROCEDURE — 2060000000 HC ICU INTERMEDIATE R&B

## 2023-11-24 RX ADMIN — LEVETIRACETAM 1750 MG: 500 TABLET, FILM COATED ORAL at 22:05

## 2023-11-24 RX ADMIN — MELATONIN TAB 3 MG 9 MG: 3 TAB at 22:04

## 2023-11-24 RX ADMIN — THERA TABS 1 TABLET: TAB at 08:52

## 2023-11-24 RX ADMIN — LACOSAMIDE 200 MG: 50 TABLET, FILM COATED ORAL at 08:42

## 2023-11-24 RX ADMIN — CETIRIZINE HYDROCHLORIDE 10 MG: 10 TABLET, FILM COATED ORAL at 08:42

## 2023-11-24 RX ADMIN — Medication 10 ML: at 22:06

## 2023-11-24 RX ADMIN — POTASSIUM CHLORIDE 20 MEQ: 1500 TABLET, EXTENDED RELEASE ORAL at 22:05

## 2023-11-24 RX ADMIN — ANTACID TABLETS 500 MG: 500 TABLET, CHEWABLE ORAL at 22:05

## 2023-11-24 RX ADMIN — LEVETIRACETAM 1750 MG: 500 TABLET, FILM COATED ORAL at 08:40

## 2023-11-24 RX ADMIN — ANTACID TABLETS 500 MG: 500 TABLET, CHEWABLE ORAL at 08:42

## 2023-11-24 RX ADMIN — TRAZODONE HYDROCHLORIDE 100 MG: 50 TABLET ORAL at 22:04

## 2023-11-24 RX ADMIN — ENOXAPARIN SODIUM 40 MG: 100 INJECTION SUBCUTANEOUS at 08:40

## 2023-11-24 RX ADMIN — FOLIC ACID 1 MG: 1 TABLET ORAL at 08:42

## 2023-11-24 RX ADMIN — LISINOPRIL 2.5 MG: 5 TABLET ORAL at 08:42

## 2023-11-24 RX ADMIN — OXCARBAZEPINE 300 MG: 300 TABLET, FILM COATED ORAL at 22:04

## 2023-11-24 RX ADMIN — OXCARBAZEPINE 300 MG: 300 TABLET, FILM COATED ORAL at 08:42

## 2023-11-24 RX ADMIN — Medication 100 MG: at 08:42

## 2023-11-24 RX ADMIN — ATORVASTATIN CALCIUM 10 MG: 10 TABLET, FILM COATED ORAL at 22:04

## 2023-11-24 RX ADMIN — POTASSIUM CHLORIDE 20 MEQ: 1500 TABLET, EXTENDED RELEASE ORAL at 08:41

## 2023-11-24 RX ADMIN — Medication 10 ML: at 09:16

## 2023-11-24 RX ADMIN — LACOSAMIDE 200 MG: 50 TABLET, FILM COATED ORAL at 22:04

## 2023-11-24 RX ADMIN — METHADONE HYDROCHLORIDE 5 MG: 10 TABLET ORAL at 08:41

## 2023-11-24 ASSESSMENT — PAIN DESCRIPTION - DESCRIPTORS: DESCRIPTORS: ACHING

## 2023-11-24 ASSESSMENT — ENCOUNTER SYMPTOMS
BLOOD IN STOOL: 0
EYE DISCHARGE: 0
EYE REDNESS: 0
CONSTIPATION: 0
VOMITING: 0
SHORTNESS OF BREATH: 0
PHOTOPHOBIA: 0
ABDOMINAL DISTENTION: 0
DIARRHEA: 0
NAUSEA: 0
CHEST TIGHTNESS: 0
COUGH: 0
ABDOMINAL PAIN: 0
FACIAL SWELLING: 0

## 2023-11-24 ASSESSMENT — PAIN SCALES - GENERAL: PAINLEVEL_OUTOF10: 8

## 2023-11-24 ASSESSMENT — PAIN DESCRIPTION - LOCATION: LOCATION: BACK;NECK

## 2023-11-24 ASSESSMENT — PAIN DESCRIPTION - ORIENTATION: ORIENTATION: LOWER

## 2023-11-25 LAB
10OH-CARBAZEPINE SERPL-MCNC: 6 UG/ML (ref 3–35)
ANION GAP SERPL CALCULATED.3IONS-SCNC: 10 MMOL/L (ref 3–16)
ANION GAP SERPL CALCULATED.3IONS-SCNC: 8 MMOL/L (ref 3–16)
ANION GAP SERPL CALCULATED.3IONS-SCNC: 9 MMOL/L (ref 3–16)
BASOPHILS # BLD: 0 K/UL (ref 0–0.2)
BASOPHILS NFR BLD: 0.7 %
BUN SERPL-MCNC: 12 MG/DL (ref 7–20)
BUN SERPL-MCNC: 12 MG/DL (ref 7–20)
BUN SERPL-MCNC: 13 MG/DL (ref 7–20)
CALCIUM SERPL-MCNC: 9.2 MG/DL (ref 8.3–10.6)
CALCIUM SERPL-MCNC: 9.4 MG/DL (ref 8.3–10.6)
CALCIUM SERPL-MCNC: 9.5 MG/DL (ref 8.3–10.6)
CHLORIDE SERPL-SCNC: 97 MMOL/L (ref 99–110)
CO2 SERPL-SCNC: 23 MMOL/L (ref 21–32)
CO2 SERPL-SCNC: 23 MMOL/L (ref 21–32)
CO2 SERPL-SCNC: 24 MMOL/L (ref 21–32)
CREAT SERPL-MCNC: 0.7 MG/DL (ref 0.8–1.3)
CREAT SERPL-MCNC: 0.8 MG/DL (ref 0.8–1.3)
CREAT SERPL-MCNC: 0.8 MG/DL (ref 0.8–1.3)
DEPRECATED RDW RBC AUTO: 12.8 % (ref 12.4–15.4)
EOSINOPHIL # BLD: 0.1 K/UL (ref 0–0.6)
EOSINOPHIL NFR BLD: 2.1 %
GFR SERPLBLD CREATININE-BSD FMLA CKD-EPI: >60 ML/MIN/{1.73_M2}
GLUCOSE SERPL-MCNC: 79 MG/DL (ref 70–99)
GLUCOSE SERPL-MCNC: 81 MG/DL (ref 70–99)
GLUCOSE SERPL-MCNC: 91 MG/DL (ref 70–99)
HCT VFR BLD AUTO: 39.1 % (ref 40.5–52.5)
HGB BLD-MCNC: 13.3 G/DL (ref 13.5–17.5)
LYMPHOCYTES # BLD: 1.4 K/UL (ref 1–5.1)
LYMPHOCYTES NFR BLD: 31.7 %
MCH RBC QN AUTO: 34.3 PG (ref 26–34)
MCHC RBC AUTO-ENTMCNC: 33.9 G/DL (ref 31–36)
MCV RBC AUTO: 101.1 FL (ref 80–100)
MONOCYTES # BLD: 0.5 K/UL (ref 0–1.3)
MONOCYTES NFR BLD: 10.6 %
NEUTROPHILS # BLD: 2.4 K/UL (ref 1.7–7.7)
NEUTROPHILS NFR BLD: 54.9 %
PLATELET # BLD AUTO: 151 K/UL (ref 135–450)
PMV BLD AUTO: 8.5 FL (ref 5–10.5)
POTASSIUM SERPL-SCNC: 4.3 MMOL/L (ref 3.5–5.1)
POTASSIUM SERPL-SCNC: 4.8 MMOL/L (ref 3.5–5.1)
POTASSIUM SERPL-SCNC: 5.3 MMOL/L (ref 3.5–5.1)
RBC # BLD AUTO: 3.87 M/UL (ref 4.2–5.9)
SODIUM SERPL-SCNC: 127 MMOL/L (ref 136–145)
SODIUM SERPL-SCNC: 129 MMOL/L (ref 136–145)
SODIUM SERPL-SCNC: 129 MMOL/L (ref 136–145)
SODIUM SERPL-SCNC: 130 MMOL/L (ref 136–145)
WBC # BLD AUTO: 4.4 K/UL (ref 4–11)

## 2023-11-25 PROCEDURE — 6360000002 HC RX W HCPCS: Performed by: PEDIATRICS

## 2023-11-25 PROCEDURE — 80048 BASIC METABOLIC PNL TOTAL CA: CPT

## 2023-11-25 PROCEDURE — 84295 ASSAY OF SERUM SODIUM: CPT

## 2023-11-25 PROCEDURE — 6370000000 HC RX 637 (ALT 250 FOR IP): Performed by: INTERNAL MEDICINE

## 2023-11-25 PROCEDURE — 2580000003 HC RX 258: Performed by: PEDIATRICS

## 2023-11-25 PROCEDURE — 6370000000 HC RX 637 (ALT 250 FOR IP): Performed by: PEDIATRICS

## 2023-11-25 PROCEDURE — 36415 COLL VENOUS BLD VENIPUNCTURE: CPT

## 2023-11-25 PROCEDURE — 2060000000 HC ICU INTERMEDIATE R&B

## 2023-11-25 PROCEDURE — 85025 COMPLETE CBC W/AUTO DIFF WBC: CPT

## 2023-11-25 RX ORDER — SODIUM CHLORIDE 1 G/1
1 TABLET ORAL 2 TIMES DAILY WITH MEALS
Status: DISCONTINUED | OUTPATIENT
Start: 2023-11-25 | End: 2023-11-26

## 2023-11-25 RX ADMIN — Medication 10 ML: at 01:56

## 2023-11-25 RX ADMIN — LEVETIRACETAM 1750 MG: 500 TABLET, FILM COATED ORAL at 20:18

## 2023-11-25 RX ADMIN — MELATONIN TAB 3 MG 9 MG: 3 TAB at 20:18

## 2023-11-25 RX ADMIN — ACETAMINOPHEN 650 MG: 325 TABLET ORAL at 20:17

## 2023-11-25 RX ADMIN — ATORVASTATIN CALCIUM 10 MG: 10 TABLET, FILM COATED ORAL at 20:18

## 2023-11-25 RX ADMIN — LEVETIRACETAM 1750 MG: 500 TABLET, FILM COATED ORAL at 09:58

## 2023-11-25 RX ADMIN — THERA TABS 1 TABLET: TAB at 09:58

## 2023-11-25 RX ADMIN — LACOSAMIDE 200 MG: 50 TABLET, FILM COATED ORAL at 09:57

## 2023-11-25 RX ADMIN — METHADONE HYDROCHLORIDE 5 MG: 10 TABLET ORAL at 09:58

## 2023-11-25 RX ADMIN — Medication 10 ML: at 10:06

## 2023-11-25 RX ADMIN — CETIRIZINE HYDROCHLORIDE 10 MG: 10 TABLET, FILM COATED ORAL at 09:59

## 2023-11-25 RX ADMIN — FOLIC ACID 1 MG: 1 TABLET ORAL at 09:58

## 2023-11-25 RX ADMIN — Medication 10 ML: at 20:07

## 2023-11-25 RX ADMIN — LACOSAMIDE 200 MG: 50 TABLET, FILM COATED ORAL at 20:18

## 2023-11-25 RX ADMIN — ANTACID TABLETS 500 MG: 500 TABLET, CHEWABLE ORAL at 20:17

## 2023-11-25 RX ADMIN — OXCARBAZEPINE 300 MG: 300 TABLET, FILM COATED ORAL at 09:57

## 2023-11-25 RX ADMIN — Medication 1 G: at 16:48

## 2023-11-25 RX ADMIN — OXCARBAZEPINE 300 MG: 300 TABLET, FILM COATED ORAL at 20:18

## 2023-11-25 RX ADMIN — Medication 100 MG: at 09:59

## 2023-11-25 RX ADMIN — ENOXAPARIN SODIUM 40 MG: 100 INJECTION SUBCUTANEOUS at 09:59

## 2023-11-25 RX ADMIN — POTASSIUM CHLORIDE 20 MEQ: 1500 TABLET, EXTENDED RELEASE ORAL at 09:58

## 2023-11-25 RX ADMIN — Medication 1 G: at 13:03

## 2023-11-25 RX ADMIN — POTASSIUM CHLORIDE 20 MEQ: 1500 TABLET, EXTENDED RELEASE ORAL at 20:17

## 2023-11-25 RX ADMIN — TRAZODONE HYDROCHLORIDE 100 MG: 50 TABLET ORAL at 20:18

## 2023-11-25 RX ADMIN — ANTACID TABLETS 500 MG: 500 TABLET, CHEWABLE ORAL at 09:58

## 2023-11-25 RX ADMIN — LISINOPRIL 2.5 MG: 5 TABLET ORAL at 09:58

## 2023-11-25 RX ADMIN — FLUTICASONE PROPIONATE 2 SPRAY: 50 SPRAY, METERED NASAL at 20:18

## 2023-11-25 ASSESSMENT — PAIN DESCRIPTION - ORIENTATION: ORIENTATION: ANTERIOR

## 2023-11-25 ASSESSMENT — PAIN SCALES - GENERAL
PAINLEVEL_OUTOF10: 0
PAINLEVEL_OUTOF10: 5
PAINLEVEL_OUTOF10: 6
PAINLEVEL_OUTOF10: 5

## 2023-11-25 ASSESSMENT — PAIN DESCRIPTION - FREQUENCY: FREQUENCY: INTERMITTENT

## 2023-11-25 ASSESSMENT — ENCOUNTER SYMPTOMS
DIARRHEA: 0
SHORTNESS OF BREATH: 0
EYE DISCHARGE: 0
CONSTIPATION: 0
ABDOMINAL PAIN: 0
CHEST TIGHTNESS: 0
NAUSEA: 0
EYE REDNESS: 0
PHOTOPHOBIA: 0
ABDOMINAL DISTENTION: 0
BLOOD IN STOOL: 0
FACIAL SWELLING: 0
COUGH: 0
VOMITING: 0

## 2023-11-25 ASSESSMENT — PAIN DESCRIPTION - LOCATION
LOCATION: BACK;HEAD
LOCATION: HEAD
LOCATION: BACK;HEAD

## 2023-11-25 ASSESSMENT — PAIN DESCRIPTION - DESCRIPTORS: DESCRIPTORS: ACHING;THROBBING

## 2023-11-26 LAB
ANION GAP SERPL CALCULATED.3IONS-SCNC: 8 MMOL/L (ref 3–16)
BASOPHILS # BLD: 0 K/UL (ref 0–0.2)
BASOPHILS NFR BLD: 0.9 %
BUN SERPL-MCNC: 12 MG/DL (ref 7–20)
CALCIUM SERPL-MCNC: 8.8 MG/DL (ref 8.3–10.6)
CHLORIDE SERPL-SCNC: 96 MMOL/L (ref 99–110)
CO2 SERPL-SCNC: 23 MMOL/L (ref 21–32)
CREAT SERPL-MCNC: 0.7 MG/DL (ref 0.8–1.3)
DEPRECATED RDW RBC AUTO: 13 % (ref 12.4–15.4)
EOSINOPHIL # BLD: 0.1 K/UL (ref 0–0.6)
EOSINOPHIL NFR BLD: 2.7 %
GFR SERPLBLD CREATININE-BSD FMLA CKD-EPI: >60 ML/MIN/{1.73_M2}
GLUCOSE SERPL-MCNC: 81 MG/DL (ref 70–99)
HCT VFR BLD AUTO: 37.2 % (ref 40.5–52.5)
HGB BLD-MCNC: 12.8 G/DL (ref 13.5–17.5)
LYMPHOCYTES # BLD: 1.6 K/UL (ref 1–5.1)
LYMPHOCYTES NFR BLD: 41.9 %
MCH RBC QN AUTO: 34.6 PG (ref 26–34)
MCHC RBC AUTO-ENTMCNC: 34.5 G/DL (ref 31–36)
MCV RBC AUTO: 100.4 FL (ref 80–100)
MONOCYTES # BLD: 0.3 K/UL (ref 0–1.3)
MONOCYTES NFR BLD: 8.7 %
NEUTROPHILS # BLD: 1.8 K/UL (ref 1.7–7.7)
NEUTROPHILS NFR BLD: 45.8 %
PLATELET # BLD AUTO: 144 K/UL (ref 135–450)
PMV BLD AUTO: 8.4 FL (ref 5–10.5)
POTASSIUM SERPL-SCNC: 4.2 MMOL/L (ref 3.5–5.1)
RBC # BLD AUTO: 3.7 M/UL (ref 4.2–5.9)
SODIUM SERPL-SCNC: 126 MMOL/L (ref 136–145)
SODIUM SERPL-SCNC: 127 MMOL/L (ref 136–145)
SODIUM SERPL-SCNC: 128 MMOL/L (ref 136–145)
SODIUM SERPL-SCNC: 130 MMOL/L (ref 136–145)
WBC # BLD AUTO: 3.9 K/UL (ref 4–11)

## 2023-11-26 PROCEDURE — 2580000003 HC RX 258: Performed by: PEDIATRICS

## 2023-11-26 PROCEDURE — 84295 ASSAY OF SERUM SODIUM: CPT

## 2023-11-26 PROCEDURE — 6370000000 HC RX 637 (ALT 250 FOR IP): Performed by: INTERNAL MEDICINE

## 2023-11-26 PROCEDURE — 6360000002 HC RX W HCPCS: Performed by: PEDIATRICS

## 2023-11-26 PROCEDURE — 80048 BASIC METABOLIC PNL TOTAL CA: CPT

## 2023-11-26 PROCEDURE — 85025 COMPLETE CBC W/AUTO DIFF WBC: CPT

## 2023-11-26 PROCEDURE — 36415 COLL VENOUS BLD VENIPUNCTURE: CPT

## 2023-11-26 PROCEDURE — 6370000000 HC RX 637 (ALT 250 FOR IP): Performed by: PEDIATRICS

## 2023-11-26 PROCEDURE — 2060000000 HC ICU INTERMEDIATE R&B

## 2023-11-26 RX ORDER — SODIUM CHLORIDE 1 G/1
1 TABLET ORAL
Status: DISCONTINUED | OUTPATIENT
Start: 2023-11-26 | End: 2023-11-26

## 2023-11-26 RX ORDER — SODIUM CHLORIDE 1 G/1
2 TABLET ORAL
Status: DISCONTINUED | OUTPATIENT
Start: 2023-11-26 | End: 2023-11-27 | Stop reason: HOSPADM

## 2023-11-26 RX ADMIN — POTASSIUM CHLORIDE 20 MEQ: 1500 TABLET, EXTENDED RELEASE ORAL at 21:04

## 2023-11-26 RX ADMIN — Medication 100 MG: at 10:30

## 2023-11-26 RX ADMIN — Medication 10 ML: at 10:33

## 2023-11-26 RX ADMIN — LISINOPRIL 2.5 MG: 5 TABLET ORAL at 10:31

## 2023-11-26 RX ADMIN — ANTACID TABLETS 500 MG: 500 TABLET, CHEWABLE ORAL at 21:03

## 2023-11-26 RX ADMIN — Medication 2 G: at 14:15

## 2023-11-26 RX ADMIN — CETIRIZINE HYDROCHLORIDE 10 MG: 10 TABLET, FILM COATED ORAL at 10:30

## 2023-11-26 RX ADMIN — LEVETIRACETAM 1750 MG: 500 TABLET, FILM COATED ORAL at 21:04

## 2023-11-26 RX ADMIN — POTASSIUM CHLORIDE 20 MEQ: 1500 TABLET, EXTENDED RELEASE ORAL at 10:29

## 2023-11-26 RX ADMIN — MELATONIN TAB 3 MG 9 MG: 3 TAB at 21:04

## 2023-11-26 RX ADMIN — METHADONE HYDROCHLORIDE 5 MG: 10 TABLET ORAL at 10:31

## 2023-11-26 RX ADMIN — Medication 1 G: at 08:00

## 2023-11-26 RX ADMIN — OXCARBAZEPINE 300 MG: 300 TABLET, FILM COATED ORAL at 21:03

## 2023-11-26 RX ADMIN — LACOSAMIDE 200 MG: 50 TABLET, FILM COATED ORAL at 10:28

## 2023-11-26 RX ADMIN — LEVETIRACETAM 1750 MG: 500 TABLET, FILM COATED ORAL at 10:29

## 2023-11-26 RX ADMIN — ENOXAPARIN SODIUM 40 MG: 100 INJECTION SUBCUTANEOUS at 10:32

## 2023-11-26 RX ADMIN — Medication 10 ML: at 10:36

## 2023-11-26 RX ADMIN — ANTACID TABLETS 500 MG: 500 TABLET, CHEWABLE ORAL at 10:30

## 2023-11-26 RX ADMIN — Medication 10 ML: at 21:05

## 2023-11-26 RX ADMIN — THERA TABS 1 TABLET: TAB at 10:29

## 2023-11-26 RX ADMIN — ACETAMINOPHEN 650 MG: 325 TABLET ORAL at 21:03

## 2023-11-26 RX ADMIN — FLUTICASONE PROPIONATE 2 SPRAY: 50 SPRAY, METERED NASAL at 10:32

## 2023-11-26 RX ADMIN — ATORVASTATIN CALCIUM 10 MG: 10 TABLET, FILM COATED ORAL at 21:04

## 2023-11-26 RX ADMIN — FOLIC ACID 1 MG: 1 TABLET ORAL at 10:30

## 2023-11-26 RX ADMIN — TRAZODONE HYDROCHLORIDE 100 MG: 50 TABLET ORAL at 21:03

## 2023-11-26 RX ADMIN — OXCARBAZEPINE 300 MG: 300 TABLET, FILM COATED ORAL at 10:29

## 2023-11-26 RX ADMIN — LACOSAMIDE 200 MG: 50 TABLET, FILM COATED ORAL at 21:03

## 2023-11-26 RX ADMIN — Medication 2 G: at 19:59

## 2023-11-26 ASSESSMENT — PAIN SCALES - GENERAL
PAINLEVEL_OUTOF10: 8
PAINLEVEL_OUTOF10: 8

## 2023-11-26 ASSESSMENT — ENCOUNTER SYMPTOMS
FACIAL SWELLING: 0
ABDOMINAL DISTENTION: 0
CONSTIPATION: 0
NAUSEA: 0
CHEST TIGHTNESS: 0
BLOOD IN STOOL: 0
COUGH: 0
VOMITING: 0
PHOTOPHOBIA: 0
DIARRHEA: 0
SHORTNESS OF BREATH: 0
ABDOMINAL PAIN: 0
EYE REDNESS: 0
EYE DISCHARGE: 0

## 2023-11-26 ASSESSMENT — PAIN DESCRIPTION - LOCATION: LOCATION: BACK

## 2023-11-27 VITALS
OXYGEN SATURATION: 98 % | HEIGHT: 69 IN | HEART RATE: 70 BPM | RESPIRATION RATE: 18 BRPM | SYSTOLIC BLOOD PRESSURE: 129 MMHG | DIASTOLIC BLOOD PRESSURE: 68 MMHG | WEIGHT: 157.8 LBS | BODY MASS INDEX: 23.37 KG/M2 | TEMPERATURE: 97.8 F

## 2023-11-27 LAB
SODIUM SERPL-SCNC: 132 MMOL/L (ref 136–145)
SODIUM SERPL-SCNC: 134 MMOL/L (ref 136–145)

## 2023-11-27 PROCEDURE — 84295 ASSAY OF SERUM SODIUM: CPT

## 2023-11-27 PROCEDURE — 6360000002 HC RX W HCPCS: Performed by: PEDIATRICS

## 2023-11-27 PROCEDURE — 6370000000 HC RX 637 (ALT 250 FOR IP): Performed by: INTERNAL MEDICINE

## 2023-11-27 PROCEDURE — 2580000003 HC RX 258: Performed by: PEDIATRICS

## 2023-11-27 PROCEDURE — 6370000000 HC RX 637 (ALT 250 FOR IP): Performed by: PEDIATRICS

## 2023-11-27 PROCEDURE — 36415 COLL VENOUS BLD VENIPUNCTURE: CPT

## 2023-11-27 RX ORDER — LEVETIRACETAM 250 MG/1
1750 TABLET ORAL 2 TIMES DAILY
Qty: 60 TABLET | Refills: 1
Start: 2023-11-27

## 2023-11-27 RX ORDER — MULTIVITAMIN WITH IRON
1 TABLET ORAL DAILY
Qty: 30 TABLET | Refills: 0
Start: 2023-11-28 | End: 2023-12-28

## 2023-11-27 RX ORDER — SODIUM CHLORIDE 1 G/1
2 TABLET ORAL
Qty: 84 TABLET | Refills: 0
Start: 2023-11-27 | End: 2023-12-11

## 2023-11-27 RX ADMIN — Medication 2 G: at 16:59

## 2023-11-27 RX ADMIN — METHADONE HYDROCHLORIDE 5 MG: 10 TABLET ORAL at 09:00

## 2023-11-27 RX ADMIN — FOLIC ACID 1 MG: 1 TABLET ORAL at 09:00

## 2023-11-27 RX ADMIN — THERA TABS 1 TABLET: TAB at 09:00

## 2023-11-27 RX ADMIN — ACETAMINOPHEN 650 MG: 325 TABLET ORAL at 10:05

## 2023-11-27 RX ADMIN — Medication 5 ML: at 09:00

## 2023-11-27 RX ADMIN — ENOXAPARIN SODIUM 40 MG: 100 INJECTION SUBCUTANEOUS at 11:54

## 2023-11-27 RX ADMIN — ANTACID TABLETS 500 MG: 500 TABLET, CHEWABLE ORAL at 09:00

## 2023-11-27 RX ADMIN — Medication 10 ML: at 09:00

## 2023-11-27 RX ADMIN — OXCARBAZEPINE 300 MG: 300 TABLET, FILM COATED ORAL at 10:06

## 2023-11-27 RX ADMIN — POTASSIUM CHLORIDE 20 MEQ: 1500 TABLET, EXTENDED RELEASE ORAL at 09:00

## 2023-11-27 RX ADMIN — CETIRIZINE HYDROCHLORIDE 10 MG: 10 TABLET, FILM COATED ORAL at 09:00

## 2023-11-27 RX ADMIN — LISINOPRIL 2.5 MG: 5 TABLET ORAL at 09:00

## 2023-11-27 RX ADMIN — Medication 2 G: at 09:00

## 2023-11-27 RX ADMIN — Medication 100 MG: at 09:00

## 2023-11-27 RX ADMIN — LEVETIRACETAM 1750 MG: 500 TABLET, FILM COATED ORAL at 09:00

## 2023-11-27 RX ADMIN — Medication 2 G: at 11:54

## 2023-11-27 RX ADMIN — LACOSAMIDE 200 MG: 50 TABLET, FILM COATED ORAL at 09:00

## 2023-11-27 ASSESSMENT — PAIN DESCRIPTION - DESCRIPTORS: DESCRIPTORS: ACHING

## 2023-11-27 ASSESSMENT — PAIN SCALES - GENERAL
PAINLEVEL_OUTOF10: 7
PAINLEVEL_OUTOF10: 4

## 2023-11-27 ASSESSMENT — PAIN DESCRIPTION - ORIENTATION: ORIENTATION: POSTERIOR

## 2023-11-27 ASSESSMENT — PAIN DESCRIPTION - ONSET: ONSET: ON-GOING

## 2023-11-27 ASSESSMENT — PAIN DESCRIPTION - PAIN TYPE: TYPE: CHRONIC PAIN

## 2023-11-27 ASSESSMENT — PAIN - FUNCTIONAL ASSESSMENT: PAIN_FUNCTIONAL_ASSESSMENT: ACTIVITIES ARE NOT PREVENTED

## 2023-11-27 ASSESSMENT — PAIN DESCRIPTION - LOCATION: LOCATION: BACK

## 2023-11-27 ASSESSMENT — PAIN DESCRIPTION - FREQUENCY: FREQUENCY: CONTINUOUS

## 2023-11-27 NOTE — DISCHARGE INSTR - COC
Continuity of Care Form    Patient Name: Luz Joiner   :  1960  MRN:  [id number]    Admit date:  2023  Discharge date:  2023    Code Status Order: DNR-CCA   Advance Directives:     Admitting Physician:  Radha Tatum MD  PCP: Wendy García MD    Discharging Nurse: Camden Kennedy [facility] Unit/Room#: 8WT-4745/0837-83  Discharging Unit Phone Number: [phone]    Emergency Contact:   Extended Emergency Contact Information  Primary Emergency Contact: [address] Phone: [phone]  Mobile Phone: [phone]  Relation: Brother/Sister  Secondary Emergency Contact: [address] [place] [address] Phone: [phone]  Relation: Other    Past Surgical History:  Past Surgical History:   Procedure Laterality Date    BRAIN ANEURYSM SURGERY Left 2017    COLONOSCOPY N/A 2019    COLONOSCOPY WITH MAC ANESTHESIA performed by Chandler Mathew MD at [address] [place] [address] Road Left 2017    LEG SURGERY      UPPER GASTROINTESTINAL ENDOSCOPY N/A 2019    EGD BIOPSY performed by Chandler Mathew MD at [facility] END[facility]       Immunization History:   Immunization History   Administered Date(s) Administered    COVID-19, PFIZER PURPLE top, DILUTE for use, (age 15 y+), 30mcg/0.3mL 2020, 2021, 10/21/2021       Active Problems:  Patient Active Problem List   Diagnosis Code    Psychosis ([address]) F29    Dysphagia R13.10    Dysphonia R49.0    Endocarditis I38    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

## 2023-11-27 NOTE — CARE COORDINATION
Transport was scheduled with [facility] at [employer] to [facility]. Discharge packet completed. All discharge paperwork faxed to facility. Discharge Plan:  [id number], [address]  Report = [phone] EMS transport at 6:30pm tonight.     Electronically signed by CRISTOPHER Krishna LSW on 11/27/2023 at 6:23 PM

## 2023-11-27 NOTE — CARE COORDINATION
11/27/23 1549   IMM Letter   IMM Letter given to Patient/Family/Significant other/Guardian/POA/by: Letter given to Edith Lagunas by Dayana Reyes RN. Pt is agreeable to letter less than 4 hours from discharge.    IMM Letter date given: 11/27/23   IMM Letter time given: 1550       Electronically signed by Markus Leon RN on 11/27/2023 at 3:50 PM

## 2023-11-27 NOTE — PLAN OF CARE
Problem: Discharge Planning  Goal: Discharge to home or other facility with appropriate resources  11/23/2023 1745 by Tristin Barrientos RN  Outcome: Progressing  Flowsheets (Taken 11/23/2023 1745)  Discharge to home or other facility with appropriate resources:   Identify barriers to discharge with patient and caregiver   Identify discharge learning needs (meds, wound care, etc)   Refer to discharge planning if patient needs post-hospital services based on physician order or complex needs related to functional status, cognitive ability or social support system     Problem: Pain  Goal: Verbalizes/displays adequate comfort level or baseline comfort level  11/23/2023 1745 by Tristin Barrientos RN  Outcome: Progressing  Flowsheets (Taken 11/23/2023 1745)  Verbalizes/displays adequate comfort level or baseline comfort level:   Encourage patient to monitor pain and request assistance   Administer analgesics based on type and severity of pain and evaluate response   Consider cultural and social influences on pain and pain management   Assess pain using appropriate pain scale     Problem: Safety - Adult  Goal: Free from fall injury  11/23/2023 1745 by Tristin Barrientos RN  Outcome: Progressing
Problem: Discharge Planning  Goal: Discharge to home or other facility with appropriate resources  11/24/2023 1443 by Rashaad Frankel RN  Outcome: Progressing  Flowsheets (Taken 11/24/2023 1443)  Discharge to home or other facility with appropriate resources:   Identify barriers to discharge with patient and caregiver   Arrange for needed discharge resources and transportation as appropriate     Problem: Pain  Goal: Verbalizes/displays adequate comfort level or baseline comfort level  11/24/2023 1443 by Rashaad Frankel RN  Outcome: Progressing     Problem: Safety - Adult  Goal: Free from fall injury  11/24/2023 1443 by Rashaad Frankel RN  Outcome: Progressing        Problem: Neurosensory - Adult  Goal: Absence of seizures  11/24/2023 1443 by Rashaad Frankel RN  Outcome: Progressing  Flowsheets (Taken 11/24/2023 1443)  Absence of seizures: Monitor for seizure activity.   If seizure occurs, document type and location of movements and any associated apnea     Problem: Neurosensory - Adult  Goal: Achieves maximal functionality and self care  11/24/2023 1443 by Rashaad Frankel RN  Outcome: Progressing  Flowsheets (Taken 11/24/2023 1443)  Achieves maximal functionality and self care:   Monitor swallowing and airway patency with patient fatigue and changes in neurological status   Encourage and assist patient to increase activity and self care with guidance from physical therapy/occupational therapy
Problem: Discharge Planning  Goal: Discharge to home or other facility with appropriate resources  11/25/2023 1214 by Cristofer Leroy RN  Outcome: Progressing     Problem: Pain  Goal: Verbalizes/displays adequate comfort level or baseline comfort level  11/25/2023 1214 by Cristofer Leroy RN  Outcome: Progressing     Problem: Safety - Adult  Goal: Free from fall injury  11/25/2023 1214 by Cristofer Leroy RN  Outcome: Progressing     Problem: Neurosensory - Adult  Goal: Achieves stable or improved neurological status  11/25/2023 1214 by Cristofer Leroy RN  Outcome: Progressing     Problem: Neurosensory - Adult  Goal: Absence of seizures  11/25/2023 1214 by Cristofer Leroy RN  Outcome: Progressing
Problem: Discharge Planning  Goal: Discharge to home or other facility with appropriate resources  Outcome: Progressing     Problem: Pain  Goal: Verbalizes/displays adequate comfort level or baseline comfort level  Outcome: Progressing     Problem: Safety - Adult  Goal: Free from fall injury  Outcome: Progressing     Problem: Neurosensory - Adult  Goal: Achieves stable or improved neurological status  Outcome: Progressing     Problem: Respiratory - Adult  Goal: Achieves optimal ventilation and oxygenation  Outcome: Progressing     Problem: Skin/Tissue Integrity - Adult  Goal: Skin integrity remains intact  Outcome: Progressing     Problem: ABCDS Injury Assessment  Goal: Absence of physical injury  Outcome: Progressing
Problem: Discharge Planning  Goal: Discharge to home or other facility with appropriate resources  Outcome: Progressing     Problem: Pain  Goal: Verbalizes/displays adequate comfort level or baseline comfort level  Outcome: Progressing  Flowsheets (Taken 11/25/2023 2000)  Verbalizes/displays adequate comfort level or baseline comfort level:   Encourage patient to monitor pain and request assistance   Assess pain using appropriate pain scale   Administer analgesics based on type and severity of pain and evaluate response   Implement non-pharmacological measures as appropriate and evaluate response   Consider cultural and social influences on pain and pain management   Notify Licensed Independent Practitioner if interventions unsuccessful or patient reports new pain     Problem: Safety - Adult  Goal: Free from fall injury  Outcome: Progressing     Problem: Neurosensory - Adult  Goal: Achieves stable or improved neurological status  Outcome: Progressing  Flowsheets (Taken 11/25/2023 2024)  Achieves stable or improved neurological status:   Assess for and report changes in neurological status   Initiate measures to prevent increased intracranial pressure   Maintain blood pressure and fluid volume within ordered parameters to optimize cerebral perfusion and minimize risk of hemorrhage   Monitor temperature, glucose, and sodium. Initiate appropriate interventions as ordered  Goal: Absence of seizures  Outcome: Progressing  Flowsheets (Taken 11/25/2023 2024)  Absence of seizures:   Monitor for seizure activity.   If seizure occurs, document type and location of movements and any associated apnea   If seizure occurs, turn head to side and suction secretions as needed   Administer anticonvulsants as ordered  Goal: Remains free of injury related to seizures activity  Outcome: Progressing  Flowsheets (Taken 11/25/2023 2024)  Remains free of injury related to seizure activity:   Maintain airway, patient safety  and administer
Problem: Discharge Planning  Goal: Discharge to home or other facility with appropriate resources  Outcome: Progressing  Flowsheets (Taken 11/24/2023 1945)  Discharge to home or other facility with appropriate resources:   Identify barriers to discharge with patient and caregiver   Arrange for needed discharge resources and transportation as appropriate   Identify discharge learning needs (meds, wound care, etc)   Refer to discharge planning if patient needs post-hospital services based on physician order or complex needs related to functional status, cognitive ability or social support system     Problem: Pain  Goal: Verbalizes/displays adequate comfort level or baseline comfort level  Outcome: Progressing     Problem: Safety - Adult  Goal: Free from fall injury  Outcome: Progressing     Problem: Neurosensory - Adult  Goal: Achieves stable or improved neurological status  Outcome: Progressing  Flowsheets (Taken 11/24/2023 1945)  Achieves stable or improved neurological status:   Assess for and report changes in neurological status   Maintain blood pressure and fluid volume within ordered parameters to optimize cerebral perfusion and minimize risk of hemorrhage   Monitor temperature, glucose, and sodium. Initiate appropriate interventions as ordered  Goal: Absence of seizures  Outcome: Progressing  Flowsheets (Taken 11/24/2023 1945)  Absence of seizures: Monitor for seizure activity.   If seizure occurs, document type and location of movements and any associated apnea  Goal: Remains free of injury related to seizures activity  Outcome: Progressing  Goal: Achieves maximal functionality and self care  Outcome: Progressing  Flowsheets (Taken 11/24/2023 1945)  Achieves maximal functionality and self care: Monitor swallowing and airway patency with patient fatigue and changes in neurological status     Problem: Respiratory - Adult  Goal: Achieves optimal ventilation and oxygenation  Outcome: Progressing  Flowsheets
Problem: Discharge Planning  Goal: Discharge to home or other facility with appropriate resources  Outcome: Progressing  Flowsheets (Taken 11/27/2023 1801)  Discharge to home or other facility with appropriate resources:   Identify barriers to discharge with patient and caregiver   Identify discharge learning needs (meds, wound care, etc)   Refer to discharge planning if patient needs post-hospital services based on physician order or complex needs related to functional status, cognitive ability or social support system     Problem: Pain  Goal: Verbalizes/displays adequate comfort level or baseline comfort level  Outcome: Progressing  Flowsheets (Taken 11/27/2023 1801)  Verbalizes/displays adequate comfort level or baseline comfort level:   Encourage patient to monitor pain and request assistance   Administer analgesics based on type and severity of pain and evaluate response     Problem: Safety - Adult  Goal: Free from fall injury  Outcome: Progressing     Problem: Neurosensory - Adult  Goal: Achieves stable or improved neurological status  Outcome: Progressing  Flowsheets (Taken 11/27/2023 1801)  Achieves stable or improved neurological status: Assess for and report changes in neurological status
Adult  Goal: Achieves stable or improved neurological status  Outcome: Progressing  Flowsheets (Taken 11/23/2023 2000)  Achieves stable or improved neurological status:   Initiate measures to prevent increased intracranial pressure   Assess for and report changes in neurological status   Maintain blood pressure and fluid volume within ordered parameters to optimize cerebral perfusion and minimize risk of hemorrhage   Monitor temperature, glucose, and sodium. Initiate appropriate interventions as ordered  Goal: Absence of seizures  Outcome: Progressing  Flowsheets (Taken 11/23/2023 2000)  Absence of seizures:   Monitor for seizure activity.   If seizure occurs, document type and location of movements and any associated apnea   If seizure occurs, turn head to side and suction secretions as needed   Administer anticonvulsants as ordered  Goal: Remains free of injury related to seizures activity  Outcome: Progressing  Flowsheets (Taken 11/23/2023 2000)  Remains free of injury related to seizure activity:   Maintain airway, patient safety  and administer oxygen as ordered   Monitor patient for seizure activity, document and report duration and description of seizure to Licensed Independent Practitioner   If seizure occurs, turn patient to side and suction secretions as needed   Seizure pads on all 4 side rails   Instruct patient/family to call for assistance with activity based on assessment   Instruct patient/family to notify RN of any seizure activity  Goal: Achieves maximal functionality and self care  Outcome: Progressing  Flowsheets (Taken 11/23/2023 2000)  Achieves maximal functionality and self care: Monitor swallowing and airway patency with patient fatigue and changes in neurological status     Problem: Respiratory - Adult  Goal: Achieves optimal ventilation and oxygenation  Outcome: Progressing  Flowsheets (Taken 11/23/2023 2000)  Achieves optimal ventilation and oxygenation:   Assess for changes in respiratory

## 2023-11-27 NOTE — CARE COORDINATION
Patient noted to have a discharge order. Pt has been medically cleared to transition to LTAC ([address])    Patient discharged to   [facility] [place], [address]  Phone: [phone]  Fax: [phone]     Pre-cert Required/obtained: nka LTC to LTC    Transportation scheduled for     Transportation provided by: Elsa Vora pending roundtrip. Notification 3T, RN Isaac Dick, and intake Areli Back at [facility]. Areli Back states the nursing staff will look for him when they are notified when he will arrive. [facility] [place] alerted to watch for the Roundtrip trip claim. AVS faxed and agency notified: yes  [phone] Pablo Arteaga)   Intake states he has EPIC and can pull the discharge off the site. The following prescriptions sent with pt:  none    Family Notified:   Sister Radha Cowan [phone] left message  Brother Karo Benjamin notified. Acknowledged.     Nurse to call report to facility [phone]    Room 128    Electronically signed by Leonidas Garza RN on 11/27/2023 at 4:05 PM

## 2023-11-27 NOTE — DISCHARGE INSTRUCTIONS
Follow up with your PCP within 5-7 days of discharge. Follow up with your neurologist at [facility] upon discharge   Follow up with nephrology as instructed   Take all your medications as prescribed.

## 2023-11-28 NOTE — PROGRESS NOTES
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    Bacterial infection due to Streptococcus mutans    Type 2 diabetes mellitus (HCC)    Protein calorie malnutrition (HCC)    Seizure (HCC)    Partial symptomatic epilepsy with complex partial seizures, intractable, without status epilepticus ([address])    Abnormal CT of the head    Seizure disorder ([address])    History of stroke    Aphasia    NATALIA (acute kidney injury) ([address])    Lactic acidosis    Unresponsive    Partial idiopathic epilepsy with seizures of localized onset, intractable, with status epilepticus ([address])    Sepsis ([address])    Marcelo coma scale total score 3-8 ([address])    Lung nodules    Hypertension associated with diabetes ([address])    Enterococcal bacteremia    Receiving intravenous antibiotic treatment as outpatient    Complex care coordination    Congestive heart failure ([address])    Nonrheumatic mitral valve regurgitation       Past Medical History:   has a past medical history of Acute intracerebral hemorrhage ([address]), Anxiety, Back pain, Cerebral artery occlusion with cerebral infarction ([address]), Chronic back pain greater than 3 months duration, Diabetes mellitus ([address]), Dysphasia, Dysphonia, Endocarditis and heart valve disorders in diseases classified elsewhere, Hydrocephalus ([address]), Hypertension, IVH (intraventricular hemorrhage) ([address]), NSTEMI (non-ST elevated myocardial infarction) ([address]), and Seizures ([address]). Past Social History:   reports that he has been smoking cigarettes. He has been smoking an average of .5 packs per day. He has never used smokeless tobacco. He reports current alcohol use of about 1.0 standard drink of alcohol per week. He reports that he does not currently use drugs after having used the following drugs: Marijuana Charlie Mater). Subjective:    Kevin overcorrected yesterday but now has slowed down.   No new
Data- discharge order received, pt verbalized agreement to discharge, disposition to [facility] #[phone], [address] [facility] reviewed and signed by physician. Action- AVS prepared, EDINSON completed/ reported faxed by case management/. Discharge instruction summary: Diet- regular, Activity- as tolerated. Medications prescriptions to be filled at receiving facility. Transfer code status: DNR-CCA, LDAs to remain with discharge: none. DME used: none. Response- Bedside RN tried to call report to receiving facility x2, no answer both times. Pt belongings gathered, peripheral IV and cardiac monitoring removed. Disposition to Discharged via ambulance to skilled nursing by EMS transportation, no complications reported. 1. WEIGHT: Admit Weight - Scale: 70.9 kg (156 lb 3.2 oz) (11/23/23 0345)        Today  Weight - Scale: 71.6 kg (157 lb 12.8 oz) (11/27/23 0830)       2.  O2 SAT.: SpO2: 98 % (11/27/23 1615)
ED TO INPATIENT SBAR HANDOFF    Patient Name: Nikole Cavazos   :  1960  61 y.o. MRN:  [id number]  Preferred Name    ED Room #:  ED-0011/11  Family/Caregiver Present no   Restraints no   Sitter no   Sepsis Risk Score Sepsis Risk Score: 1.68    Situation  Code Status: Prior No additional code details. Allergies: Levonorgestrel-ethinyl estrad and Seasonal  Weight: No data found. Arrived from: nursing home  Chief Complaint:   Chief Complaint   Patient presents with    Seizures     C/o witnessed seizure at nursing home. Pt did hit head     Hospital Problem/Diagnosis:  Principal Problem:    Seizure ([address])  Resolved Problems:    * No resolved hospital problems. *    Imaging:   CT HEAD WO CONTRAST (Select for new onset seizures or head trauma)   Final Result   No acute intracranial abnormality.            Abnormal labs:   Abnormal Labs Reviewed   CBC WITH AUTO DIFFERENTIAL - Abnormal; Notable for the following components:       Result Value    RBC 3.73 (*)     Hemoglobin 12.9 (*)     Hematocrit 36.9 (*)     MCH 34.5 (*)     Lymphocytes Absolute 0.8 (*)     All other components within normal limits   COMPREHENSIVE METABOLIC PANEL W/ REFLEX TO MG FOR LOW K - Abnormal; Notable for the following components:    Sodium 120 (*)     Chloride 88 (*)     Glucose 113 (*)     Creatinine 0.7 (*)     ALT 9 (*)     All other components within normal limits   URINALYSIS WITH REFLEX TO CULTURE - Abnormal; Notable for the following components:    Blood, Urine MODERATE (*)     All other components within normal limits   MICROSCOPIC URINALYSIS - Abnormal; Notable for the following components:    RBC, UA 11 (*)     All other components within normal limits     Critical values: no     Abnormal Assessment Findings:     Background  History:   Past Medical History:   Diagnosis Date    Acute intracerebral hemorrhage (HCC) 2017    Anxiety     Back pain     Cerebral artery occlusion with cerebral infarction (HCC)     Chronic back pain
HOSPITALISTS PROGRESS NOTE    11/24/2023 9:14 AM        Name: Kristina Hennessy . Admitted: 11/22/2023  Primary Care Provider: Ray Centeno MD (Tel: [phone])      Brief Course: This 70-year-old male with PMHx of opiate abuse, refractory epilepsy, chronic hyponatremia and remote left hemispheric CVA with hemorrhagic conversion presented with breakthrough seizure and hyponatremia. Interval history:   Pt seen and examined today   Overnight events noted and interval ancillary notes reviewed. Serum sodium 128 this morning. Goal serum sodium 134-136 within next 24 hours. No further seizure-like activity reported since admission  Resting in bed; endorsed back pain but denied any fever, chills, chest pain, shortness of breath, bowel or bladder dysfunction      Assessment & Plan:     Hx of refractory epilepsy; presented with breakthrough seizure  CT head on 11/22/23 negative for acute abnormality   Neurology consulted; recommended increasing Keppra to 1750 mg twice daily  Continue Vimpat 200 mg twice daily, Trileptal 300 mg twice daily  Maintain seizure precautions. Hyponatremia; acute on chronic. Sodium 120 on admission; overcorrected to 131 within 12 hours; treated with D5W and desmopressin  Serum sodium 128 this morning; within goal  Nephrology on board; goal serum sodium 134-136 within next 24 hours. Monitor serum sodium every 4 hours. Hx of remote left hemispheric hemorrhagic CVA with aphasia; continue aspirin and statins    Hx of CHF, mitral and aortic valve endocarditis due to streptococcus mutans in 2017:   Last echo on 2/25/22 with EF 50%, severe AR, severe MR, dilated LV     Hypertension; continue current regimen monitor BP closely    Hyperlipidemia; continue statin     Hx of Diabetes mellitus; HgbA1c 4.9 on 11/30/23. Monitor blood glucose for now     Hx of opiate use:  On methadone    Hx of alcohol abuse; BERTHA
HOSPITALISTS PROGRESS NOTE    11/25/2023 9:27 AM        Name: Kathy House . Admitted: 11/22/2023  Primary Care Provider: Markel Martin MD (Tel: [phone])      Brief Course: This 59-year-old male with PMHx of opiate abuse, refractory epilepsy, chronic hyponatremia and remote left hemispheric CVA with hemorrhagic conversion presented with breakthrough seizure and hyponatremia. Interval history:   Pt seen and examined today. Overnight events noted, interval ancillary notes and labs reviewed. Hemodynamically stable. No further seizures during hospital stay  Serum sodium 129 this morning; goal 134-136 within the next 4 hours. Resting in bed; denies any new complaint. PT consulted      Assessment & Plan:     Hx of refractory epilepsy; presented with breakthrough seizure  CT head on 11/22/23 negative for acute abnormality   Neurology consulted; recommended increasing Keppra to 1750 mg twice daily  Continue Vimpat 200 mg twice daily, Trileptal 300 mg twice daily  Maintain seizure precautions. Hyponatremia; acute on chronic. Sodium 120 on admission; overcorrected to 131 within 12 hours; treated with D5W and desmopressin  Serum sodium 129 this morning; goal serum sodium 134-136 within next 24 hours. Nephrology on board; started on sodium tabs twice daily. Maintain 1.5 L/24Hrs  fluid restriction  Monitor serum sodium every 6 hours. Hx of remote left hemispheric hemorrhagic CVA with aphasia; continue aspirin and statins    Hx of CHF, mitral and aortic valve endocarditis due to streptococcus mutans in 2017:   Last echo on 2/25/22 with EF 50%, severe AR, severe MR, dilated LV     Hypertension; continue current regimen monitor BP closely    Hyperlipidemia; continue statin     Hx of Diabetes mellitus; HgbA1c 4.9 on 11/30/23. Monitor blood glucose for now     Hx of opiate use:  On methadone    Hx of alcohol abuse; BERTHA
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    Bacterial infection due to Streptococcus mutans    Type 2 diabetes mellitus (HCC)    Protein calorie malnutrition (HCC)    Seizure (HCC)    Partial symptomatic epilepsy with complex partial seizures, intractable, without status epilepticus ([address])    Abnormal CT of the head    Seizure disorder ([address])    History of stroke    Aphasia    NATALIA (acute kidney injury) ([address])    Lactic acidosis    Unresponsive    Partial idiopathic epilepsy with seizures of localized onset, intractable, with status epilepticus ([address])    Sepsis ([address])    Marcelo coma scale total score 3-8 ([address])    Lung nodules    Hypertension associated with diabetes ([address])    Enterococcal bacteremia    Receiving intravenous antibiotic treatment as outpatient    Complex care coordination    Congestive heart failure ([address])    Nonrheumatic mitral valve regurgitation       Past Medical History:   has a past medical history of Acute intracerebral hemorrhage ([address]), Anxiety, Back pain, Cerebral artery occlusion with cerebral infarction ([address]), Chronic back pain greater than 3 months duration, Diabetes mellitus ([address]), Dysphasia, Dysphonia, Endocarditis and heart valve disorders in diseases classified elsewhere, Hydrocephalus ([address]), Hypertension, IVH (intraventricular hemorrhage) ([address]), NSTEMI (non-ST elevated myocardial infarction) ([address]), and Seizures ([address]). Past Social History:   reports that he has been smoking cigarettes. He has been smoking an average of .5 packs per day. He has never used smokeless tobacco. He reports current alcohol use of about 1.0 standard drink of alcohol per week. He reports that he does not currently use drugs after having used the following drugs: Marijuana Jovannavinmariah Coppola). Subjective:    No new neurologic symptoms.     Review of Systems
Physical Therapy    [address] [place] [address] Department   Phone: [phone]    Physical Therapy    [x] Initial Evaluation            [] Daily Treatment Note         [x] Discharge Summary      Patient: Kelly Griggs   : 1960   MRN: [id number]   Date of Service:  2023  Admitting Diagnosis: Seizure [facility])  Current Admission Summary: Kelly Griggs is a 61 y.o. male who presents to the emergency department after a witnessed seizure at nursing facility. Patient thinks that he is on blood thinners does not know what medication and on chart review does have a history of intracranial hemorrhage. History is reconstructed through nursing notes as patient is a poor historian. He does complain of a headache over the occipital region. He drinks alcohol states that he thinks his last alcohol was about a week ago. He does have a history of alcohol abuse. He reports nausea and vomiting with the last vomiting about 3 days ago but none since he fell. No neck pain. No weakness, numbness, tingling. No chest pain or shortness of breath though he does note that he has had some URI type symptoms this week. He does not know if he is on seizure medications though chart review showed by me shows that the patient is on Keppra. Past Medical History:  has a past medical history of Acute intracerebral hemorrhage ([address]), Anxiety, Back pain, Cerebral artery occlusion with cerebral infarction [facility]), Chronic back pain greater than 3 months duration, Diabetes mellitus ([address]), Dysphasia, Dysphonia, Endocarditis and heart valve disorders in diseases classified elsewhere, Hydrocephalus ([address]), Hypertension, IVH (intraventricular hemorrhage) ([address]), NSTEMI (non-ST elevated myocardial infarction) ([address]), and Seizures ([address]). Past Surgical History:  has a past surgical history that includes Leg Surgery; craniotomy (Left, 2017); Brain aneurysm surgery (Left, 2017);  Upper gastrointestinal endoscopy (N/A,
RN from [employer] called me back to report that this pt gets one can of beer a night from the nursing facility, She stated that she believes he drinks more than that. I asked if she knew how much/ who was brining him in alcohol/ what kind of alcohol and she stated she believes someone in his family brings it in but was unable to answer the rest of my questions.    Kendra Sotomayor RN
Report given to 3T rn. All questions answered. All belongings with pt. Disconnected from ed monitor system.
Independent  Active : No  Occupation: Retired  Type of occupation:   Leisure & Hobbies: [employer]  IADL Comments: Does not perform  Additional Comments: Patient is a LTC resident at [facility]. Patient reports is indep with ADLs, does not perform any IADLs, ambulates without a device    Examination   Vision:   Vision Gross Assessment: WFL  Hearing:   WFL  ROM:   (B) UE ROM WFL  Strength:   (B) UE gross strength WFL    Therapist Clinical Decision Making (Complexity): low complexity  Clinical Presentation: stable      Subjective  General: Pt lying upright in bed upon arrival, agreeable to evaluation. Reports he is up ad sintia  Pain: 0/10  Pain Interventions: not applicable        Activities of Daily Living  Basic Activities of Daily Living  Grooming: Independent  Grooming Comments: oral/hand hygiene in stance at sink  Lower Extremity Dressing: Independent  Dressing Comments: changing from hospital pants to jeans, donning shoes  Toileting: Independent. Instrumental Activities of Daily Living  No IADL completed on this date.     Functional Mobility  Bed Mobility  Supine to Sit: modified independent  Sit to Supine: modified independent  Scooting: Independent  Comments:  Transfers  Sit to stand transfer:Independent  Stand to sit transfer: Independent  Comments:  Functional Mobility  Functional Mobility Activity: to/from bathroom, around unit  Device Use: no device  Required Assistance: Independent  Balance:  Static Sitting Balance: good(+): independent with high level dynamic balance in unsupported position  Dynamic Sitting Balance: good(+): independent with high level dynamic balance in unsupported position  Static Standing Balance: good: independent with functional balance in unsupported position  Dynamic Standing Balance: good: independent with functional balance in unsupported position  Comments:    Other Therapeutic Interventions    Functional Outcomes  AM-PAC Inpatient Daily Activity Raw
of opiate use: On methadone    Hx of alcohol abuse; [facility] protocol. Continue folic acid and thiamine.       DVT PPX: Lovenox  Code:DNR-CCA    Disposition: Once acute medical issues have resolved    Current Medications  sodium chloride tablet 1 g, TID WC  sodium chloride flush 0.9 % injection 5-40 mL, 2 times per day  sodium chloride flush 0.9 % injection 5-40 mL, PRN  0.9 % sodium chloride infusion, PRN  potassium chloride (KLOR-CON M) extended release tablet 40 mEq, PRN   Or  potassium bicarb-citric acid (EFFER-K) effervescent tablet 40 mEq, PRN   Or  potassium chloride 10 mEq/100 mL IVPB (Peripheral Line), PRN  magnesium sulfate 2000 mg in 50 mL IVPB premix, PRN  enoxaparin (LOVENOX) injection 40 mg, Daily  ondansetron (ZOFRAN-ODT) disintegrating tablet 4 mg, Q8H PRN   Or  ondansetron (ZOFRAN) injection 4 mg, Q6H PRN  polyethylene glycol (GLYCOLAX) packet 17 g, Daily PRN  acetaminophen (TYLENOL) tablet 650 mg, Q6H PRN   Or  acetaminophen (TYLENOL) suppository 650 mg, Q6H PRN  sodium chloride flush 0.9 % injection 5-40 mL, 2 times per day  sodium chloride flush 0.9 % injection 5-40 mL, PRN  0.9 % sodium chloride infusion, PRN  thiamine mononitrate tablet 100 mg, Daily  LORazepam (ATIVAN) tablet 1 mg, Q1H PRN   Or  LORazepam (ATIVAN) injection 1 mg, Q1H PRN   Or  LORazepam (ATIVAN) tablet 2 mg, Q1H PRN   Or  LORazepam (ATIVAN) injection 2 mg, Q1H PRN   Or  LORazepam (ATIVAN) tablet 3 mg, Q1H PRN   Or  LORazepam (ATIVAN) injection 3 mg, Q1H PRN   Or  LORazepam (ATIVAN) tablet 4 mg, Q1H PRN   Or  LORazepam (ATIVAN) injection 4 mg, Q1H PRN  multivitamin 1 tablet, Daily  levETIRAcetam (KEPPRA) tablet 1,750 mg, BID  LORazepam (ATIVAN) injection 2 mg, Q6H PRN  calcium carbonate (TUMS) chewable tablet 500 mg, BID  lacosamide (VIMPAT) tablet 200 mg, BID  cetirizine (ZYRTEC) tablet 10 mg, Daily  atorvastatin (LIPITOR) tablet 10 mg, Nightly  fluticasone (FLONASE) 50 MCG/ACT nasal spray 2 spray, Daily PRN  folic acid (FOLVITE)
Disposition- ok to dc       Medical decision making- high complexity. Multiple complex health problems. Discussed with patient and treatment team.    Thank you for allowing me to participate in this patient's care. Please do not hesitate to contact me for any questions/concerns. We will follow along with you. Glenn Rowland MD  [facility] [address]   Phone: [phone] or [address]  Fax: [phone]    Severally ill, at risk of impending organ failure needing  higher level of care/monitoring. Time spent that included face-to-face meeting/discussion with patient, patient's family -as available, and treatment team (including primary/referring team and other consultants; included coordination of care with the treatment team; and review of patient's electronic medical records and ordering appropriates tests.
K 5.3 (H) 11/25/2023    CL 97 (L) 11/25/2023    CO2 24 11/25/2023       Assessment/Plan:  1. Hyponatremia-unclear onset. History of hyponatremia. Presented with seizure. Sodium increased from 120 to 131 in 12 hours. +ODS risk. Needed D5W and desmopressin IV. Rate of correction now acceptable. Goal serum sodium in the next 24 hours around 134-136. Sodium every 6 hours. Currently no seizures. Start NaCl tabs BID.  1.5L/24H fluid restriction. 2.  Renal function within normal limits  3. Hypertension-controlled  4. History of seizures-was on sertraline and oxcarbazepine as an outpatient, they may increase ADH release. Was also on Keppra. Can continue oxcarbazepine. Stopped sertraline for now. 5.  History of CVA with aphasia  6. History of diabetes mellitus type 2  7. History of coronary disease  8.   Disposition-once sodium 130 and above    Samir Rodriguez MD

## 2024-03-22 ENCOUNTER — APPOINTMENT (OUTPATIENT)
Dept: CT IMAGING | Age: 64
DRG: 291 | End: 2024-03-22
Payer: COMMERCIAL

## 2024-03-22 ENCOUNTER — HOSPITAL ENCOUNTER (INPATIENT)
Age: 64
LOS: 5 days | Discharge: HOSPICE | DRG: 291 | End: 2024-03-27
Attending: EMERGENCY MEDICINE | Admitting: HOSPITALIST
Payer: COMMERCIAL

## 2024-03-22 ENCOUNTER — APPOINTMENT (OUTPATIENT)
Dept: GENERAL RADIOLOGY | Age: 64
DRG: 291 | End: 2024-03-22
Payer: COMMERCIAL

## 2024-03-22 DIAGNOSIS — J96.01 ACUTE HYPOXIC RESPIRATORY FAILURE (HCC): ICD-10-CM

## 2024-03-22 DIAGNOSIS — I50.21 ACUTE SYSTOLIC CHF (CONGESTIVE HEART FAILURE) (HCC): Primary | ICD-10-CM

## 2024-03-22 DIAGNOSIS — I35.1 SEVERE AORTIC VALVE REGURGITATION: ICD-10-CM

## 2024-03-22 DIAGNOSIS — N39.0 URINARY TRACT INFECTION WITHOUT HEMATURIA, SITE UNSPECIFIED: ICD-10-CM

## 2024-03-22 DIAGNOSIS — G40.909 EPILEPSY WITH ALTERED CONSCIOUSNESS WITHOUT INTRACTABLE EPILEPSY (HCC): ICD-10-CM

## 2024-03-22 PROBLEM — I34.0 SEVERE MITRAL VALVE REGURGITATION: Status: ACTIVE | Noted: 2024-03-22

## 2024-03-22 PROBLEM — F33.0 MAJOR DEPRESSIVE DISORDER, RECURRENT EPISODE, MILD DEGREE (HCC): Chronic | Status: ACTIVE | Noted: 2022-08-15

## 2024-03-22 PROBLEM — I61.0: Status: ACTIVE | Noted: 2017-01-27

## 2024-03-22 PROBLEM — F25.0 SCHIZOAFFECTIVE DISORDER, BIPOLAR TYPE (HCC): Status: ACTIVE | Noted: 2022-08-15

## 2024-03-22 PROBLEM — G47.00 INSOMNIA: Status: ACTIVE | Noted: 2023-05-18

## 2024-03-22 PROBLEM — G89.4 CHRONIC PAIN DISORDER: Status: ACTIVE | Noted: 2017-08-24

## 2024-03-22 PROBLEM — I34.0 SEVERE MITRAL VALVE REGURGITATION: Chronic | Status: ACTIVE | Noted: 2024-03-22

## 2024-03-22 PROBLEM — I50.9 ACUTE DECOMPENSATED HEART FAILURE (HCC): Status: ACTIVE | Noted: 2024-03-22

## 2024-03-22 PROBLEM — I50.33 ACUTE ON CHRONIC HEART FAILURE WITH PRESERVED EJECTION FRACTION (HFPEF) (HCC): Status: ACTIVE | Noted: 2024-03-22

## 2024-03-22 PROBLEM — F41.1 GENERALIZED ANXIETY DISORDER: Status: ACTIVE | Noted: 2022-08-15

## 2024-03-22 PROBLEM — A49.02 MRSA INFECTION: Status: ACTIVE | Noted: 2017-03-27

## 2024-03-22 PROBLEM — F03.90 DEMENTIA WITHOUT BEHAVIORAL DISTURBANCE (HCC): Status: ACTIVE | Noted: 2022-08-15

## 2024-03-22 PROBLEM — I25.2 HISTORY OF MI (MYOCARDIAL INFARCTION): Status: ACTIVE | Noted: 2023-05-18

## 2024-03-22 PROBLEM — E78.2 MIXED HYPERCHOLESTEROLEMIA AND HYPERTRIGLYCERIDEMIA: Status: ACTIVE | Noted: 2017-03-27

## 2024-03-22 PROBLEM — I50.33 ACUTE ON CHRONIC HEART FAILURE WITH PRESERVED EJECTION FRACTION (HFPEF) (HCC): Chronic | Status: ACTIVE | Noted: 2024-03-22

## 2024-03-22 PROBLEM — K92.2 ACUTE GI BLEEDING: Status: ACTIVE | Noted: 2018-05-20

## 2024-03-22 PROBLEM — I11.9 HYPERTENSIVE HEART DISEASE: Status: ACTIVE | Noted: 2024-03-22

## 2024-03-22 PROBLEM — J69.0 ASPIRATION PNEUMONIA DUE TO VOMIT (HCC): Status: ACTIVE | Noted: 2024-03-22

## 2024-03-22 PROBLEM — E27.1 ADRENAL INSUFFICIENCY (ADDISON'S DISEASE) (HCC): Status: ACTIVE | Noted: 2024-03-22

## 2024-03-22 PROBLEM — F25.0 SCHIZOAFFECTIVE DISORDER, BIPOLAR TYPE (HCC): Chronic | Status: ACTIVE | Noted: 2022-08-15

## 2024-03-22 PROBLEM — N17.9 AKI (ACUTE KIDNEY INJURY) (HCC): Status: RESOLVED | Noted: 2022-02-23 | Resolved: 2024-03-22

## 2024-03-22 PROBLEM — Z86.79 HISTORY OF SUBACUTE BACTERIAL ENDOCARDITIS: Status: ACTIVE | Noted: 2024-03-22

## 2024-03-22 LAB
ALBUMIN SERPL-MCNC: 4.3 G/DL (ref 3.4–5)
ALBUMIN/GLOB SERPL: 1.3 {RATIO} (ref 1.1–2.2)
ALP SERPL-CCNC: 81 U/L (ref 40–129)
ALT SERPL-CCNC: 42 U/L (ref 10–40)
ANION GAP SERPL CALCULATED.3IONS-SCNC: 13 MMOL/L (ref 3–16)
AST SERPL-CCNC: 42 U/L (ref 15–37)
BACTERIA URNS QL MICRO: ABNORMAL /HPF
BASE EXCESS BLDA CALC-SCNC: 2.6 MMOL/L (ref -3–3)
BASOPHILS # BLD: 0 K/UL (ref 0–0.2)
BASOPHILS NFR BLD: 0.3 %
BILIRUB SERPL-MCNC: 1.5 MG/DL (ref 0–1)
BILIRUB UR QL STRIP.AUTO: ABNORMAL
BUN SERPL-MCNC: 30 MG/DL (ref 7–20)
CALCIUM OXALATE CRYSTALS: PRESENT
CALCIUM SERPL-MCNC: 10 MG/DL (ref 8.3–10.6)
CHARACTER UR: ABNORMAL
CHLORIDE SERPL-SCNC: 104 MMOL/L (ref 99–110)
CLARITY UR: CLEAR
CO2 BLDA-SCNC: 58.4 MMOL/L
CO2 SERPL-SCNC: 23 MMOL/L (ref 21–32)
COHGB MFR BLDA: 1.9 % (ref 0–1.5)
COLOR UR: ABNORMAL
CREAT SERPL-MCNC: 0.8 MG/DL (ref 0.8–1.3)
D DIMER: 0.87 UG/ML FEU (ref 0–0.6)
DEPRECATED RDW RBC AUTO: 14.7 % (ref 12.4–15.4)
EOSINOPHIL # BLD: 0 K/UL (ref 0–0.6)
EOSINOPHIL NFR BLD: 0 %
EPI CELLS #/AREA URNS AUTO: 0 /HPF (ref 0–5)
GFR SERPLBLD CREATININE-BSD FMLA CKD-EPI: >60 ML/MIN/{1.73_M2}
GLUCOSE SERPL-MCNC: 120 MG/DL (ref 70–99)
GLUCOSE UR STRIP.AUTO-MCNC: NEGATIVE MG/DL
HCO3 BLDA-SCNC: 25.1 MMOL/L (ref 21–29)
HCT VFR BLD AUTO: 40.2 % (ref 40.5–52.5)
HGB BLD-MCNC: 13.8 G/DL (ref 13.5–17.5)
HGB BLDA-MCNC: 13.4 G/DL (ref 13.5–17.5)
HGB UR QL STRIP.AUTO: ABNORMAL
HYALINE CASTS #/AREA URNS AUTO: 5 /LPF (ref 0–8)
KETONES UR STRIP.AUTO-MCNC: NEGATIVE MG/DL
LACTATE BLDV-SCNC: 2.3 MMOL/L (ref 0.4–1.9)
LACTATE BLDV-SCNC: 2.3 MMOL/L (ref 0.4–1.9)
LEUKOCYTE ESTERASE UR QL STRIP.AUTO: ABNORMAL
LYMPHOCYTES # BLD: 0.5 K/UL (ref 1–5.1)
LYMPHOCYTES NFR BLD: 7.7 %
MCH RBC QN AUTO: 35.6 PG (ref 26–34)
MCHC RBC AUTO-ENTMCNC: 34.4 G/DL (ref 31–36)
MCV RBC AUTO: 103.5 FL (ref 80–100)
METHGB MFR BLDA: 0.1 %
MONOCYTES # BLD: 0.6 K/UL (ref 0–1.3)
MONOCYTES NFR BLD: 8.7 %
MUCUS: PRESENT
NEUTROPHILS # BLD: 5.8 K/UL (ref 1.7–7.7)
NEUTROPHILS NFR BLD: 83.3 %
NITRITE UR QL STRIP.AUTO: POSITIVE
NT-PROBNP SERPL-MCNC: ABNORMAL PG/ML (ref 0–124)
O2 THERAPY: ABNORMAL
PCO2 BLDA: 31.7 MMHG (ref 35–45)
PH BLDA: 7.51 [PH] (ref 7.35–7.45)
PH UR STRIP.AUTO: 5 [PH] (ref 5–8)
PLATELET # BLD AUTO: 180 K/UL (ref 135–450)
PMV BLD AUTO: 9 FL (ref 5–10.5)
PO2 BLDA: 72.9 MMHG (ref 75–108)
POTASSIUM SERPL-SCNC: 4.9 MMOL/L (ref 3.5–5.1)
PROCALCITONIN SERPL IA-MCNC: 0.07 NG/ML (ref 0–0.15)
PROT SERPL-MCNC: 7.5 G/DL (ref 6.4–8.2)
PROT UR STRIP.AUTO-MCNC: 100 MG/DL
RBC # BLD AUTO: 3.89 M/UL (ref 4.2–5.9)
RBC #/AREA URNS HPF: ABNORMAL /HPF (ref 0–4)
SAO2 % BLDA: 95.7 %
SODIUM SERPL-SCNC: 140 MMOL/L (ref 136–145)
SP GR UR STRIP.AUTO: >=1.03 (ref 1–1.03)
UA COMPLETE W REFLEX CULTURE PNL UR: YES
UA DIPSTICK W REFLEX MICRO PNL UR: YES
URN SPEC COLLECT METH UR: ABNORMAL
UROBILINOGEN UR STRIP-ACNC: 1 E.U./DL
WBC # BLD AUTO: 7 K/UL (ref 4–11)
WBC #/AREA URNS AUTO: 36 /HPF (ref 0–5)

## 2024-03-22 PROCEDURE — 5A09357 ASSISTANCE WITH RESPIRATORY VENTILATION, LESS THAN 24 CONSECUTIVE HOURS, CONTINUOUS POSITIVE AIRWAY PRESSURE: ICD-10-PCS | Performed by: INTERNAL MEDICINE

## 2024-03-22 PROCEDURE — 80053 COMPREHEN METABOLIC PANEL: CPT

## 2024-03-22 PROCEDURE — 2100000000 HC CCU R&B

## 2024-03-22 PROCEDURE — 2700000000 HC OXYGEN THERAPY PER DAY

## 2024-03-22 PROCEDURE — 99285 EMERGENCY DEPT VISIT HI MDM: CPT

## 2024-03-22 PROCEDURE — 96375 TX/PRO/DX INJ NEW DRUG ADDON: CPT

## 2024-03-22 PROCEDURE — 96365 THER/PROPH/DIAG IV INF INIT: CPT

## 2024-03-22 PROCEDURE — 99223 1ST HOSP IP/OBS HIGH 75: CPT | Performed by: HOSPITALIST

## 2024-03-22 PROCEDURE — 85025 COMPLETE CBC W/AUTO DIFF WBC: CPT

## 2024-03-22 PROCEDURE — 94660 CPAP INITIATION&MGMT: CPT

## 2024-03-22 PROCEDURE — 93005 ELECTROCARDIOGRAM TRACING: CPT | Performed by: EMERGENCY MEDICINE

## 2024-03-22 PROCEDURE — 36415 COLL VENOUS BLD VENIPUNCTURE: CPT

## 2024-03-22 PROCEDURE — 83880 ASSAY OF NATRIURETIC PEPTIDE: CPT

## 2024-03-22 PROCEDURE — 82803 BLOOD GASES ANY COMBINATION: CPT

## 2024-03-22 PROCEDURE — 84145 PROCALCITONIN (PCT): CPT

## 2024-03-22 PROCEDURE — 6360000004 HC RX CONTRAST MEDICATION: Performed by: EMERGENCY MEDICINE

## 2024-03-22 PROCEDURE — 87040 BLOOD CULTURE FOR BACTERIA: CPT

## 2024-03-22 PROCEDURE — 6360000002 HC RX W HCPCS: Performed by: EMERGENCY MEDICINE

## 2024-03-22 PROCEDURE — 71045 X-RAY EXAM CHEST 1 VIEW: CPT

## 2024-03-22 PROCEDURE — 2000000000 HC ICU R&B

## 2024-03-22 PROCEDURE — 71260 CT THORAX DX C+: CPT

## 2024-03-22 PROCEDURE — 83605 ASSAY OF LACTIC ACID: CPT

## 2024-03-22 PROCEDURE — 87086 URINE CULTURE/COLONY COUNT: CPT

## 2024-03-22 PROCEDURE — 2580000003 HC RX 258: Performed by: EMERGENCY MEDICINE

## 2024-03-22 PROCEDURE — 85379 FIBRIN DEGRADATION QUANT: CPT

## 2024-03-22 PROCEDURE — 36600 WITHDRAWAL OF ARTERIAL BLOOD: CPT

## 2024-03-22 PROCEDURE — 81001 URINALYSIS AUTO W/SCOPE: CPT

## 2024-03-22 RX ORDER — INSULIN LISPRO 100 [IU]/ML
0-4 INJECTION, SOLUTION INTRAVENOUS; SUBCUTANEOUS NIGHTLY
Status: DISCONTINUED | OUTPATIENT
Start: 2024-03-23 | End: 2024-03-27 | Stop reason: HOSPADM

## 2024-03-22 RX ORDER — INSULIN LISPRO 100 [IU]/ML
0-4 INJECTION, SOLUTION INTRAVENOUS; SUBCUTANEOUS EVERY 4 HOURS
Status: DISCONTINUED | OUTPATIENT
Start: 2024-03-23 | End: 2024-03-23

## 2024-03-22 RX ORDER — SODIUM CHLORIDE 0.9 % (FLUSH) 0.9 %
5-40 SYRINGE (ML) INJECTION EVERY 12 HOURS SCHEDULED
Status: DISCONTINUED | OUTPATIENT
Start: 2024-03-23 | End: 2024-03-27 | Stop reason: HOSPADM

## 2024-03-22 RX ORDER — INSULIN LISPRO 100 [IU]/ML
0-4 INJECTION, SOLUTION INTRAVENOUS; SUBCUTANEOUS
Status: DISCONTINUED | OUTPATIENT
Start: 2024-03-23 | End: 2024-03-27 | Stop reason: HOSPADM

## 2024-03-22 RX ORDER — BISACODYL 10 MG
10 SUPPOSITORY, RECTAL RECTAL DAILY PRN
Status: DISCONTINUED | OUTPATIENT
Start: 2024-03-22 | End: 2024-03-27 | Stop reason: HOSPADM

## 2024-03-22 RX ORDER — FUROSEMIDE 10 MG/ML
80 INJECTION INTRAMUSCULAR; INTRAVENOUS ONCE
Status: COMPLETED | OUTPATIENT
Start: 2024-03-22 | End: 2024-03-22

## 2024-03-22 RX ORDER — BENZOCAINE/MENTHOL 6 MG-10 MG
LOZENGE MUCOUS MEMBRANE 2 TIMES DAILY
Status: ON HOLD | COMMUNITY
End: 2024-03-27 | Stop reason: HOSPADM

## 2024-03-22 RX ORDER — MAGNESIUM SULFATE IN WATER 40 MG/ML
2000 INJECTION, SOLUTION INTRAVENOUS PRN
Status: DISCONTINUED | OUTPATIENT
Start: 2024-03-22 | End: 2024-03-27 | Stop reason: HOSPADM

## 2024-03-22 RX ORDER — DEXTROSE MONOHYDRATE 100 MG/ML
INJECTION, SOLUTION INTRAVENOUS CONTINUOUS PRN
Status: DISCONTINUED | OUTPATIENT
Start: 2024-03-22 | End: 2024-03-27 | Stop reason: HOSPADM

## 2024-03-22 RX ORDER — ACETAMINOPHEN 325 MG/1
650 TABLET ORAL EVERY 6 HOURS PRN
Status: DISCONTINUED | OUTPATIENT
Start: 2024-03-22 | End: 2024-03-27 | Stop reason: HOSPADM

## 2024-03-22 RX ORDER — OXCARBAZEPINE 300 MG/1
300 TABLET, FILM COATED ORAL 2 TIMES DAILY
Status: DISCONTINUED | OUTPATIENT
Start: 2024-03-23 | End: 2024-03-27 | Stop reason: HOSPADM

## 2024-03-22 RX ORDER — SODIUM CHLORIDE 9 MG/ML
INJECTION, SOLUTION INTRAVENOUS PRN
Status: DISCONTINUED | OUTPATIENT
Start: 2024-03-22 | End: 2024-03-27 | Stop reason: HOSPADM

## 2024-03-22 RX ORDER — ACETAMINOPHEN 650 MG/1
650 SUPPOSITORY RECTAL EVERY 6 HOURS PRN
Status: DISCONTINUED | OUTPATIENT
Start: 2024-03-22 | End: 2024-03-27 | Stop reason: HOSPADM

## 2024-03-22 RX ORDER — LISINOPRIL 5 MG/1
2.5 TABLET ORAL DAILY
Status: DISCONTINUED | OUTPATIENT
Start: 2024-03-23 | End: 2024-03-23

## 2024-03-22 RX ORDER — ATORVASTATIN CALCIUM 10 MG/1
10 TABLET, FILM COATED ORAL NIGHTLY
Status: DISCONTINUED | OUTPATIENT
Start: 2024-03-23 | End: 2024-03-27 | Stop reason: HOSPADM

## 2024-03-22 RX ORDER — ENOXAPARIN SODIUM 100 MG/ML
40 INJECTION SUBCUTANEOUS DAILY
Status: DISCONTINUED | OUTPATIENT
Start: 2024-03-23 | End: 2024-03-27 | Stop reason: HOSPADM

## 2024-03-22 RX ORDER — GLUCAGON 1 MG/ML
1 KIT INJECTION PRN
Status: DISCONTINUED | OUTPATIENT
Start: 2024-03-22 | End: 2024-03-27 | Stop reason: HOSPADM

## 2024-03-22 RX ORDER — FOLIC ACID 1 MG/1
1 TABLET ORAL DAILY
Status: DISCONTINUED | OUTPATIENT
Start: 2024-03-23 | End: 2024-03-27 | Stop reason: HOSPADM

## 2024-03-22 RX ORDER — POTASSIUM CHLORIDE 7.45 MG/ML
10 INJECTION INTRAVENOUS PRN
Status: DISCONTINUED | OUTPATIENT
Start: 2024-03-22 | End: 2024-03-23

## 2024-03-22 RX ORDER — POTASSIUM CHLORIDE 29.8 MG/ML
20 INJECTION INTRAVENOUS PRN
Status: DISCONTINUED | OUTPATIENT
Start: 2024-03-22 | End: 2024-03-23

## 2024-03-22 RX ORDER — SODIUM CHLORIDE 0.9 % (FLUSH) 0.9 %
5-40 SYRINGE (ML) INJECTION PRN
Status: DISCONTINUED | OUTPATIENT
Start: 2024-03-22 | End: 2024-03-27 | Stop reason: HOSPADM

## 2024-03-22 RX ORDER — LACOSAMIDE 100 MG/1
200 TABLET ORAL 2 TIMES DAILY
Status: DISCONTINUED | OUTPATIENT
Start: 2024-03-23 | End: 2024-03-22

## 2024-03-22 RX ORDER — LEVETIRACETAM 500 MG/5ML
1750 INJECTION, SOLUTION, CONCENTRATE INTRAVENOUS EVERY 12 HOURS
Status: DISCONTINUED | OUTPATIENT
Start: 2024-03-23 | End: 2024-03-24

## 2024-03-22 RX ORDER — POLYETHYLENE GLYCOL 3350 17 G/17G
17 POWDER, FOR SOLUTION ORAL DAILY PRN
Status: DISCONTINUED | OUTPATIENT
Start: 2024-03-22 | End: 2024-03-27 | Stop reason: HOSPADM

## 2024-03-22 RX ADMIN — IOPAMIDOL 75 ML: 755 INJECTION, SOLUTION INTRAVENOUS at 19:46

## 2024-03-22 RX ADMIN — CEFEPIME 2000 MG: 2 INJECTION, POWDER, FOR SOLUTION INTRAVENOUS at 20:11

## 2024-03-22 RX ADMIN — FUROSEMIDE 80 MG: 10 INJECTION, SOLUTION INTRAMUSCULAR; INTRAVENOUS at 18:30

## 2024-03-22 ASSESSMENT — PAIN - FUNCTIONAL ASSESSMENT: PAIN_FUNCTIONAL_ASSESSMENT: 0-10

## 2024-03-22 ASSESSMENT — PAIN DESCRIPTION - LOCATION: LOCATION: CHEST

## 2024-03-22 ASSESSMENT — PAIN DESCRIPTION - DESCRIPTORS: DESCRIPTORS: TIGHTNESS

## 2024-03-22 NOTE — ED PROVIDER NOTES
EMERGENCY MEDICINE PROVIDER NOTE    Patient Identification  Pt Name: Dakota Luciano  MRN: [id number]  Birthdate 1960  Date of evaluation: 3/22/2024  Provider: Anand Vital MD  PCP: Seven Ponce MD    Chief Complaint  Altered Mental Status (Pt to ED with CC of AMS starting several weeks ago per EMS.  EMS states that pt's oxygen upon their arrival was in the 60s.  Upon arrival pt's RA saturation drops to 73.)      HPI  (History provided by EMS and patient; limited by patient's confusion)  This is a 63 y.o. male who was brought in by EMS transportation for altered mental hypoxia.  Per report from nursing home and EMS, patient had progressively worsening mental status over the last week.  Has had increased confusion, although he remains awake and alert.  Today, it was worse, so the call EMS.  EMS noted his SpO2 to be in the 60s on arrival, so they initiated oxygen therapy, stabilizing him on 4 L nasal cannula with SpO2 in the mid 90s.  Patient denies having had shortness of breath at this before, but reports feeling very winded at times.  Breathing is worse with exertion and talking.  Patient has no previous history of COPD or CHF.  He has no previous history of PE and is not currently on anticoagulation.    I have reviewed the following nursing documentation:  Allergies: Levonorgestrel-ethinyl estrad and Seasonal    Past medical history:   Past Medical History:   Diagnosis Date    Acute intracerebral hemorrhage (HCC) 02/03/2017    NATALIA (acute kidney injury) (HCC) 02/23/2022    Anxiety     Back pain     Cerebral artery occlusion with cerebral infarction (HCC)     Chronic back pain greater than 3 months duration 02/03/2017    Diabetes mellitus (HCC)     Dysphasia 02/03/2017    Dysphonia 02/03/2017    Endocarditis and heart valve disorders in diseases classified elsewhere 02/03/2017    Enterococcal bacteremia     Hydrocephalus (HCC) 02/03/2017    Hypertension     IVH (intraventricular hemorrhage) ([facility])

## 2024-03-23 ENCOUNTER — APPOINTMENT (OUTPATIENT)
Dept: CT IMAGING | Age: 64
DRG: 291 | End: 2024-03-23
Payer: COMMERCIAL

## 2024-03-23 PROBLEM — I50.21 ACUTE SYSTOLIC CHF (CONGESTIVE HEART FAILURE) (HCC): Status: ACTIVE | Noted: 2024-03-22

## 2024-03-23 PROBLEM — N39.0 URINARY TRACT INFECTION WITHOUT HEMATURIA: Status: ACTIVE | Noted: 2024-03-23

## 2024-03-23 PROBLEM — G40.909 EPILEPSY WITH ALTERED CONSCIOUSNESS WITHOUT INTRACTABLE EPILEPSY (HCC): Status: ACTIVE | Noted: 2024-03-23

## 2024-03-23 LAB
ALBUMIN SERPL-MCNC: 3.9 G/DL (ref 3.4–5)
ALBUMIN/GLOB SERPL: 1.4 {RATIO} (ref 1.1–2.2)
ALP SERPL-CCNC: 66 U/L (ref 40–129)
ALT SERPL-CCNC: 36 U/L (ref 10–40)
ANION GAP SERPL CALCULATED.3IONS-SCNC: 12 MMOL/L (ref 3–16)
AST SERPL-CCNC: 30 U/L (ref 15–37)
BACTERIA UR CULT: NORMAL
BASE EXCESS BLDA CALC-SCNC: 3.1 MMOL/L (ref -3–3)
BASOPHILS # BLD: 0 K/UL (ref 0–0.2)
BASOPHILS NFR BLD: 0.3 %
BILIRUB SERPL-MCNC: 1.5 MG/DL (ref 0–1)
BUN SERPL-MCNC: 31 MG/DL (ref 7–20)
CALCIUM SERPL-MCNC: 9.3 MG/DL (ref 8.3–10.6)
CHLORIDE SERPL-SCNC: 106 MMOL/L (ref 99–110)
CO2 BLDA-SCNC: 62.1 MMOL/L
CO2 SERPL-SCNC: 26 MMOL/L (ref 21–32)
COHGB MFR BLDA: 2.2 % (ref 0–1.5)
CREAT SERPL-MCNC: 0.9 MG/DL (ref 0.8–1.3)
DEPRECATED RDW RBC AUTO: 14.7 % (ref 12.4–15.4)
EKG ATRIAL RATE: 89 BPM
EKG DIAGNOSIS: NORMAL
EKG P AXIS: 43 DEGREES
EKG P-R INTERVAL: 204 MS
EKG Q-T INTERVAL: 370 MS
EKG QRS DURATION: 122 MS
EKG QTC CALCULATION (BAZETT): 450 MS
EKG R AXIS: -29 DEGREES
EKG T AXIS: 51 DEGREES
EKG VENTRICULAR RATE: 89 BPM
EOSINOPHIL # BLD: 0 K/UL (ref 0–0.6)
EOSINOPHIL NFR BLD: 0 %
GFR SERPLBLD CREATININE-BSD FMLA CKD-EPI: >60 ML/MIN/{1.73_M2}
GLUCOSE BLD-MCNC: 112 MG/DL (ref 70–99)
GLUCOSE BLD-MCNC: 118 MG/DL (ref 70–99)
GLUCOSE BLD-MCNC: 162 MG/DL (ref 70–99)
GLUCOSE BLD-MCNC: 66 MG/DL (ref 70–99)
GLUCOSE BLD-MCNC: 96 MG/DL (ref 70–99)
GLUCOSE SERPL-MCNC: 103 MG/DL (ref 70–99)
HCO3 BLDA-SCNC: 26.6 MMOL/L (ref 21–29)
HCT VFR BLD AUTO: 39.5 % (ref 40.5–52.5)
HGB BLD-MCNC: 12.8 G/DL (ref 13.5–17.5)
HGB BLDA-MCNC: 13 G/DL (ref 13.5–17.5)
LYMPHOCYTES # BLD: 0.6 K/UL (ref 1–5.1)
LYMPHOCYTES NFR BLD: 5.6 %
MAGNESIUM SERPL-MCNC: 1.9 MG/DL (ref 1.8–2.4)
MCH RBC QN AUTO: 34.4 PG (ref 26–34)
MCHC RBC AUTO-ENTMCNC: 32.5 G/DL (ref 31–36)
MCV RBC AUTO: 105.8 FL (ref 80–100)
METHGB MFR BLDA: 0.3 %
MONOCYTES # BLD: 1 K/UL (ref 0–1.3)
MONOCYTES NFR BLD: 8.9 %
NEUTROPHILS # BLD: 9.3 K/UL (ref 1.7–7.7)
NEUTROPHILS NFR BLD: 85.2 %
O2 THERAPY: ABNORMAL
PCO2 BLDA: 36.1 MMHG (ref 35–45)
PERFORMED ON: ABNORMAL
PERFORMED ON: NORMAL
PH BLDA: 7.48 [PH] (ref 7.35–7.45)
PHOSPHATE SERPL-MCNC: 4.3 MG/DL (ref 2.5–4.9)
PLATELET # BLD AUTO: 145 K/UL (ref 135–450)
PMV BLD AUTO: 8.9 FL (ref 5–10.5)
PO2 BLDA: 94.8 MMHG (ref 75–108)
POC SAMPLE TYPE: ABNORMAL
POTASSIUM BLD-SCNC: 3.3 MMOL/L (ref 3.5–5.1)
POTASSIUM SERPL-SCNC: 4 MMOL/L (ref 3.5–5.1)
PROT SERPL-MCNC: 6.6 G/DL (ref 6.4–8.2)
RBC # BLD AUTO: 3.73 M/UL (ref 4.2–5.9)
SAO2 % BLDA: 98.4 %
SODIUM SERPL-SCNC: 144 MMOL/L (ref 136–145)
WBC # BLD AUTO: 10.9 K/UL (ref 4–11)

## 2024-03-23 PROCEDURE — 6360000002 HC RX W HCPCS: Performed by: HOSPITALIST

## 2024-03-23 PROCEDURE — C9254 INJECTION, LACOSAMIDE: HCPCS | Performed by: HOSPITALIST

## 2024-03-23 PROCEDURE — 99222 1ST HOSP IP/OBS MODERATE 55: CPT | Performed by: PSYCHIATRY & NEUROLOGY

## 2024-03-23 PROCEDURE — 36600 WITHDRAWAL OF ARTERIAL BLOOD: CPT

## 2024-03-23 PROCEDURE — 94761 N-INVAS EAR/PLS OXIMETRY MLT: CPT

## 2024-03-23 PROCEDURE — 99222 1ST HOSP IP/OBS MODERATE 55: CPT | Performed by: STUDENT IN AN ORGANIZED HEALTH CARE EDUCATION/TRAINING PROGRAM

## 2024-03-23 PROCEDURE — 2580000003 HC RX 258: Performed by: HOSPITALIST

## 2024-03-23 PROCEDURE — P9047 ALBUMIN (HUMAN), 25%, 50ML: HCPCS | Performed by: HOSPITALIST

## 2024-03-23 PROCEDURE — 84100 ASSAY OF PHOSPHORUS: CPT

## 2024-03-23 PROCEDURE — 6360000002 HC RX W HCPCS: Performed by: INTERNAL MEDICINE

## 2024-03-23 PROCEDURE — 99223 1ST HOSP IP/OBS HIGH 75: CPT | Performed by: INTERNAL MEDICINE

## 2024-03-23 PROCEDURE — 80053 COMPREHEN METABOLIC PANEL: CPT

## 2024-03-23 PROCEDURE — 84132 ASSAY OF SERUM POTASSIUM: CPT

## 2024-03-23 PROCEDURE — 82803 BLOOD GASES ANY COMBINATION: CPT

## 2024-03-23 PROCEDURE — 82947 ASSAY GLUCOSE BLOOD QUANT: CPT

## 2024-03-23 PROCEDURE — 85025 COMPLETE CBC W/AUTO DIFF WBC: CPT

## 2024-03-23 PROCEDURE — 93306 TTE W/DOPPLER COMPLETE: CPT

## 2024-03-23 PROCEDURE — 6370000000 HC RX 637 (ALT 250 FOR IP): Performed by: HOSPITALIST

## 2024-03-23 PROCEDURE — 6370000000 HC RX 637 (ALT 250 FOR IP)

## 2024-03-23 PROCEDURE — APPNB30 APP NON BILLABLE TIME 0-30 MINS

## 2024-03-23 PROCEDURE — 2700000000 HC OXYGEN THERAPY PER DAY

## 2024-03-23 PROCEDURE — 70450 CT HEAD/BRAIN W/O DYE: CPT

## 2024-03-23 PROCEDURE — 51702 INSERT TEMP BLADDER CATH: CPT

## 2024-03-23 PROCEDURE — 93010 ELECTROCARDIOGRAM REPORT: CPT | Performed by: INTERNAL MEDICINE

## 2024-03-23 PROCEDURE — 83735 ASSAY OF MAGNESIUM: CPT

## 2024-03-23 PROCEDURE — 1200000000 HC SEMI PRIVATE

## 2024-03-23 RX ORDER — MIDODRINE HYDROCHLORIDE 5 MG/1
2.5 TABLET ORAL
Status: DISCONTINUED | OUTPATIENT
Start: 2024-03-23 | End: 2024-03-27 | Stop reason: HOSPADM

## 2024-03-23 RX ORDER — SPIRONOLACTONE 25 MG/1
25 TABLET ORAL DAILY
Status: DISCONTINUED | OUTPATIENT
Start: 2024-03-23 | End: 2024-03-27 | Stop reason: HOSPADM

## 2024-03-23 RX ORDER — DIAZEPAM 5 MG/ML
5 INJECTION, SOLUTION INTRAMUSCULAR; INTRAVENOUS EVERY 4 HOURS PRN
Status: DISCONTINUED | OUTPATIENT
Start: 2024-03-23 | End: 2024-03-27 | Stop reason: HOSPADM

## 2024-03-23 RX ORDER — METOCLOPRAMIDE HYDROCHLORIDE 5 MG/ML
10 INJECTION INTRAMUSCULAR; INTRAVENOUS EVERY 6 HOURS PRN
Status: DISCONTINUED | OUTPATIENT
Start: 2024-03-23 | End: 2024-03-27 | Stop reason: HOSPADM

## 2024-03-23 RX ORDER — FENTANYL CITRATE 50 UG/ML
50 INJECTION, SOLUTION INTRAMUSCULAR; INTRAVENOUS ONCE
Status: DISCONTINUED | OUTPATIENT
Start: 2024-03-23 | End: 2024-03-23

## 2024-03-23 RX ORDER — POTASSIUM CHLORIDE 20 MEQ/1
40 TABLET, EXTENDED RELEASE ORAL PRN
Status: DISCONTINUED | OUTPATIENT
Start: 2024-03-23 | End: 2024-03-27 | Stop reason: HOSPADM

## 2024-03-23 RX ORDER — ALBUMIN (HUMAN) 12.5 G/50ML
50 SOLUTION INTRAVENOUS ONCE
Status: COMPLETED | OUTPATIENT
Start: 2024-03-23 | End: 2024-03-23

## 2024-03-23 RX ORDER — POTASSIUM CHLORIDE 20 MEQ/1
40 TABLET, EXTENDED RELEASE ORAL PRN
Status: DISCONTINUED | OUTPATIENT
Start: 2024-03-23 | End: 2024-03-23

## 2024-03-23 RX ORDER — FUROSEMIDE 10 MG/ML
20 INJECTION INTRAMUSCULAR; INTRAVENOUS 2 TIMES DAILY
Status: DISCONTINUED | OUTPATIENT
Start: 2024-03-23 | End: 2024-03-27 | Stop reason: HOSPADM

## 2024-03-23 RX ORDER — POTASSIUM CHLORIDE 7.45 MG/ML
10 INJECTION INTRAVENOUS PRN
Status: DISCONTINUED | OUTPATIENT
Start: 2024-03-23 | End: 2024-03-27 | Stop reason: HOSPADM

## 2024-03-23 RX ORDER — SPIRONOLACTONE 25 MG/1
TABLET ORAL
Status: COMPLETED
Start: 2024-03-23 | End: 2024-03-23

## 2024-03-23 RX ORDER — OXYCODONE HYDROCHLORIDE AND ACETAMINOPHEN 5; 325 MG/1; MG/1
1 TABLET ORAL EVERY 6 HOURS PRN
Status: DISCONTINUED | OUTPATIENT
Start: 2024-03-23 | End: 2024-03-27 | Stop reason: HOSPADM

## 2024-03-23 RX ORDER — FENTANYL CITRATE 50 UG/ML
50 INJECTION, SOLUTION INTRAMUSCULAR; INTRAVENOUS
Status: DISCONTINUED | OUTPATIENT
Start: 2024-03-23 | End: 2024-03-23

## 2024-03-23 RX ADMIN — LACOSAMIDE 200 MG: 10 INJECTION INTRAVENOUS at 00:37

## 2024-03-23 RX ADMIN — LACOSAMIDE 200 MG: 10 INJECTION INTRAVENOUS at 11:16

## 2024-03-23 RX ADMIN — SODIUM CHLORIDE 25 ML: 9 INJECTION, SOLUTION INTRAVENOUS at 20:36

## 2024-03-23 RX ADMIN — LEVETIRACETAM 1750 MG: 100 INJECTION, SOLUTION INTRAVENOUS at 23:42

## 2024-03-23 RX ADMIN — DIAZEPAM 5 MG: 5 INJECTION, SOLUTION INTRAMUSCULAR; INTRAVENOUS at 00:26

## 2024-03-23 RX ADMIN — MIDODRINE HYDROCHLORIDE 2.5 MG: 5 TABLET ORAL at 16:20

## 2024-03-23 RX ADMIN — SPIRONOLACTONE 25 MG: 25 TABLET ORAL at 17:45

## 2024-03-23 RX ADMIN — ENOXAPARIN SODIUM 40 MG: 100 INJECTION SUBCUTANEOUS at 11:21

## 2024-03-23 RX ADMIN — SODIUM CHLORIDE, PRESERVATIVE FREE 10 ML: 5 INJECTION INTRAVENOUS at 20:38

## 2024-03-23 RX ADMIN — LACOSAMIDE 200 MG: 10 INJECTION INTRAVENOUS at 20:36

## 2024-03-23 RX ADMIN — POTASSIUM CHLORIDE 40 MEQ: 1500 TABLET, EXTENDED RELEASE ORAL at 15:10

## 2024-03-23 RX ADMIN — FUROSEMIDE 20 MG: 10 INJECTION, SOLUTION INTRAMUSCULAR; INTRAVENOUS at 20:38

## 2024-03-23 RX ADMIN — LEVETIRACETAM 1750 MG: 100 INJECTION, SOLUTION INTRAVENOUS at 00:15

## 2024-03-23 RX ADMIN — OXYCODONE HYDROCHLORIDE AND ACETAMINOPHEN 1 TABLET: 5; 325 TABLET ORAL at 20:38

## 2024-03-23 RX ADMIN — ALBUMIN (HUMAN) 50 G: 0.25 INJECTION, SOLUTION INTRAVENOUS at 04:16

## 2024-03-23 RX ADMIN — OXCARBAZEPINE 300 MG: 300 TABLET, FILM COATED ORAL at 20:38

## 2024-03-23 RX ADMIN — FUROSEMIDE 20 MG: 10 INJECTION, SOLUTION INTRAMUSCULAR; INTRAVENOUS at 13:24

## 2024-03-23 RX ADMIN — CEFTRIAXONE SODIUM 1000 MG: 1 INJECTION, POWDER, FOR SOLUTION INTRAMUSCULAR; INTRAVENOUS at 03:19

## 2024-03-23 RX ADMIN — ATORVASTATIN CALCIUM 10 MG: 10 TABLET, FILM COATED ORAL at 20:38

## 2024-03-23 RX ADMIN — SODIUM CHLORIDE, PRESERVATIVE FREE 10 ML: 5 INJECTION INTRAVENOUS at 11:21

## 2024-03-23 RX ADMIN — LEVETIRACETAM 1750 MG: 100 INJECTION, SOLUTION INTRAVENOUS at 13:24

## 2024-03-23 RX ADMIN — FUROSEMIDE 10 MG/HR: 10 INJECTION, SOLUTION INTRAMUSCULAR; INTRAVENOUS at 01:24

## 2024-03-23 RX ADMIN — SODIUM CHLORIDE: 9 INJECTION, SOLUTION INTRAVENOUS at 00:35

## 2024-03-23 ASSESSMENT — PAIN SCALES - GENERAL
PAINLEVEL_OUTOF10: 8
PAINLEVEL_OUTOF10: 0
PAINLEVEL_OUTOF10: 8
PAINLEVEL_OUTOF10: 5

## 2024-03-23 ASSESSMENT — PAIN SCALES - WONG BAKER: WONGBAKER_NUMERICALRESPONSE: NO HURT

## 2024-03-23 ASSESSMENT — PAIN DESCRIPTION - DESCRIPTORS
DESCRIPTORS: ACHING
DESCRIPTORS: ACHING

## 2024-03-23 ASSESSMENT — PAIN DESCRIPTION - LOCATION
LOCATION: BACK
LOCATION: BACK

## 2024-03-23 ASSESSMENT — PAIN DESCRIPTION - ORIENTATION
ORIENTATION: MID
ORIENTATION: MID

## 2024-03-23 NOTE — PLAN OF CARE
Problem: Chronic Conditions and Co-morbidities  Goal: Patient's chronic conditions and co-morbidity symptoms are monitored and maintained or improved  Outcome: Progressing     Problem: Pain  Goal: Verbalizes/displays adequate comfort level or baseline comfort level  Outcome: Progressing     Pt unable to make needs known.  Pt discussing information on reindeers.  Pt also requested water, pt npo at this time unable to follow simple commands.  When told that at this time he couldn't receive water pt reported he would like to drink urine.  Asked if he has previously drank his urine, pt reported yes.   Then patient began laughing and told this nurse \"common of course I haven't drank my own urine.\"  Pt began to chuckle and was easily distracted.  Will continue to monitor.

## 2024-03-23 NOTE — ED NOTES
.  
Bipap ordered per ED MD, RT mary alice blood gas and placed Bipap but is worried the he will become more alkalotic so MD notified and Bipap removed and pt place back on 6L NC..   
Pt changed and external urinary catheter placed  
Pt not in room at this time   
Report given to CVU RN. PT to go to room 8 not 14 per CVU.   
SBAR in and called.   
19953, hypoxic on room air. Tachypneic      Background  History:   Past Medical History:   Diagnosis Date    Acute intracerebral hemorrhage ([facility]) 02/03/2017    NATALIA (acute kidney injury) ([facility]) 02/23/2022    Anxiety     Back pain     Cerebral artery occlusion with cerebral infarction ([facility])     Chronic back pain greater than 3 months duration 02/03/2017    Diabetes mellitus ([facility])     Dysphasia 02/03/2017    Dysphonia 02/03/2017    Endocarditis and heart valve disorders in diseases classified elsewhere 02/03/2017    Enterococcal bacteremia     Hydrocephalus ([facility]) 02/03/2017    Hypertension     IVH (intraventricular hemorrhage) ([facility]) 02/03/2017    NSTEMI (non-ST elevated myocardial infarction) ([facility]) 02/03/2017    Receiving intravenous antibiotic treatment as outpatient     Seizures ([facility])        Assessment    Vitals/MEWS: MEWS Score: 2  Level of Consciousness: Alert (0)   Vitals:    03/22/24 2215 03/22/24 2226 03/22/24 2238 03/22/24 2245   BP: 106/79  120/75 109/68   Pulse: (!) 102  (!) 101 96   Resp: (!) 38  29 26   Temp:       TempSrc:       SpO2: 92% 90% 100% 99%     FiO2 (%): high humidity nasal cannula  O2 Flow Rate: O2 Device: PAP (positive airway pressure) O2 Flow Rate (L/min): 6 L/min  Cardiac Rhythm:    Pain Assessment:    [x] Verbal [] Schmitz Russo Scale  Pain Scale: Pain Assessment  Pain Assessment: 0-10  Pain Location: Chest  Pain Descriptors: Tightness  Last documented pain score (0-10 scale)    Last documented pain medication administered:   Mental Status: oriented  Orientation Level:    NIH Score:    C-SSRS: Risk of Suicide: No Risk  Bedside swallow:    Guion Coma Scale (GCS): Guion Coma Scale  Eye Opening: Spontaneous  Best Verbal Response: Oriented  Best Motor Response: Obeys commands  Marcelo Coma Scale Score: 15  Active LDA's:   Peripheral IV 03/22/24 Right Wrist (Active)   Site Assessment Clean, dry & intact 03/22/24 1605   Line Status Brisk blood return;Normal saline locked;Flushed;Specimen

## 2024-03-23 NOTE — H&P
History and Physical      Name:  Dakota Luciano /Age/Sex: 1960  (63 y.o. male)   MRN & CSN:  [id number] & [id number] Admission Date/Time: 3/22/2024  3:10 PM   Location:  [facility]/ PCP: Seven Ponce MD       Hospital Day: 1    Assessment and Plan:   Dakota Luciano is a 63 y.o.  male  who presents with Acute on chronic heart failure with preserved ejection fraction (HFpEF) (HCC) with Acute Hypoxemic respiratory failure    Principal Problem:    Acute on chronic heart failure with preserved ejection fraction (HFpEF) (HCC)  Active Problems:    Acute hypoxemic respiratory failure (HCC)    Severe aortic valve regurgitation    Severe mitral valve regurgitation    Hypertensive heart disease    Hypertension associated with diabetes (HCC)    Adrenal insufficiency (Abisai's disease) (HCC)    Dementia without behavioral disturbance (HCC)    Type 2 diabetes mellitus (HCC)    Partial symptomatic epilepsy with complex partial seizures, intractable, without status epilepticus (HCC)    History of stroke    DM gastroparesis ([facility])    Schizoaffective disorder, bipolar type ([facility])    Lung nodules    History of subacute bacterial endocarditis    Chronic pain disorder    Generalized anxiety disorder    History of MI (myocardial infarction)    Mixed hypercholesterolemia and hypertriglyceridemia  Resolved Problems:    * No resolved hospital problems. *       # Acute hypoxemic respiratory failure  -Present on admission  -Not on oxygen at home  -Requiring HFNC at 10 L t  -Nursing reporting patient would not cooperate with BiPAP  -Secondary to acute decompensated heart failure  -Consult Intensivist    #Acute on chronic HFpEF  -Present on admission  -With valvular disease (severe mitral and aortic valve regurgitation)  -History of subacute bacterial endocarditis  -proBNP 19,953  -Insert Tamayo catheter strict I's and O's  -Started on Lasix drip 10 mg/hr  -Strict input and output: Daily weights: Fluid and salt

## 2024-03-24 ENCOUNTER — APPOINTMENT (OUTPATIENT)
Dept: GENERAL RADIOLOGY | Age: 64
DRG: 291 | End: 2024-03-24
Payer: COMMERCIAL

## 2024-03-24 PROBLEM — D64.9 ANEMIA: Status: ACTIVE | Noted: 2024-03-24

## 2024-03-24 PROBLEM — R41.0 DISORIENTATION: Status: ACTIVE | Noted: 2024-03-24

## 2024-03-24 PROBLEM — E87.1 HYPONATREMIA: Status: ACTIVE | Noted: 2024-03-24

## 2024-03-24 LAB
ANION GAP SERPL CALCULATED.3IONS-SCNC: 11 MMOL/L (ref 3–16)
BUN SERPL-MCNC: 23 MG/DL (ref 7–20)
CALCIUM SERPL-MCNC: 8.8 MG/DL (ref 8.3–10.6)
CHLORIDE SERPL-SCNC: 103 MMOL/L (ref 99–110)
CO2 SERPL-SCNC: 25 MMOL/L (ref 21–32)
CREAT SERPL-MCNC: 0.7 MG/DL (ref 0.8–1.3)
DEPRECATED RDW RBC AUTO: 14.7 % (ref 12.4–15.4)
FERRITIN SERPL IA-MCNC: 726.5 NG/ML (ref 30–400)
GFR SERPLBLD CREATININE-BSD FMLA CKD-EPI: >60 ML/MIN/{1.73_M2}
GLUCOSE BLD-MCNC: 100 MG/DL (ref 70–99)
GLUCOSE BLD-MCNC: 106 MG/DL (ref 70–99)
GLUCOSE BLD-MCNC: 111 MG/DL (ref 70–99)
GLUCOSE BLD-MCNC: 117 MG/DL (ref 70–99)
GLUCOSE SERPL-MCNC: 100 MG/DL (ref 70–99)
HCT VFR BLD AUTO: 36.3 % (ref 40.5–52.5)
HGB BLD-MCNC: 12.1 G/DL (ref 13.5–17.5)
IRON SATN MFR SERPL: 28 % (ref 20–50)
IRON SERPL-MCNC: 42 UG/DL (ref 59–158)
MCH RBC QN AUTO: 34.4 PG (ref 26–34)
MCHC RBC AUTO-ENTMCNC: 33.4 G/DL (ref 31–36)
MCV RBC AUTO: 102.9 FL (ref 80–100)
PERFORMED ON: ABNORMAL
PLATELET # BLD AUTO: 151 K/UL (ref 135–450)
PMV BLD AUTO: 9.1 FL (ref 5–10.5)
POTASSIUM SERPL-SCNC: 3.5 MMOL/L (ref 3.5–5.1)
RBC # BLD AUTO: 3.52 M/UL (ref 4.2–5.9)
SODIUM SERPL-SCNC: 139 MMOL/L (ref 136–145)
TIBC SERPL-MCNC: 148 UG/DL (ref 260–445)
WBC # BLD AUTO: 7.6 K/UL (ref 4–11)

## 2024-03-24 PROCEDURE — 83550 IRON BINDING TEST: CPT

## 2024-03-24 PROCEDURE — 80048 BASIC METABOLIC PNL TOTAL CA: CPT

## 2024-03-24 PROCEDURE — 6370000000 HC RX 637 (ALT 250 FOR IP): Performed by: HOSPITALIST

## 2024-03-24 PROCEDURE — 94640 AIRWAY INHALATION TREATMENT: CPT

## 2024-03-24 PROCEDURE — 6360000002 HC RX W HCPCS: Performed by: INTERNAL MEDICINE

## 2024-03-24 PROCEDURE — 99233 SBSQ HOSP IP/OBS HIGH 50: CPT | Performed by: CLINICAL NURSE SPECIALIST

## 2024-03-24 PROCEDURE — 6360000002 HC RX W HCPCS: Performed by: HOSPITALIST

## 2024-03-24 PROCEDURE — 36415 COLL VENOUS BLD VENIPUNCTURE: CPT

## 2024-03-24 PROCEDURE — C9254 INJECTION, LACOSAMIDE: HCPCS | Performed by: HOSPITALIST

## 2024-03-24 PROCEDURE — 82728 ASSAY OF FERRITIN: CPT

## 2024-03-24 PROCEDURE — 6370000000 HC RX 637 (ALT 250 FOR IP): Performed by: INTERNAL MEDICINE

## 2024-03-24 PROCEDURE — 85027 COMPLETE CBC AUTOMATED: CPT

## 2024-03-24 PROCEDURE — 2580000003 HC RX 258: Performed by: HOSPITALIST

## 2024-03-24 PROCEDURE — 71045 X-RAY EXAM CHEST 1 VIEW: CPT

## 2024-03-24 PROCEDURE — 83540 ASSAY OF IRON: CPT

## 2024-03-24 PROCEDURE — 1200000000 HC SEMI PRIVATE

## 2024-03-24 RX ORDER — IPRATROPIUM BROMIDE AND ALBUTEROL SULFATE 2.5; .5 MG/3ML; MG/3ML
1 SOLUTION RESPIRATORY (INHALATION)
Status: DISCONTINUED | OUTPATIENT
Start: 2024-03-24 | End: 2024-03-24

## 2024-03-24 RX ORDER — IPRATROPIUM BROMIDE AND ALBUTEROL SULFATE 2.5; .5 MG/3ML; MG/3ML
1 SOLUTION RESPIRATORY (INHALATION) EVERY 4 HOURS PRN
Status: DISCONTINUED | OUTPATIENT
Start: 2024-03-24 | End: 2024-03-27 | Stop reason: HOSPADM

## 2024-03-24 RX ADMIN — SODIUM CHLORIDE, PRESERVATIVE FREE 10 ML: 5 INJECTION INTRAVENOUS at 08:45

## 2024-03-24 RX ADMIN — LACOSAMIDE 200 MG: 10 INJECTION INTRAVENOUS at 21:18

## 2024-03-24 RX ADMIN — SODIUM CHLORIDE 25 ML: 9 INJECTION, SOLUTION INTRAVENOUS at 01:40

## 2024-03-24 RX ADMIN — ENOXAPARIN SODIUM 40 MG: 100 INJECTION SUBCUTANEOUS at 10:48

## 2024-03-24 RX ADMIN — OXCARBAZEPINE 300 MG: 300 TABLET, FILM COATED ORAL at 08:42

## 2024-03-24 RX ADMIN — OXYCODONE HYDROCHLORIDE AND ACETAMINOPHEN 1 TABLET: 5; 325 TABLET ORAL at 04:01

## 2024-03-24 RX ADMIN — FUROSEMIDE 20 MG: 10 INJECTION, SOLUTION INTRAMUSCULAR; INTRAVENOUS at 10:48

## 2024-03-24 RX ADMIN — LEVETIRACETAM 1750 MG: 500 TABLET, FILM COATED ORAL at 13:30

## 2024-03-24 RX ADMIN — MIDODRINE HYDROCHLORIDE 2.5 MG: 5 TABLET ORAL at 13:30

## 2024-03-24 RX ADMIN — SPIRONOLACTONE 25 MG: 25 TABLET ORAL at 08:43

## 2024-03-24 RX ADMIN — ATORVASTATIN CALCIUM 10 MG: 10 TABLET, FILM COATED ORAL at 20:47

## 2024-03-24 RX ADMIN — SODIUM CHLORIDE, PRESERVATIVE FREE 10 ML: 5 INJECTION INTRAVENOUS at 20:48

## 2024-03-24 RX ADMIN — LEVETIRACETAM 1750 MG: 500 TABLET, FILM COATED ORAL at 20:47

## 2024-03-24 RX ADMIN — MIDODRINE HYDROCHLORIDE 2.5 MG: 5 TABLET ORAL at 08:42

## 2024-03-24 RX ADMIN — SODIUM CHLORIDE 25 ML: 9 INJECTION, SOLUTION INTRAVENOUS at 21:18

## 2024-03-24 RX ADMIN — CEFTRIAXONE SODIUM 1000 MG: 1 INJECTION, POWDER, FOR SOLUTION INTRAMUSCULAR; INTRAVENOUS at 01:41

## 2024-03-24 RX ADMIN — MIDODRINE HYDROCHLORIDE 2.5 MG: 5 TABLET ORAL at 17:22

## 2024-03-24 RX ADMIN — DIAZEPAM 5 MG: 5 INJECTION, SOLUTION INTRAMUSCULAR; INTRAVENOUS at 17:43

## 2024-03-24 RX ADMIN — OXCARBAZEPINE 300 MG: 300 TABLET, FILM COATED ORAL at 20:50

## 2024-03-24 RX ADMIN — IPRATROPIUM BROMIDE AND ALBUTEROL SULFATE 1 DOSE: .5; 3 SOLUTION RESPIRATORY (INHALATION) at 18:41

## 2024-03-24 RX ADMIN — FUROSEMIDE 20 MG: 10 INJECTION, SOLUTION INTRAMUSCULAR; INTRAVENOUS at 17:22

## 2024-03-24 RX ADMIN — OXYCODONE HYDROCHLORIDE AND ACETAMINOPHEN 1 TABLET: 5; 325 TABLET ORAL at 20:47

## 2024-03-24 RX ADMIN — Medication 1 MG: at 08:43

## 2024-03-24 RX ADMIN — LACOSAMIDE 200 MG: 10 INJECTION INTRAVENOUS at 10:37

## 2024-03-24 ASSESSMENT — PAIN DESCRIPTION - LOCATION
LOCATION: BACK
LOCATION: BACK

## 2024-03-24 ASSESSMENT — PAIN DESCRIPTION - DESCRIPTORS
DESCRIPTORS: ACHING
DESCRIPTORS: ACHING

## 2024-03-24 ASSESSMENT — PAIN DESCRIPTION - ORIENTATION
ORIENTATION: MID
ORIENTATION: MID

## 2024-03-24 ASSESSMENT — PAIN SCALES - GENERAL
PAINLEVEL_OUTOF10: 7
PAINLEVEL_OUTOF10: 7

## 2024-03-24 NOTE — PLAN OF CARE
Problem: Chronic Conditions and Co-morbidities  Goal: Patient's chronic conditions and co-morbidity symptoms are monitored and maintained or improved  Outcome: Progressing     Problem: Pain  Goal: Verbalizes/displays adequate comfort level or baseline comfort level  Outcome: Progressing     Problem: Safety - Adult  Goal: Free from fall injury  Outcome: Progressing     Problem: Skin/Tissue Integrity  Goal: Absence of new skin breakdown  Description: 1.  Monitor for areas of redness and/or skin breakdown  2.  Assess vascular access sites hourly  3.  Every 4-6 hours minimum:  Change oxygen saturation probe site  4.  Every 4-6 hours:  If on nasal continuous positive airway pressure, respiratory therapy assess nares and determine need for appliance change or resting period.  Outcome: Progressing     Problem: ABCDS Injury Assessment  Goal: Absence of physical injury  Outcome: Progressing     Problem: Discharge Planning  Goal: Discharge to home or other facility with appropriate resources  Outcome: Progressing

## 2024-03-24 NOTE — RT PROTOCOL NOTE
order mode.         7-10 - enter or revise RT Bronchodilator order(s) to two equivalent RT bronchodilator orders with one order with TID Frequency and one order with Frequency of every 4 hours PRN wheezing or increased work of breathing using Per Protocol order mode.       11-13 - enter or revise RT Bronchodilator order(s) to one equivalent RT bronchodilator order with QID Frequency and an Albuterol order with Frequency of every 4 hours PRN wheezing or increased work of breathing using Per Protocol order mode.      Greater than 13 - enter or revise RT Bronchodilator order(s) to one equivalent RT bronchodilator order with every 4 hours Frequency and an Albuterol order with Frequency of every 2 hours PRN wheezing or increased work of breathing using Per Protocol order mode.     RT to enter RT Home Evaluation for COPD & MDI Assessment order using Per Protocol order mode.    Electronically signed by KATERYNA GERARD RCP on 3/24/2024 at 6:17 PM

## 2024-03-25 LAB
GLUCOSE BLD-MCNC: 103 MG/DL (ref 70–99)
GLUCOSE BLD-MCNC: 114 MG/DL (ref 70–99)
GLUCOSE BLD-MCNC: 117 MG/DL (ref 70–99)
GLUCOSE BLD-MCNC: 140 MG/DL (ref 70–99)
PERFORMED ON: ABNORMAL

## 2024-03-25 PROCEDURE — 6370000000 HC RX 637 (ALT 250 FOR IP): Performed by: HOSPITALIST

## 2024-03-25 PROCEDURE — 2580000003 HC RX 258: Performed by: HOSPITALIST

## 2024-03-25 PROCEDURE — 6370000000 HC RX 637 (ALT 250 FOR IP): Performed by: INTERNAL MEDICINE

## 2024-03-25 PROCEDURE — 99232 SBSQ HOSP IP/OBS MODERATE 35: CPT | Performed by: INTERNAL MEDICINE

## 2024-03-25 PROCEDURE — 99233 SBSQ HOSP IP/OBS HIGH 50: CPT | Performed by: STUDENT IN AN ORGANIZED HEALTH CARE EDUCATION/TRAINING PROGRAM

## 2024-03-25 PROCEDURE — 6360000002 HC RX W HCPCS: Performed by: HOSPITALIST

## 2024-03-25 PROCEDURE — 1200000000 HC SEMI PRIVATE

## 2024-03-25 PROCEDURE — 6360000002 HC RX W HCPCS: Performed by: INTERNAL MEDICINE

## 2024-03-25 PROCEDURE — C9254 INJECTION, LACOSAMIDE: HCPCS | Performed by: HOSPITALIST

## 2024-03-25 RX ADMIN — ENOXAPARIN SODIUM 40 MG: 100 INJECTION SUBCUTANEOUS at 09:10

## 2024-03-25 RX ADMIN — SODIUM CHLORIDE 25 ML: 9 INJECTION, SOLUTION INTRAVENOUS at 01:08

## 2024-03-25 RX ADMIN — CEFTRIAXONE SODIUM 1000 MG: 1 INJECTION, POWDER, FOR SOLUTION INTRAMUSCULAR; INTRAVENOUS at 01:09

## 2024-03-25 RX ADMIN — OXYCODONE HYDROCHLORIDE AND ACETAMINOPHEN 1 TABLET: 5; 325 TABLET ORAL at 16:28

## 2024-03-25 RX ADMIN — Medication 1 MG: at 08:59

## 2024-03-25 RX ADMIN — LACOSAMIDE 200 MG: 10 INJECTION INTRAVENOUS at 21:16

## 2024-03-25 RX ADMIN — SPIRONOLACTONE 25 MG: 25 TABLET ORAL at 08:59

## 2024-03-25 RX ADMIN — MIDODRINE HYDROCHLORIDE 2.5 MG: 5 TABLET ORAL at 16:28

## 2024-03-25 RX ADMIN — FUROSEMIDE 20 MG: 10 INJECTION, SOLUTION INTRAMUSCULAR; INTRAVENOUS at 16:31

## 2024-03-25 RX ADMIN — MIDODRINE HYDROCHLORIDE 2.5 MG: 5 TABLET ORAL at 11:45

## 2024-03-25 RX ADMIN — FUROSEMIDE 20 MG: 10 INJECTION, SOLUTION INTRAMUSCULAR; INTRAVENOUS at 09:07

## 2024-03-25 RX ADMIN — MIDODRINE HYDROCHLORIDE 2.5 MG: 5 TABLET ORAL at 09:00

## 2024-03-25 RX ADMIN — ATORVASTATIN CALCIUM 10 MG: 10 TABLET, FILM COATED ORAL at 21:00

## 2024-03-25 RX ADMIN — LEVETIRACETAM 1750 MG: 500 TABLET, FILM COATED ORAL at 21:00

## 2024-03-25 RX ADMIN — LACOSAMIDE 200 MG: 10 INJECTION INTRAVENOUS at 09:09

## 2024-03-25 RX ADMIN — OXCARBAZEPINE 300 MG: 300 TABLET, FILM COATED ORAL at 08:59

## 2024-03-25 RX ADMIN — OXCARBAZEPINE 300 MG: 300 TABLET, FILM COATED ORAL at 21:00

## 2024-03-25 RX ADMIN — SODIUM CHLORIDE, PRESERVATIVE FREE 10 ML: 5 INJECTION INTRAVENOUS at 09:10

## 2024-03-25 RX ADMIN — SODIUM CHLORIDE, PRESERVATIVE FREE 10 ML: 5 INJECTION INTRAVENOUS at 21:00

## 2024-03-25 RX ADMIN — LEVETIRACETAM 1750 MG: 500 TABLET, FILM COATED ORAL at 08:59

## 2024-03-25 RX ADMIN — OXYCODONE HYDROCHLORIDE AND ACETAMINOPHEN 1 TABLET: 5; 325 TABLET ORAL at 09:38

## 2024-03-25 ASSESSMENT — PAIN DESCRIPTION - LOCATION
LOCATION: BACK

## 2024-03-25 ASSESSMENT — PAIN SCALES - GENERAL
PAINLEVEL_OUTOF10: 6
PAINLEVEL_OUTOF10: 7
PAINLEVEL_OUTOF10: 5
PAINLEVEL_OUTOF10: 8

## 2024-03-25 NOTE — DISCHARGE INSTR - COC
Continuity of Care Form  CHF Pathway  _x_ Cardiovascular Assessment. Titrate O2 to keep SaO2 greater than 90%    _x_ Daily Weights- Baseline Wt:64.5 kg   Call MD if:   3 pound weight gain or loss in one day OR 5 pound weight gain in one week       _x_No added salt diet (3-4 G sodium) and 64 oz fluid restriction          _x_ Med List attached:   Hold Coreg/Metoprolol if HR less than 45 or patient symptomatic*   Hold ACE/ARB if SBP less than 85 or patient symptomatic*   Do not hold Spironolactone (aldactone) for hypotension/bradycardia   Call MD for questions: CHF Clinic: [phone]    _x_Follow up appointment with cardiology: date/time: Comfort Care      Patient Name: Dakota Luciano   :  1960  MRN:  [id number]    Admit date:  3/22/2024  Discharge date:  2024    Code Status Order: DNR-CCA   Advance Directives:     Admitting Physician:  Dane Kapadia DO  PCP: Seven Ponce MD    Discharging Nurse: Caryn Gotti RN  Discharging Hospital Unit/Room#: T3W-3444/5916-01  Discharging Unit Phone Number: [phone]    Emergency Contact:   Extended Emergency Contact Information  Primary Emergency Contact: Shayna Miller  Home Phone: [phone]  Mobile Phone: [phone]  Relation: Brother/Sister  Secondary Emergency Contact: John Miller  [place] Phone: [phone]  Relation: Other    Past Surgical History:  Past Surgical History:   Procedure Laterality Date    BRAIN ANEURYSM SURGERY Left 2017    COLONOSCOPY N/A 2019    COLONOSCOPY WITH MAC ANESTHESIA performed by Rikc Swift MD at [facility] ENDOSCOPY    CRANIOTOMY Left 2017    LEG SURGERY      UPPER GASTROINTESTINAL ENDOSCOPY N/A 2019    EGD BIOPSY performed by Rick Swift MD at [facility] ENDOSCOPY       Immunization History:   Immunization History   Administered Date(s) Administered    COVID-19, PFIZER PURPLE top, DILUTE for use, (age 12 y+), 30mcg/0.3mL 2020, 2021, 10/21/2021       Active Problems:  Patient Active Problem

## 2024-03-25 NOTE — DISCHARGE INSTRUCTIONS
doctor any significant weight change. Remember that weight change of 2-3 lbs. in 1 day or 5 lbs in a week is \"significant\" and likely represents changes in \"fluid\" status.  If you are experiencing any swelling in your feet, ankles or abdomen, or shortness of breath, call your doctor.     3. You should restrict all sodium intake to 3000 milligrams (3 grams) a day. Depending on your status, you may also be asked to restrict fluid intake to no more that 64 oz/2 Liters a day. If uncertain, ask the nurse or physician.     4. Regular aerobic exercise is encouraged 30 minutes a day (walking, bike, swimming, etc.). For specific exercise recommendations, ask your physician.     5. Report to your doctor any change in symptoms (chest pain, worsening shortness of breath, increased dizziness or passing out, increased palpitations or ICD shock, trouble catching breath while lying down, increased edema or abdominal bloating). Remember that even \"minor\" changes in symptoms may be important. Also report any changes in medications including \"over the counter\" medications.     6. DO NOT take NSAID's for pain (i.e, Advil, Aleve, Motrin, ibuprofen, and many more) since these may cause serious problems in those with a history of CHF. If uncertain about the medication, call your doctor.    7. If you have new significant or ongoing diarrhea or vomiting, please call your doctor for further instructions. Taking a diuretic (water pill) with these symptoms can worsen dehydration.     8. If you have any questions or concerns you can always call the CHF  Resource Line( [phone].  It is available Monday thru Friday 8 am- 5 pm. Please leave a message and your call will be returned shortly.     Extra Heart Failure sites:    https://HappyBoxitalpSpreedly.com/publication/?h=605201  --- this is American Heart Association interactive Healthier Living with Heart Failure guidebook. Please click hyperlink or copy / paste link into search bar. Use your

## 2024-03-25 NOTE — NURSE NAVIGATOR
Summary (Last 24 hours) at 3/25/2024 1441  Last data filed at 3/25/2024 0947  Gross per 24 hour   Intake --   Output 3700 ml   Net -3700 ml       Recommendations:   1. Patient will need a one week or less hospital follow up scheduled before discharge.   2. Educate further on fluid restriction 48 oz- 64 oz during inpatient stay so they can understand how to measure intake at home.   3. Continue to educate on S/S.   4. Emphasize daily weights, diet, and knowing when and who to call  5. Provided patient with CHF Resource Line for questions and concerns.  6. HF pathway on lisa.       EAMON STALEY, RN 3/25/2024 2:41 PM

## 2024-03-26 LAB
BACTERIA BLD CULT ORG #2: NORMAL
BACTERIA BLD CULT: NORMAL
GLUCOSE BLD-MCNC: 106 MG/DL (ref 70–99)
GLUCOSE BLD-MCNC: 124 MG/DL (ref 70–99)
GLUCOSE BLD-MCNC: 129 MG/DL (ref 70–99)
GLUCOSE BLD-MCNC: 144 MG/DL (ref 70–99)
PERFORMED ON: ABNORMAL

## 2024-03-26 PROCEDURE — C9254 INJECTION, LACOSAMIDE: HCPCS | Performed by: HOSPITALIST

## 2024-03-26 PROCEDURE — 6370000000 HC RX 637 (ALT 250 FOR IP): Performed by: INTERNAL MEDICINE

## 2024-03-26 PROCEDURE — 6360000002 HC RX W HCPCS: Performed by: HOSPITALIST

## 2024-03-26 PROCEDURE — 97166 OT EVAL MOD COMPLEX 45 MIN: CPT

## 2024-03-26 PROCEDURE — 97530 THERAPEUTIC ACTIVITIES: CPT

## 2024-03-26 PROCEDURE — 97116 GAIT TRAINING THERAPY: CPT

## 2024-03-26 PROCEDURE — 6360000002 HC RX W HCPCS: Performed by: INTERNAL MEDICINE

## 2024-03-26 PROCEDURE — 6370000000 HC RX 637 (ALT 250 FOR IP): Performed by: HOSPITALIST

## 2024-03-26 PROCEDURE — 97162 PT EVAL MOD COMPLEX 30 MIN: CPT

## 2024-03-26 PROCEDURE — 1200000000 HC SEMI PRIVATE

## 2024-03-26 PROCEDURE — 97535 SELF CARE MNGMENT TRAINING: CPT

## 2024-03-26 PROCEDURE — 2580000003 HC RX 258: Performed by: HOSPITALIST

## 2024-03-26 RX ADMIN — SODIUM CHLORIDE, PRESERVATIVE FREE 10 ML: 5 INJECTION INTRAVENOUS at 11:58

## 2024-03-26 RX ADMIN — SODIUM CHLORIDE, PRESERVATIVE FREE 10 ML: 5 INJECTION INTRAVENOUS at 20:19

## 2024-03-26 RX ADMIN — LEVETIRACETAM 1750 MG: 500 TABLET, FILM COATED ORAL at 08:53

## 2024-03-26 RX ADMIN — DIAZEPAM 5 MG: 5 INJECTION, SOLUTION INTRAMUSCULAR; INTRAVENOUS at 03:18

## 2024-03-26 RX ADMIN — ATORVASTATIN CALCIUM 10 MG: 10 TABLET, FILM COATED ORAL at 20:19

## 2024-03-26 RX ADMIN — SODIUM CHLORIDE 25 ML: 9 INJECTION, SOLUTION INTRAVENOUS at 21:18

## 2024-03-26 RX ADMIN — MIDODRINE HYDROCHLORIDE 2.5 MG: 5 TABLET ORAL at 13:45

## 2024-03-26 RX ADMIN — OXYCODONE HYDROCHLORIDE AND ACETAMINOPHEN 1 TABLET: 5; 325 TABLET ORAL at 05:57

## 2024-03-26 RX ADMIN — OXYCODONE HYDROCHLORIDE AND ACETAMINOPHEN 1 TABLET: 5; 325 TABLET ORAL at 12:01

## 2024-03-26 RX ADMIN — MIDODRINE HYDROCHLORIDE 2.5 MG: 5 TABLET ORAL at 17:44

## 2024-03-26 RX ADMIN — LACOSAMIDE 200 MG: 10 INJECTION INTRAVENOUS at 11:52

## 2024-03-26 RX ADMIN — OXCARBAZEPINE 300 MG: 300 TABLET, FILM COATED ORAL at 20:19

## 2024-03-26 RX ADMIN — LEVETIRACETAM 1750 MG: 500 TABLET, FILM COATED ORAL at 20:19

## 2024-03-26 RX ADMIN — MIDODRINE HYDROCHLORIDE 2.5 MG: 5 TABLET ORAL at 08:54

## 2024-03-26 RX ADMIN — FUROSEMIDE 20 MG: 10 INJECTION, SOLUTION INTRAMUSCULAR; INTRAVENOUS at 08:55

## 2024-03-26 RX ADMIN — ENOXAPARIN SODIUM 40 MG: 100 INJECTION SUBCUTANEOUS at 08:57

## 2024-03-26 RX ADMIN — CEFTRIAXONE SODIUM 1000 MG: 1 INJECTION, POWDER, FOR SOLUTION INTRAMUSCULAR; INTRAVENOUS at 04:57

## 2024-03-26 RX ADMIN — FUROSEMIDE 20 MG: 10 INJECTION, SOLUTION INTRAMUSCULAR; INTRAVENOUS at 17:45

## 2024-03-26 RX ADMIN — SPIRONOLACTONE 25 MG: 25 TABLET ORAL at 08:55

## 2024-03-26 RX ADMIN — SODIUM CHLORIDE: 9 INJECTION, SOLUTION INTRAVENOUS at 11:50

## 2024-03-26 RX ADMIN — OXYCODONE HYDROCHLORIDE AND ACETAMINOPHEN 1 TABLET: 5; 325 TABLET ORAL at 20:19

## 2024-03-26 RX ADMIN — OXCARBAZEPINE 300 MG: 300 TABLET, FILM COATED ORAL at 08:55

## 2024-03-26 RX ADMIN — Medication 1 MG: at 08:54

## 2024-03-26 RX ADMIN — LACOSAMIDE 200 MG: 10 INJECTION INTRAVENOUS at 21:19

## 2024-03-26 ASSESSMENT — PAIN DESCRIPTION - INTENSITY: RATING_2: 7

## 2024-03-26 ASSESSMENT — PAIN DESCRIPTION - ORIENTATION
ORIENTATION: LOWER
ORIENTATION: MID

## 2024-03-26 ASSESSMENT — PAIN DESCRIPTION - LOCATION
LOCATION: BACK
LOCATION: BACK;HEAD
LOCATION: BACK
LOCATION: BACK

## 2024-03-26 ASSESSMENT — PAIN SCALES - GENERAL
PAINLEVEL_OUTOF10: 7
PAINLEVEL_OUTOF10: 4
PAINLEVEL_OUTOF10: 7
PAINLEVEL_OUTOF10: 0
PAINLEVEL_OUTOF10: 6

## 2024-03-26 ASSESSMENT — PAIN DESCRIPTION - DESCRIPTORS
DESCRIPTORS: DISCOMFORT
DESCRIPTORS: THROBBING

## 2024-03-26 ASSESSMENT — PAIN SCALES - WONG BAKER: WONGBAKER_NUMERICALRESPONSE: NO HURT

## 2024-03-26 NOTE — ACP (ADVANCE CARE PLANNING)
Advance Care Planning     General Advance Care Planning (ACP) Conversation    Date of Conversation: 3/26/24  Conducted with: Patient, patient sister Shayna on phone    Healthcare Decision Maker:    Primary Decision Maker: Shayna Miller - Brother/Sister - [phone]    Secondary Decision Maker: karen Rolle - Other - [phone]  Click here to complete Healthcare Decision Makers including selection of the Healthcare Decision Maker Relationship (ie \"Primary\").      Content/Action Overview:  Has ACP document(s) on file - reflects the patient's care preferences  Reviewed DNR/DNI and patient confirms current DNR status - completed forms on file (place new order if needed)  treatment goals, benefit/burden of treatment options, artificial nutrition, ventilation preferences, hospitalization preferences, resuscitation preferences, end of life care preferences (vegetative state/imminent death), and hospice care      Length of Voluntary ACP Conversation in minutes:  <16 minutes (Non-Billable)    Amanda Fenton RN

## 2024-03-26 NOTE — DISCHARGE INSTR - DIET
table.  Try dry or fresh herbs, pepper, lemon juice, or a sodium-free seasoning blend such as Mrs. Dash to add flavor to food.   Do not use a salt substitute.  Use caution at restaurants  Restaurant foods are high in sodium. Ask for your food to be cooked without salt and request sauces and dressing to come “on the side.”

## 2024-03-27 VITALS
HEIGHT: 72 IN | OXYGEN SATURATION: 98 % | RESPIRATION RATE: 16 BRPM | SYSTOLIC BLOOD PRESSURE: 109 MMHG | DIASTOLIC BLOOD PRESSURE: 56 MMHG | TEMPERATURE: 97.9 F | WEIGHT: 142.2 LBS | BODY MASS INDEX: 19.26 KG/M2 | HEART RATE: 78 BPM

## 2024-03-27 LAB
ANION GAP SERPL CALCULATED.3IONS-SCNC: 11 MMOL/L (ref 3–16)
BUN SERPL-MCNC: 31 MG/DL (ref 7–20)
CALCIUM SERPL-MCNC: 9 MG/DL (ref 8.3–10.6)
CHLORIDE SERPL-SCNC: 103 MMOL/L (ref 99–110)
CO2 SERPL-SCNC: 27 MMOL/L (ref 21–32)
CREAT SERPL-MCNC: 0.7 MG/DL (ref 0.8–1.3)
DEPRECATED RDW RBC AUTO: 14.2 % (ref 12.4–15.4)
GFR SERPLBLD CREATININE-BSD FMLA CKD-EPI: >90 ML/MIN/{1.73_M2}
GLUCOSE BLD-MCNC: 105 MG/DL (ref 70–99)
GLUCOSE BLD-MCNC: 125 MG/DL (ref 70–99)
GLUCOSE SERPL-MCNC: 108 MG/DL (ref 70–99)
HCT VFR BLD AUTO: 35.7 % (ref 40.5–52.5)
HGB BLD-MCNC: 11.9 G/DL (ref 13.5–17.5)
MCH RBC QN AUTO: 34 PG (ref 26–34)
MCHC RBC AUTO-ENTMCNC: 33.4 G/DL (ref 31–36)
MCV RBC AUTO: 101.8 FL (ref 80–100)
PERFORMED ON: ABNORMAL
PERFORMED ON: ABNORMAL
PLATELET # BLD AUTO: 181 K/UL (ref 135–450)
PMV BLD AUTO: 8.6 FL (ref 5–10.5)
POTASSIUM SERPL-SCNC: 3.7 MMOL/L (ref 3.5–5.1)
RBC # BLD AUTO: 3.5 M/UL (ref 4.2–5.9)
SODIUM SERPL-SCNC: 141 MMOL/L (ref 136–145)
WBC # BLD AUTO: 5.5 K/UL (ref 4–11)

## 2024-03-27 PROCEDURE — 6360000002 HC RX W HCPCS: Performed by: HOSPITALIST

## 2024-03-27 PROCEDURE — 94760 N-INVAS EAR/PLS OXIMETRY 1: CPT

## 2024-03-27 PROCEDURE — 6370000000 HC RX 637 (ALT 250 FOR IP): Performed by: HOSPITALIST

## 2024-03-27 PROCEDURE — 99232 SBSQ HOSP IP/OBS MODERATE 35: CPT | Performed by: NURSE PRACTITIONER

## 2024-03-27 PROCEDURE — 36415 COLL VENOUS BLD VENIPUNCTURE: CPT

## 2024-03-27 PROCEDURE — 6360000002 HC RX W HCPCS: Performed by: INTERNAL MEDICINE

## 2024-03-27 PROCEDURE — 85027 COMPLETE CBC AUTOMATED: CPT

## 2024-03-27 PROCEDURE — 6370000000 HC RX 637 (ALT 250 FOR IP): Performed by: NURSE PRACTITIONER

## 2024-03-27 PROCEDURE — 6370000000 HC RX 637 (ALT 250 FOR IP): Performed by: INTERNAL MEDICINE

## 2024-03-27 PROCEDURE — 2580000003 HC RX 258: Performed by: HOSPITALIST

## 2024-03-27 PROCEDURE — 2700000000 HC OXYGEN THERAPY PER DAY

## 2024-03-27 PROCEDURE — C9254 INJECTION, LACOSAMIDE: HCPCS | Performed by: HOSPITALIST

## 2024-03-27 PROCEDURE — 80048 BASIC METABOLIC PNL TOTAL CA: CPT

## 2024-03-27 RX ORDER — FUROSEMIDE 40 MG/1
40 TABLET ORAL 2 TIMES DAILY
Qty: 60 TABLET | Refills: 0
Start: 2024-03-27

## 2024-03-27 RX ORDER — LORAZEPAM 2 MG/ML
1 CONCENTRATE ORAL
Qty: 30 ML | Refills: 0 | Status: SHIPPED | OUTPATIENT
Start: 2024-03-27 | End: 2024-04-10

## 2024-03-27 RX ORDER — SPIRONOLACTONE 25 MG/1
25 TABLET ORAL DAILY
Qty: 30 TABLET | Refills: 0
Start: 2024-03-27

## 2024-03-27 RX ORDER — MORPHINE SULFATE 100 MG/5ML
10 SOLUTION ORAL
Qty: 30 ML | Refills: 0 | Status: SHIPPED | OUTPATIENT
Start: 2024-03-27 | End: 2024-03-30

## 2024-03-27 RX ORDER — MIDODRINE HYDROCHLORIDE 2.5 MG/1
2.5 TABLET ORAL
Qty: 90 TABLET | Refills: 0
Start: 2024-03-27

## 2024-03-27 RX ADMIN — LACOSAMIDE 200 MG: 10 INJECTION INTRAVENOUS at 11:01

## 2024-03-27 RX ADMIN — Medication 1 MG: at 09:59

## 2024-03-27 RX ADMIN — MIDODRINE HYDROCHLORIDE 2.5 MG: 5 TABLET ORAL at 10:02

## 2024-03-27 RX ADMIN — OXCARBAZEPINE 300 MG: 300 TABLET, FILM COATED ORAL at 10:03

## 2024-03-27 RX ADMIN — ENOXAPARIN SODIUM 40 MG: 100 INJECTION SUBCUTANEOUS at 10:04

## 2024-03-27 RX ADMIN — SPIRONOLACTONE 25 MG: 25 TABLET ORAL at 10:00

## 2024-03-27 RX ADMIN — FUROSEMIDE 20 MG: 10 INJECTION, SOLUTION INTRAMUSCULAR; INTRAVENOUS at 10:00

## 2024-03-27 RX ADMIN — MIDODRINE HYDROCHLORIDE 2.5 MG: 5 TABLET ORAL at 13:35

## 2024-03-27 RX ADMIN — SODIUM CHLORIDE, PRESERVATIVE FREE 10 ML: 5 INJECTION INTRAVENOUS at 10:07

## 2024-03-27 RX ADMIN — LEVETIRACETAM 1750 MG: 500 TABLET, FILM COATED ORAL at 10:00

## 2024-03-27 RX ADMIN — EMPAGLIFLOZIN 10 MG: 10 TABLET, FILM COATED ORAL at 13:36

## 2024-03-27 ASSESSMENT — PAIN DESCRIPTION - LOCATION: LOCATION: BACK

## 2024-03-27 ASSESSMENT — PAIN SCALES - GENERAL
PAINLEVEL_OUTOF10: 7
PAINLEVEL_OUTOF10: 8
PAINLEVEL_OUTOF10: 0

## 2024-03-27 ASSESSMENT — PAIN DESCRIPTION - ORIENTATION: ORIENTATION: LOWER

## 2024-03-27 ASSESSMENT — PAIN DESCRIPTION - DESCRIPTORS: DESCRIPTORS: ACHING

## 2024-03-27 NOTE — ACP (ADVANCE CARE PLANNING)
Advanced Care Planning Note.    Purpose of Encounter: Advanced care planning in light of acute systolic CHF  Parties In Attendance: Patient, brother in law  Decisional Capacity: No  Subjective: Patient is SOB  Objective: Cr 0.7  Goals of Care Determination: POA wishes to focus on comfort and expressed the desired to pursue hospice.  Plan:  Hospice consult. Discuss Hospice care with case management. Review medications to assure focus on symptom relief and comfort.  Code Status: DNR CC    Time spent on Advanced care Plannin minutes  Advanced Care Planning Documents: Completed advanced directives on chart, sister is the POA.    Sukhdev Pelaez MD  3/27/2024 7:05 AM

## 2024-03-27 NOTE — CARE COORDINATION
03/27/24 1031   IMM Letter   IMM Letter given to Patient/Family/Significant other/Guardian/POA/by: Second IMM given to patient by CM   IMM Letter date given: 03/27/24   IMM Letter time given: 0925       
Case Management -  Discharge Note      Patient Name: Dakota Luciano                   YOB: 1960            Readmission Risk (Low < 19, Mod (19-27), High > 27): Readmission Risk Score: 17.3    Current PCP: Seven Ponce MD    ([facility]) Important Message from Medicare:    Date: 03/27/24    PT AM-PAC: 17 /24  OT AM-PAC: 17 /24      Patient noted to have a discharge order.  Pt has been medically cleared for transition to Skilled Nursing Rehab Facility    Patient discharged to   [facility] at [facility]  [address] [place] [address]  Phone: [phone]  Fax: [phone]      HENS Completed:  N/a  Pre-cert required/obtained:  N/A  Transportation scheduled for 1400  Transportation provided by [facility]  ([phone])   AVS faxed and agency notified:  Yes  The following prescriptions sent with pt: - Lorazepam concentrate solution 2 mg/ml PRN  -  Morphine Sulfate  concentrate solution 20 mg /ml PRN  Family Notified:  Patient's sister- Shayna  Nurse  Adelaida to call report to facility        [facility] will follow patient at the facility  [address] [place] [address]  Phone: [phone]  Fax: [phone]       Financial    Payor: [facility] / Plan: [facility] DUAL / Product Type: *No Product type* /     Pharmacy:  Potential assistance Purchasing Medications:    Meds-to-Beds request:        [facility] - [place], [place] - [address] [address] - P [phone] - F [phone]  [address] [facility] [address]  Phone: [phone] Fax: [phone]      Notes:    Additional Case Management Notes:   The facility's admissions liaison- Rayshawn was informed of the pickup time.    Electronically signed by ZAKIYA NASH RN /BSN/CCM on 3/27/2024 at 12:53 PM        
Discharge Planning;  Discharge order noted for patient to go back to the facility- [facility] . CM spoke to Rayshawn in admissions, who reported okay for patient to go back. Transport was scheduled with pickup time of 1400 . Patient and patient's sister Shayna were updated and in agreement.   Patient's sister  informed the CM that patient will sign up for hospice care at the facility.   
use, (age 12 y+), 30mcg/0.3mL  12/21/2020   Second Dose COVID-19, PFIZER PURPLE top, DILUTE for use, (age 12 y+), 30mcg/0.3mL   01/11/2021       Last COVID Lab SARS-CoV-2, PCR (no units)   Date Value   07/15/2020 Not Detected     SARS-CoV-2 RNA, RT PCR (no units)   Date Value   11/22/2023 NOT DETECTED     SARS-CoV-2, NAAT (no units)   Date Value   02/01/2023 Not Detected     POC Glucose (mg/dl)   Date Value   03/25/2024 140 (H)     POC Potassium (mmol/L)   Date Value   03/23/2024 3.3 (L)     Sample Type (no units)   Date Value   03/23/2024 ART            Covid Test requirement for return: No Rapid/PCR Covid test needed before return      Additional Case Management Notes:   Patient from [facility] at the [facility]. No precert required to go back.    The Plan for Transition of Care is related to the following treatment goals of Acute systolic CHF (congestive heart failure) (HCC) [I50.21]  Acute decompensated heart failure (HCC) [I50.9]  Urinary tract infection without hematuria, site unspecified [N39.0]  Acute hypoxic respiratory failure (HCC) [J96.01]    The Patient and/or Patient Representative Agree with the Discharge Plan?      ZAKIYA NASH RN/BSN/CCM  Case Management Department

## 2024-03-27 NOTE — DISCHARGE SUMMARY
Hospital Medicine Discharge Summary    Patient: Dakota Luciano     Gender: male  : 1960   Age: 63 y.o.  MRN: [id number]    Admitting Physician: Dane Kapadia DO  Discharge Physician: Sukhdev Pelaez MD     Code Status: DNR-CC    Admit Date: 3/22/2024   Discharge Date: 3/27/2024      Disposition:  [facility]    Discharge Diagnoses:    Active Hospital Problems    Diagnosis Date Noted    Disorientation [R41.0] 2024    Hyponatremia [E87.1] 2024    Anemia [D64.9] 2024    Urinary tract infection without hematuria [N39.0] 2024    Epilepsy with altered consciousness without intractable epilepsy (HCC) [G40.909] 2024    Acute on chronic diastolic (congestive) heart failure (HCC) [I50.33] 2024    History of subacute bacterial endocarditis [Z86.79] 2024    Adrenal insufficiency (Hendry's disease) (HCC) [E27.1] 2024    Severe aortic valve regurgitation [I35.1] 2024    Severe mitral regurgitation [I34.0] 2024    Hypertensive heart disease [I11.9] 2024    Acute systolic CHF (congestive heart failure) (HCC) [I50.21] 2024    History of MI (myocardial infarction) [I25.2] 2023    Schizoaffective disorder, bipolar type (HCC) [F25.0] 08/15/2022    Dementia without behavioral disturbance (HCC) [F03.90] 08/15/2022    Generalized anxiety disorder [F41.1] 08/15/2022    Hypertension associated with diabetes (HCC) [E11.59, I15.2]     Lung nodules [R91.8]     Acute hypoxic respiratory failure (HCC) [J96.01]     Acute encephalopathy [G93.40] 2022    History of stroke [Z86.73]     Partial symptomatic epilepsy with complex partial seizures, intractable, without status epilepticus (HCC) [G40.219]     Chronic pain disorder [G89.4] 2017    Mixed hypercholesterolemia and hypertriglyceridemia [E78.2] 2017    Type 2 diabetes mellitus (HCC) [E11.9] 2017    DM gastroparesis (HCC) [E11.43, K31.84] 2016

## 2024-03-27 NOTE — PROGRESS NOTES
ADVANCED CARE PLANNING    Name:Dakota Luciano       :  1960              MRN:  [id number]      Purpose of Encounter: Advanced care planning in light of problem listed above   Parties in attendance: :Brent Olson MD, Family members:  Decisional Capacity:Yes    Diagnosis: Principal Problem:    Acute on chronic diastolic (congestive) heart failure (HCC)  Active Problems:    Type 2 diabetes mellitus (HCC)    Partial symptomatic epilepsy with complex partial seizures, intractable, without status epilepticus (HCC)    Acute encephalopathy    History of stroke    Acute hypoxic respiratory failure (HCC)    Lung nodules    Hypertension associated with diabetes (HCC)    History of subacute bacterial endocarditis    Adrenal insufficiency (Pope's disease) (HCC)    Chronic pain disorder    Dementia without behavioral disturbance (HCC)    DM gastroparesis (HCC)    Generalized anxiety disorder    History of MI (myocardial infarction)    Mixed hypercholesterolemia and hypertriglyceridemia    Schizoaffective disorder, bipolar type (HCC)    Severe aortic valve regurgitation    Severe mitral regurgitation    Hypertensive heart disease    Acute systolic CHF (congestive heart failure) (HCC)    Urinary tract infection without hematuria    Epilepsy with altered consciousness without intractable epilepsy (HCC)    Disorientation    Hyponatremia    Anemia  Resolved Problems:    * No resolved hospital problems. *    Patients Medical Story:Presented with worsening symptom of dx above. With at risk for life threatening event. Procedure and testing as noted in progress noted. We discussed patient long term goal and also wishes and aggressive care. Discussed in detail about code status and what it means with detailed explanation.   Goals of Care Determinations: Patient wishes to focus on full code with aggressive care, CPR, intubation long term vent and facility as well.   Plan: Will notify Seven Ponce MD of 
    V2.0    [facility] Progress Note      Name:  Dakota Luciano /Age/Sex: 1960  (63 y.o. male)   MRN & CSN:  [id number] & [id number] Encounter Date/Time: 3/23/2024 12:57 PM EDT   Location:  [facility]2908/2908-01 PCP: Seven Ponce MD     Attending:Brent Pelaez MD       Hospital Day: 2    Assessment and Recommendations   Dakota Luciano is a 63 y.o. male who presents with Acute on chronic diastolic (congestive) heart failure (HCC)      Plan:   Acute hypoxic respiratory  Likely secondary pulm edema in setting of CHF.  Patient was on Lasix drip has been transitioned to IV Lasix for  Blood pressure still labile continue midodrine and albumin if needed  Appreciate pulmonary and cardiology recommended      Acute CHF exacerbation pulmonary edema and pleural  Secondary to above  If no improvement postdialysis then we can consider thoracocentesis      History of seizure      UTI continue IV Rocephin follow-up on        Diet ADULT DIET; Regular   DVT Prophylaxis    Code Status DNR-CCA   Disposition          Personally reviewed Lab Studies and Imaging         Subjective:     Chief Complaint:     Dakota Luciano is a 63 y.o. male who presents with still with some oxygen requirement still with shortness of but improving.  Blood pressure still      Review of Systems:      Pertinent positives and negatives discussed in HPI    Objective:     Intake/Output Summary (Last 24 hours) at 3/23/2024 1257  Last data filed at 3/23/2024 1129  Gross per 24 hour   Intake 390.14 ml   Output 3875 ml   Net -3484.86 ml      Vitals:   Vitals:    24 0900 24 1000 24 1100 24 1200   BP: (!) 110/37 114/61 107/62 118/61   Pulse: 90 93 90 89   Resp: (!) 35 (!) 40 25 30   Temp:    97.4 °F (36.3 °C)   TempSrc:    Temporal   SpO2: 96% 100%  90%   Weight:             Physical Exam:      General: NAD  Eyes: EOMI  ENT: neck supple  Cardiovascular: Regular rate.  Respiratory: Clear to auscultation  Gastrointestinal: Soft, non 
    V2.0    [facility] [place]      Name:  Dakota Luciano /Age/Sex: 1960  (63 y.o. male)   MRN & CSN:  [id number] & [id number] Encounter Date/Time: 3/26/2024 12:57 PM EDT   Location:  I1E-1431/5916-01 PCP: Seven Ponce MD     Attending:Brent Pelaez MD       Hospital Day: 5    Assessment and Recommendations   Dakota Luciano is a 63 y.o. male who presents with Acute on chronic diastolic (congestive) heart failure (HCC)      Plan:   Acute hypoxic respiratory    Secondary to CHF exacerbation.  Continues to improve.  Still with significant volume overload continue IV Lasix.  Repeat labs this morning.  Echocardiogram noted with EF of 35%.  Monitor for any worsening effusion    Severe aortic regurgitation  Overall prognosis is guarded.  Appreciate pulmonary recommendations    UTI on Rocephin culture noted has completed 4-day course.  Antibiotics today.      History of seizure continue Keppra.  A      History of seizure continue keppra       UTI continue IV Rocephin follow-up on await cultuyre         Diet ADULT DIET; Regular; No Added Salt (3-4 gm)   DVT Prophylaxis    Code Status DNR-CCA   Disposition          Personally reviewed Lab Studies and Imaging         Subjective:     Chief Complaint:     Dakota Luciano is a 63 y.o. male who presents with still with some oxygen requirement still with shortness of but improving.  Blood pressure still      Review of Systems:      Pertinent positives and negatives discussed in HPI    Objective:     Intake/Output Summary (Last 24 hours) at 3/26/2024 1100  Last data filed at 3/26/2024 0602  Gross per 24 hour   Intake 320 ml   Output 1300 ml   Net -980 ml      Vitals:   Vitals:    24 0040 24 0300 24 0557 24 0850   BP: (!) 94/54 (!) 118/56  100/64   Pulse:    85   Resp: 16 15  18   Temp: 97.7 °F (36.5 °C) 97 °F (36.1 °C)     TempSrc: Temporal Temporal     SpO2: 92% 100%  100%   Weight:   67.4 kg (148 lb 11.2 oz)          Physical Exam:      General: 
    V2.0    [facility] Progress Note      Name:  Dakota Luciano /Age/Sex: 1960  (63 y.o. male)   MRN & CSN:  [id number] & [id number] Encounter Date/Time: 3/25/2024 12:57 PM EDT   Location:  [id number]-1860/5916-01 PCP: Seven Ponce MD     Attending:Brent Pelaez MD       Hospital Day: 4    Assessment and Recommendations   Dakota Luciano is a 63 y.o. male who presents with Acute on chronic diastolic (congestive) heart failure (HCC)      Plan:   Acute hypoxic respiratory  Slow improvement.  Last evening had this pulmonary placement acute shortness of breath  Repeat chest x-ray still showing persistent effusion.  Will continue IV diuresis repeat checks x-ray later today persistent will discuss with pulmonary if amendable to drainage if no improvement  Patient is back on nasal cannula oxygen  Resolved on room air now improving.  Likely secondary to CHF  Likely secondary pulm edema in setting of CHF.  Continue iv lasix bid  Midodrine       Acute CHF exacerbation pulmonary edema and pleural  Secondary to above  Continue diuresis      History of seizure continue keppra       UTI continue IV Rocephin follow-up on await cultuyre         Diet ADULT DIET; Regular   DVT Prophylaxis    Code Status DNR-CCA   Disposition          Personally reviewed Lab Studies and Imaging         Subjective:     Chief Complaint:     Dakota Luciano is a 63 y.o. male who presents with still with some oxygen requirement still with shortness of but improving.  Blood pressure still      Review of Systems:      Pertinent positives and negatives discussed in HPI    Objective:     Intake/Output Summary (Last 24 hours) at 3/25/2024 0922  Last data filed at 3/24/2024 2340  Gross per 24 hour   Intake 120 ml   Output 3125 ml   Net -3005 ml      Vitals:   Vitals:    24 2326 24 0112 24 0515 24 0857   BP: (!) 96/58  97/64 (!) 103/91   Pulse: 86  86 97   Resp:    Temp: 98.2 °F (36.8 °C)  98.3 °F (36.8 °C) 97.1 °F (36.2 °C) 
   03/22/24 1428   NIV Type   $NIV $Daily Charge   Ventilator ID [id number]   NIV Started/Stopped On   Equipment Type V60   Mode Bilevel   Mask Type Full face mask   Mask Size Medium   Assessment   Pulse (!) 101   Respirations 29   /75   SpO2 100 %   Level of Consciousness 0   Comfort Level Good   Using Accessory Muscles Yes   Mask Compliance Good   Skin Assessment Clean, dry, & intact   Skin Protection for O2 Device No   Breath Sounds   Breath Sounds Bilateral Diminished;Fine crackles   Settings/Measurements   PIP Observed 14 cm H20   IPAP 12 cmH20   CPAP/EPAP 5 cmH2O   Vt (Measured) 682 mL   Rate Ordered 12   Insp Rise Time (%) 3 %   FiO2  40 %   I Time/ I Time % 1 s   Minute Volume (L/min) 17.3 Liters   Mask Leak (lpm) 60 lpm  (full face beard)   Patient's Home Machine No   Alarm Settings   Alarms On Y   Low Pressure (cmH2O) 2 cmH2O   High Pressure (cmH2O) 24 cmH2O   Apnea (secs) 20 secs   RR Low (bpm) 10   RR High (bpm) 40 br/min       
   03/22/24 2226   Oxygen Therapy/Pulse Ox   O2 Device High flow nasal cannula   O2 Flow Rate (L/min) 6 L/min   SpO2 90 %   Blood Gas  Performed? Yes   $ABG $Arterial Puncture   Dario's Test #1 Not assessed   Site #1 Left Brachial   Site Prepped #1 Yes   Number of Attempts #1 1   Pressure Held #1 Yes   Complications #1 None   Post-procedure #1 None   Specimen Status #1 To lab   How Tolerated? Tolerated well       
   03/24/24 1816   RT Protocol   History Pulmonary Disease 0   Respiratory pattern 2   Breath sounds 0   Cough 0   Bronchodilator Assessment Score 2       
   03/27/24 0938   RT Protocol   History Pulmonary Disease 0   Respiratory pattern 2   Breath sounds 0   Cough 0   Bronchodilator Assessment Score 2       
  [facility] - Inpatient Rehabilitation Department   Phone: [phone]    Physical Therapy    [x] Initial Evaluation            [] Daily Treatment Note         [] Discharge Summary      Patient: Dakota Luciano   : 1960   MRN: [id number]   Date of Service:  3/26/2024  Admitting Diagnosis: Acute on chronic diastolic (congestive) heart failure (HCC)  Current Admission Summary: The pt was admitted with SOB due to CHF, UTI, metabolic encephalopathy, severe aortic regurgitation.  He has a palliative care consult.   Past Medical History:  has a past medical history of Acute intracerebral hemorrhage (HCC), NATALIA (acute kidney injury) (HCC), Anxiety, Back pain, Cerebral artery occlusion with cerebral infarction (HCC), Chronic back pain greater than 3 months duration, Diabetes mellitus (HCC), Dysphasia, Dysphonia, Endocarditis and heart valve disorders in diseases classified elsewhere, Enterococcal bacteremia, Hydrocephalus (HCC), Hypertension, IVH (intraventricular hemorrhage) (HCC), NSTEMI (non-ST elevated myocardial infarction) (HCC), Receiving intravenous antibiotic treatment as outpatient, and Seizures (HCC).  Past Surgical History:  has a past surgical history that includes Leg Surgery; craniotomy (Left, 2017); Brain aneurysm surgery (Left, 2017); Upper gastrointestinal endoscopy (N/A, 2019); and Colonoscopy (N/A, 2019).  Discharge Recommendations: Dakota Luciano scored a 17/24 on the AM-PAC short mobility form. Current research shows that an AM-PAC score of 17 or less is typically not associated with a discharge to the patient's home setting. Based on the patient's AM-PAC score and their current functional mobility deficits, it is recommended that the patient have 3-5 sessions per week of Physical Therapy at d/c to increase the patient's independence.  Please see assessment section for further patient specific details.    If patient discharges prior to next session this note will 
  Pulmonary and Critical Care Medicine    CC: SOB   Date: 3/25/2024  Admit Date:  3/22/2024  Reason for Consultation: ICU management   Consult Requesting Physician: Brent Pelaez MD     HPI     This is a 62 y/o M w/ a hx of CVA, seizure disorder, MR, AR, Endocarditis, CHF, who presented with AMS and SOB. Admitted with acute hypoxic respiratory failure.     Overnight:  I was asked to see the patient again and determine if we need to drain his pleural effusion  On my exam patient was on 2L NC but it was not in his nose  He stated he felt better  No cough.     ROS: negative except above     OBJECTIVE DATA       PHYSICAL EXAM:   Temp  Av.7 °F (36.5 °C)  Min: 97 °F (36.1 °C)  Max: 98.3 °F (36.8 °C)  Pulse  Av  Min: 86  Max: 98  BP  Min: 95/61  Max: 117/76  SpO2  Av.2 %  Min: 92 %  Max: 98 %    General: NAD  Neuro; awake and alert  Lung: CTA non labored equal   Heart: regular rate    MEDICATIONS: Reviewed  Scheduled Meds:   levETIRAcetam  1,750 mg Oral BID    cefTRIAXone (ROCEPHIN) IV  1,000 mg IntraVENous Q24H    furosemide  20 mg IntraVENous BID    midodrine  2.5 mg Oral TID WC    spironolactone  25 mg Oral Daily    atorvastatin  10 mg Oral Nightly    folic acid  1 mg Oral Daily    OXcarbazepine  300 mg Oral BID    lacosamide (VIMPAT) 200 mg in sodium chloride 0.9 % 70 mL IVPB  200 mg IntraVENous BID    insulin lispro  0-4 Units SubCUTAneous TID WC    insulin lispro  0-4 Units SubCUTAneous Nightly    sodium chloride flush  5-40 mL IntraVENous 2 times per day    enoxaparin  40 mg SubCUTAneous Daily     Continuous Infusions:   dextrose      sodium chloride 25 mL (24 0108)     PRN Meds:.ipratropium 0.5 mg-albuterol 2.5 mg, diazePAM, metoclopramide, potassium chloride **OR** potassium alternative oral replacement **OR** potassium chloride, oxyCODONE-acetaminophen, dextrose bolus **OR** dextrose bolus, glucagon (rDNA), dextrose, sodium chloride flush, sodium chloride, magnesium sulfate, polyethylene 
4 Eyes Skin Assessment     NAME:  Dakota Luciano  YOB: 1960  MEDICAL RECORD NUMBER:  [id number]    The patient is being assessed for  Admission    I agree that at least one RN has performed a thorough Head to Toe Skin Assessment on the patient. ALL assessment sites listed below have been assessed.      Areas assessed by both nurses:    Head, Face, Ears, Shoulders, Back, Chest, Arms, Elbows, Hands, Sacrum. Buttock, Coccyx, Ischium, and Legs. Feet and Heels        Does the Patient have a Wound? No noted wound(s)     Pt with redness to buttocks, has old scabs on BLE. Pt with lesions on head unable to tell us what they are from.  Zi Prevention initiated by RN: Yes  Wound Care Orders initiated by RN: No    Pressure Injury (Stage 3,4, Unstageable, DTI, NWPT, and Complex wounds) if present, place Wound referral order by RN under : No    New Ostomies, if present place, Ostomy referral order under : No     Nurse 1 eSignature: Electronically signed by Ev Pak RN on 3/23/24 at 1:53 AM EDT    **SHARE this note so that the co-signing nurse can place an eSignature**    Nurse 2 eSignature: Electronically signed by DIANE MELISSA RN on 3/23/24 at 2:53 AM EDT    
Attempted to call pt's emergency contacts Shayna and John Miller that are listed as pt's DPOA (according to ACP document entered into pt's chart 8/2019) to provide updates and notify of pt's admission. Both numbers (Shayna: [phone], John: [phone]) went straight to voicemail. Left message for them to call unit.     Charisse Charles, RN  3/23/2024    
Attempted to call report to [facility] janet, lost connections. All information is listed on AVS.. awaiting   
Data- discharge order received, pt and DPOA (appointed legal authority) verbalized agreement to discharge, disposition to [facility] # [phone], EDINSON reviewed and signed by physician.    Action- AVS prepared, EDINSON completed/ reported faxed by case management/. Discharge instruction summary: Diet- SHAUNNA-standard supplement with meals and 1500 ml fluid restriction, Activity- as tolerated, assist x 1 via walker for ambulation, medications prescriptions to be filled at receiving facility except for the controlled prescriptions, paper rx sent with pt. Transfer code status: DNR-CC, LDAs to remain with discharge: None.     Response- Pt belongings gathered, peripheral IV and cardiac monitoring removed. Disposition to Discharged via cart/stretcher and via ambulance to skilled nursing- [facility] hospice by EMS transportation, no complications reported.       1. WEIGHT: Admit Weight - Scale: 72.4 kg (159 lb 9.8 oz) (03/23/24 0000)        Today  Weight - Scale: 64.5 kg (142 lb 3.2 oz) (03/27/24 0019)       2. O2 SAT.: SpO2: 98 % (03/27/24 0945)   
Patient continues to be tachypneic with BIPAP on. Patient encouraged to take deep slow breaths. Patient is breathing 38 bpm exVT 780 VE 20.1 L   ABG results reviewed. This RT spoke with Dr Vital about ABG results and BIPAP measurements. Ok to take off BIPAP at this time per Dr Vital. In hopes to not make respiratory alkalosis worse. Primary RN Prudence aware. Patient placed back on HFNC at 6 lpm flow. SpO2 94% RR 32 bpm.  
Patient's blood pressure low at 84/45, blood gas has improved.  Dr. Kapadia with an order for albumin, reduced lasix drip to 5mg/hr.    
Pharmacy Home Medication Reconciliation Note    A medication reconciliation has been completed for Dakota Luciano 1960    Pharmacy: [facility], [address] [place], [place]  Information provided by: facility nurse Ama and paperwork    The patient's home medication list is as follows:  No current facility-administered medications on file prior to encounter.     Current Outpatient Medications on File Prior to Encounter   Medication Sig Dispense Refill    hydrocortisone 1 % cream Apply topically 2 times daily Apply topically to bilateral forearms and hands every day and night shift for rash until healed.      levETIRAcetam (KEPPRA) 250 MG tablet Take 7 tablets by mouth 2 times daily 60 tablet 1    Multiple Vitamin (MULTIVITAMIN) TABS tablet Take 1 tablet by mouth daily 30 tablet 0    sodium chloride 1 g tablet Take 2 tablets by mouth 3 times daily (with meals) for 14 days 84 tablet 0    folic acid (FOLVITE) 1 MG tablet Take 1 tablet by mouth daily 30 tablet 3    sertraline (ZOLOFT) 50 MG tablet Take 1.5 tablets by mouth daily (Patient not taking: Reported on 3/22/2024)      OXcarbazepine (TRILEPTAL) 300 MG tablet Take 1 tablet by mouth 2 times daily 60 tablet 3    LORazepam (ATIVAN) 2 MG/ML injection Inject 1 mL into the muscle daily as needed. Inject 1 mL IM every 5 minutes as needed for seizures for up to 6 months.      fluticasone (FLONASE) 50 MCG/ACT nasal spray 2 sprays by Nasal route daily      cetirizine (ZYRTEC) 10 MG tablet Take 1 tablet by mouth daily      MELATONIN PO Take 9 mg by mouth nightly      traZODone (DESYREL) 100 MG tablet Take 1 tablet by mouth nightly      lisinopril (PRINIVIL;ZESTRIL) 2.5 MG tablet Take 1 tablet by mouth daily 30 tablet 3    potassium chloride (KLOR-CON M) 20 MEQ extended release tablet Take 1 tablet by mouth 2 times daily (Patient not taking: Reported on 3/22/2024)      acetaminophen (TYLENOL) 325 MG tablet Take 2 tablets by mouth every 4 
Pt stated that he \"feels weird, something isn't right\". Pt respiratory rate increased, Dr. Bourgeois. Ok to give PRN valium. HR, BP and O2 stable. Will continue to monitor  
Pt transported to  via bed. Report given to receiving JOSH Galvan. Belongings and dinner tray transported with pt.     Charisse Charles RN  3/23/2024    
Pt's brother jane Miller (James) returned call. He and his spouse (pt's sister) were aware of pt's admission and appreciated updates and care provided. Family aware of pt's pending transfer off of CVU to med/surg w/telemetry level of care.     Charisse Charles, RN  3/23/2024    
Pt's sister Shayna returned call. Updates provided, appreciated care for pt. Wishes to have more updates tomorrow as MD's round. Informed sister that pt is to be transferred to . Hospital number provided.     Charisse Charles RN  3/23/2024    
RT obtained ABG, ABG resulted and has improved. No indication for Bipap at this time. Patient resting Comfortably. Will continue to monitor  
Received order to hold lasix drip for now from Dr. Kapadia.  
[facility]  HEART FAILURE  Progress Note      Admit Date 3/22/2024     Reason for Consult:      Reason for Consultation/Chief Complaint: AMS, hypoxia    HPI:    Dakota Luciano is a 63 y.o. male with PMH seizure d/o, hyponatremia, severe MR, AV endocarditis admitted with hypoxia and MS changes. Echo with new drop in EF and he has diuresed with lasix gtt.       Subjective:  Patient is being seen for CHF. There were no acute overnight cardiac events.   Today Mr. Luciano is in the chair denies chest pain, shortness of breath, palpitations, or dizziness but he is a poor historian        Baseline Weight: 142?   Wt Readings from Last 3 Encounters:   03/27/24 64.5 kg (142 lb 3.2 oz)   12/20/23 74.4 kg (164 lb)   11/27/23 71.6 kg (157 lb 12.8 oz)         Cardiac Testing:   Echo 3/23/2024  Summary   Technically difficult exam due to poor patient toleration.   Left ventricular is markedly dilated.   Global left ventricular function is moderate to severely reduced with   ejection fraction estimated from 30 % to 35%. E/e' 26.   No regional wall motion abnormalities are noted   Diastolic filling parameters suggests grade III diastolic dysfunction .   The aortic valve leaflets are not well visualized; unable to determine   morphology.   Severe aortic regurgitation; unable to assess for holodiastolic flow   reversal.   The mitral valve is thickened. Dilated mitral annulus. Posterior mitral   valve leaflet is restricted in motion. There is poor mitral leaflet   coaptation.   Severe eccentric posterior directed mitral regurgitation is present.   Systolic flow reversal in pulmonary veins.   The left atrium is severely dilated.   Moderate tricuspid regurgitation.   Systolic pulmonary artery pressure (SPAP) estimated at 52 mmHg (RA pressure   8 mmHg), consistent with moderate pulmonary hypertension.   IVC size is normal (<2.1 cm) but collapses < 50% with respiration consistent   with elevated RA pressure (8 mmHg).   There is a 
Reviewed information and assessment with Dr. Kapadia, valium given to help pt per MD.  Dr. Kapadia reported to the bedside and requests that if patient's work of breathing gets worse to place the bipap on pt.  Dr. Kapadia ordered a lasix drip to be started for the patient.  
[facility]  Cardiology Consult    Dakota Luciano  1960 March 25, 2024        CC: Shortness of breath      Subjective:     History of Present Illness:    Dakota Luciano is a 63 y.o. patient with a PMH significant for mitral regurgitation with heart failure and diastolic heart failure presented with complaints of shortness of breath.         Past Medical History:   has a past medical history of Acute intracerebral hemorrhage (HCC), NATALIA (acute kidney injury) (HCC), Anxiety, Back pain, Cerebral artery occlusion with cerebral infarction (HCC), Chronic back pain greater than 3 months duration, Diabetes mellitus (HCC), Dysphasia, Dysphonia, Endocarditis and heart valve disorders in diseases classified elsewhere, Enterococcal bacteremia, Hydrocephalus (HCC), Hypertension, IVH (intraventricular hemorrhage) (HCC), NSTEMI (non-ST elevated myocardial infarction) (HCC), Receiving intravenous antibiotic treatment as outpatient, and Seizures ([facility]).    Surgical History:   has a past surgical history that includes Leg Surgery; craniotomy (Left, 02/03/2017); Brain aneurysm surgery (Left, 02/03/2017); Upper gastrointestinal endoscopy (N/A, 8/14/2019); and Colonoscopy (N/A, 8/14/2019).     Social History:   reports that he has been smoking cigarettes. He has never used smokeless tobacco. He reports current alcohol use of about 1.0 standard drink of alcohol per week. He reports that he does not currently use drugs after having used the following drugs: Marijuana (Weed).     Family History:  family history is not on file.    Home Medications:  Were reviewed and are listed in nursing record and/or below  Prior to Admission medications    Medication Sig Start Date End Date Taking? Authorizing Provider   hydrocortisone 1 % cream Apply topically 2 times daily Apply topically to bilateral forearms and hands every day and night shift for rash until healed.   Yes Provider, MD Davi   levETIRAcetam (KEPPRA) 250 MG tablet 
Spoke with respiratory therapist multiple times to place bipap in patient's room.  Respiratory therapist Charmaine reported that she did not want to hook it up in the patient's room since pt was seen to be alkalotic in the ER.  Respiratory therapist placed bipap outside patient's room if it should be needed.  
Systolic blood pressure 84 despite 50 g of albumin  Held Lasix drip  Monitor hemodynamics  Will defer to cardiology regarding appropriateness of inotropic support  
Unable to complete [place] Screening dur to patient confusion. The would only answer questions with, \"yeah, I could sleep\", for every question despite multiple attempts at explaining the answer scale.  
follow-up. Take a copy of this screening test to your primary care physician on your next office visit.      Interpretation:      0 -   7: It is unlikely that you are abnormally sleepy.    8 -   9: You have an average amount of daytime sleepiness.  10 - 15: You may be excessively sleepy depending on the situation. You may want to consider seeking medical attention.   16 - 24: You are excessively sleepy and should consider seeking medical attention.      Electronically signed by Barbara Gaona RCP on 3/27/2024 at 9:35 AM   
HISTORY: ORDERING SYSTEM PROVIDED HISTORY: short of breath, hypoxia, possible PE TECHNOLOGIST PROVIDED HISTORY: Reason for exam:->short of breath, hypoxia, possible PE Additional Contrast?->1 Reason for Exam: short of breath, hypoxia, possible PE FINDINGS: Pulmonary Arteries: Pulmonary arteries are adequately opacified for evaluation.  No evidence of intraluminal filling defect to suggest pulmonary embolism.  Main pulmonary artery is normal in caliber. Mediastinum: No evidence of mediastinal lymphadenopathy.  The heart is enlarged.  There is no acute abnormality of the thoracic aorta. Lungs/pleura: There is a moderate to large right pleural effusion.  There is a small left pleural effusion.  There are bilateral ground-glass opacities predominantly within a central pattern and interlobular septal thickening consistent with pulmonary edema. Upper Abdomen: Limited images of the upper abdomen demonstrate no acute abnormality. Soft Tissues/Bones: No acute bone or soft tissue abnormality.     1. No acute pulmonary artery embolism. 2. Cardiomegaly with pulmonary edema and bilateral pleural effusions.     XR CHEST PORTABLE    Result Date: 3/22/2024  EXAMINATION: ONE XRAY VIEW OF THE CHEST 3/22/2024 3:36 pm COMPARISON: 11/01/2023 HISTORY: Acute shortness of breath and hypoxia. FINDINGS: Evaluation is suboptimal secondary to body habitus and portable technique. Limited depth of inspiration. Borderline cardiomegaly.  Extensive perihilar opacities compatible with pulmonary edema.  Left greater than right pleural effusions.  No pneumothorax.     CHF.       CBC:   Recent Labs     03/22/24  1613 03/23/24  0500 03/24/24  0954   WBC 7.0 10.9 7.6   HGB 13.8 12.8* 12.1*    145 151     BMP:    Recent Labs     03/22/24  1613 03/23/24  0500 03/24/24  0954    144 139   K 4.9 4.0 3.5    106 103   CO2 23 26 25   BUN 30* 31* 23*   CREATININE 0.8 0.9 0.7*   GLUCOSE 120* 103* 100*     Hepatic:   Recent Labs     
respiration consistent   with elevated RA pressure (8 mmHg).   There is a pleural effusion.   There is a trivial pericardial effusion noted    Echo:  2/24/22:  Summary   -Left ventricular cavity size is severely dilated with normal left   ventricular wall thickness.   -Overall left ventricular systolic function appears low normal. Ejection   fraction is visually estimated to be 55%.   -Grade I diastolic dysfunction with normal LV filling pressures.   -Mitral valve leaflets appear mild-moderately thickened.   -Severe mitral regurgitation directed eccentrically posterior with systolic   flow reversal in pulmonary veins.   -Moderate aortic regurgitation.   -Trivial tricuspid regurgitation with a PASP of 25 mmHg.   -Trivial pulmonic regurgitation.    Assessment:    1.  Acute on chronic diastolic heart failure with new drop in LVEF to 30-35%  2.  Altered mental status  3.  UTI per hospitalist  4.  Severe mitral regurg  5.  Hyponatremia on salt tablets  6.  Mild anemia      Plan:    check iron studies and ferritin, treat if abnormal  Continue IV lasix 20 mg twice a day  Continue midodrine 2.5 mg three times a day   Continue aldactone 25 mg daily  Can consider adding sglt2 but with recent UTI not beneficial for him  No ace/arb/arni due to soft BP and on midodrine  Continue off sodium tablets due to volume overload    Need to consider hospice with recent drop in LVEF on echo and severe mitral regurg  Not sure if he is a candidate for [facility] (not able to make decisions as currently confused)    Per Dr Golden notes (6/13/2022), patient saw Dr Peter simmons regarding mitral valve and patient does not want to proceed with surgery    NYHA IV    Discussed with patient who is agreeable with plan of care.     Thank you for allowing me to participate in the care of your patient.    BEULAH MERA, SNEAIT - CNS      
ADL completion    Above goals reviewed on 3/26/2024.  All goals are ongoing at this time unless indicated above.       Therapy Session Time     Individual Group Co-treatment   Time In    0850   Time Out    0947   Minutes    57        Timed Code Treatment Minutes:   42  Total Treatment Minutes:  57       Electronically Signed By: JONATHAN Rasmussen OTR/L [id number] (I have reviewed and approve the above documentation. I provided supervision throughout session and guidance in clinical decision making as needed. )

## 2024-03-27 NOTE — PLAN OF CARE
Problem: Chronic Conditions and Co-morbidities  Goal: Patient's chronic conditions and co-morbidity symptoms are monitored and maintained or improved  Outcome: Completed     Problem: Pain  Goal: Verbalizes/displays adequate comfort level or baseline comfort level  Outcome: Completed     Problem: Safety - Adult  Goal: Free from fall injury  Outcome: Completed     Problem: Skin/Tissue Integrity  Goal: Absence of new skin breakdown  Description: 1.  Monitor for areas of redness and/or skin breakdown  2.  Assess vascular access sites hourly  3.  Every 4-6 hours minimum:  Change oxygen saturation probe site  4.  Every 4-6 hours:  If on nasal continuous positive airway pressure, respiratory therapy assess nares and determine need for appliance change or resting period.  Outcome: Completed     Problem: ABCDS Injury Assessment  Goal: Absence of physical injury  Outcome: Completed     Problem: Discharge Planning  Goal: Discharge to home or other facility with appropriate resources  Outcome: Completed     Problem: Neurosensory - Adult  Goal: Achieves stable or improved neurological status  Outcome: Completed  Goal: Absence of seizures  Outcome: Completed  Goal: Remains free of injury related to seizures activity  Outcome: Completed  Goal: Achieves maximal functionality and self care  Outcome: Completed     Problem: Respiratory - Adult  Goal: Achieves optimal ventilation and oxygenation  Outcome: Completed     Problem: Cardiovascular - Adult  Goal: Maintains optimal cardiac output and hemodynamic stability  Outcome: Completed     Problem: Skin/Tissue Integrity - Adult  Goal: Skin integrity remains intact  Outcome: Completed  Goal: Oral mucous membranes remain intact  Outcome: Completed     Problem: Musculoskeletal - Adult  Goal: Return mobility to safest level of function  Outcome: Completed  Goal: Maintain proper alignment of affected body part  Outcome: Completed  Goal: Return ADL status to a safe level of

## 2024-03-27 NOTE — CONSULTS
In patient Neurology consult        [facility] Neurology      MD Dakota Stratton  1960    Date of Service: 3/23/2024    Referring Physician: Brent Pelaez MD      Reason for the consult and CC: Acute encephalopathy and history of seizure    HPI:   The patient is a 63 y.o.  years old male with multimedical problem, history of refractory epilepsy and remote ICH she was admitted to the hospital for CHF exacerbation.  Neurology was consulted for history of seizures.  History limited from the patient.  Is awake and alert.  Appears at baseline.  Denies any pain, new weakness, neck pain, chest pain, dysphagia or dysarthria.  History of medical refractory epilepsy on different AED.  No recent seizure according to report.  He is currently on Keppra 70 mg every 12 hours, Trileptal 300 mg x 2, Vimpat 200 mg x 2.  Blood test reviewed.  Unremarkable CBC and CMP.  Other review of system was limited from the patient       Constitutional:   Vitals:    03/23/24 0900 03/23/24 1000 03/23/24 1100 03/23/24 1200   BP: (!) 110/37 114/61 107/62 118/61   Pulse: 90 93 90 89   Resp: (!) 35 (!) 40 25 30   Temp:    97.4 °F (36.3 °C)   TempSrc:    Temporal   SpO2: 96% 100%  90%   Weight:             I personally reviewed and updated social history, past medical history, medications, allergy, surgical history, and family history as documented in the patient's electronic health records.       ROS: 10-14 ROS reviewed with the patient/nurse/family which were unremarkable except mentioned in H&P.    General appearance:  Normal development and appear in no acute distress.   Mental Status:   Oriented to person, and place  Poor fund of knowledge and immediate recall  Fluent  Good attention  He is distracted      Cranial Nerves:   II: Visual fields: Full. Pupils: equal, round, reactive to light, bilaterally  III,IV,VI: Extra Ocular Movements are intact. No nystagmus  V: Facial sensation is intact  VII: Facial strength and 
  Pulmonary and Critical Care Medicine    CC: SOB   Date: 3/23/2024  Admit Date:  3/22/2024  Reason for Consultation: ICU management   Consult Requesting Physician: Brent Pelaez MD     HPI     This is a 64 y/o M w/ a hx of CVA, seizure disorder, MR, AR, Endocarditis, CHF, who presented with AMS and SOB. Patient was admitted to the CVU 8 with Acute hypoxic respiratory failure, decompensated heart failure exacerbation, UTI. He was started on 10L on presentation which is weaned down to 5L, he was been started on lasix and abx. Critical Care medicine was consulted to help with the management on my exam patient was in room he was on 5L NC but the canula was not in his nose, no distress, he states he is better but he is confused and he is not oriented to place.     ROS: unable to obtain given confusion     PMH:  has a past medical history of Acute intracerebral hemorrhage (HCC), NATALIA (acute kidney injury) (HCC), Anxiety, Back pain, Cerebral artery occlusion with cerebral infarction (HCC), Chronic back pain greater than 3 months duration, Diabetes mellitus (HCC), Dysphasia, Dysphonia, Endocarditis and heart valve disorders in diseases classified elsewhere, Enterococcal bacteremia, Hydrocephalus (HCC), Hypertension, IVH (intraventricular hemorrhage) (HCC), NSTEMI (non-ST elevated myocardial infarction) (HCC), Receiving intravenous antibiotic treatment as outpatient, and Seizures (HCC).   PSH:  has a past surgical history that includes Leg Surgery; craniotomy (Left, 02/03/2017); Brain aneurysm surgery (Left, 02/03/2017); Upper gastrointestinal endoscopy (N/A, 8/14/2019); and Colonoscopy (N/A, 8/14/2019).    SH:  reports that he has been smoking cigarettes. He has never used smokeless tobacco. He reports current alcohol use of about 1.0 standard drink of alcohol per week. He reports that he does not currently use drugs after having used the following drugs: Marijuana (Weed).   FH: family history is not on file.    Allergies: 
Palliative Care:        Patient pending DC today @ 1400. Sister of patient and DPOA (Shayna) notified and in agreement upon discharge patient can have hospice services. Per choice she has elected [facility] also updated of what agency is following.     Consulted with Dr. Pelaez. Hospice consult placed. Information already has been faxed to [facility]. Liaison Tk notified.     Will continue to follow as needed.     Electronically signed by Amanda Fenton RN, BSN, CHPN (Palliative Care) [phone]    
Palliative Care:   a 63 y.o.  male with complex medical history presented to the ED from nursing home via EMS for acute respiratory distress and altered mental status.  On arrival SpO2 was in the 60s on room air.  Oxygen saturation continued to be low on 4 L nasal cannula.  Supplemental oxygen was escalated to HFNC 10 L.  At present patient has slight labored rapid shallow breathing.  His speech is nonsensical. He is lethargic but denies any pain.     CTA chest ruled out pulmonary embolus but showed findings consistent with decompensated heart failure (pulmonary edema cardiomegaly and bilateral pleural effusions). Patient admitted 3/22/24 for further workup/treatment. Palliative consult placed 3/25/24.       ECHO 3/23/24:   Summary   Technically difficult exam due to poor patient toleration.   Left ventricular is markedly dilated.   Global left ventricular function is moderate to severely reduced with   ejection fraction estimated from 30 % to 35%. E/e' 26.   No regional wall motion abnormalities are noted   Diastolic filling parameters suggests grade III diastolic dysfunction .   The aortic valve leaflets are not well visualized; unable to determine   morphology.   Severe aortic regurgitation; unable to assess for holodiastolic flow   reversal.   The mitral valve is thickened. Dilated mitral annulus. Posterior mitral   valve leaflet is restricted in motion. There is poor mitral leaflet   coaptation.   Severe eccentric posterior directed mitral regurgitation is present.   Systolic flow reversal in pulmonary veins.   The left atrium is severely dilated.   Moderate tricuspid regurgitation.   Systolic pulmonary artery pressure (SPAP) estimated at 52 mmHg (RA pressure   8 mmHg), consistent with moderate pulmonary hypertension.   IVC size is normal (<2.1 cm) but collapses < 50% with respiration consistent   with elevated RA pressure (8 mmHg).   There is a pleural effusion.   There is a trivial pericardial effusion 
respiratory failure (HCC)     4.  Severe MR  5.  History of hyponatremia treated with sodium chloride tablets  6.  Seizure disorder  7.  Mildly elevated bilirubin  8.  Mild anemia    Plan:   He diuresed very well with lasix drip.  Still needs diuresis.  Start lasix 20 mg IV bid  Stop sodium chloride tablets, as these are likely the cause of his severe volume overload  Recheck echo for his severe MR  Holding lisinopril while diuresing  Start spironolactone 25 mg po qd for hyponatremia and volume overload  No need for a beta blocker at this time.  Need to recheck LVEF on echo.  Treat UTI with antibiotics.  CHF education reinforced.  ~salt restriction  ~fluid restriction  ~medication compliance  ~daily weights and notify of any significant weight gain/loss  ~establish with CHF nurse  ~outpatient follow-up with our CHF team    The patient was seen for > 75  minutes. I reviewed interval history, physical exam, review of data including labs, imaging, development and implementation of treatment plan and coordination of complex care. More than 50% of the time was devoted to counseling the patient on their diagnoses/treatments, as well as coordination of care with the other care teams      I appreciate the opportunity of cooperating in the care of this patient.    Hyacinth Moraes M.D., [facility]

## 2024-08-04 NOTE — ED NOTES
ED TO INPATIENT SBAR HANDOFF    Patient Name: Kelly Griggs   :  1960  61 y.o. MRN:  [id number]  Preferred Name  Heidi Kahn  ED Room #:  ED-0021/21  Family/Caregiver Present no   Restraints no   Sitter no   Sepsis Risk Score Sepsis Risk Score: 7.32    Situation  Code Status: Full Code No additional code details. Allergies: Levonorgestrel-ethinyl estrad and Seasonal  Weight: No data found. Arrived from: nursing home  Chief Complaint:   Chief Complaint   Patient presents with    Seizures     Pt came in via [facility] from [address] [place] [address]. After first witness seizure pt was given 2 mg of Ativan. Pt had another witness seizure by EMS at the Nursing Home. Pt had third witness seizure enroute to [facility] ED. [facility] started a line and administered 4 mg of Versed. Pt has a cut to R Arm and abrasions all over body. Hospital Problem/Diagnosis:  Principal Problem:    Seizure [facility])  Resolved Problems:    * No resolved hospital problems. *    Imaging:   XR CHEST PORTABLE   Final Result   Mild cardiomegaly. No acute cardiopulmonary findings.            Abnormal labs:   Abnormal Labs Reviewed   COMPREHENSIVE METABOLIC PANEL W/ REFLEX TO MG FOR LOW K - Abnormal; Notable for the following components:       Result Value    Sodium 126 (*)     Chloride 92 (*)     CO2 14 (*)     Anion Gap 20 (*)     Glucose 187 (*)     Total Protein 6.1 (*)     All other components within normal limits    Narrative:     CALL  Rolle  [facility] tel. [phone],  Chemistry results called to and read back by JOSH Travis, 2023  18:47, by Selene Marin   CBC WITH AUTO DIFFERENTIAL - Abnormal; Notable for the following components:    RBC 3.80 (*)     Hemoglobin 12.9 (*)     Hematocrit 38.7 (*)     .0 (*)     MCH 34.1 (*)     RDW 12.3 (*)     Lymphocytes Absolute 0.6 (*)     All other components within normal limits   LACTIC ACID - Abnormal; Notable for the following components:    Lactic Acid 9.3 (*)     All EKG: Sinus tachycardia @111 w/ possible L atrial enlargement L axis deviation L ventricular hypertrophy Qtc 484 on presentation    - Trop 96.8 EKG with sinus tach @111 likely demand ischemia; asymptomatic  - trend trops to peak Meets SIRs criteria on admission Tmax 101, . Unclear source, UA with few bacteria also squamous cells (likely contaminant), and proteinuria, no leukesterase or nitrites.   Imaging: No pulmonary embolism though evaluation of subsegmental branches is somewhat limited secondary to bolus timing. No consolidation or pleural effusion. Liver cirrhotic morphology with findings of portal hypertension. No ascites. Mild bladder wall thickening may reflect underdistention and/or cystitis. Correlate with urinalysis and symptomatology.      - s/p acetaminophen 975mg PO x1, ceftriaxone 1000mg IVPB x1, KCl 20meq x1, 1L 0.9% NS bolus x4 in ED  - UA neg, RVP neg  - Lactate 2.2 --> 2.4  - F/u UCx, BCx x2, repeat Lactate  - Monitor for any further signs of fever

## 2024-10-05 ENCOUNTER — HOSPITAL ENCOUNTER (EMERGENCY)
Age: 64
Discharge: OTHER INSTITUTION | End: 2024-10-06
Attending: INTERNAL MEDICINE
Payer: COMMERCIAL

## 2024-10-05 ENCOUNTER — APPOINTMENT (OUTPATIENT)
Dept: GENERAL RADIOLOGY | Age: 64
End: 2024-10-05
Payer: COMMERCIAL

## 2024-10-05 ENCOUNTER — APPOINTMENT (OUTPATIENT)
Dept: CT IMAGING | Age: 64
End: 2024-10-05
Payer: COMMERCIAL

## 2024-10-05 DIAGNOSIS — Z23 TETANUS TOXOID VACCINATION ADMINISTERED AT CURRENT VISIT: ICD-10-CM

## 2024-10-05 DIAGNOSIS — S01.01XA LACERATION OF SCALP, INITIAL ENCOUNTER: ICD-10-CM

## 2024-10-05 DIAGNOSIS — R29.6 UNWITNESSED FALL: ICD-10-CM

## 2024-10-05 DIAGNOSIS — Z66 DNR (DO NOT RESUSCITATE): Primary | ICD-10-CM

## 2024-10-05 LAB
ALBUMIN SERPL-MCNC: 4 G/DL (ref 3.4–5)
ALBUMIN/GLOB SERPL: 1.5 {RATIO} (ref 1.1–2.2)
ALP SERPL-CCNC: 118 U/L (ref 40–129)
ALT SERPL-CCNC: 20 U/L (ref 10–40)
ANION GAP SERPL CALCULATED.3IONS-SCNC: 14 MMOL/L (ref 3–16)
AST SERPL-CCNC: 36 U/L (ref 15–37)
BACTERIA URNS QL MICRO: ABNORMAL /HPF
BASOPHILS # BLD: 0 K/UL (ref 0–0.2)
BASOPHILS NFR BLD: 0.5 %
BILIRUB SERPL-MCNC: 1.1 MG/DL (ref 0–1)
BILIRUB UR QL STRIP.AUTO: NEGATIVE
BUN SERPL-MCNC: 43 MG/DL (ref 7–20)
CALCIUM SERPL-MCNC: 9.3 MG/DL (ref 8.3–10.6)
CHLORIDE SERPL-SCNC: 104 MMOL/L (ref 99–110)
CLARITY UR: CLEAR
CO2 SERPL-SCNC: 23 MMOL/L (ref 21–32)
COLOR UR: ABNORMAL
CREAT SERPL-MCNC: 1.3 MG/DL (ref 0.8–1.3)
DEPRECATED RDW RBC AUTO: 14.3 % (ref 12.4–15.4)
EOSINOPHIL # BLD: 0 K/UL (ref 0–0.6)
EOSINOPHIL NFR BLD: 0 %
EPI CELLS #/AREA URNS AUTO: 2 /HPF (ref 0–5)
GFR SERPLBLD CREATININE-BSD FMLA CKD-EPI: 61 ML/MIN/{1.73_M2}
GLUCOSE SERPL-MCNC: 139 MG/DL (ref 70–99)
GLUCOSE UR STRIP.AUTO-MCNC: NEGATIVE MG/DL
HCT VFR BLD AUTO: 36.7 % (ref 40.5–52.5)
HGB BLD-MCNC: 12.8 G/DL (ref 13.5–17.5)
HGB UR QL STRIP.AUTO: ABNORMAL
HYALINE CASTS #/AREA URNS AUTO: 12 /LPF (ref 0–8)
HYALINE CASTS: PRESENT
KETONES UR STRIP.AUTO-MCNC: NEGATIVE MG/DL
LEUKOCYTE ESTERASE UR QL STRIP.AUTO: ABNORMAL
LYMPHOCYTES # BLD: 0.7 K/UL (ref 1–5.1)
LYMPHOCYTES NFR BLD: 13.6 %
MCH RBC QN AUTO: 36.1 PG (ref 26–34)
MCHC RBC AUTO-ENTMCNC: 34.9 G/DL (ref 31–36)
MCV RBC AUTO: 103.3 FL (ref 80–100)
MONOCYTES # BLD: 0.3 K/UL (ref 0–1.3)
MONOCYTES NFR BLD: 6 %
NEUTROPHILS # BLD: 4.4 K/UL (ref 1.7–7.7)
NEUTROPHILS NFR BLD: 79.9 %
NITRITE UR QL STRIP.AUTO: NEGATIVE
NT-PROBNP SERPL-MCNC: ABNORMAL PG/ML (ref 0–124)
PH UR STRIP.AUTO: 5 [PH] (ref 5–8)
PLATELET # BLD AUTO: 150 K/UL (ref 135–450)
PMV BLD AUTO: 8.8 FL (ref 5–10.5)
POTASSIUM SERPL-SCNC: 4.8 MMOL/L (ref 3.5–5.1)
PROT SERPL-MCNC: 6.6 G/DL (ref 6.4–8.2)
PROT UR STRIP.AUTO-MCNC: ABNORMAL MG/DL
RBC # BLD AUTO: 3.55 M/UL (ref 4.2–5.9)
RBC CLUMPS #/AREA URNS AUTO: 20 /HPF (ref 0–4)
SODIUM SERPL-SCNC: 141 MMOL/L (ref 136–145)
SP GR UR STRIP.AUTO: 1.02 (ref 1–1.03)
TROPONIN, HIGH SENSITIVITY: 29 NG/L (ref 0–22)
TROPONIN, HIGH SENSITIVITY: 43 NG/L (ref 0–22)
UA COMPLETE W REFLEX CULTURE PNL UR: ABNORMAL
UA DIPSTICK W REFLEX MICRO PNL UR: YES
URN SPEC COLLECT METH UR: ABNORMAL
UROBILINOGEN UR STRIP-ACNC: 1 E.U./DL
WBC # BLD AUTO: 5.5 K/UL (ref 4–11)
WBC #/AREA URNS AUTO: 6 /HPF (ref 0–5)

## 2024-10-05 PROCEDURE — 12002 RPR S/N/AX/GEN/TRNK2.6-7.5CM: CPT

## 2024-10-05 PROCEDURE — 6370000000 HC RX 637 (ALT 250 FOR IP): Performed by: PHYSICIAN ASSISTANT

## 2024-10-05 PROCEDURE — 90471 IMMUNIZATION ADMIN: CPT | Performed by: PHYSICIAN ASSISTANT

## 2024-10-05 PROCEDURE — 83880 ASSAY OF NATRIURETIC PEPTIDE: CPT

## 2024-10-05 PROCEDURE — 85025 COMPLETE CBC W/AUTO DIFF WBC: CPT

## 2024-10-05 PROCEDURE — 80053 COMPREHEN METABOLIC PANEL: CPT

## 2024-10-05 PROCEDURE — 70450 CT HEAD/BRAIN W/O DYE: CPT

## 2024-10-05 PROCEDURE — 93005 ELECTROCARDIOGRAM TRACING: CPT | Performed by: PHYSICIAN ASSISTANT

## 2024-10-05 PROCEDURE — 84484 ASSAY OF TROPONIN QUANT: CPT

## 2024-10-05 PROCEDURE — 72125 CT NECK SPINE W/O DYE: CPT

## 2024-10-05 PROCEDURE — 71045 X-RAY EXAM CHEST 1 VIEW: CPT

## 2024-10-05 PROCEDURE — 90714 TD VACC NO PRESV 7 YRS+ IM: CPT | Performed by: PHYSICIAN ASSISTANT

## 2024-10-05 PROCEDURE — 99285 EMERGENCY DEPT VISIT HI MDM: CPT

## 2024-10-05 PROCEDURE — 6360000002 HC RX W HCPCS: Performed by: PHYSICIAN ASSISTANT

## 2024-10-05 PROCEDURE — 81001 URINALYSIS AUTO W/SCOPE: CPT

## 2024-10-05 RX ORDER — 0.9 % SODIUM CHLORIDE 0.9 %
1000 INTRAVENOUS SOLUTION INTRAVENOUS ONCE
Status: DISCONTINUED | OUTPATIENT
Start: 2024-10-05 | End: 2024-10-05

## 2024-10-05 RX ORDER — LIDOCAINE HYDROCHLORIDE 40 MG/ML
SOLUTION TOPICAL
Status: DISCONTINUED
Start: 2024-10-05 | End: 2024-10-06 | Stop reason: HOSPADM

## 2024-10-05 RX ORDER — MIDODRINE HYDROCHLORIDE 5 MG/1
2.5 TABLET ORAL ONCE
Status: COMPLETED | OUTPATIENT
Start: 2024-10-05 | End: 2024-10-05

## 2024-10-05 RX ORDER — LIDOCAINE/PRILOCAINE 2.5 %-2.5%
CREAM (GRAM) TOPICAL ONCE
Status: COMPLETED | OUTPATIENT
Start: 2024-10-05 | End: 2024-10-05

## 2024-10-05 RX ADMIN — LIDOCAINE AND PRILOCAINE: 25; 25 CREAM TOPICAL at 18:42

## 2024-10-05 RX ADMIN — MIDODRINE HYDROCHLORIDE 2.5 MG: 5 TABLET ORAL at 18:42

## 2024-10-05 RX ADMIN — CLOSTRIDIUM TETANI TOXOID ANTIGEN (FORMALDEHYDE INACTIVATED) AND CORYNEBACTERIUM DIPHTHERIAE TOXOID ANTIGEN (FORMALDEHYDE INACTIVATED) 0.5 ML: 5; 2 INJECTION, SUSPENSION INTRAMUSCULAR at 23:47

## 2024-10-05 ASSESSMENT — PAIN - FUNCTIONAL ASSESSMENT: PAIN_FUNCTIONAL_ASSESSMENT: NONE - DENIES PAIN

## 2024-10-06 VITALS
DIASTOLIC BLOOD PRESSURE: 49 MMHG | WEIGHT: 125 LBS | RESPIRATION RATE: 22 BRPM | OXYGEN SATURATION: 100 % | SYSTOLIC BLOOD PRESSURE: 94 MMHG | BODY MASS INDEX: 16.93 KG/M2 | HEIGHT: 72 IN | TEMPERATURE: 98 F | HEART RATE: 88 BPM

## 2024-10-06 LAB
EKG ATRIAL RATE: 89 BPM
EKG DIAGNOSIS: NORMAL
EKG P AXIS: 74 DEGREES
EKG P-R INTERVAL: 222 MS
EKG Q-T INTERVAL: 380 MS
EKG QRS DURATION: 134 MS
EKG QTC CALCULATION (BAZETT): 462 MS
EKG R AXIS: -39 DEGREES
EKG T AXIS: 78 DEGREES
EKG VENTRICULAR RATE: 89 BPM

## 2024-10-06 PROCEDURE — 93010 ELECTROCARDIOGRAM REPORT: CPT | Performed by: INTERNAL MEDICINE

## 2024-10-06 NOTE — ED PROVIDER NOTES
In addition to the advanced practice provider, I personally saw Dakota Luciano and performed a substantive portion of the visit including all aspects of the medical decision making.    Medical Decision Making  64-year-old male comes in today status post fall.  [facility].  There is report that the patient did hit his head.  There is a laceration to the back of the head.  Patient has a documented DNR CC/hospice order.  Patient is not on blood thinners.  Patient is a poor historian.  Heart has regular rhythm and rate.  Lungs are clear bilaterally.  Patient is nontoxic and nonseptic in appearance.  Patient does have a laceration to the scalp with no active bleeding.  I do not appreciate step-off or crepitus.  I do not see any Thomas sign or raccoon eyes.  Tetanus shot was updated patient is on midodrine and at this time patient's blood pressure has been low at 99/58, 90/64 and 94/49.  Heart rate is not tachycardic.  I do not see any evidence of seizure activity or postictal state.  Chest x-ray shows bibasilar opacities and opacities in the perihilar region.  This could be atelectasis and/or edema.  BNP is 12,253.  He has not respiratory distress.  Troponin was initially 43 and decreased to 29.  Patient was also given midodrine here in the ER due to the low blood pressure.  Case was discussed with the POA/emergency contact.  The POA/emergency contact would like the patient to remain in hospice and comfort care.  The individual does not want the patient admitted for further cardiac workup.  Family is okay with the patient going back to the facility.  I agree with the assessment and plan.  Patient is stable to go back to the facility.      EKG  The Ekg interpreted by me in the absence of a cardiologist shows.  normal sinus rhythm with multiple PVCs a rate of 89  Axis is   Left axis deviation  QTc is  normal  Intervals and Durations are unremarkable.      No specific ST-T wave changes 
SpO2   (!) 99/58 -- -- 98 °F (36.7 °C) Oral 88 22 100 %      Height Weight - Scale         1.829 m (6') 56.7 kg (125 lb)             Physical Exam  Constitutional:       General: He is not in acute distress.     Appearance: Normal appearance. He is well-developed. He is not ill-appearing, toxic-appearing or diaphoretic.   HENT:      Head: Normocephalic.      Comments: Laceration to the occipital scalp.  No active bleeding.  No raccoon eyes or Thomas sign     Right Ear: External ear normal.      Left Ear: External ear normal.   Eyes:      General:         Right eye: No discharge.         Left eye: No discharge.      Extraocular Movements: Extraocular movements intact.      Conjunctiva/sclera: Conjunctivae normal.      Pupils: Pupils are equal, round, and reactive to light.   Cardiovascular:      Rate and Rhythm: Normal rate and regular rhythm.      Pulses: Normal pulses.      Heart sounds: Normal heart sounds. No murmur heard.     No friction rub. No gallop.      Comments: 2+ radial pulses bilaterally.  No pulse deficit.  No JVD.  No calf tenderness.  No pedal edema  Pulmonary:      Effort: Pulmonary effort is normal. No respiratory distress.      Breath sounds: Normal breath sounds. No stridor. No wheezing, rhonchi or rales.   Chest:      Chest wall: No tenderness.   Abdominal:      General: Abdomen is flat. Bowel sounds are normal. There is no distension.      Palpations: Abdomen is soft. There is no mass.      Tenderness: There is no abdominal tenderness. There is no right CVA tenderness, left CVA tenderness, guarding or rebound.      Hernia: No hernia is present.   Musculoskeletal:         General: Normal range of motion.      Cervical back: Normal range of motion and neck supple.      Comments: No TTP to the midline cervical, thoracic or lumbar spine.  No anterior chest wall tenderness.  No pelvis instability.  No TTP to the upper and lower extremities throughout.  Full range of motion intact to the upper and

## 2024-10-06 NOTE — ED NOTES
Called [facility] with report.  The facility nurse notified of hourly need of neuro and incontinence checks. All questions answered.

## 2024-10-07 ENCOUNTER — HOSPITAL ENCOUNTER (EMERGENCY)
Age: 64
Discharge: HOME | End: 2024-10-08
Attending: EMERGENCY MEDICINE
Payer: COMMERCIAL
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
## 2024-10-07 DIAGNOSIS — S09.90XA CLOSED HEAD INJURY, INITIAL ENCOUNTER: ICD-10-CM

## 2024-10-07 DIAGNOSIS — R41.82 ALTERED MENTAL STATUS, UNSPECIFIED ALTERED MENTAL STATUS TYPE: ICD-10-CM

## 2024-10-07 DIAGNOSIS — Z51.5 HOSPICE CARE: Primary | ICD-10-CM

## 2024-10-07 DIAGNOSIS — S12.9XXA FRACTURE OF CERVICAL SPINOUS PROCESS, INITIAL ENCOUNTER (HCC): ICD-10-CM

## 2024-10-07 DIAGNOSIS — S22.41XA CLOSED FRACTURE OF MULTIPLE RIBS OF RIGHT SIDE, INITIAL ENCOUNTER: ICD-10-CM

## 2024-10-07 DIAGNOSIS — S00.03XA CONTUSION OF SCALP, INITIAL ENCOUNTER: ICD-10-CM

## 2024-10-07 DIAGNOSIS — E86.0 DEHYDRATION: ICD-10-CM

## 2024-10-07 DIAGNOSIS — N17.9 AKI (ACUTE KIDNEY INJURY) (HCC): ICD-10-CM

## 2024-10-07 PROCEDURE — 99285 EMERGENCY DEPT VISIT HI MDM: CPT

## 2024-10-07 ASSESSMENT — LIFESTYLE VARIABLES
HOW OFTEN DO YOU HAVE A DRINK CONTAINING ALCOHOL: PATIENT UNABLE TO ANSWER
HOW MANY STANDARD DRINKS CONTAINING ALCOHOL DO YOU HAVE ON A TYPICAL DAY: PATIENT UNABLE TO ANSWER

## 2024-10-08 ENCOUNTER — APPOINTMENT (OUTPATIENT)
Dept: CT IMAGING | Age: 64
End: 2024-10-08
Payer: COMMERCIAL

## 2024-10-08 ENCOUNTER — APPOINTMENT (OUTPATIENT)
Dept: GENERAL RADIOLOGY | Age: 64
End: 2024-10-08
Payer: COMMERCIAL

## 2024-10-08 VITALS
TEMPERATURE: 97.8 F | RESPIRATION RATE: 28 BRPM | OXYGEN SATURATION: 99 % | SYSTOLIC BLOOD PRESSURE: 92 MMHG | DIASTOLIC BLOOD PRESSURE: 60 MMHG | HEART RATE: 82 BPM | BODY MASS INDEX: 16.93 KG/M2 | HEIGHT: 72 IN | WEIGHT: 125 LBS

## 2024-10-08 LAB
ALBUMIN SERPL-MCNC: 4 G/DL (ref 3.4–5)
ALBUMIN/GLOB SERPL: 1.5 {RATIO} (ref 1.1–2.2)
ALP SERPL-CCNC: 122 U/L (ref 40–129)
ALT SERPL-CCNC: 160 U/L (ref 10–40)
ANION GAP SERPL CALCULATED.3IONS-SCNC: 24 MMOL/L (ref 3–16)
AST SERPL-CCNC: 120 U/L (ref 15–37)
BACTERIA URNS QL MICRO: ABNORMAL /HPF
BASOPHILS # BLD: 0 K/UL (ref 0–0.2)
BASOPHILS NFR BLD: 0.3 %
BILIRUB SERPL-MCNC: 1.9 MG/DL (ref 0–1)
BILIRUB UR QL STRIP.AUTO: NEGATIVE
BUN SERPL-MCNC: 67 MG/DL (ref 7–20)
CALCIUM SERPL-MCNC: 9.2 MG/DL (ref 8.3–10.6)
CHLORIDE SERPL-SCNC: 103 MMOL/L (ref 99–110)
CK SERPL-CCNC: 313 U/L (ref 39–308)
CLARITY UR: CLEAR
CO2 SERPL-SCNC: 18 MMOL/L (ref 21–32)
COLOR UR: ABNORMAL
CREAT SERPL-MCNC: 1.6 MG/DL (ref 0.8–1.3)
DEPRECATED RDW RBC AUTO: 14.8 % (ref 12.4–15.4)
EOSINOPHIL # BLD: 0 K/UL (ref 0–0.6)
EOSINOPHIL NFR BLD: 0 %
EPI CELLS #/AREA URNS AUTO: 0 /HPF (ref 0–5)
GFR SERPLBLD CREATININE-BSD FMLA CKD-EPI: 48 ML/MIN/{1.73_M2}
GLUCOSE SERPL-MCNC: 157 MG/DL (ref 70–99)
GLUCOSE UR STRIP.AUTO-MCNC: NEGATIVE MG/DL
HCT VFR BLD AUTO: 40.2 % (ref 40.5–52.5)
HGB BLD-MCNC: 13.5 G/DL (ref 13.5–17.5)
HGB UR QL STRIP.AUTO: ABNORMAL
HYALINE CASTS #/AREA URNS AUTO: 28 /LPF (ref 0–8)
HYALINE CASTS: PRESENT
KETONES UR STRIP.AUTO-MCNC: NEGATIVE MG/DL
LACTATE BLDV-SCNC: 6.5 MMOL/L (ref 0.4–1.9)
LEUKOCYTE ESTERASE UR QL STRIP.AUTO: ABNORMAL
LYMPHOCYTES # BLD: 0.5 K/UL (ref 1–5.1)
LYMPHOCYTES NFR BLD: 5.1 %
MCH RBC QN AUTO: 36 PG (ref 26–34)
MCHC RBC AUTO-ENTMCNC: 33.5 G/DL (ref 31–36)
MCV RBC AUTO: 107.5 FL (ref 80–100)
MONOCYTES # BLD: 0.6 K/UL (ref 0–1.3)
MONOCYTES NFR BLD: 7.1 %
NEUTROPHILS # BLD: 7.9 K/UL (ref 1.7–7.7)
NEUTROPHILS NFR BLD: 87.5 %
NITRITE UR QL STRIP.AUTO: NEGATIVE
PH UR STRIP.AUTO: 5 [PH] (ref 5–8)
PLATELET # BLD AUTO: 163 K/UL (ref 135–450)
PMV BLD AUTO: 9.2 FL (ref 5–10.5)
POTASSIUM SERPL-SCNC: 4.1 MMOL/L (ref 3.5–5.1)
PROT SERPL-MCNC: 6.7 G/DL (ref 6.4–8.2)
PROT UR STRIP.AUTO-MCNC: ABNORMAL MG/DL
RBC # BLD AUTO: 3.74 M/UL (ref 4.2–5.9)
RBC CLUMPS #/AREA URNS AUTO: 5 /HPF (ref 0–4)
REASON FOR REJECTION: NORMAL
REJECTED TEST: NORMAL
SODIUM SERPL-SCNC: 145 MMOL/L (ref 136–145)
SP GR UR STRIP.AUTO: 1.01 (ref 1–1.03)
TROPONIN, HIGH SENSITIVITY: 51 NG/L (ref 0–22)
UA COMPLETE W REFLEX CULTURE PNL UR: ABNORMAL
UA DIPSTICK W REFLEX MICRO PNL UR: YES
URN SPEC COLLECT METH UR: ABNORMAL
UROBILINOGEN UR STRIP-ACNC: 1 E.U./DL
WBC # BLD AUTO: 9 K/UL (ref 4–11)
WBC #/AREA URNS AUTO: 3 /HPF (ref 0–5)

## 2024-10-08 PROCEDURE — 93005 ELECTROCARDIOGRAM TRACING: CPT | Performed by: EMERGENCY MEDICINE

## 2024-10-08 PROCEDURE — 81001 URINALYSIS AUTO W/SCOPE: CPT

## 2024-10-08 PROCEDURE — 71250 CT THORAX DX C-: CPT

## 2024-10-08 PROCEDURE — 72125 CT NECK SPINE W/O DYE: CPT

## 2024-10-08 PROCEDURE — 84484 ASSAY OF TROPONIN QUANT: CPT

## 2024-10-08 PROCEDURE — 2580000003 HC RX 258: Performed by: EMERGENCY MEDICINE

## 2024-10-08 PROCEDURE — 70450 CT HEAD/BRAIN W/O DYE: CPT

## 2024-10-08 PROCEDURE — 82550 ASSAY OF CK (CPK): CPT

## 2024-10-08 PROCEDURE — 73080 X-RAY EXAM OF ELBOW: CPT

## 2024-10-08 PROCEDURE — 80053 COMPREHEN METABOLIC PANEL: CPT

## 2024-10-08 PROCEDURE — 96360 HYDRATION IV INFUSION INIT: CPT

## 2024-10-08 PROCEDURE — 96361 HYDRATE IV INFUSION ADD-ON: CPT

## 2024-10-08 PROCEDURE — 85025 COMPLETE CBC W/AUTO DIFF WBC: CPT

## 2024-10-08 PROCEDURE — 83605 ASSAY OF LACTIC ACID: CPT

## 2024-10-08 RX ORDER — 0.9 % SODIUM CHLORIDE 0.9 %
1000 INTRAVENOUS SOLUTION INTRAVENOUS ONCE
Status: COMPLETED | OUTPATIENT
Start: 2024-10-08 | End: 2024-10-08

## 2024-10-08 RX ADMIN — SODIUM CHLORIDE 1000 ML: 9 INJECTION, SOLUTION INTRAVENOUS at 02:08

## 2024-10-08 NOTE — ED NOTES
Report called to RN at [facility] at the [facility]. Expressed to RN the families concerns for the pt and his care at this time, no questions.

## 2024-10-08 NOTE — ED NOTES
Report given to cmt.  Pt hospice, freq falls, neck fx refuses to wear c-collar.  Family and pt agreeable to refuse c-collar

## 2024-10-08 NOTE — ED NOTES
Justino GARCIA spoke to family about pts fractures in his neck, family stated they did not wish to have the pt placed in a C collar at this time to promote pts comfort, even though advised of the risks.

## 2024-10-08 NOTE — ED NOTES
[facility] notified of possible admission with them. Dr. Badillo spoke with sister. Hospice to call back.

## 2024-10-08 NOTE — ED PROVIDER NOTES
[facility] EMERGENCY DEPARTMENT  EMERGENCY DEPARTMENT ENCOUNTER        Pt Name: Dakota Luciano  MRN: [id number]  Birthdate 1960  Date of evaluation: 10/7/2024  Provider: Alvaro Badillo MD  PCP: Seven Ponce MD  Note Started: 2:22 AM EDT 10/8/24    CHIEF COMPLAINT       Chief Complaint   Patient presents with    Fall     Pt arrived [facility] from [facility] at the [facility] from a fall and AMS. PT presents alert but nonverbal not sure of his baseline, pt presents with lacerations on left left arm, redness on left ribs. Pt has a bruise on the left side of his head from a fall that he was seen for yesterday. [facility] unable to let us know how long pt was on the floor       HISTORY OF PRESENT ILLNESS: 1 or more Elements     History from : Family sister and EMS    Limitations to history : Altered Mental Status    Dakota Luciano is a 64 y.o. male who presents for a fall and altered mental status comes in from [facility] at the [facility].  Patient is DNR CC on hospice.  Currently nonverbal.  Patient has been having fluctuating mental status per sister who is his medical power of .  Patient was found down on his left side has bruise on the left side of his chest, skin tear on his left elbow gait patient has contusions to his head as well as recently had a laceration repair with staples that was done 2 days ago.  Unknown how long patient was on the floor patient provides no history unable to obtain review of systems.  His sister just states that he has been losing lots of weight he has been less and less responsive and has been eating and drinking less.    Nursing Notes were all reviewed and agreed with or any disagreements were addressed in the HPI.    REVIEW OF SYSTEMS :      Review of Systems   Unable to perform ROS: Dementia       Positives and Pertinent negatives as per HPI.     SURGICAL HISTORY     Past Surgical History:   Procedure Laterality Date    BRAIN ANEURYSM SURGERY Left

## 2024-10-10 LAB
EKG ATRIAL RATE: 86 BPM
EKG DIAGNOSIS: NORMAL
EKG P AXIS: 77 DEGREES
EKG P-R INTERVAL: 224 MS
EKG Q-T INTERVAL: 414 MS
EKG QRS DURATION: 150 MS
EKG QTC CALCULATION (BAZETT): 495 MS
EKG R AXIS: -39 DEGREES
EKG T AXIS: 91 DEGREES
EKG VENTRICULAR RATE: 86 BPM

## 2024-10-10 PROCEDURE — 93010 ELECTROCARDIOGRAM REPORT: CPT | Performed by: INTERNAL MEDICINE

## (undated) DEVICE — FORCEPS BX L240CM DIA2.4MM L NDL RAD JAW 4 133340